# Patient Record
Sex: FEMALE | Race: WHITE | Employment: FULL TIME | URBAN - NONMETROPOLITAN AREA
[De-identification: names, ages, dates, MRNs, and addresses within clinical notes are randomized per-mention and may not be internally consistent; named-entity substitution may affect disease eponyms.]

---

## 2022-05-04 ENCOUNTER — APPOINTMENT (OUTPATIENT)
Dept: GENERAL RADIOLOGY | Age: 52
End: 2022-05-04
Attending: EMERGENCY MEDICINE
Payer: COMMERCIAL

## 2022-05-04 ENCOUNTER — HOSPITAL ENCOUNTER (EMERGENCY)
Age: 52
Discharge: HOME | End: 2022-05-04
Attending: EMERGENCY MEDICINE
Payer: COMMERCIAL

## 2022-05-04 VITALS
HEIGHT: 63 IN | WEIGHT: 155 LBS | TEMPERATURE: 97.7 F | OXYGEN SATURATION: 100 % | DIASTOLIC BLOOD PRESSURE: 63 MMHG | BODY MASS INDEX: 27.46 KG/M2 | SYSTOLIC BLOOD PRESSURE: 140 MMHG | RESPIRATION RATE: 18 BRPM | HEART RATE: 72 BPM

## 2022-05-04 DIAGNOSIS — N18.5 CKD (CHRONIC KIDNEY DISEASE) STAGE 5, GFR LESS THAN 15 ML/MIN (HCC): Primary | ICD-10-CM

## 2022-05-04 DIAGNOSIS — N18.5 ANEMIA DUE TO STAGE 5 CHRONIC KIDNEY DISEASE, NOT ON CHRONIC DIALYSIS (HCC): ICD-10-CM

## 2022-05-04 DIAGNOSIS — D63.1 ANEMIA DUE TO STAGE 5 CHRONIC KIDNEY DISEASE, NOT ON CHRONIC DIALYSIS (HCC): ICD-10-CM

## 2022-05-04 LAB
ALBUMIN SERPL-MCNC: 3.3 G/DL (ref 3.5–4.7)
ALBUMIN/GLOB SERPL: 0.9 {RATIO}
ALP SERPL-CCNC: 134 U/L (ref 38–126)
ALT SERPL-CCNC: 8 U/L (ref 3–52)
ANION GAP SERPL CALC-SCNC: 10 MMOL/L
APPEARANCE UR: CLEAR
AST SERPL W P-5'-P-CCNC: 10 U/L (ref 14–74)
BACTERIA URNS QL MICRO: NEGATIVE /HPF
BASOPHILS # BLD: 0.1 K/UL (ref 0–0.1)
BASOPHILS NFR BLD: 1 % (ref 0–2)
BILIRUB SERPL-MCNC: 0.6 MG/DL (ref 0.2–1)
BILIRUB UR QL: NEGATIVE
BUN SERPL-MCNC: 56 MG/DL (ref 9–21)
BUN/CREAT SERPL: 10
CA-I BLD-MCNC: 8 MG/DL (ref 8.5–10.5)
CHLORIDE SERPL-SCNC: 113 MMOL/L (ref 94–111)
CO2 SERPL-SCNC: 16 MMOL/L (ref 21–33)
COLOR UR: YELLOW
CREAT SERPL-MCNC: 5.4 MG/DL (ref 0.7–1.2)
DIFFERENTIAL METHOD BLD: ABNORMAL
EOSINOPHIL # BLD: 0.2 K/UL (ref 0–0.4)
EOSINOPHIL NFR BLD: 2 % (ref 0–5)
EPITH CASTS URNS QL MICRO: ABNORMAL /LPF (ref 0–20)
ERYTHROCYTE [DISTWIDTH] IN BLOOD BY AUTOMATED COUNT: 15.2 % (ref 11.6–14.5)
GLOBULIN SER CALC-MCNC: 3.6 G/DL
GLUCOSE BLD STRIP.AUTO-MCNC: 175 MG/DL (ref 70–110)
GLUCOSE SERPL-MCNC: 134 MG/DL (ref 70–110)
GLUCOSE UR STRIP.AUTO-MCNC: 250 MG/DL
HCT VFR BLD AUTO: 27 % (ref 35–45)
HGB BLD-MCNC: 8.1 G/DL (ref 12–16)
HGB UR QL STRIP: ABNORMAL
IMM GRANULOCYTES # BLD AUTO: 0.1 K/UL (ref 0–0.04)
IMM GRANULOCYTES NFR BLD AUTO: 1 % (ref 0–0.5)
KETONES UR QL STRIP.AUTO: NEGATIVE MG/DL
LEUKOCYTE ESTERASE UR QL STRIP.AUTO: NEGATIVE
LYMPHOCYTES # BLD: 1.2 K/UL (ref 0.9–3.6)
LYMPHOCYTES NFR BLD: 14 % (ref 21–52)
MAGNESIUM SERPL-MCNC: 1.8 MG/DL (ref 1.7–2.8)
MCH RBC QN AUTO: 27.4 PG (ref 24–34)
MCHC RBC AUTO-ENTMCNC: 30 G/DL (ref 31–37)
MCV RBC AUTO: 91.2 FL (ref 78–100)
MONOCYTES # BLD: 0.6 K/UL (ref 0.05–1.2)
MONOCYTES NFR BLD: 7 % (ref 3–10)
NEUTS SEG # BLD: 6.2 K/UL (ref 1.8–8)
NEUTS SEG NFR BLD: 75 % (ref 40–73)
NITRITE UR QL STRIP.AUTO: NEGATIVE
NRBC # BLD: 0 K/UL (ref 0–0.01)
NRBC BLD-RTO: 0 PER 100 WBC
PERFORMED BY, TECHID: ABNORMAL
PH UR STRIP: 5.5 [PH] (ref 5–8)
PLATELET # BLD AUTO: 143 K/UL (ref 135–420)
PMV BLD AUTO: 11 FL (ref 9.2–11.8)
POTASSIUM SERPL-SCNC: 5.1 MMOL/L (ref 3.2–5.1)
PROT SERPL-MCNC: 6.9 G/DL (ref 6.1–8.4)
PROT UR STRIP-MCNC: 300 MG/DL
RBC # BLD AUTO: 2.96 M/UL (ref 4.2–5.3)
RBC #/AREA URNS HPF: ABNORMAL /HPF (ref 0–2)
SODIUM SERPL-SCNC: 139 MMOL/L (ref 135–145)
SP GR UR REFRACTOMETRY: 1.01 (ref 1–1.03)
UROBILINOGEN UR QL STRIP.AUTO: 0.2 EU/DL (ref 0.2–1)
WBC # BLD AUTO: 8.3 K/UL (ref 4.6–13.2)
WBC URNS QL MICRO: ABNORMAL /HPF (ref 0–4)

## 2022-05-04 PROCEDURE — 71045 X-RAY EXAM CHEST 1 VIEW: CPT

## 2022-05-04 PROCEDURE — 80053 COMPREHEN METABOLIC PANEL: CPT

## 2022-05-04 PROCEDURE — 85025 COMPLETE CBC W/AUTO DIFF WBC: CPT

## 2022-05-04 PROCEDURE — 99284 EMERGENCY DEPT VISIT MOD MDM: CPT

## 2022-05-04 PROCEDURE — 83735 ASSAY OF MAGNESIUM: CPT

## 2022-05-04 PROCEDURE — 81001 URINALYSIS AUTO W/SCOPE: CPT

## 2022-05-04 PROCEDURE — 74011250636 HC RX REV CODE- 250/636: Performed by: EMERGENCY MEDICINE

## 2022-05-04 PROCEDURE — 82962 GLUCOSE BLOOD TEST: CPT

## 2022-05-04 RX ORDER — ACETAMINOPHEN, DIPHENHYDRAMINE HCL, PHENYLEPHRINE HCL 325; 25; 5 MG/1; MG/1; MG/1
1 TABLET ORAL DAILY
COMMUNITY
Start: 2021-06-03

## 2022-05-04 RX ORDER — SODIUM CHLORIDE 9 MG/ML
75 INJECTION, SOLUTION INTRAVENOUS CONTINUOUS
Status: DISCONTINUED | OUTPATIENT
Start: 2022-05-04 | End: 2022-05-04

## 2022-05-04 RX ADMIN — SODIUM CHLORIDE 75 ML/HR: 9 INJECTION, SOLUTION INTRAVENOUS at 09:57

## 2022-05-04 NOTE — ED PROVIDER NOTES
EMERGENCY DEPARTMENT HISTORY AND PHYSICAL EXAM      Date: 5/4/2022  Patient Name: Belle Romero    History of Presenting Illness     Chief Complaint   Patient presents with    Other     multiple complaints       History Provided By: Patient    HPI: Belle Romero, 45 y.o. female with a past medical history significant diabetes presents to the ED with cc of Cramps in extremities, worse in hands and feet. Radiates into whole body. Started 2 weeks ago. She denies any aggravating or relieving factors. Denies NVD. States she has been off her DM medications for months. There are no other complaints, changes, or physical findings at this time. PCP: None    No current facility-administered medications on file prior to encounter. Current Outpatient Medications on File Prior to Encounter   Medication Sig Dispense Refill    cyanocobalamin, vitamin B-12, 5,000 mcg subl 1 Tablet by SubLINGual route daily. Past History     Past Medical History:  History reviewed. No pertinent past medical history. Past Surgical History:  History reviewed. No pertinent surgical history. Family History:  History reviewed. No pertinent family history. Social History:  Social History     Tobacco Use    Smoking status: Current Every Day Smoker     Packs/day: 0.50    Smokeless tobacco: Never Used   Substance Use Topics    Alcohol use: Not on file    Drug use: Not on file       Allergies: Allergies   Allergen Reactions    Codeine Palpitations    Pcn [Penicillins] Hives         Review of Systems     Review of Systems   Constitutional: Negative. HENT: Negative. Respiratory: Negative. Cardiovascular: Negative. Gastrointestinal: Negative. Genitourinary: Negative. Musculoskeletal:        Cramps   Neurological: Negative. All other systems reviewed and are negative. Physical Exam     Physical Exam  Vitals and nursing note reviewed. Constitutional:       Appearance: Normal appearance.    HENT:      Head: Normocephalic. Eyes:      Extraocular Movements: Extraocular movements intact. Pupils: Pupils are equal, round, and reactive to light. Cardiovascular:      Rate and Rhythm: Normal rate and regular rhythm. Pulses: Normal pulses. Heart sounds: Normal heart sounds. Pulmonary:      Effort: Pulmonary effort is normal.      Breath sounds: Normal breath sounds. Abdominal:      Palpations: Abdomen is soft. Tenderness: There is no abdominal tenderness. Musculoskeletal:         General: Normal range of motion. Cervical back: Normal range of motion and neck supple. Comments: Cramps in extremities   Neurological:      General: No focal deficit present. Mental Status: She is alert and oriented to person, place, and time. Cranial Nerves: No cranial nerve deficit. Sensory: No sensory deficit. Motor: No weakness. Psychiatric:         Mood and Affect: Mood normal.         Behavior: Behavior normal.         Lab and Diagnostic Study Results     Labs -     Recent Results (from the past 12 hour(s))   GLUCOSE, POC    Collection Time: 05/04/22  8:12 AM   Result Value Ref Range    Glucose (POC) 175 (H) 70 - 110 mg/dL    Performed by Army Soto    CBC WITH AUTOMATED DIFF    Collection Time: 05/04/22  8:34 AM   Result Value Ref Range    WBC 8.3 4.6 - 13.2 K/uL    RBC 2.96 (L) 4.20 - 5.30 M/uL    HGB 8.1 (L) 12.0 - 16.0 g/dL    HCT 27.0 (L) 35.0 - 45.0 %    MCV 91.2 78.0 - 100.0 FL    MCH 27.4 24.0 - 34.0 PG    MCHC 30.0 (L) 31.0 - 37.0 g/dL    RDW 15.2 (H) 11.6 - 14.5 %    PLATELET 378 350 - 924 K/uL    MPV 11.0 9.2 - 11.8 FL    NRBC 0.0 0.0  WBC    ABSOLUTE NRBC 0.00 0.00 - 0.01 K/uL    NEUTROPHILS 75 (H) 40 - 73 %    LYMPHOCYTES 14 (L) 21 - 52 %    MONOCYTES 7 3 - 10 %    EOSINOPHILS 2 0 - 5 %    BASOPHILS 1 0 - 2 %    IMMATURE GRANULOCYTES 1 (H) 0 - 0.5 %    ABS. NEUTROPHILS 6.2 1.8 - 8.0 K/UL    ABS. LYMPHOCYTES 1.2 0.9 - 3.6 K/UL    ABS.  MONOCYTES 0.6 0.05 - 1.2 K/UL    ABS. EOSINOPHILS 0.2 0.0 - 0.4 K/UL    ABS. BASOPHILS 0.1 0.0 - 0.1 K/UL    ABS. IMM. GRANS. 0.1 (H) 0.00 - 0.04 K/UL    DF AUTOMATED     METABOLIC PANEL, COMPREHENSIVE    Collection Time: 05/04/22  8:34 AM   Result Value Ref Range    Sodium 139 135 - 145 mmol/L    Potassium 5.1 3.2 - 5.1 mmol/L    Chloride 113 (H) 94 - 111 mmol/L    CO2 16 (L) 21 - 33 mmol/L    Anion gap 10 mmol/L    Glucose 134 (H) 70 - 110 mg/dL    BUN 56 (H) 9 - 21 mg/dL    Creatinine 5.40 (H) 0.70 - 1.20 mg/dL    BUN/Creatinine ratio 10      GFR est AA 10 ml/min/1.73m2    GFR est non-AA 8 ml/min/1.73m2    Calcium 8.0 (L) 8.5 - 10.5 mg/dL    Bilirubin, total 0.6 0.2 - 1.0 mg/dL    AST (SGOT) 10 (L) 14 - 74 U/L    ALT (SGPT) 8 3 - 52 U/L    Alk. phosphatase 134 (H) 38 - 126 U/L    Protein, total 6.9 6.1 - 8.4 g/dL    Albumin 3.3 (L) 3.5 - 4.7 g/dL    Globulin 3.6 g/dL    A-G Ratio 0.9     MAGNESIUM    Collection Time: 05/04/22  8:34 AM   Result Value Ref Range    Magnesium 1.8 1.7 - 2.8 mg/dL   URINALYSIS W/ RFLX MICROSCOPIC    Collection Time: 05/04/22  8:50 AM   Result Value Ref Range    Color Yellow      Appearance Clear      Specific gravity 1.015 1.005 - 1.030      pH (UA) 5.5 5.0 - 8.0      Protein 300 (A) Negative mg/dL    Glucose 250 (A) Negative mg/dL    Ketone Negative Negative mg/dL    Bilirubin Negative Negative      Blood Moderate (A) Negative      Urobilinogen 0.2 0.2 - 1.0 EU/dL    Nitrites Negative Negative      Leukocyte Esterase Negative Negative     URINE MICROSCOPIC    Collection Time: 05/04/22  8:50 AM   Result Value Ref Range    WBC 0-4 0 - 4 /hpf    RBC 0-5 0 - 2 /hpf    Epithelial cells Few 0 - 20 /lpf    Bacteria Negative (A) None /hpf       Radiologic Studies -   @lastxrresult@  CT Results  (Last 48 hours)    None        CXR Results  (Last 48 hours)    None            Medical Decision Making   - I am the first provider for this patient.     - I reviewed the vital signs, available nursing notes, past medical history, past surgical history, family history and social history. - Initial assessment performed. The patients presenting problems have been discussed, and they are in agreement with the care plan formulated and outlined with them. I have encouraged them to ask questions as they arise throughout their visit. Vital Signs-Reviewed the patient's vital signs. Patient Vitals for the past 12 hrs:   Temp Pulse Resp BP SpO2   05/04/22 1020 -- 72 18 (!) 140/63 100 %   05/04/22 0912 -- 72 18 (!) 139/56 100 %   05/04/22 0808 97.7 °F (36.5 °C) 93 18 (!) 156/79 100 %       Records Reviewed: Nursing Notes and Old Medical Records    The patient presents with       ED Course:          Provider Notes (Medical Decision Making):     MDM  Number of Diagnoses or Management Options  Risk of Complications, Morbidity, and/or Mortality  General comments: Patient refused rectal exam. Patient denies any PMHx apart from DM. However, it appears she had CRI, Anemia and DM. Prior Bun/Cr was 41/4.3 in 3/2021. Discussed with Dr Arnold Farias Nephrology. She recommends see in office today or tomorrow. [phone] She will follow. 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 She states she has not seen a Nephrologist in 2.5 years and was referred to one but doesn't remember the name. Procedures   Medical Decision Makingedical Decision Making  Performed by: Loa Osgood, MD  PROCEDURES:  Procedures       Disposition   Disposition: DC- Adult Discharges: All of the diagnostic tests were reviewed and questions answered. Diagnosis, care plan and treatment options were discussed. The patient understands the instructions and will follow up as directed. The patients results have been reviewed with them. They have been counseled regarding their diagnosis.   The patient verbally convey understanding and agreement of the signs, symptoms, diagnosis, treatment and prognosis and additionally agrees to follow up as recommended with their PCP in 24 - 48 hours. They also agree with the care-plan and convey that all of their questions have been answered. I have also put together some discharge instructions for them that include: 1) educational information regarding their diagnosis, 2) how to care for their diagnosis at home, as well a 3) list of reasons why they would want to return to the ED prior to their follow-up appointment, should their condition change. Discharged    DISCHARGE PLAN:  1. Current Discharge Medication List      CONTINUE these medications which have NOT CHANGED    Details   cyanocobalamin, vitamin B-12, 5,000 mcg subl 1 Tablet by SubLINGual route daily. 2.   Follow-up Information     Follow up With Specialties Details Why Derrick Duarte MD Nephrology, [facility]  [address] [address] [phone]          3. Return to ED if worse   4. Current Discharge Medication List            Diagnosis     Clinical Impression:   1. CKD (chronic kidney disease) stage 5, GFR less than 15 ml/min ([facility])    2. Anemia due to stage 5 chronic kidney disease, not on chronic dialysis [facility])        Attestations:    Iglesia Gallegos MD    Please note that this dictation was completed with [employer], the computer voice recognition software. Quite often unanticipated grammatical, syntax, homophones, and other interpretive errors are inadvertently transcribed by the computer software. Please disregard these errors. Please excuse any errors that have escaped final proofreading. Thank you.

## 2022-05-04 NOTE — ED TRIAGE NOTES
Pt states she has been cramping real bad x 2 weeks. Pt states the cramps are in her hands and abdomen and they were worse last night. Pt states she used to be a diabetic, but they stopped a lot of her sugar meds, pt also reports itchy skin and she thinks she is dehydrated.

## 2022-05-05 ENCOUNTER — OFFICE VISIT (OUTPATIENT)
Dept: NEPHROLOGY | Age: 52
End: 2022-05-05
Payer: COMMERCIAL

## 2022-05-05 VITALS
BODY MASS INDEX: 27.89 KG/M2 | DIASTOLIC BLOOD PRESSURE: 69 MMHG | HEART RATE: 85 BPM | WEIGHT: 157.4 LBS | SYSTOLIC BLOOD PRESSURE: 108 MMHG | HEIGHT: 63 IN

## 2022-05-05 DIAGNOSIS — E11.22 CONTROLLED TYPE 2 DIABETES MELLITUS WITH STAGE 5 CHRONIC KIDNEY DISEASE NOT ON CHRONIC DIALYSIS, WITHOUT LONG-TERM CURRENT USE OF INSULIN (HCC): ICD-10-CM

## 2022-05-05 DIAGNOSIS — N18.5 CKD (CHRONIC KIDNEY DISEASE) STAGE 5, GFR LESS THAN 15 ML/MIN (HCC): Primary | ICD-10-CM

## 2022-05-05 DIAGNOSIS — N18.5 CONTROLLED TYPE 2 DIABETES MELLITUS WITH STAGE 5 CHRONIC KIDNEY DISEASE NOT ON CHRONIC DIALYSIS, WITHOUT LONG-TERM CURRENT USE OF INSULIN (HCC): ICD-10-CM

## 2022-05-05 PROCEDURE — 99204 OFFICE O/P NEW MOD 45 MIN: CPT | Performed by: INTERNAL MEDICINE

## 2022-05-05 RX ORDER — SODIUM BICARBONATE 650 MG/1
650 TABLET ORAL 2 TIMES DAILY
Qty: 60 TABLET | Refills: 3 | Status: SHIPPED | OUTPATIENT
Start: 2022-05-05

## 2022-05-05 RX ORDER — EPOETIN ALFA 10000 [IU]/ML
10000 SOLUTION INTRAVENOUS; SUBCUTANEOUS EVERY 2 WEEKS
Qty: 1 ML | Refills: 5
Start: 2022-05-05 | End: 2022-07-07 | Stop reason: SDUPTHER

## 2022-05-05 RX ORDER — LANOLIN ALCOHOL/MO/W.PET/CERES
325 CREAM (GRAM) TOPICAL
Qty: 90 TABLET | Refills: 3 | Status: SHIPPED | OUTPATIENT
Start: 2022-05-05

## 2022-05-05 NOTE — PROGRESS NOTES
Renal Consultation Note    NAME:  Naomi Officer   :   1970   MRN:   [id number]     ATTENDING: No admitting provider for patient encounter. PCP:  None    Date/Time:  2022 1:30 PM      Subjective:   REQUESTING PHYSICIAN:  REASON FOR CONSULT:        Patient is a 78-year-old  female with history of diabetes diet-controlled, chronic kidney disease stage V, history of unilateral kidney s/p nephrectomy, who presented to the ER yesterday because of bilateral leg cramping. She had labs done in ER which showed a creatinine of 5.4 with a GFR of 8. Her potassium was 5.1 and CO2 16 on those labs. Patient was discharged from the ER with plan to follow-up in my office today. Patient seen in the room. Her only complaint today is fatigue and tiredness. According to patient she was told about 3 years ago about kidney issues and she saw a nephrologist in the community, Dr. Shannen Iqbal about 2 years ago \" but nothing was done other than stop metformin\". She has been taking Aleve/Advil on a regular basis almost twice a day for the past year or more  Review of records show her creatinine was 4.2 in  and 3.8 in 2019  Patient claims she had unilateral nephrectomy done when she was 22years of age for a congenital problem, she cannot elaborate more  Does not take any blood pressure meds  Blood sugars in the 130s range. Not on any antidiabetics now which were discontinued because of hypoglycemic episodes  She is still smoking      History reviewed. No pertinent past medical history.    Past Surgical History:   Procedure Laterality Date    HX  SECTION      PARTIAL REMOVAL OF KIDNEY       Social History     Tobacco Use    Smoking status: Current Every Day Smoker     Packs/day: 0.50     Years: 35.00     Pack years: 17.50    Smokeless tobacco: Never Used   Substance Use Topics    Alcohol use: Not Currently      Family History   Problem Relation Age of Onset    Diabetes Maternal Grandmother Allergies   Allergen Reactions    Dulaglutide Other (comments)     Nausea with vomiting and dizziness    Sitagliptin Other (comments)     Headache, runny nose, back pain and low blood sugars    Codeine Palpitations    Pcn [Penicillins] Hives      Prior to Admission medications    Medication Sig Start Date End Date Taking? Authorizing Provider   sodium bicarbonate 650 mg tablet Take 1 Tablet by mouth two (2) times a day. 5/5/22  Yes Sue Knowles MD   ferrous sulfate 325 mg (65 mg iron) tablet Take 1 Tablet by mouth three (3) times daily (with meals). 5/5/22  Yes Sue Knowles MD   cyanocobalamin, vitamin B-12, 5,000 mcg subl 1 Tablet by SubLINGual route daily. 6/3/21  Yes Other, MD Sera       REVIEW OF SYSTEMS:       Constitutional: Fatigue +  HENT: Negative. Eyes: Negative. Respiratory: Negative. Cardiovascular: Negative. Gastrointestinal: Negative for abdominal pain and nausea. Skin: Negative. Neurological: Negative. Objective:   VITALS:    Visit Vitals  /69 (BP 1 Location: Right arm, BP Patient Position: Sitting, BP Cuff Size: Adult)   Pulse 85   Ht 5' 3\" (1.6 m)   Wt 71.4 kg (157 lb 6.4 oz)   BMI 27.88 kg/m²         PHYSICAL EXAM:     General: NAD, alert, cooperative  Eyes: sclera anicteric  Oral Cavity: No thrush or ulcers  Neck: no JVD  Chest: Fair bilateral air entry. No Wheezing or Rhonchi. No rales. Heart: normal sounds  Abdomen: soft and non tender   : no woods  Lower Extremities: no edema  Skin: no rash  Neuro: intact, no focals  Psychiatric: non-depressed          LAB DATA REVIEWED:        Labs from 5/4/2022 sodium 139 potassium 5.1 chloride 113 CO2 16 BUN 56 creatinine 5.4 GFR 8 calcium 8.0 ALT AST normal bilirubin normal hemoglobin 8.1 urine shows 300+ protein    Recommendations/Plan:       #1 acute kidney injury on chronic kidney disease stage V  -Creatinine was 5.4 on recent labs with a GFR of only 8  -Creatinine was 4.2 in January 2021.   3.8 in 2019  -This might be her baseline versus might have acute kidney injury from prerenal component from NSAIDs. She has been taking Aleve almost on a daily basis for the past year  -I am discontinuing NSAIDs today  -She claims she had a nephrectomy when she was 22years of age for some congenital problem.  -We will check renal ultrasound to evaluate her remaining kidney  -We will check urine analysis quantify proteinuria. UA shows 300+ protein  -She is a diabetic but has no retinopathy. However no eye exam for over 8 years  -Significant proteinuria could be because of analgesic nephropathy/NSAIDs versus diabetic nephropathy. With her having unilateral kidney would not do kidney biopsy because of risks over weighing benefit  -I am referring her for kidney smart class today  -She needs to quit smoking. We will refer her for kidney transplant evaluation after  -We will repeat her labs in 3 weeks. No emergent indication to start hemodialysis today. Based on her labs in 3 weeks we will decide if she needs to be referred for PD catheter or AV fistula evaluation  -Follow-up in office in 3 weeks with labs    #2 mild hyperkalemia  -Potassium is ranged 5.1-5.3 in the past.  5.1 on recent labs  -Likely because of advanced renal disease/NSAIDs/potassium supplementation. She has been taking over-the-counter potassium chloride for cramps which I am discontinuing today  -She was also given a flyer for low potassium diet. Discontinuing NSAIDs will also help  -Advised to increase her fluid intake    #3 metabolic acidosis  -CO2 was only 17 on recent labs. Likely because of advanced renal disease and RTA 4 from NSAIDs  -Discontinue NSAIDs today  -Starting sodium bicarbonate 650 twice daily    #4 anemia  -Hb 8.1. Likely anemia of chronic kidney disease. Will check iron studies.   She has been low in iron in the past.  -Advised her to start taking iron sulfate 325 3 times daily  -Also starting Procrit 10,000 units every 2 weeks  -Needs GI evaluation including colonoscopy    #5 renal osteodystrophy  -Calcium is okay. Check phosphorus. Vitamin D level has been low in the past we will recheck and start vitamin D3 if indicated  -iPTh has been elevated in 2019. We will repeat and start vitamin D analogs if needed    #6 diabetes  -Diet controlled.   Last A1c was 5.6    #7 arthritic pain  -I advised her to see a rheumatologist.  Will discontinue NSAIDs today    Negative for the referral.  She will come back to see me in 3 weeks with pre-clinic labs          ________________________________________________________________________  Signed: Juan Pablo Camilo MD

## 2022-05-05 NOTE — LETTER
NOTIFICATION RETURN TO WORK / SCHOOL    5/5/2022 1:14 PM    Ms. Juan Chong  [address] [address]      To Whom It May Concern:    Juan Chong is currently under the care of [facility]. Patients return to work is contingent upon her follow up that is scheduled for 5/26/2022. If there are questions or concerns please have the patient contact our office.         Sincerely,      Joy Vaca MD

## 2022-05-05 NOTE — PROGRESS NOTES
Marlo December presents today for   Chief Complaint   Patient presents with   [facility] ED Follow-up       Is someone accompanying this pt? Patient sister Sharee Alert     Is the patient using any DME equipment during [address]? no    Depression Screening:  3 most recent PHQ Screens 5/5/2022   Little interest or pleasure in doing things Not at all   Feeling down, depressed, irritable, or hopeless Not at all   Total Score PHQ 2 0   Trouble falling or staying asleep, or sleeping too much Not at all   Feeling tired or having little energy Not at all   Poor appetite, weight loss, or overeating Not at all   Feeling bad about yourself - or that you are a failure or have let yourself or your family down Not at all   Trouble concentrating on things such as school, work, reading, or watching TV Not at all   Moving or speaking so slowly that other people could have noticed; or the opposite being so fidgety that others notice Not at all   Thoughts of being better off dead, or hurting yourself in some way Not at all   PHQ 9 Score 0   How difficult have these problems made it for you to do your work, take care of your home and get along with others Not difficult at all       Learning Assessment:  No flowsheet data found. Health Maintenance reviewed and discussed and ordered per Provider. Health Maintenance Due   Topic Date Due    Hepatitis C Screening  Never done    Depression Screen  Never done    COVID-19 Vaccine (1) Never done    Pneumococcal 0-64 years (1 - PCV) Never done    DTaP/Tdap/Td series (1 - Tdap) Never done    Cervical cancer screen  Never done    Lipid Screen  Never done    Colorectal Cancer Screening Combo  Never done    Shingrix Vaccine Age 50> (1 of 2) Never done    Breast Cancer Screen Mammogram  Never done   . Coordination of Care:  1. Have you been to the ER, urgent care clinic since your last visit? Hospitalized since your last visit? Yes, generalized pain, cramping    2.  Have you seen or consulted any other health care providers outside of the [address] [place] [address] since your last visit? Include any pap smears or colon screening.  no

## 2022-05-09 ENCOUNTER — HOSPITAL ENCOUNTER (OUTPATIENT)
Dept: ULTRASOUND IMAGING | Age: 52
Discharge: HOME | End: 2022-05-09
Attending: INTERNAL MEDICINE
Payer: COMMERCIAL

## 2022-05-09 DIAGNOSIS — N18.5 CKD (CHRONIC KIDNEY DISEASE) STAGE 5, GFR LESS THAN 15 ML/MIN (HCC): ICD-10-CM

## 2022-05-09 PROCEDURE — 76770 US EXAM ABDO BACK WALL COMP: CPT

## 2022-05-24 ENCOUNTER — HOSPITAL ENCOUNTER (OUTPATIENT)
Dept: LAB | Age: 52
Discharge: HOME | End: 2022-05-24
Payer: COMMERCIAL

## 2022-05-24 LAB
25(OH)D3 SERPL-MCNC: 18.1 NG/ML (ref 30–100)
ALBUMIN SERPL-MCNC: 3.1 G/DL (ref 3.4–5)
ANION GAP SERPL CALC-SCNC: 8 MMOL/L (ref 3–18)
BUN SERPL-MCNC: 51 MG/DL (ref 7–18)
BUN/CREAT SERPL: 9 (ref 12–20)
CA-I BLD-MCNC: 8.4 MG/DL (ref 8.5–10.1)
CHLORIDE SERPL-SCNC: 110 MMOL/L (ref 100–111)
CO2 SERPL-SCNC: 20 MMOL/L (ref 21–32)
CREAT SERPL-MCNC: 5.58 MG/DL (ref 0.6–1.3)
FERRITIN SERPL-MCNC: 237 NG/ML (ref 8–388)
GLUCOSE SERPL-MCNC: 154 MG/DL (ref 74–99)
IRON SATN MFR SERPL: 19 % (ref 20–50)
IRON SERPL-MCNC: 49 UG/DL (ref 50–175)
PHOSPHATE SERPL-MCNC: 5.1 MG/DL (ref 2.5–4.9)
POTASSIUM SERPL-SCNC: 4.6 MMOL/L (ref 3.5–5.5)
SODIUM SERPL-SCNC: 138 MMOL/L (ref 136–145)
TIBC SERPL-MCNC: 256 UG/DL (ref 250–450)

## 2022-05-24 PROCEDURE — 83540 ASSAY OF IRON: CPT

## 2022-05-24 PROCEDURE — 80069 RENAL FUNCTION PANEL: CPT

## 2022-05-24 PROCEDURE — 36415 COLL VENOUS BLD VENIPUNCTURE: CPT

## 2022-05-24 PROCEDURE — 84156 ASSAY OF PROTEIN URINE: CPT

## 2022-05-24 PROCEDURE — 82306 VITAMIN D 25 HYDROXY: CPT

## 2022-05-24 PROCEDURE — 82728 ASSAY OF FERRITIN: CPT

## 2022-05-26 ENCOUNTER — OFFICE VISIT (OUTPATIENT)
Dept: NEPHROLOGY | Age: 52
End: 2022-05-26
Payer: COMMERCIAL

## 2022-05-26 VITALS
HEART RATE: 87 BPM | WEIGHT: 153.4 LBS | BODY MASS INDEX: 27.18 KG/M2 | DIASTOLIC BLOOD PRESSURE: 58 MMHG | HEIGHT: 63 IN | SYSTOLIC BLOOD PRESSURE: 121 MMHG

## 2022-05-26 DIAGNOSIS — N28.89 RENAL MASS, RIGHT: Primary | ICD-10-CM

## 2022-05-26 DIAGNOSIS — E11.22 CONTROLLED TYPE 2 DIABETES MELLITUS WITH STAGE 5 CHRONIC KIDNEY DISEASE NOT ON CHRONIC DIALYSIS, WITHOUT LONG-TERM CURRENT USE OF INSULIN (HCC): ICD-10-CM

## 2022-05-26 DIAGNOSIS — N18.5 CKD (CHRONIC KIDNEY DISEASE) STAGE 5, GFR LESS THAN 15 ML/MIN (HCC): ICD-10-CM

## 2022-05-26 DIAGNOSIS — N18.5 CHRONIC KIDNEY DISEASE (CKD), STAGE V (HCC): ICD-10-CM

## 2022-05-26 DIAGNOSIS — N18.5 CONTROLLED TYPE 2 DIABETES MELLITUS WITH STAGE 5 CHRONIC KIDNEY DISEASE NOT ON CHRONIC DIALYSIS, WITHOUT LONG-TERM CURRENT USE OF INSULIN (HCC): ICD-10-CM

## 2022-05-26 LAB
ALBUMIN MFR UR ELPH: 60.2 %
ALPHA1 GLOB MFR UR ELPH: 7.8 %
ALPHA2 GLOB 24H MFR UR ELPH: 9.2 %
B-GLOBULIN 24H MFR UR ELPH: 14.4 %
GAMMA GLOB 24H MFR UR ELPH: 8.4 %
M PROTEIN MFR UR ELPH: NORMAL %
PLEASE NOTE:, 133800: NORMAL
PROT UR-MCNC: 144.7 MG/DL

## 2022-05-26 PROCEDURE — 99214 OFFICE O/P EST MOD 30 MIN: CPT | Performed by: INTERNAL MEDICINE

## 2022-05-26 RX ORDER — MELATONIN
1000 DAILY
Qty: 30 TABLET | Refills: 3 | Status: SHIPPED | OUTPATIENT
Start: 2022-05-26 | End: 2022-07-07 | Stop reason: SDUPTHER

## 2022-05-26 NOTE — PROGRESS NOTES
Renal Note    NAME:  Angie Ferrer   :   1970   MRN:   [id number]     ATTENDING: No admitting provider for patient encounter. PCP:  None    Date/Time:  2022 1:30 PM      Subjective:       Patient is here for follow-up. She denies any complaints today. Creatinine is unchanged with a GFR of 8. Potassium is improved denies any nausea/vomiting. No loss of appetite. Complains of feeling tired. Still waiting for FARSHAD approval.  Has started taking iron tablets but they constipate her. Already taking stool softener. Has stopped taking Aleve/Advil    Blood sugars in the 130s range. Not on any antidiabetics now which were discontinued because of hypoglycemic episodes  She is still smoking      No past medical history on file. Past Surgical History:   Procedure Laterality Date    HX  SECTION      PARTIAL REMOVAL OF KIDNEY       Social History     Tobacco Use    Smoking status: Current Every Day Smoker     Packs/day: 0.50     Years: 35.00     Pack years: 17.50    Smokeless tobacco: Never Used   Substance Use Topics    Alcohol use: Not Currently      Family History   Problem Relation Age of Onset    Diabetes Maternal Grandmother        Allergies   Allergen Reactions    Dulaglutide Other (comments)     Nausea with vomiting and dizziness    Sitagliptin Other (comments)     Headache, runny nose, back pain and low blood sugars    Codeine Palpitations    Pcn [Penicillins] Hives      Prior to Admission medications    Medication Sig Start Date End Date Taking? Authorizing Provider   cholecalciferol (VITAMIN D3) (1000 Units /25 mcg) tablet Take 1 Tablet by mouth daily. 22  Yes Tang Simental MD   sodium bicarbonate 650 mg tablet Take 1 Tablet by mouth two (2) times a day. 22  Yes Tang Simental MD   ferrous sulfate 325 mg (65 mg iron) tablet Take 1 Tablet by mouth three (3) times daily (with meals).  22  Yes Tang Simental MD   cyanocobalamin, vitamin B-12, 5,000 mcg subl 1 Tablet by SubLINGual route daily. 6/3/21  Yes Other, MD Sera   epoetin carmelo (Epogen) 10,000 unit/mL injection 1 mL by SubCUTAneous route Once every 2 weeks. Patient not taking: Reported on 5/26/2022 5/5/22   Guy Roberts MD       REVIEW OF SYSTEMS:       Constitutional: Fatigue +  HENT: Negative. Eyes: Negative. Respiratory: Negative. Cardiovascular: Negative. Gastrointestinal: Negative for abdominal pain and nausea. Skin: Negative. Neurological: Negative. Objective:   VITALS:    Visit Vitals  BP (!) 121/58 (BP 1 Location: Left arm, BP Patient Position: Sitting, BP Cuff Size: Adult)   Pulse 87   Ht 5' 3\" (1.6 m)   Wt 69.6 kg (153 lb 6.4 oz)   BMI 27.17 kg/m²         PHYSICAL EXAM:     General: NAD, alert, cooperative  Eyes: sclera anicteric  Oral Cavity: No thrush or ulcers  Neck: no JVD  Chest: Fair bilateral air entry. No Wheezing or Rhonchi. No rales.   Heart: normal sounds  Abdomen: soft and non tender   : no woods  Lower Extremities: no edema  Skin: no rash  Neuro: intact, no focals  Psychiatric: non-depressed          LAB DATA REVIEWED:      1901  FERRITIN   Collected: 05/24/22 1215  Final result  Specimen: Serum    Ferritin 237 ng/mL            05/24/22 1901  IRON PROFILE   Collected: 05/24/22 1215  Final result  Specimen: Serum or Plasma    Iron 49 Low  ug/dL  Iron % saturation 19 Low  %   TIBC 256 ug/dL            05/24/22 1856  VITAMIN D, 25 HYDROXY   Collected: 05/24/22 1215  Final result  Specimen: Serum    Vitamin D 25-Hydroxy 18.1 Low  ng/mL             05/24/22 1445  RENAL FUNCTION PANEL   Collected: 05/24/22 1215  Final result  Specimen: Serum or Plasma    Sodium 138 mmol/L Creatinine 5.58 High  mg/dL   Potassium 4.6 mmol/L BUN/Creatinine ratio 9 Low      Chloride 110 mmol/L GFR est AA 10 Low  ml/min/1.73m2   CO2 20 Low  mmol/L GFR est non-AA 8 Low  ml/min/1.73m2    Anion gap 8 mmol/L Calcium 8.4 Low  mg/dL   Glucose 154 High  mg/dL Phosphorus 5.1 High  mg/dL   BUN 51 High  mg/dL Albumin 3.1 Low  g/dL          Labs from 5/4/2022 sodium 139 potassium 5.1 chloride 113 CO2 16 BUN 56 creatinine 5.4 GFR 8 calcium 8.0 ALT AST normal bilirubin normal hemoglobin 8.1 urine shows 300+ protein    Recommendations/Plan:       #1 acute kidney injury on chronic kidney disease stage V  -Creatinine was 5.5 on recent labs with a GFR of only 8  -Creatinine was 4.2 in January 2021. 3.8 in 2019  -This might be her baseline . No improvement in renal function despite discontinuing NSAIDs at the last visit.  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  -Ultrasound shows an exophytic lower pole solid mass 7.5 cm. . No visible calculi. No hydronephrosis.   Prior left nephrectomy  -Ending results of urine analysis quantify proteinuria. UA shows 300+ protein  -She is a diabetic but has no retinopathy. However no eye exam for over 8 years  -Significant proteinuria could be because of analgesic nephropathy/NSAIDs versus diabetic nephropathy. With her having unilateral kidney would not do kidney biopsy because of risks over weighing benefit  -I am referring her for kidney smart class today  -She needs to quit smoking. We will refer her for kidney transplant evaluation after  -We will repeat her labs in 3 weeks. No emergent indication to start hemodialysis today. She wishes to do in center hemodialysis. I will refer her for AV fistula evaluation  -Follow-up in office in 6 weeks with labs    #2 mild hyperkalemia  -Potassium is ranged 5.1-5.3 in the past.  Improved to 4.6 on recent labs. Has stopped taking over-the-counter potassium supplements now  -Likely because of advanced renal disease/NSAIDs/potassium supplementation.   -She was also given a flyer for low potassium diet. NSAIDs now-Advised to increase her fluid intake    #3 metabolic acidosis  -CO2 was only 17-->20 on recent labs.   Likely because of advanced renal disease and RTA 4 from NSAIDs  -Discontinued NSAIDs   -Continue sodium bicarbonate 650 twice daily    #4 anemia  -Hb 8.1. Likely anemia of chronic kidney disease. Will check iron studies. She has been low in iron in the past.  -Continue iron sulfate 325 tid  -Also starting Procrit 10,000 units every 2 weeks  -Needs GI evaluation including colonoscopy    #5 renal osteodystrophy  -Calcium was first are okay  -Vitamin D level is only 18. Start vitamin D3 1000 units daily  -iPTh has been elevated in 2019. We will repeat and start vitamin D analogs if needed    #6 diabetes  -Diet controlled. Last A1c was 5.6    #7  Right renal mass  -Renal ultrasound shows possible right renal mass. I will order MRI without contrast for further evaluation.   Make referral to urology if needed   -advised her to stop smoking    #8 arthritic pain  -I advised her to see a rheumatologist.  off NSAIDs    Negative for the referral.  She will come back to see me in 6 weeks with pre-clinic labs          ________________________________________________________________________  Signed: Juan Pablo Camilo MD

## 2022-05-26 NOTE — PROGRESS NOTES
F/u for CKD. Patient states that she is experiencing constipation. Jhoana Garrett presents today for   Chief Complaint   Patient presents with    Follow-up     CKD stage 5, GFR less than 15 ml/min       Is someone accompanying this pt? No  Is the patient using any DME equipment during OV? No    Depression Screening:  3 most recent PHQ Screens 5/26/2022   Little interest or pleasure in doing things Not at all   Feeling down, depressed, irritable, or hopeless Not at all   Total Score PHQ 2 0   Trouble falling or staying asleep, or sleeping too much -   Feeling tired or having little energy -   Poor appetite, weight loss, or overeating -   Feeling bad about yourself - or that you are a failure or have let yourself or your family down -   Trouble concentrating on things such as school, work, reading, or watching TV -   Moving or speaking so slowly that other people could have noticed; or the opposite being so fidgety that others notice -   Thoughts of being better off dead, or hurting yourself in some way -   PHQ 9 Score -   How difficult have these problems made it for you to do your work, take care of your home and get along with others -       Learning Assessment:  No flowsheet data found. Health Maintenance reviewed and discussed and ordered per Provider. Health Maintenance Due   Topic Date Due    Hepatitis C Screening  Never done    COVID-19 Vaccine (1) Never done    Pneumococcal 0-64 years (1 - PCV) Never done    DTaP/Tdap/Td series (1 - Tdap) Never done    Cervical cancer screen  Never done    Lipid Screen  Never done    Colorectal Cancer Screening Combo  Never done    Shingrix Vaccine Age 50> (1 of 2) Never done    Breast Cancer Screen Mammogram  Never done   . Coordination of Care:  1. \"Have you been to the ER, urgent care clinic since your last visit? Hospitalized since your last visit? \" No    2.  \"Have you seen or consulted any other health care providers outside of the [facility] since your last visit? \" No     3. For patients aged 39-70: Has the patient had a colonoscopy? No     If the patient is female:    4. For patients aged 41-77: Has the patient had a mammogram within the past 2 years? No    5. For patients aged 21-65: Has the patient had a pap smear?  No

## 2022-06-07 LAB — CREATININE, EXTERNAL: 5.3

## 2022-07-01 ENCOUNTER — HOSPITAL ENCOUNTER (OUTPATIENT)
Dept: LAB | Age: 52
Discharge: HOME | End: 2022-07-01
Payer: COMMERCIAL

## 2022-07-01 DIAGNOSIS — N18.5 CKD (CHRONIC KIDNEY DISEASE) STAGE 5, GFR LESS THAN 15 ML/MIN (HCC): ICD-10-CM

## 2022-07-01 DIAGNOSIS — E11.22 CONTROLLED TYPE 2 DIABETES MELLITUS WITH STAGE 5 CHRONIC KIDNEY DISEASE NOT ON CHRONIC DIALYSIS, WITHOUT LONG-TERM CURRENT USE OF INSULIN (HCC): ICD-10-CM

## 2022-07-01 DIAGNOSIS — N28.89 RENAL MASS, RIGHT: ICD-10-CM

## 2022-07-01 DIAGNOSIS — N18.5 CHRONIC KIDNEY DISEASE (CKD), STAGE V (HCC): ICD-10-CM

## 2022-07-01 DIAGNOSIS — N18.5 CONTROLLED TYPE 2 DIABETES MELLITUS WITH STAGE 5 CHRONIC KIDNEY DISEASE NOT ON CHRONIC DIALYSIS, WITHOUT LONG-TERM CURRENT USE OF INSULIN (HCC): ICD-10-CM

## 2022-07-01 LAB
ALBUMIN SERPL-MCNC: 3.1 G/DL (ref 3.4–5)
ANION GAP SERPL CALC-SCNC: 7 MMOL/L (ref 3–18)
BUN SERPL-MCNC: 62 MG/DL (ref 7–18)
BUN/CREAT SERPL: 12 (ref 12–20)
CA-I BLD-MCNC: 8.5 MG/DL (ref 8.5–10.1)
CHLORIDE SERPL-SCNC: 110 MMOL/L (ref 100–111)
CO2 SERPL-SCNC: 20 MMOL/L (ref 21–32)
CREAT SERPL-MCNC: 5.25 MG/DL (ref 0.6–1.3)
CREAT UR-MCNC: 99 MG/DL (ref 30–125)
GLUCOSE SERPL-MCNC: 116 MG/DL (ref 74–99)
PHOSPHATE SERPL-MCNC: 6 MG/DL (ref 2.5–4.9)
POTASSIUM SERPL-SCNC: 5.4 MMOL/L (ref 3.5–5.5)
PROT UR-MCNC: 417 MG/DL
PROT/CREAT UR-RTO: 4.2
SODIUM SERPL-SCNC: 137 MMOL/L (ref 136–145)

## 2022-07-01 PROCEDURE — 83970 ASSAY OF PARATHORMONE: CPT

## 2022-07-01 PROCEDURE — 84156 ASSAY OF PROTEIN URINE: CPT

## 2022-07-01 PROCEDURE — 80069 RENAL FUNCTION PANEL: CPT

## 2022-07-02 LAB
CA-I BLD-MCNC: 8.2 MG/DL (ref 8.5–10.1)
PTH-INTACT SERPL-MCNC: 287.8 PG/ML (ref 18.4–88)

## 2022-07-07 ENCOUNTER — OFFICE VISIT (OUTPATIENT)
Dept: NEPHROLOGY | Age: 52
End: 2022-07-07
Payer: COMMERCIAL

## 2022-07-07 VITALS
DIASTOLIC BLOOD PRESSURE: 69 MMHG | SYSTOLIC BLOOD PRESSURE: 123 MMHG | HEART RATE: 68 BPM | WEIGHT: 150.2 LBS | HEIGHT: 63 IN | BODY MASS INDEX: 26.61 KG/M2

## 2022-07-07 DIAGNOSIS — N18.5 CKD (CHRONIC KIDNEY DISEASE) STAGE 5, GFR LESS THAN 15 ML/MIN (HCC): ICD-10-CM

## 2022-07-07 DIAGNOSIS — N28.89 RENAL MASS: ICD-10-CM

## 2022-07-07 DIAGNOSIS — N18.5 CHRONIC KIDNEY DISEASE (CKD), STAGE V (HCC): Primary | ICD-10-CM

## 2022-07-07 DIAGNOSIS — N28.89 RENAL MASS, RIGHT: ICD-10-CM

## 2022-07-07 DIAGNOSIS — N28.89 RENAL MASS: Primary | ICD-10-CM

## 2022-07-07 DIAGNOSIS — N18.5 CHRONIC KIDNEY DISEASE (CKD), STAGE V (HCC): ICD-10-CM

## 2022-07-07 PROCEDURE — 99214 OFFICE O/P EST MOD 30 MIN: CPT | Performed by: INTERNAL MEDICINE

## 2022-07-07 RX ORDER — SEVELAMER HYDROCHLORIDE 800 MG/1
800 TABLET, FILM COATED ORAL
Qty: 90 TABLET | Refills: 1 | Status: SHIPPED | OUTPATIENT
Start: 2022-07-07 | End: 2022-08-18

## 2022-07-07 RX ORDER — CALCITRIOL 0.25 UG/1
0.25 CAPSULE ORAL DAILY
Qty: 30 CAPSULE | Refills: 1 | Status: SHIPPED | OUTPATIENT
Start: 2022-07-07 | End: 2022-08-17

## 2022-07-07 RX ORDER — EPOETIN ALFA 10000 [IU]/ML
10000 SOLUTION INTRAVENOUS; SUBCUTANEOUS EVERY 2 WEEKS
Qty: 1 ML | Refills: 5 | Status: SHIPPED | OUTPATIENT
Start: 2022-07-07

## 2022-07-07 RX ORDER — MELATONIN
1000 DAILY
Qty: 30 TABLET | Refills: 3 | Status: SHIPPED | OUTPATIENT
Start: 2022-07-07

## 2022-07-07 NOTE — PROGRESS NOTES
Sonya Hinojosa presents today for 6 week follow up right renal mass. Patient states no concerns for today. Is someone accompanying this pt? No  Is the patient using any DME equipment during OV? No    Depression Screening:  3 most recent PHQ Screens 7/7/2022   Little interest or pleasure in doing things Not at all   Feeling down, depressed, irritable, or hopeless Not at all   Total Score PHQ 2 0   Trouble falling or staying asleep, or sleeping too much -   Feeling tired or having little energy -   Poor appetite, weight loss, or overeating -   Feeling bad about yourself - or that you are a failure or have let yourself or your family down -   Trouble concentrating on things such as school, work, reading, or watching TV -   Moving or speaking so slowly that other people could have noticed; or the opposite being so fidgety that others notice -   Thoughts of being better off dead, or hurting yourself in some way -   PHQ 9 Score -   How difficult have these problems made it for you to do your work, take care of your home and get along with others -       Learning Assessment:  No flowsheet data found. Health Maintenance reviewed and discussed and ordered per Provider. Health Maintenance Due   Topic Date Due    Hepatitis C Screening  Never done    COVID-19 Vaccine (1) Never done    Pneumococcal 0-64 years (1 - PCV) Never done    Foot Exam Q1  Never done    A1C test (Diabetic or Prediabetic)  Never done    MICROALBUMIN Q1  Never done    Eye Exam Retinal or Dilated  Never done    Lipid Screen  Never done    DTaP/Tdap/Td series (1 - Tdap) Never done    Cervical cancer screen  Never done    Colorectal Cancer Screening Combo  Never done    Shingrix Vaccine Age 50> (1 of 2) Never done    Breast Cancer Screen Mammogram  Never done   . Coordination of Care:  1. \"Have you been to the ER, urgent care clinic since your last visit? Hospitalized since your last visit? \" No    2.  \"Have you seen or consulted any other health care providers outside of the [address] [place] [address] since your last visit? \" Yes- Dr. Rachel Orozco    3. For patients aged 39-70: Has the patient had a colonoscopy? No     If the patient is female:    4. For patients aged 41-77: Has the patient had a mammogram within the past 2 years? No    5. For patients aged 21-65: Has the patient had a pap smear?  No

## 2022-07-07 NOTE — PROGRESS NOTES
Renal Note    NAME:  Kristi Steen   :   1970   MRN:   [id number]     ATTENDING: No admitting provider for patient encounter. PCP:  None    Date/Time:  2022 1:30 PM      Subjective:       Patient is here for follow-up. She denies any complaints today. Creatinine is unchanged with a GFR of 8. Potassium is improved denies any nausea/vomiting. No loss of appetite. Complains of feeling tired. Still waiting for FARSHAD approval.  Has started taking iron tablets but they constipate her. Already taking stool softener. Has stopped taking Aleve/Advil    Blood sugars in the 130s range. Not on any antidiabetics now which were discontinued because of hypoglycemic episodes  She is still smoking  GFR on recent labs was only 8  Not yet set up for kidney smart class  scheduled for vascular appointment tomorrow      No past medical history on file. Past Surgical History:   Procedure Laterality Date    HX  SECTION      PARTIAL REMOVAL OF KIDNEY       Social History     Tobacco Use    Smoking status: Current Every Day Smoker     Packs/day: 0.50     Years: 35.00     Pack years: 17.50    Smokeless tobacco: Never Used   Substance Use Topics    Alcohol use: Not Currently      Family History   Problem Relation Age of Onset    Diabetes Maternal Grandmother        Allergies   Allergen Reactions    Dulaglutide Other (comments)     Nausea with vomiting and dizziness    Sitagliptin Other (comments)     Headache, runny nose, back pain and low blood sugars    Codeine Palpitations    Pcn [Penicillins] Hives      Prior to Admission medications    Medication Sig Start Date End Date Taking? Authorizing Provider   calcitRIOL (ROCALTROL) 0.25 mcg capsule Take 1 Capsule by mouth daily. 22  Yes Maria C Dennison MD   sevelamer (RENAGEL) 800 mg tablet Take 1 Tablet by mouth three (3) times daily (with meals).  22  Yes Maria C Dennison MD   cholecalciferol (VITAMIN D3) (1000 Units /25 mcg) tablet Take 1 Tablet by mouth daily. 7/7/22  Yes Mason Quintero MD   sodium bicarbonate 650 mg tablet Take 1 Tablet by mouth two (2) times a day. 5/5/22  Yes Mason Quintero MD   ferrous sulfate 325 mg (65 mg iron) tablet Take 1 Tablet by mouth three (3) times daily (with meals). 5/5/22  Yes Mason Quintero MD   cyanocobalamin, vitamin B-12, 5,000 mcg subl 1 Tablet by SubLINGual route daily. 6/3/21  Yes Manish, MD Sera   epoetin carmelo (Epogen) 10,000 unit/mL injection 1 mL by SubCUTAneous route Once every 2 weeks. 7/7/22   Mason Quintero MD       REVIEW OF SYSTEMS:       Constitutional: Fatigue +  HENT: Negative. Eyes: Negative. Respiratory: Negative. Cardiovascular: Negative. Gastrointestinal: Negative for abdominal pain and nausea. Skin: Negative. Neurological: Negative. Objective:   VITALS:    Visit Vitals  /69 (BP 1 Location: Right arm, BP Patient Position: Sitting, BP Cuff Size: Adult)   Pulse 68   Ht 5' 3\" (1.6 m)   Wt 68.1 kg (150 lb 3.2 oz)   BMI 26.61 kg/m²         PHYSICAL EXAM:     General: NAD, alert, cooperative  Eyes: sclera anicteric  Oral Cavity: No thrush or ulcers  Neck: no JVD  Chest: Fair bilateral air entry. No Wheezing or Rhonchi. No rales.   Heart: normal sounds  Abdomen: soft and non tender   : no woods  Lower Extremities: no edema  Skin: no rash  Neuro: intact, no focals  Psychiatric: non-depressed          LAB DATA REVIEWED:      1901  FERRITIN   Collected: 05/24/22 1215  Final result  Specimen: Serum    Ferritin 237 ng/mL            05/24/22 1901  IRON PROFILE   Collected: 05/24/22 1215  Final result  Specimen: Serum or Plasma    Iron 49 Low  ug/dL  Iron % saturation 19 Low  %   TIBC 256 ug/dL            05/24/22 1856  VITAMIN D, 25 HYDROXY   Collected: 05/24/22 1215  Final result  Specimen: Serum    Vitamin D 25-Hydroxy 18.1 Low  ng/mL             05/24/22 1445  RENAL FUNCTION PANEL   Collected: 05/24/22 1215  Final result  Specimen: Serum or Plasma    Sodium 138 mmol/L Creatinine 5.58 High  mg/dL   Potassium 4.6 mmol/L BUN/Creatinine ratio 9 Low      Chloride 110 mmol/L GFR est AA 10 Low  ml/min/1.73m2   CO2 20 Low  mmol/L GFR est non-AA 8 Low  ml/min/1.73m2    Anion gap 8 mmol/L Calcium 8.4 Low  mg/dL   Glucose 154 High  mg/dL Phosphorus 5.1 High  mg/dL   BUN 51 High  mg/dL Albumin 3.1 Low  g/dL          Labs from 5/4/2022 sodium 139 potassium 5.1 chloride 113 CO2 16 BUN 56 creatinine 5.4 GFR 8 calcium 8.0 ALT AST normal bilirubin normal hemoglobin 8.1 urine shows 300+ protein    Recommendations/Plan:       #1 acute kidney injury on chronic kidney disease stage V  -Creatinine was 5.3 on recent labs with a GFR of only 9  -Creatinine was 4.2 in January 2021. 3.8 in 2019  -This might be her baseline . 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  -Ultrasound shows an exophytic lower pole solid mass 7.5 cm. . No visible calculi. No hydronephrosis.   Prior left nephrectomy  -Ending results of urine analysis quantify proteinuria. UA shows 300+ protein  -She is a diabetic but has no retinopathy. However no eye exam for over 8 years  -Significant proteinuria could be because of analgesic nephropathy/NSAIDs versus diabetic nephropathy. With her having unilateral kidney would not do kidney biopsy because of risks over weighing benefit  -I am referring her for kidney smart class today  -She needs to quit smoking. We will refer her for kidney transplant evaluation after  -We will repeat her labs in 3 weeks. No emergent indication to start hemodialysis today. She wishes to do in center hemodialysis. Supposed to see vascular for AV fistula evaluation in am  -Follow-up in office in 6 weeks with labs    #2 mild hyperkalemia  -Potassium is ranged 5.1-5.3 in the past.  5.4 on recent labs.   Has stopped taking over-the-counter potassium supplements now  -Giving her samples of Scott Davis today  -Likely because of advanced renal disease/NSAIDs/potassium supplementation.   -She was also given a flyer for low potassium diet. NSAIDs now-Advised to increase her fluid intake    #3 metabolic acidosis  -CO2 was only 17-->20 on recent labs. Likely because of advanced renal disease and RTA 4 from NSAIDs  -Discontinued NSAIDs   -Continue sodium bicarbonate 650 twice daily    #4 anemia  -Hb 8.1. Likely anemia of chronic kidney disease. Will check iron studies. She has been low in iron in the past.  -Continue iron sulfate 325 tid  -Also starting Procrit 10,000 units every 2 weeks  -Needs GI evaluation including colonoscopy    #5 renal osteodystrophy  -Calcium was first are okay  -Vitamin D level is only 18. Start vitamin D3 1000 units daily  -iPTh has been elevated in 2019. We will repeat and start vitamin D analogs if needed    #6 diabetes  -Diet controlled. Last A1c was 5.6    #7  Right renal mass  -Renal ultrasound shows possible right renal mass. I will order MRI without contrast for further(the last visit also but not done. I am reordering today) evaluation.   Make referral to urology if needed   -advised her to stop smoking    #8 arthritic pain  -I advised her to see a rheumatologist.  off NSAIDs    Negative for the referral.  She will come back to see me in 6 weeks with pre-clinic labs          ________________________________________________________________________  Signed: Saira Gonzalez MD

## 2022-07-08 ENCOUNTER — TELEPHONE (OUTPATIENT)
Dept: SURGERY | Age: 52
End: 2022-07-08

## 2022-07-08 NOTE — TELEPHONE ENCOUNTER
Please call [facility] pharmacy to see if they carry this medication. There number is [phone].

## 2022-07-08 NOTE — TELEPHONE ENCOUNTER
Patient called and stated that [facility] does not carry :  epoetin carmelo (Epogen) 10,000 unit/mL injection       Patient would like this medication called into a different pharmacy. When asked which pharmacy she would like us to call this into, patient did not specify. Patient would like for us to call her once this has been addressed to let her know where she can pick it up at.

## 2022-07-13 ENCOUNTER — HOSPITAL ENCOUNTER (OUTPATIENT)
Dept: MRI IMAGING | Age: 52
Discharge: HOME | End: 2022-07-13
Attending: INTERNAL MEDICINE
Payer: COMMERCIAL

## 2022-07-13 DIAGNOSIS — N28.89 RENAL MASS: ICD-10-CM

## 2022-07-13 PROCEDURE — 74181 MRI ABDOMEN W/O CONTRAST: CPT

## 2022-08-17 DIAGNOSIS — N18.5 CHRONIC KIDNEY DISEASE (CKD), STAGE V (HCC): ICD-10-CM

## 2022-08-17 DIAGNOSIS — N28.89 RENAL MASS: Primary | ICD-10-CM

## 2022-08-17 RX ORDER — CALCITRIOL 0.25 UG/1
CAPSULE ORAL
Qty: 30 CAPSULE | Refills: 0 | Status: SHIPPED | OUTPATIENT
Start: 2022-08-17

## 2022-08-18 ENCOUNTER — TELEPHONE (OUTPATIENT)
Dept: GASTROENTEROLOGY | Age: 52
End: 2022-08-18

## 2022-08-18 RX ORDER — SEVELAMER CARBONATE 800 MG/1
TABLET, FILM COATED ORAL
Qty: 45 TABLET | Refills: 0 | Status: SHIPPED | OUTPATIENT
Start: 2022-08-18

## 2022-08-22 ENCOUNTER — TRANSCRIBE ORDER (OUTPATIENT)
Dept: REGISTRATION | Age: 52
End: 2022-08-22

## 2022-08-22 ENCOUNTER — HOSPITAL ENCOUNTER (OUTPATIENT)
Dept: LAB | Age: 52
Discharge: HOME | End: 2022-08-22
Payer: COMMERCIAL

## 2022-08-22 DIAGNOSIS — D64.9 ANEMIA, UNSPECIFIED: Primary | ICD-10-CM

## 2022-08-22 DIAGNOSIS — N18.4 CHRONIC KIDNEY DISEASE, STAGE IV (SEVERE) (HCC): ICD-10-CM

## 2022-08-22 DIAGNOSIS — D64.9 ANEMIA, UNSPECIFIED: ICD-10-CM

## 2022-08-22 DIAGNOSIS — N18.4 CHRONIC KIDNEY DISEASE, STAGE IV (SEVERE) (HCC): Primary | ICD-10-CM

## 2022-08-22 LAB
ALBUMIN SERPL-MCNC: 3.2 G/DL (ref 3.4–5)
ANION GAP SERPL CALC-SCNC: 13 MMOL/L (ref 3–18)
BASOPHILS # BLD: 0.1 K/UL (ref 0–0.1)
BASOPHILS NFR BLD: 1 % (ref 0–2)
BUN SERPL-MCNC: 86 MG/DL (ref 7–18)
BUN/CREAT SERPL: 13 (ref 12–20)
CA-I BLD-MCNC: 8.6 MG/DL (ref 8.5–10.1)
CHLORIDE SERPL-SCNC: 100 MMOL/L (ref 100–111)
CO2 SERPL-SCNC: 21 MMOL/L (ref 21–32)
CREAT SERPL-MCNC: 6.86 MG/DL (ref 0.6–1.3)
CREAT UR-MCNC: 60 MG/DL (ref 30–125)
DIFFERENTIAL METHOD BLD: ABNORMAL
EOSINOPHIL # BLD: 0.2 K/UL (ref 0–0.4)
EOSINOPHIL NFR BLD: 2 % (ref 0–5)
ERYTHROCYTE [DISTWIDTH] IN BLOOD BY AUTOMATED COUNT: 14.6 % (ref 11.6–14.5)
GLUCOSE SERPL-MCNC: 274 MG/DL (ref 74–99)
HCT VFR BLD AUTO: 26.9 % (ref 35–45)
HGB BLD-MCNC: 8.6 G/DL (ref 12–16)
IMM GRANULOCYTES # BLD AUTO: 0.1 K/UL (ref 0–0.04)
IMM GRANULOCYTES NFR BLD AUTO: 1 % (ref 0–0.5)
LYMPHOCYTES # BLD: 1.2 K/UL (ref 0.9–3.6)
LYMPHOCYTES NFR BLD: 13 % (ref 21–52)
MCH RBC QN AUTO: 29 PG (ref 24–34)
MCHC RBC AUTO-ENTMCNC: 32 G/DL (ref 31–37)
MCV RBC AUTO: 90.6 FL (ref 78–100)
MONOCYTES # BLD: 0.5 K/UL (ref 0.05–1.2)
MONOCYTES NFR BLD: 6 % (ref 3–10)
NEUTS SEG # BLD: 7 K/UL (ref 1.8–8)
NEUTS SEG NFR BLD: 77 % (ref 40–73)
NRBC # BLD: 0 K/UL (ref 0–0.01)
NRBC BLD-RTO: 0 PER 100 WBC
PHOSPHATE SERPL-MCNC: 5.5 MG/DL (ref 2.5–4.9)
PLATELET # BLD AUTO: 175 K/UL (ref 135–420)
PMV BLD AUTO: 10.8 FL (ref 9.2–11.8)
POTASSIUM SERPL-SCNC: 4.7 MMOL/L (ref 3.5–5.5)
PROT UR-MCNC: 206 MG/DL
RBC # BLD AUTO: 2.97 M/UL (ref 4.2–5.3)
SODIUM SERPL-SCNC: 134 MMOL/L (ref 136–145)
WBC # BLD AUTO: 9 K/UL (ref 4.6–13.2)

## 2022-08-22 PROCEDURE — 83540 ASSAY OF IRON: CPT

## 2022-08-22 PROCEDURE — 83970 ASSAY OF PARATHORMONE: CPT

## 2022-08-22 PROCEDURE — 82570 ASSAY OF URINE CREATININE: CPT

## 2022-08-22 PROCEDURE — 85025 COMPLETE CBC W/AUTO DIFF WBC: CPT

## 2022-08-22 PROCEDURE — 36415 COLL VENOUS BLD VENIPUNCTURE: CPT

## 2022-08-22 PROCEDURE — 83550 IRON BINDING TEST: CPT

## 2022-08-22 PROCEDURE — 80069 RENAL FUNCTION PANEL: CPT

## 2022-08-22 PROCEDURE — 82728 ASSAY OF FERRITIN: CPT

## 2022-08-22 PROCEDURE — 84156 ASSAY OF PROTEIN URINE: CPT

## 2022-08-23 LAB
CA-I BLD-MCNC: 8.1 MG/DL (ref 8.5–10.1)
FERRITIN SERPL-MCNC: 299 NG/ML (ref 8–388)
IRON SERPL-MCNC: 45 UG/DL (ref 50–175)
PTH-INTACT SERPL-MCNC: 199.9 PG/ML (ref 18.4–88)
TIBC SERPL-MCNC: 242 UG/DL (ref 250–450)

## 2023-01-05 ENCOUNTER — APPOINTMENT (OUTPATIENT)
Dept: GENERAL RADIOLOGY | Age: 53
DRG: 133 | End: 2023-01-05
Attending: EMERGENCY MEDICINE
Payer: MEDICAID

## 2023-01-05 ENCOUNTER — HOSPITAL ENCOUNTER (INPATIENT)
Age: 53
LOS: 3 days | Discharge: HOME | DRG: 133 | End: 2023-01-09
Attending: EMERGENCY MEDICINE | Admitting: INTERNAL MEDICINE
Payer: MEDICAID

## 2023-01-05 DIAGNOSIS — J44.1 COPD EXACERBATION (HCC): Primary | ICD-10-CM

## 2023-01-05 DIAGNOSIS — J81.0 ACUTE PULMONARY EDEMA (HCC): ICD-10-CM

## 2023-01-05 DIAGNOSIS — N18.9 CHRONIC RENAL FAILURE, UNSPECIFIED CKD STAGE: ICD-10-CM

## 2023-01-05 DIAGNOSIS — R09.02 HYPOXEMIA: ICD-10-CM

## 2023-01-05 DIAGNOSIS — I95.9 HYPOTENSION, UNSPECIFIED HYPOTENSION TYPE: ICD-10-CM

## 2023-01-05 LAB
ARTERIAL PATENCY WRIST A: ABNORMAL
BASE EXCESS BLDA CALC-SCNC: 0.3 MMOL/L (ref 0–3)
BDY SITE: ABNORMAL
BREATHS.SPONTANEOUS ON VENT: 30
COHGB MFR BLD: 3.6 % (ref 1–2)
FIO2 ON VENT: 35 %
GAS FLOW.O2 SETTING OXYMISER: 12
HCO3 BLDA-SCNC: 26 MMOL/L (ref 22–26)
METHGB MFR BLD: 0 % (ref 0–1.4)
OXYHGB MFR BLD: 86.4 % (ref 95–99)
PCO2 BLDA: 46 MMHG (ref 35–45)
PEEP RESPIRATORY: 8
PERFORMED BY, TECHID: ABNORMAL
PH BLDA: 7.36 (ref 7.35–7.45)
PO2 BLDA: 58 MMHG (ref 80–100)
SAO2 % BLD: 90 % (ref 95–99)
SAO2% DEVICE SAO2% SENSOR NAME: ABNORMAL
SPECIMEN SITE: ABNORMAL
VENTILATION MODE VENT: ABNORMAL

## 2023-01-05 PROCEDURE — 5A1935Z RESPIRATORY VENTILATION, LESS THAN 24 CONSECUTIVE HOURS: ICD-10-PCS | Performed by: INTERNAL MEDICINE

## 2023-01-05 PROCEDURE — 99285 EMERGENCY DEPT VISIT HI MDM: CPT

## 2023-01-05 PROCEDURE — 94640 AIRWAY INHALATION TREATMENT: CPT

## 2023-01-05 PROCEDURE — 81001 URINALYSIS AUTO W/SCOPE: CPT

## 2023-01-05 PROCEDURE — 96365 THER/PROPH/DIAG IV INF INIT: CPT

## 2023-01-05 PROCEDURE — 87635 SARS-COV-2 COVID-19 AMP PRB: CPT

## 2023-01-05 PROCEDURE — 87804 INFLUENZA ASSAY W/OPTIC: CPT

## 2023-01-05 PROCEDURE — 74011000250 HC RX REV CODE- 250: Performed by: EMERGENCY MEDICINE

## 2023-01-05 PROCEDURE — 85025 COMPLETE CBC W/AUTO DIFF WBC: CPT

## 2023-01-05 PROCEDURE — 84484 ASSAY OF TROPONIN QUANT: CPT

## 2023-01-05 PROCEDURE — 77010033678 HC OXYGEN DAILY

## 2023-01-05 PROCEDURE — 83880 ASSAY OF NATRIURETIC PEPTIDE: CPT

## 2023-01-05 PROCEDURE — 5A09357 ASSISTANCE WITH RESPIRATORY VENTILATION, LESS THAN 24 CONSECUTIVE HOURS, CONTINUOUS POSITIVE AIRWAY PRESSURE: ICD-10-PCS | Performed by: INTERNAL MEDICINE

## 2023-01-05 PROCEDURE — 71045 X-RAY EXAM CHEST 1 VIEW: CPT

## 2023-01-05 PROCEDURE — 74011250636 HC RX REV CODE- 250/636: Performed by: EMERGENCY MEDICINE

## 2023-01-05 PROCEDURE — 93005 ELECTROCARDIOGRAM TRACING: CPT

## 2023-01-05 PROCEDURE — 94761 N-INVAS EAR/PLS OXIMETRY MLT: CPT

## 2023-01-05 PROCEDURE — 36600 WITHDRAWAL OF ARTERIAL BLOOD: CPT

## 2023-01-05 PROCEDURE — 80048 BASIC METABOLIC PNL TOTAL CA: CPT

## 2023-01-05 PROCEDURE — 51702 INSERT TEMP BLADDER CATH: CPT

## 2023-01-05 PROCEDURE — 36415 COLL VENOUS BLD VENIPUNCTURE: CPT

## 2023-01-05 PROCEDURE — 94002 VENT MGMT INPAT INIT DAY: CPT

## 2023-01-05 PROCEDURE — 82803 BLOOD GASES ANY COMBINATION: CPT

## 2023-01-05 PROCEDURE — 83605 ASSAY OF LACTIC ACID: CPT

## 2023-01-05 RX ORDER — NOREPINEPHRINE BITARTRATE/D5W 4MG/250ML
.5-3 PLASTIC BAG, INJECTION (ML) INTRAVENOUS
Status: DISCONTINUED | OUTPATIENT
Start: 2023-01-05 | End: 2023-01-07

## 2023-01-05 RX ORDER — FUROSEMIDE 10 MG/ML
40 INJECTION INTRAMUSCULAR; INTRAVENOUS ONCE
Status: COMPLETED | OUTPATIENT
Start: 2023-01-05 | End: 2023-01-06

## 2023-01-05 RX ORDER — SODIUM CHLORIDE 9 MG/ML
250 INJECTION, SOLUTION INTRAVENOUS ONCE
Status: COMPLETED | OUTPATIENT
Start: 2023-01-05 | End: 2023-01-05

## 2023-01-05 RX ORDER — IPRATROPIUM BROMIDE AND ALBUTEROL SULFATE 2.5; .5 MG/3ML; MG/3ML
3 SOLUTION RESPIRATORY (INHALATION)
Status: COMPLETED | OUTPATIENT
Start: 2023-01-05 | End: 2023-01-05

## 2023-01-05 RX ORDER — ALBUTEROL SULFATE 0.83 MG/ML
5 SOLUTION RESPIRATORY (INHALATION)
Status: COMPLETED | OUTPATIENT
Start: 2023-01-05 | End: 2023-01-05

## 2023-01-05 RX ORDER — NOREPINEPHRINE BITARTRATE 1 MG/ML
INJECTION, SOLUTION INTRAVENOUS
Status: DISPENSED
Start: 2023-01-05 | End: 2023-01-06

## 2023-01-05 RX ORDER — DEXTROSE MONOHYDRATE 50 MG/ML
INJECTION, SOLUTION INTRAVENOUS
Status: DISPENSED
Start: 2023-01-05 | End: 2023-01-06

## 2023-01-05 RX ORDER — NOREPINEPHRINE BITARTRATE/D5W 4MG/250ML
.5-3 PLASTIC BAG, INJECTION (ML) INTRAVENOUS
Status: DISCONTINUED | OUTPATIENT
Start: 2023-01-05 | End: 2023-01-05

## 2023-01-05 RX ADMIN — IPRATROPIUM BROMIDE AND ALBUTEROL SULFATE 3 ML: 2.5; .5 SOLUTION RESPIRATORY (INHALATION) at 23:30

## 2023-01-05 RX ADMIN — ALBUTEROL SULFATE 5 MG: 2.5 SOLUTION RESPIRATORY (INHALATION) at 22:46

## 2023-01-05 RX ADMIN — Medication 4 MCG/MIN: at 23:51

## 2023-01-05 RX ADMIN — SODIUM CHLORIDE 250 ML: 9 INJECTION, SOLUTION INTRAVENOUS at 23:21

## 2023-01-05 RX ADMIN — ALBUTEROL SULFATE 5 MG: 2.5 SOLUTION RESPIRATORY (INHALATION) at 23:30

## 2023-01-06 ENCOUNTER — APPOINTMENT (OUTPATIENT)
Dept: NON INVASIVE DIAGNOSTICS | Age: 53
DRG: 133 | End: 2023-01-06
Attending: NURSE PRACTITIONER
Payer: MEDICAID

## 2023-01-06 PROBLEM — N18.6 END STAGE RENAL FAILURE ON DIALYSIS (HCC): Status: ACTIVE | Noted: 2023-01-06

## 2023-01-06 PROBLEM — E87.70 FLUID OVERLOAD: Status: ACTIVE | Noted: 2023-01-06

## 2023-01-06 PROBLEM — I95.9 HYPOTENSION: Status: ACTIVE | Noted: 2023-01-06

## 2023-01-06 PROBLEM — J81.1 PULMONARY EDEMA: Status: ACTIVE | Noted: 2023-01-06

## 2023-01-06 PROBLEM — Z99.2 END STAGE RENAL FAILURE ON DIALYSIS (HCC): Status: ACTIVE | Noted: 2023-01-06

## 2023-01-06 PROBLEM — J96.01 ACUTE RESPIRATORY FAILURE WITH HYPOXIA (HCC): Status: ACTIVE | Noted: 2023-01-06

## 2023-01-06 PROBLEM — J44.1 COPD EXACERBATION (HCC): Status: ACTIVE | Noted: 2023-01-06

## 2023-01-06 LAB
ANION GAP SERPL CALC-SCNC: 13 MMOL/L (ref 3–18)
ANION GAP SERPL CALC-SCNC: 13 MMOL/L (ref 3–18)
APPEARANCE UR: CLEAR
ARTERIAL PATENCY WRIST A: ABNORMAL
ATRIAL RATE: 90 BPM
BACTERIA URNS QL MICRO: ABNORMAL /HPF
BASE DEFICIT BLDA-SCNC: 0.3 MMOL/L
BASOPHILS # BLD: 0 K/UL (ref 0–0.1)
BASOPHILS # BLD: 0.1 K/UL (ref 0–0.1)
BASOPHILS NFR BLD: 0 % (ref 0–2)
BASOPHILS NFR BLD: 1 % (ref 0–2)
BDY SITE: ABNORMAL
BILIRUB UR QL: NEGATIVE
BNP SERPL-MCNC: ABNORMAL PG/ML (ref 0–900)
BREATHS.SPONTANEOUS ON VENT: 20
BUN SERPL-MCNC: 27 MG/DL (ref 7–18)
BUN SERPL-MCNC: 33 MG/DL (ref 7–18)
BUN/CREAT SERPL: 6 (ref 12–20)
BUN/CREAT SERPL: 7 (ref 12–20)
CA-I BLD-MCNC: 8.2 MG/DL (ref 8.5–10.1)
CA-I BLD-MCNC: 8.8 MG/DL (ref 8.5–10.1)
CALCULATED P AXIS, ECG09: 62 DEGREES
CALCULATED R AXIS, ECG10: 53 DEGREES
CALCULATED T AXIS, ECG11: -147 DEGREES
CHLORIDE SERPL-SCNC: 97 MMOL/L (ref 100–111)
CHLORIDE SERPL-SCNC: 97 MMOL/L (ref 100–111)
CO2 SERPL-SCNC: 26 MMOL/L (ref 21–32)
CO2 SERPL-SCNC: 27 MMOL/L (ref 21–32)
COHGB MFR BLD: 0.9 % (ref 1–2)
COLOR UR: YELLOW
COVID-19 RAPID TEST, COVR: NOT DETECTED
CREAT SERPL-MCNC: 4.16 MG/DL (ref 0.6–1.3)
CREAT SERPL-MCNC: 4.67 MG/DL (ref 0.6–1.3)
DIAGNOSIS, 93000: NORMAL
DIFFERENTIAL METHOD BLD: ABNORMAL
DIFFERENTIAL METHOD BLD: ABNORMAL
EOSINOPHIL # BLD: 0 K/UL (ref 0–0.4)
EOSINOPHIL # BLD: 0.2 K/UL (ref 0–0.4)
EOSINOPHIL NFR BLD: 0 % (ref 0–5)
EOSINOPHIL NFR BLD: 2 % (ref 0–5)
EPITH CASTS URNS QL MICRO: ABNORMAL /LPF (ref 0–20)
ERYTHROCYTE [DISTWIDTH] IN BLOOD BY AUTOMATED COUNT: 16.8 % (ref 11.6–14.5)
ERYTHROCYTE [DISTWIDTH] IN BLOOD BY AUTOMATED COUNT: 17 % (ref 11.6–14.5)
FIO2 ON VENT: 35 %
FLUAV AG NPH QL IA: NEGATIVE
FLUBV AG NOSE QL IA: NEGATIVE
GAS FLOW.O2 SETTING OXYMISER: 12
GLUCOSE SERPL-MCNC: 264 MG/DL (ref 74–99)
GLUCOSE SERPL-MCNC: 284 MG/DL (ref 74–99)
GLUCOSE UR STRIP.AUTO-MCNC: 500 MG/DL
HBV SURFACE AG SER QL: 0.5 INDEX
HBV SURFACE AG SER QL: NEGATIVE
HCO3 BLDA-SCNC: 24 MMOL/L (ref 22–26)
HCT VFR BLD AUTO: 23.8 % (ref 35–45)
HCT VFR BLD AUTO: 24.2 % (ref 35–45)
HGB BLD-MCNC: 7.2 G/DL (ref 12–16)
HGB BLD-MCNC: 7.6 G/DL (ref 12–16)
HGB UR QL STRIP: ABNORMAL
HYALINE CASTS URNS QL MICRO: ABNORMAL /LPF
IMM GRANULOCYTES # BLD AUTO: 0.1 K/UL (ref 0–0.04)
IMM GRANULOCYTES # BLD AUTO: 0.2 K/UL (ref 0–0.04)
IMM GRANULOCYTES NFR BLD AUTO: 1 % (ref 0–0.5)
IMM GRANULOCYTES NFR BLD AUTO: 1 % (ref 0–0.5)
KETONES UR QL STRIP.AUTO: NEGATIVE MG/DL
LACTATE SERPL-SCNC: 2.4 MMOL/L (ref 0.4–2)
LEUKOCYTE ESTERASE UR QL STRIP.AUTO: ABNORMAL
LYMPHOCYTES # BLD: 0.7 K/UL (ref 0.9–3.6)
LYMPHOCYTES # BLD: 3.2 K/UL (ref 0.9–3.6)
LYMPHOCYTES NFR BLD: 28 % (ref 21–52)
LYMPHOCYTES NFR BLD: 5 % (ref 21–52)
MCH RBC QN AUTO: 29.6 PG (ref 24–34)
MCH RBC QN AUTO: 30.5 PG (ref 24–34)
MCHC RBC AUTO-ENTMCNC: 30.3 G/DL (ref 31–37)
MCHC RBC AUTO-ENTMCNC: 31.4 G/DL (ref 31–37)
MCV RBC AUTO: 97.2 FL (ref 78–100)
MCV RBC AUTO: 97.9 FL (ref 78–100)
METHGB MFR BLD: 0.2 % (ref 0–1.4)
MONOCYTES # BLD: 0.2 K/UL (ref 0.05–1.2)
MONOCYTES # BLD: 0.6 K/UL (ref 0.05–1.2)
MONOCYTES NFR BLD: 1 % (ref 3–10)
MONOCYTES NFR BLD: 5 % (ref 3–10)
MUCOUS THREADS URNS QL MICRO: ABNORMAL /LPF
NEUTS SEG # BLD: 13.6 K/UL (ref 1.8–8)
NEUTS SEG # BLD: 7.2 K/UL (ref 1.8–8)
NEUTS SEG NFR BLD: 63 % (ref 40–73)
NEUTS SEG NFR BLD: 93 % (ref 40–73)
NITRITE UR QL STRIP.AUTO: NEGATIVE
NRBC # BLD: 0 K/UL (ref 0–0.01)
NRBC # BLD: 0 K/UL (ref 0–0.01)
NRBC BLD-RTO: 0 PER 100 WBC
NRBC BLD-RTO: 0 PER 100 WBC
OXYHGB MFR BLD: 94.5 % (ref 95–99)
P-R INTERVAL, ECG05: 159 MS
PCO2 BLDA: 36 MMHG (ref 35–45)
PEEP RESPIRATORY: 8
PERFORMED BY, TECHID: ABNORMAL
PH BLDA: 7.43 (ref 7.35–7.45)
PH UR STRIP: 7.5 (ref 5–8)
PLATELET # BLD AUTO: 197 K/UL (ref 135–420)
PLATELET # BLD AUTO: 271 K/UL (ref 135–420)
PMV BLD AUTO: 10.4 FL (ref 9.2–11.8)
PMV BLD AUTO: 10.6 FL (ref 9.2–11.8)
PO2 BLDA: 77 MMHG (ref 80–100)
POTASSIUM SERPL-SCNC: 3.2 MMOL/L (ref 3.5–5.5)
POTASSIUM SERPL-SCNC: 4.1 MMOL/L (ref 3.5–5.5)
PROT UR STRIP-MCNC: 300 MG/DL
Q-T INTERVAL, ECG07: 348 MS
QRS DURATION, ECG06: 82 MS
QTC CALCULATION (BEZET), ECG08: 429 MS
RBC # BLD AUTO: 2.43 M/UL (ref 4.2–5.3)
RBC # BLD AUTO: 2.49 M/UL (ref 4.2–5.3)
RBC #/AREA URNS HPF: ABNORMAL /HPF (ref 0–2)
SAO2 % BLD: 96 % (ref 95–99)
SAO2% DEVICE SAO2% SENSOR NAME: ABNORMAL
SODIUM SERPL-SCNC: 136 MMOL/L (ref 136–145)
SODIUM SERPL-SCNC: 137 MMOL/L (ref 136–145)
SP GR UR REFRACTOMETRY: 1.02 (ref 1–1.03)
SPECIMEN SITE: ABNORMAL
TROPONIN-HIGH SENSITIVITY: 101 NG/L (ref 0–54)
TROPONIN-HIGH SENSITIVITY: 75 NG/L (ref 0–54)
UROBILINOGEN UR QL STRIP.AUTO: 0.2 EU/DL (ref 0.2–1)
VENTRICULAR RATE, ECG03: 91 BPM
WBC # BLD AUTO: 11.4 K/UL (ref 4.6–13.2)
WBC # BLD AUTO: 14.6 K/UL (ref 4.6–13.2)
WBC URNS QL MICRO: ABNORMAL /HPF (ref 0–4)

## 2023-01-06 PROCEDURE — 74011250637 HC RX REV CODE- 250/637: Performed by: EMERGENCY MEDICINE

## 2023-01-06 PROCEDURE — 94640 AIRWAY INHALATION TREATMENT: CPT

## 2023-01-06 PROCEDURE — 84484 ASSAY OF TROPONIN QUANT: CPT

## 2023-01-06 PROCEDURE — 86706 HEP B SURFACE ANTIBODY: CPT

## 2023-01-06 PROCEDURE — 65610000006 HC RM INTENSIVE CARE

## 2023-01-06 PROCEDURE — 96366 THER/PROPH/DIAG IV INF ADDON: CPT

## 2023-01-06 PROCEDURE — 96374 THER/PROPH/DIAG INJ IV PUSH: CPT

## 2023-01-06 PROCEDURE — 85025 COMPLETE CBC W/AUTO DIFF WBC: CPT

## 2023-01-06 PROCEDURE — 74011250636 HC RX REV CODE- 250/636: Performed by: EMERGENCY MEDICINE

## 2023-01-06 PROCEDURE — 94664 DEMO&/EVAL PT USE INHALER: CPT

## 2023-01-06 PROCEDURE — 74011000250 HC RX REV CODE- 250

## 2023-01-06 PROCEDURE — 74011000250 HC RX REV CODE- 250: Performed by: EMERGENCY MEDICINE

## 2023-01-06 PROCEDURE — 74011000250 HC RX REV CODE- 250: Performed by: NURSE PRACTITIONER

## 2023-01-06 PROCEDURE — 87340 HEPATITIS B SURFACE AG IA: CPT

## 2023-01-06 PROCEDURE — 36600 WITHDRAWAL OF ARTERIAL BLOOD: CPT

## 2023-01-06 PROCEDURE — 94761 N-INVAS EAR/PLS OXIMETRY MLT: CPT

## 2023-01-06 PROCEDURE — 90935 HEMODIALYSIS ONE EVALUATION: CPT

## 2023-01-06 PROCEDURE — 5A1D70Z PERFORMANCE OF URINARY FILTRATION, INTERMITTENT, LESS THAN 6 HOURS PER DAY: ICD-10-PCS | Performed by: INTERNAL MEDICINE

## 2023-01-06 PROCEDURE — 80048 BASIC METABOLIC PNL TOTAL CA: CPT

## 2023-01-06 PROCEDURE — 94003 VENT MGMT INPAT SUBQ DAY: CPT

## 2023-01-06 PROCEDURE — 74011250637 HC RX REV CODE- 250/637: Performed by: NURSE PRACTITIONER

## 2023-01-06 PROCEDURE — 74011250636 HC RX REV CODE- 250/636: Performed by: NURSE PRACTITIONER

## 2023-01-06 PROCEDURE — 87040 BLOOD CULTURE FOR BACTERIA: CPT

## 2023-01-06 PROCEDURE — 82803 BLOOD GASES ANY COMBINATION: CPT

## 2023-01-06 PROCEDURE — 77010033678 HC OXYGEN DAILY

## 2023-01-06 RX ORDER — ONDANSETRON 4 MG/1
4 TABLET, ORALLY DISINTEGRATING ORAL
Status: DISCONTINUED | OUTPATIENT
Start: 2023-01-06 | End: 2023-01-09 | Stop reason: HOSPADM

## 2023-01-06 RX ORDER — FUROSEMIDE 10 MG/ML
40 INJECTION INTRAMUSCULAR; INTRAVENOUS 2 TIMES DAILY
Status: DISCONTINUED | OUTPATIENT
Start: 2023-01-06 | End: 2023-01-08

## 2023-01-06 RX ORDER — NOREPINEPHRINE BITARTRATE 1 MG/ML
INJECTION, SOLUTION INTRAVENOUS
Status: COMPLETED
Start: 2023-01-06 | End: 2023-01-06

## 2023-01-06 RX ORDER — ALBUTEROL SULFATE 0.83 MG/ML
2.5 SOLUTION RESPIRATORY (INHALATION)
Status: DISCONTINUED | OUTPATIENT
Start: 2023-01-06 | End: 2023-01-09 | Stop reason: HOSPADM

## 2023-01-06 RX ORDER — LANOLIN ALCOHOL/MO/W.PET/CERES
325 CREAM (GRAM) TOPICAL
Status: DISCONTINUED | OUTPATIENT
Start: 2023-01-06 | End: 2023-01-09 | Stop reason: HOSPADM

## 2023-01-06 RX ORDER — CALCITRIOL 0.25 UG/1
0.25 CAPSULE ORAL DAILY
Status: DISCONTINUED | OUTPATIENT
Start: 2023-01-06 | End: 2023-01-09 | Stop reason: HOSPADM

## 2023-01-06 RX ORDER — ONDANSETRON 2 MG/ML
4 INJECTION INTRAMUSCULAR; INTRAVENOUS
Status: DISCONTINUED | OUTPATIENT
Start: 2023-01-06 | End: 2023-01-09 | Stop reason: HOSPADM

## 2023-01-06 RX ORDER — SEVELAMER CARBONATE 800 MG/1
800 TABLET, FILM COATED ORAL
Status: DISCONTINUED | OUTPATIENT
Start: 2023-01-06 | End: 2023-01-09 | Stop reason: HOSPADM

## 2023-01-06 RX ORDER — NOREPINEPHRINE BITARTRATE 1 MG/ML
INJECTION, SOLUTION INTRAVENOUS
Status: DISPENSED
Start: 2023-01-06 | End: 2023-01-07

## 2023-01-06 RX ORDER — SODIUM BICARBONATE 650 MG/1
650 TABLET ORAL 2 TIMES DAILY
Status: DISCONTINUED | OUTPATIENT
Start: 2023-01-06 | End: 2023-01-09 | Stop reason: HOSPADM

## 2023-01-06 RX ORDER — LANOLIN ALCOHOL/MO/W.PET/CERES
5000 CREAM (GRAM) TOPICAL DAILY
Status: DISCONTINUED | OUTPATIENT
Start: 2023-01-06 | End: 2023-01-09 | Stop reason: HOSPADM

## 2023-01-06 RX ORDER — HEPARIN SODIUM 5000 [USP'U]/ML
5000 INJECTION, SOLUTION INTRAVENOUS; SUBCUTANEOUS EVERY 8 HOURS
Status: DISCONTINUED | OUTPATIENT
Start: 2023-01-06 | End: 2023-01-09 | Stop reason: HOSPADM

## 2023-01-06 RX ORDER — MELATONIN
1000 DAILY
Status: DISCONTINUED | OUTPATIENT
Start: 2023-01-06 | End: 2023-01-09 | Stop reason: HOSPADM

## 2023-01-06 RX ORDER — ACETAMINOPHEN 650 MG/1
650 SUPPOSITORY RECTAL
Status: DISCONTINUED | OUTPATIENT
Start: 2023-01-06 | End: 2023-01-09 | Stop reason: HOSPADM

## 2023-01-06 RX ORDER — SODIUM CHLORIDE 0.9 % (FLUSH) 0.9 %
5-40 SYRINGE (ML) INJECTION AS NEEDED
Status: DISCONTINUED | OUTPATIENT
Start: 2023-01-06 | End: 2023-01-09 | Stop reason: HOSPADM

## 2023-01-06 RX ORDER — POLYETHYLENE GLYCOL 3350 17 G/17G
17 POWDER, FOR SOLUTION ORAL DAILY PRN
Status: DISCONTINUED | OUTPATIENT
Start: 2023-01-06 | End: 2023-01-09 | Stop reason: HOSPADM

## 2023-01-06 RX ORDER — ACETAMINOPHEN 325 MG/1
650 TABLET ORAL
Status: DISCONTINUED | OUTPATIENT
Start: 2023-01-06 | End: 2023-01-09 | Stop reason: HOSPADM

## 2023-01-06 RX ORDER — SODIUM CHLORIDE 0.9 % (FLUSH) 0.9 %
5-40 SYRINGE (ML) INJECTION EVERY 8 HOURS
Status: DISCONTINUED | OUTPATIENT
Start: 2023-01-06 | End: 2023-01-09 | Stop reason: HOSPADM

## 2023-01-06 RX ORDER — FUROSEMIDE 10 MG/ML
40 INJECTION INTRAMUSCULAR; INTRAVENOUS 2 TIMES DAILY
Status: DISCONTINUED | OUTPATIENT
Start: 2023-01-06 | End: 2023-01-06

## 2023-01-06 RX ADMIN — SEVELAMER CARBONATE 800 MG: 800 TABLET, FILM COATED ORAL at 16:56

## 2023-01-06 RX ADMIN — SODIUM BICARBONATE 650 MG: 650 TABLET ORAL at 13:48

## 2023-01-06 RX ADMIN — METHYLPREDNISOLONE SODIUM SUCCINATE 60 MG: 125 INJECTION, POWDER, FOR SOLUTION INTRAMUSCULAR; INTRAVENOUS at 06:50

## 2023-01-06 RX ADMIN — METHYLPREDNISOLONE SODIUM SUCCINATE 60 MG: 125 INJECTION, POWDER, FOR SOLUTION INTRAMUSCULAR; INTRAVENOUS at 17:24

## 2023-01-06 RX ADMIN — Medication 4 MCG/MIN: at 17:40

## 2023-01-06 RX ADMIN — CYANOCOBALAMIN TAB 500 MCG 5000 MCG: 500 TAB at 13:47

## 2023-01-06 RX ADMIN — FERROUS SULFATE TAB 325 MG (65 MG ELEMENTAL FE) 325 MG: 325 (65 FE) TAB at 13:48

## 2023-01-06 RX ADMIN — NITROGLYCERIN 0.5 INCH: 20 OINTMENT TOPICAL at 01:15

## 2023-01-06 RX ADMIN — FUROSEMIDE 40 MG: 10 INJECTION, SOLUTION INTRAMUSCULAR; INTRAVENOUS at 00:21

## 2023-01-06 RX ADMIN — HEPARIN SODIUM 5000 UNITS: 5000 INJECTION INTRAVENOUS; SUBCUTANEOUS at 22:28

## 2023-01-06 RX ADMIN — HEPARIN SODIUM 5000 UNITS: 5000 INJECTION INTRAVENOUS; SUBCUTANEOUS at 06:47

## 2023-01-06 RX ADMIN — FUROSEMIDE 40 MG: 10 INJECTION, SOLUTION INTRAMUSCULAR; INTRAVENOUS at 13:49

## 2023-01-06 RX ADMIN — Medication 1000 UNITS: at 13:48

## 2023-01-06 RX ADMIN — METHYLPREDNISOLONE SODIUM SUCCINATE 60 MG: 125 INJECTION, POWDER, FOR SOLUTION INTRAMUSCULAR; INTRAVENOUS at 13:53

## 2023-01-06 RX ADMIN — METHYLPREDNISOLONE SODIUM SUCCINATE 60 MG: 125 INJECTION, POWDER, FOR SOLUTION INTRAMUSCULAR; INTRAVENOUS at 23:00

## 2023-01-06 RX ADMIN — ALBUTEROL SULFATE 2.5 MG: 2.5 SOLUTION RESPIRATORY (INHALATION) at 07:40

## 2023-01-06 RX ADMIN — CALCITRIOL CAPSULES 0.25 MCG 0.25 MCG: 0.25 CAPSULE ORAL at 13:48

## 2023-01-06 RX ADMIN — SEVELAMER CARBONATE 800 MG: 800 TABLET, FILM COATED ORAL at 13:48

## 2023-01-06 RX ADMIN — SODIUM BICARBONATE 650 MG: 650 TABLET ORAL at 17:41

## 2023-01-06 RX ADMIN — HEPARIN SODIUM 5000 UNITS: 5000 INJECTION INTRAVENOUS; SUBCUTANEOUS at 17:41

## 2023-01-06 RX ADMIN — ALBUTEROL SULFATE 2.5 MG: 2.5 SOLUTION RESPIRATORY (INHALATION) at 19:54

## 2023-01-06 RX ADMIN — SODIUM CHLORIDE, PRESERVATIVE FREE 10 ML: 5 INJECTION INTRAVENOUS at 23:11

## 2023-01-06 RX ADMIN — FERROUS SULFATE TAB 325 MG (65 MG ELEMENTAL FE) 325 MG: 325 (65 FE) TAB at 16:57

## 2023-01-06 RX ADMIN — SODIUM CHLORIDE, PRESERVATIVE FREE 10 ML: 5 INJECTION INTRAVENOUS at 14:05

## 2023-01-06 RX ADMIN — FUROSEMIDE 40 MG: 10 INJECTION, SOLUTION INTRAMUSCULAR; INTRAVENOUS at 17:28

## 2023-01-06 RX ADMIN — NOREPINEPHRINE BITARTRATE 4 MG: 1 SOLUTION INTRAVENOUS at 18:17

## 2023-01-06 NOTE — RT PROTOCOL NOTE
RT Nebulizer Bronchodilator Protocol Note    There is a bronchodilator order in the chart from a provider indicating to follow the RT Bronchodilator Protocol and there is an Initiate RT Bronchodilator Protocol order as well (see protocol at bottom of note). CXR Findings:  Recent Results (from the past 2 days)    XR CHEST PORT 01/05/2023    Impression  1. Findings of pulmonary edema from fluid overload versus CHF. Small right  effusion is present. 2. Unchanged focal left apical rounded opacity which may represent summation  artifact from left anterior rib. This finding could be further characterized  after complete resolution of pulmonary symptomatology with a nonemergent CT scan  of the chest.      The findings from the last RT Protocol Assessment were as follows:  History of Pulmonary Disease: Chronic pulmonary disease  Respiratory Pattern: Regular pattern and RR 12-20 bpm  Breath Sounds: Slighly diminished and/or crackles  Cough: Strong, spontaneous, non-productive  Indication for Bronchodilator Therapy: Decreased or absent breath sounds  Bronchodilator Assessment Score: 4     Aerosolized bronchodilator medication orders have been revised according to the RT Nebulizer Bronchodilator Protocol below. Respiratory Therapist to perform RT Therapy Protocol Assessment initially then follow the protocol. Repeat RT Therapy Protocol Assessment PRN for score 0-3 or on second treatment, BID, and PRN for scores above 3. No Indications - adjust the frequency to every 6 hours PRN wheezing or bronchospasm, if no treatments needed after 48 hours then discontinue using Per Protocol order mode. If indication present, adjust the RT bronchodilator orders based on the Bronchodilator Assessment Score as indicated below. If a patient is on this medication at home then do not decrease Frequency below that used at home.     0-3 - enter or revise RT bronchodilator order(s) to equivalent RT Bronchodilator order with Frequency of every 4 hours PRN for wheezing or increased work of breathing using Per Protocol order mode. 4-6 - enter or revise RT Bronchodilator order(s) to two equivalent RT bronchodilator orders with one order with BID Frequency and one order with Frequency of every 4 hours PRN wheezing or increased work of breathing using Per Protocol order mode. 7-10 - enter or revise RT Bronchodilator order(s) to two equivalent RT bronchodilator orders with one order with TID Frequency and one order with Frequency of every 4 hours PRN wheezing or increased work of breathing using Per Protocol order mode. 11-13 - enter or revise RT Bronchodilator order(s) to one equivalent RT bronchodilator order with QID Frequency and an Albuterol order with Frequency of every 4 hours PRN wheezing or increased work of breathing using Per Protocol order mode. Greater than 13 - enter or revise RT Bronchodilator order(s) to one equivalent RT bronchodilator order with every 4 hours Frequency and an Albuterol order with Frequency of every 2 hours PRN wheezing or increased work of breathing using Per Protocol order mode. RT to enter RT Home Evaluation for COPD & MDI Assessment order using Per Protocol order mode.     Electronically signed by Diamante Torre on 1/6/2023 at 4:37 AM

## 2023-01-06 NOTE — PROGRESS NOTES
-[id number] TRANSFER - IN REPORT:    Verbal report received from [facility] [address], RN(name) on Authur Aase  being received from ED(unit) for routine progression of care      Report consisted of patients Situation, Background, Assessment and   Recommendations(SBAR). Information from the following report(s) Kardex, ED Summary, MAR, and Recent Results was reviewed with the receiving nurse. Opportunity for questions and clarification was provided. Assessment completed upon patients arrival to unit and care assumed.     -0410 Received care of pt from ED via stretcher. Pt transfer to ICU bed. A&OX4. Skin cold. ( All extremities cold to touch). Pt requesting warm blankets. Perm cath to right upper chest wall. TLC to right femoral intact. . Levophed drip @ 4 mcg/min. #16 fr woods patent draining yellow colored urine. Resp shallow. Lung sounds diminished in all lung fields. BIPAP settings rt-12, I- 10, E-5,FIO2 -30%. Warm blanket applied to hands in order to get SATS. Pt denies any complaints at present. Pt has own home meds in personal belongings bag. Per pt will give home meds to , (This nurse will inform am nurse about pt home meds). Pt denies any complaints at present. Pt oriented to call bell system and bed controls. @1786 Verbal shift change report given to KELLE Aragon LPN (oncoming nurse) by KELLE Tolentino RN (offgoing nurse). Report included the following information ED Summary, Intake/Output, MAR, and Recent Results.

## 2023-01-06 NOTE — ED PROVIDER NOTES
Pt biba, in severe resp distress, found by medics, w dec rr, sig resp distress, placed on cpap, improved some. Hpi limited by resp distress. Nodding yes no to some questions. C/o mid chest pain. On hd. Says had hd as scheduled yesterday. Given duoneb/magnesium/solu medrol en route  H/o copd. Past Medical History:   Diagnosis Date    ESRD on hemodialysis [facility])        Past Surgical History:   Procedure Laterality Date    HX  SECTION      PARTIAL REMOVAL OF KIDNEY           Family History:   Problem Relation Age of Onset    Diabetes Maternal Grandmother        Social History     Socioeconomic History    Marital status:      Spouse name: Not on file    Number of children: Not on file    Years of education: Not on file    Highest education level: Not on file   Occupational History    Not on file   Tobacco Use    Smoking status: Every Day     Packs/day: 0.50     Years: 35.00     Pack years: 17.50     Types: Cigarettes    Smokeless tobacco: Never   Vaping Use    Vaping Use: Never used   Substance and Sexual Activity    Alcohol use: Not Currently    Drug use: Not on file    Sexual activity: Not on file   Other Topics Concern    Not on file   Social History Narrative    Not on file     Social Determinants of Health     Financial Resource Strain: Not on file   Food Insecurity: Not on file   Transportation Needs: Not on file   Physical Activity: Not on file   Stress: Not on file   Social Connections: Not on file   Intimate Partner Violence: Not on file   Housing Stability: Not on file         ALLERGIES: Dulaglutide, Sitagliptin, Codeine, and Pcn [penicillins]    Review of Systems   Unable to perform ROS: Acuity of condition     Vitals:    23 2330 23 0015 23 0021 23 0115   BP:   120/63 127/64   Pulse:  (!) 105 89 81   Resp:  20  20   Temp:       SpO2: 100% 95%  97%            Physical Exam  Vitals and nursing note reviewed.    Constitutional:       General: She is in acute distress. Appearance: She is well-developed. She is ill-appearing. HENT:      Head: Normocephalic and atraumatic. Eyes:      Pupils: Pupils are equal, round, and reactive to light. Cardiovascular:      Rate and Rhythm: Regular rhythm. Tachycardia present. Heart sounds: No murmur heard. Pulmonary:      Effort: Respiratory distress present. Breath sounds: Wheezing present. Abdominal:      Palpations: Abdomen is soft. Tenderness: There is no abdominal tenderness. Musculoskeletal:         General: Normal range of motion. Cervical back: Normal range of motion. Right lower leg: No edema. Left lower leg: No edema. Comments: + thrill in lue fistula   Skin:     General: Skin is dry. Capillary Refill: Capillary refill takes less than 2 seconds. Findings: No rash. Neurological:      Mental Status: She is alert. Comments: Alert, eyes open, nodding to questions, following commands   Psychiatric:      Comments: Unable to assess        Medical Decision Making  Amount and/or Complexity of Data Reviewed  Labs: ordered. Radiology: ordered. ECG/medicine tests: ordered. Risk  Prescription drug management. Decision regarding hospitalization.            Procedures      Vitals:  Patient Vitals for the past 12 hrs:   Temp Pulse Resp BP SpO2   01/06/23 0115 -- 81 20 127/64 97 %   01/06/23 0021 -- 89 -- 120/63 --   01/06/23 0015 -- (!) 105 20 -- 95 %   01/05/23 2330 -- -- -- -- 100 %   01/05/23 2247 -- -- -- -- 100 %   01/05/23 2235 -- 100 (!) 36 -- 100 %   01/05/23 2231 -- 95 27 135/73 --   01/05/23 2216 97.9 °F (36.6 °C) (!) 113 30 135/73 100 %         Medications ordered:   Medications   NOREPINephrine (LEVOPHED) 1 mg/mL injection (  Canceled Entry 1/5/23 2346)   dextrose 5% infusion (  Canceled Entry 1/5/23 2346)   NOREPINephrine (LEVOPHED) 4 mg in 5% dextrose 250 mL infusion (4 mcg/min IntraVENous New Bag 1/5/23 5401)   albuterol (PROVENTIL VENTOLIN) nebulizer solution 5 mg (5 mg Nebulization Given 1/5/23 2246)   furosemide (LASIX) injection 40 mg (40 mg IntraVENous Given 1/6/23 0021)   nitroglycerin (NITROBID) 2 % ointment 0.5 Inch (0.5 Inches Topical Given 1/6/23 0115)   0.9% sodium chloride infusion 250 mL (250 mL IntraVENous New Bag 1/5/23 2321)   albuterol-ipratropium (DUO-NEB) 2.5 MG-0.5 MG/3 ML (3 mL Nebulization Given 1/5/23 2330)   albuterol (PROVENTIL VENTOLIN) nebulizer solution 5 mg (5 mg Nebulization Given 1/5/23 2330)         Lab findings:  Recent Results (from the past 12 hour(s))   TROPONIN-HIGH SENSITIVITY    Collection Time: 01/05/23 10:34 PM   Result Value Ref Range    Troponin-High Sensitivity 75 (H) 0 - 54 ng/L   LACTIC ACID    Collection Time: 01/05/23 10:34 PM   Result Value Ref Range    Lactic acid 2.4 (HH) 0.4 - 2.0 mmol/L   CBC WITH AUTOMATED DIFF    Collection Time: 01/05/23 10:34 PM   Result Value Ref Range    WBC 11.4 4.6 - 13.2 K/uL    RBC 2.43 (L) 4.20 - 5.30 M/uL    HGB 7.2 (L) 12.0 - 16.0 g/dL    HCT 23.8 (L) 35.0 - 45.0 %    MCV 97.9 78.0 - 100.0 FL    MCH 29.6 24.0 - 34.0 PG    MCHC 30.3 (L) 31.0 - 37.0 g/dL    RDW 17.0 (H) 11.6 - 14.5 %    PLATELET 050 637 - 235 K/uL    MPV 10.6 9.2 - 11.8 FL    NRBC 0.0 0.0  WBC    ABSOLUTE NRBC 0.00 0.00 - 0.01 K/uL    NEUTROPHILS 63 40 - 73 %    LYMPHOCYTES 28 21 - 52 %    MONOCYTES 5 3 - 10 %    EOSINOPHILS 2 0 - 5 %    BASOPHILS 1 0 - 2 %    IMMATURE GRANULOCYTES 1 (H) 0 - 0.5 %    ABS. NEUTROPHILS 7.2 1.8 - 8.0 K/UL    ABS. LYMPHOCYTES 3.2 0.9 - 3.6 K/UL    ABS. MONOCYTES 0.6 0.05 - 1.2 K/UL    ABS. EOSINOPHILS 0.2 0.0 - 0.4 K/UL    ABS. BASOPHILS 0.1 0.0 - 0.1 K/UL    ABS. IMM.  GRANS. 0.1 (H) 0.00 - 0.04 K/UL    DF AUTOMATED     METABOLIC PANEL, BASIC    Collection Time: 01/05/23 10:34 PM   Result Value Ref Range    Sodium 136 136 - 145 mmol/L    Potassium 3.2 (L) 3.5 - 5.5 mmol/L    Chloride 97 (L) 100 - 111 mmol/L    CO2 26 21 - 32 mmol/L    Anion gap 13 3.0 - 18.0 mmol/L    Glucose 284 (H) 74 - 99 mg/dL    BUN 27 (H) 7 - 18 mg/dL    Creatinine 4.16 (H) 0.60 - 1.30 mg/dL    BUN/Creatinine ratio 6 (L) 12 - 20      eGFR 12 (L) >60 ml/min/1.73m2    Calcium 8.2 (L) 8.5 - 10.1 mg/dL   NT-PRO BNP    Collection Time: 01/05/23 10:34 PM   Result Value Ref Range    NT pro-BNP 47,190 (H) 0 - 900 pg/mL   INFLUENZA A & B AG (RAPID TEST)    Collection Time: 01/05/23 10:34 PM   Result Value Ref Range    Influenza A Antigen Negative Negative      Influenza B Antigen Negative Negative     BLOOD GAS, ARTERIAL    Collection Time: 01/05/23 10:40 PM   Result Value Ref Range    pH 7.36 7.35 - 7.45      PCO2 46 (H) 35 - 45 mmHg    PO2 58 (L) 80 - 100 mmHg    O2 SATURATION 90 (L) 95 - 99 %    BICARBONATE 26 22 - 26 mmol/L    BASE EXCESS 0.3 0 - 3 mmol/L    O2 METHOD Non Invasive      FIO2 35 %    MODE Pressure Control      SET RATE 12      SPONTANEOUS RATE 30      PEEP/CPAP 8.0      Sample source Arterial      SITE Right Brachial      CHRISTIANO'S TEST NOT APPLICABLE      Carboxy-Hgb 3.6 (H) 1 - 2 %    Methemoglobin 0.0 0 - 1.4 %    Oxyhemoglobin 86.4 (L) 95 - 99 %    Performed by Enedelia Rolf    COVID-19 RAPID TEST    Collection Time: 01/05/23 10:45 PM   Result Value Ref Range    COVID-19 rapid test Not Detected Not Detected     URINALYSIS W/ RFLX MICROSCOPIC    Collection Time: 01/05/23 11:45 PM   Result Value Ref Range    Color Yellow      Appearance Clear      Specific gravity 1.018 1.005 - 1.030      pH (UA) 7.5 5.0 - 8.0      Protein 300 (A) Negative mg/dL    Glucose 500 (A) Negative mg/dL    Ketone Negative Negative mg/dL    Bilirubin Negative Negative      Blood Moderate (A) Negative      Urobilinogen 0.2 0.2 - 1.0 EU/dL    Nitrites Negative Negative      Leukocyte Esterase Small (A) Negative     URINE MICROSCOPIC    Collection Time: 01/05/23 11:45 PM   Result Value Ref Range    WBC 10-20 0 - 4 /hpf    RBC 20-50 0 - 2 /hpf    Epithelial cells Few 0 - 20 /lpf    Bacteria 3+ (A) None /hpf    Mucus 1+ /lpf    Hyaline cast 0-2 /lpf   TROPONIN-HIGH SENSITIVITY    Collection Time: 01/06/23 12:40 AM   Result Value Ref Range    Troponin-High Sensitivity 101 (H) 0 - 54 ng/L           X-Ray, CT or other radiology findings or impressions:  XR CHEST PORT   Final Result      1. Findings of pulmonary edema from fluid overload versus CHF. Small right   effusion is present. 2. Unchanged focal left apical rounded opacity which may represent summation   artifact from left anterior rib. This finding could be further characterized   after complete resolution of pulmonary symptomatology with a nonemergent CT scan   of the chest.         10:55 PM cephalization on cxr, c/w pulm edema. Progress notes, Consult notes or additional Procedure notes:   10:55 PM feeling better, no pain  11:42 PM cont hypotension, urgend central fem line placed without diff for pressor use  2:09 AM cont to be markedly improved on bipap, no pain, no curr sob. On levophed via cvl, pressure improved. 2:10 AM d/w NP Jorge Luis Certain, to admit to icu  CRITICAL CARE NOTE:  2:10 AM  I have spent 33 minutes of critical care time involved in lab review, consultations with specialist, family decision-making, and documentation. During this entire length of time I was immediately available to the patient. This time excludes time spent on separately billable procedures. Critical Care: The reason for providing this level of medical care for this critically ill patient was due a critical illness that impaired one or more vital organ systems such that there was a high probability of imminent or life threatening deterioration in the patients condition. This care involved high complexity decision making to assess, manipulate, and support vital system functions, to treat this degree vital organ system failure and to prevent further life threatening deterioration of the patients condition. Diagnosis:   1. COPD exacerbation ([facility] [address]    2. Acute pulmonary edema (HCC)    3. Hypotension, unspecified hypotension type    4. Hypoxemia    5. Chronic renal failure, unspecified CKD stage        Disposition: admit    Follow-up Information    None          Patient's Medications   Start Taking    No medications on file   Continue Taking    CALCITRIOL (ROCALTROL) 0.25 MCG CAPSULE    Take 1 capsule by mouth once daily    CHOLECALCIFEROL (VITAMIN D3) (1000 UNITS /25 MCG) TABLET    Take 1 Tablet by mouth daily. CYANOCOBALAMIN, VITAMIN B-12, 5,000 MCG SUBL    1 Tablet by SubLINGual route daily. EPOETIN LILLIANA (EPOGEN) 10,000 UNIT/ML INJECTION    1 mL by SubCUTAneous route Once every 2 weeks. FERROUS SULFATE 325 MG (65 MG IRON) TABLET    Take 1 Tablet by mouth three (3) times daily (with meals). SEVELAMER CARBONATE (RENVELA) 800 MG TAB TAB    TAKE 1 TABLET BY MOUTH THREE TIMES DAILY WITH MEALS    SODIUM BICARBONATE 650 MG TABLET    Take 1 Tablet by mouth two (2) times a day.    These Medications have changed    No medications on file   Stop Taking    No medications on file

## 2023-01-06 NOTE — ROUTINE PROCESS
TRANSFER - OUT REPORT:    Verbal report given to Vamsi Eliseo (name) on Tucker Dempsey  being transferred to ICU (unit) for routine progression of care       Report consisted of patients Situation, Background, Assessment and   Recommendations(SBAR). Information from the following report(s) SBAR, ED Summary, and MAR was reviewed with the receiving nurse. Lines:   Triple Lumen 01/05/23 Proximal;Right Femoral (Active)       Peripheral IV 01/05/23 Right Hand (Active)   Site Assessment Clean, dry, & intact 01/05/23 2242   Phlebitis Assessment 0 01/05/23 2242   Infiltration Assessment 0 01/05/23 2242   Dressing Status Clean, dry, & intact 01/05/23 2242   Dressing Type Elastic bandage;Tape;Transparent 01/05/23 2242   Hub Color/Line Status Blue;Capped;Flushed 01/05/23 2242   Action Taken Blood drawn 01/05/23 2242       Peripheral IV 01/05/23 Right Antecubital (Active)   Site Assessment Clean, dry, & intact 01/05/23 2244   Phlebitis Assessment 0 01/05/23 2244   Infiltration Assessment 0 01/05/23 2244   Dressing Status Clean, dry, & intact 01/05/23 2244   Dressing Type Transparent 01/05/23 2244   Hub Color/Line Status Pink;Flushed; Infusing 01/05/23 2244        Opportunity for questions and clarification was provided.       Patient transported with:   Monitor  O2 @ BiPap liters  Tech

## 2023-01-06 NOTE — H&P
History and Physical    Subjective:     Alecia Sarah is a 45 y.o. female  has a past medical history of ESRD on hemodialysis ([facility] [address] Patient seen at bedside in the emergency department. Patient came in in respiratory distress short of breath and was placed on BiPAP. Patient was given breathing treatments and magnesium in the field ER physician continue treatment with continued BiPAP steroids nebulizer treatments. On x-ray patient was found to be in pulmonary edema versus CHF. Patient does not have a history of CHF either in her history or stated. Patient does have a significant history of end-stage renal dialysis with dialysis  and COPD. She is not on oxygen at home. Patient continues to smoke about half pack of cigarettes a day. Patient went to dialysis on Wednesday and had a full dialysis per herself and she goes again on Friday. Patient will be admitted to the ICU she will be continued on BiPAP she is also on the Levophed drip for the ER doctor placed a femoral central line on the right we will continue the Levophed drip while patient stabilizes. Nebulizer treatments steroids Lasix cardiology and nephrology consult. Patient is awake alert oriented and comfortable at this time on BiPAP. Past Medical History:   Diagnosis Date    ESRD on hemodialysis [facility])       Past Surgical History:   Procedure Laterality Date    HX  SECTION      PARTIAL REMOVAL OF KIDNEY       Family History   Problem Relation Age of Onset    Diabetes Maternal Grandmother       Social History     Tobacco Use    Smoking status: Every Day     Packs/day: 0.50     Years: 35.00     Pack years: 17.50     Types: Cigarettes    Smokeless tobacco: Never   Substance Use Topics    Alcohol use: Not Currently       Prior to Admission medications    Medication Sig Start Date End Date Taking?  Authorizing Provider   calcitRIOL (ROCALTROL) 0.25 mcg capsule Take 1 capsule by mouth once daily 22  Yes Marcus, Aaron Vallejo MD   cholecalciferol (VITAMIN D3) (1000 Units /25 mcg) tablet Take 1 Tablet by mouth daily. 7/7/22  Yes Katerina Donahue MD   sodium bicarbonate 650 mg tablet Take 1 Tablet by mouth two (2) times a day. 5/5/22  Yes Katerina Donahue MD   ferrous sulfate 325 mg (65 mg iron) tablet Take 1 Tablet by mouth three (3) times daily (with meals). 5/5/22  Yes Katerina Donahue MD   cyanocobalamin, vitamin B-12, 5,000 mcg subl 1 Tablet by SubLINGual route daily. 6/3/21  Yes Sera Hammond MD   sevelamer carbonate (RENVELA) 800 mg tab tab TAKE 1 TABLET BY MOUTH THREE TIMES DAILY WITH MEALS 8/18/22   Katerina Donahue MD   epoetin carmelo (Epogen) 10,000 unit/mL injection 1 mL by SubCUTAneous route Once every 2 weeks. 7/7/22   Katerina Donahue MD     Allergies   Allergen Reactions    Dulaglutide Other (comments)     Nausea with vomiting and dizziness    Sitagliptin Other (comments)     Headache, runny nose, back pain and low blood sugars    Codeine Palpitations    Pcn [Penicillins] Hives         Review of Systems   Constitutional:  Negative for fever. Respiratory:  Positive for shortness of breath. Negative for wheezing. Cardiovascular:  Positive for chest pain and leg swelling. Neurological:  Negative for dizziness. All other systems reviewed and are negative. Objective:   VITALS:    Visit Vitals  /64 (BP 1 Location: Right upper arm, BP Patient Position: At rest)   Pulse 89   Temp 97.9 °F (36.6 °C)   Resp 28   Wt 68 kg (150 lb)   SpO2 100%   Breastfeeding No   BMI 26.57 kg/m²       Physical Exam  Vitals and nursing note reviewed. Constitutional:       General: She is not in acute distress. Appearance: She is well-developed. She is ill-appearing. She is not toxic-appearing. HENT:      Head: Normocephalic and atraumatic. Eyes:      Conjunctiva/sclera: Conjunctivae normal.      Pupils: Pupils are equal, round, and reactive to light. Cardiovascular:      Rate and Rhythm: Normal rate and regular rhythm. Pulses: Normal pulses. Comments: Distant heart sounds  Pulmonary:      Effort: Pulmonary effort is normal.      Breath sounds: No stridor. Rhonchi and rales present. No wheezing. Comments: Respirations assisted with BiPAP, scattered rhonchi and rales to lower bases  Abdominal:      General: Bowel sounds are normal. There is no distension. Palpations: Abdomen is soft. Tenderness: There is no abdominal tenderness. Musculoskeletal:         General: Normal range of motion. Cervical back: Normal range of motion and neck supple. Skin:     General: Skin is warm and dry. Coloration: Skin is pale. Skin is not jaundiced. Comments: Dialysis catheter to right chest, femoral line to right groin   Neurological:      General: No focal deficit present. Mental Status: She is alert and oriented to person, place, and time. Psychiatric:         Mood and Affect: Mood normal.         Behavior: Behavior normal.         Thought Content:  Thought content normal.         Judgment: Judgment normal.       _______________________________________________________________________  Care Plan discussed with:    Comments   Patient X    Family      RN X    Care Manager                    Consultant:      _______________________________________________________________________  Expected  Disposition:   Home with Family X   HH/PT/OT/RN    SNF/LTC    ATILIO    ________________________________________________________________________  TOTAL TIME:  48 Minutes    Critical Care Provided     Minutes non procedure based      Comments    X Reviewed previous records   >50% of visit spent in counseling and coordination of care X Discussion with patient and/or family and questions answered       ________________________________________________________________________    Labs:  Recent Results (from the past 24 hour(s))   TROPONIN-HIGH SENSITIVITY    Collection Time: 01/05/23 10:34 PM   Result Value Ref Range    Troponin-High Sensitivity 75 (H) 0 - 54 ng/L   LACTIC ACID    Collection Time: 01/05/23 10:34 PM   Result Value Ref Range    Lactic acid 2.4 (HH) 0.4 - 2.0 mmol/L   CBC WITH AUTOMATED DIFF    Collection Time: 01/05/23 10:34 PM   Result Value Ref Range    WBC 11.4 4.6 - 13.2 K/uL    RBC 2.43 (L) 4.20 - 5.30 M/uL    HGB 7.2 (L) 12.0 - 16.0 g/dL    HCT 23.8 (L) 35.0 - 45.0 %    MCV 97.9 78.0 - 100.0 FL    MCH 29.6 24.0 - 34.0 PG    MCHC 30.3 (L) 31.0 - 37.0 g/dL    RDW 17.0 (H) 11.6 - 14.5 %    PLATELET 488 844 - 423 K/uL    MPV 10.6 9.2 - 11.8 FL    NRBC 0.0 0.0  WBC    ABSOLUTE NRBC 0.00 0.00 - 0.01 K/uL    NEUTROPHILS 63 40 - 73 %    LYMPHOCYTES 28 21 - 52 %    MONOCYTES 5 3 - 10 %    EOSINOPHILS 2 0 - 5 %    BASOPHILS 1 0 - 2 %    IMMATURE GRANULOCYTES 1 (H) 0 - 0.5 %    ABS. NEUTROPHILS 7.2 1.8 - 8.0 K/UL    ABS. LYMPHOCYTES 3.2 0.9 - 3.6 K/UL    ABS. MONOCYTES 0.6 0.05 - 1.2 K/UL    ABS. EOSINOPHILS 0.2 0.0 - 0.4 K/UL    ABS. BASOPHILS 0.1 0.0 - 0.1 K/UL    ABS. IMM.  GRANS. 0.1 (H) 0.00 - 0.04 K/UL    DF AUTOMATED     METABOLIC PANEL, BASIC    Collection Time: 01/05/23 10:34 PM   Result Value Ref Range    Sodium 136 136 - 145 mmol/L    Potassium 3.2 (L) 3.5 - 5.5 mmol/L    Chloride 97 (L) 100 - 111 mmol/L    CO2 26 21 - 32 mmol/L    Anion gap 13 3.0 - 18.0 mmol/L    Glucose 284 (H) 74 - 99 mg/dL    BUN 27 (H) 7 - 18 mg/dL    Creatinine 4.16 (H) 0.60 - 1.30 mg/dL    BUN/Creatinine ratio 6 (L) 12 - 20      eGFR 12 (L) >60 ml/min/1.73m2    Calcium 8.2 (L) 8.5 - 10.1 mg/dL   NT-PRO BNP    Collection Time: 01/05/23 10:34 PM   Result Value Ref Range    NT pro-BNP 47,190 (H) 0 - 900 pg/mL   INFLUENZA A & B AG (RAPID TEST)    Collection Time: 01/05/23 10:34 PM   Result Value Ref Range    Influenza A Antigen Negative Negative      Influenza B Antigen Negative Negative     BLOOD GAS, ARTERIAL    Collection Time: 01/05/23 10:40 PM   Result Value Ref Range    pH 7.36 7.35 - 7.45      PCO2 46 (H) 35 - 45 mmHg    PO2 58 (L) 80 - 100 mmHg    O2 SATURATION 90 (L) 95 - 99 %    BICARBONATE 26 22 - 26 mmol/L    BASE EXCESS 0.3 0 - 3 mmol/L    O2 METHOD Non Invasive      FIO2 35 %    MODE Pressure Control      SET RATE 12      SPONTANEOUS RATE 30      PEEP/CPAP 8.0      Sample source Arterial      SITE Right Brachial      CHRISTIANO'S TEST NOT APPLICABLE      Carboxy-Hgb 3.6 (H) 1 - 2 %    Methemoglobin 0.0 0 - 1.4 %    Oxyhemoglobin 86.4 (L) 95 - 99 %    Performed by [employer]    COVID-19 RAPID TEST    Collection Time: 01/05/23 10:45 PM   Result Value Ref Range    COVID-19 rapid test Not Detected Not Detected     URINALYSIS W/ RFLX MICROSCOPIC    Collection Time: 01/05/23 11:45 PM   Result Value Ref Range    Color Yellow      Appearance Clear      Specific gravity 1.018 1.005 - 1.030      pH (UA) 7.5 5.0 - 8.0      Protein 300 (A) Negative mg/dL    Glucose 500 (A) Negative mg/dL    Ketone Negative Negative mg/dL    Bilirubin Negative Negative      Blood Moderate (A) Negative      Urobilinogen 0.2 0.2 - 1.0 EU/dL    Nitrites Negative Negative      Leukocyte Esterase Small (A) Negative     URINE MICROSCOPIC    Collection Time: 01/05/23 11:45 PM   Result Value Ref Range    WBC 10-20 0 - 4 /hpf    RBC 20-50 0 - 2 /hpf    Epithelial cells Few 0 - 20 /lpf    Bacteria 3+ (A) None /hpf    Mucus 1+ /lpf    Hyaline cast 0-2 /lpf   TROPONIN-HIGH SENSITIVITY    Collection Time: 01/06/23 12:40 AM   Result Value Ref Range    Troponin-High Sensitivity 101 (H) 0 - 54 ng/L   BLOOD GAS, ARTERIAL    Collection Time: 01/06/23  3:48 AM   Result Value Ref Range    pH 7.43 7.35 - 7.45      PCO2 36 35 - 45 mmHg    PO2 77 (L) 80 - 100 mmHg    O2 SATURATION 96 95 - 99 %    BICARBONATE 24 22 - 26 mmol/L    BASE DEFICIT 0.3 mmol/L    O2 METHOD Non Invasive      FIO2 35 %    SET RATE 12      SPONTANEOUS RATE 20      PEEP/CPAP 8.0      Sample source Arterial      SITE Right Brachial      CHRISTIANO'S TEST NOT APPLICABLE      Carboxy-Hgb 0.9 (L) 1 - 2 %    Methemoglobin 0.2 0 - 1.4 % Oxyhemoglobin 94.5 (L) 95 - 99 %    Performed by Oliver Carr        Imaging:  XR CHEST PORT    Result Date: 1/5/2023  EXAM: XR CHEST PORT CLINICAL INDICATION/HISTORY: sob -Additional: None COMPARISON: 5/4/2022 TECHNIQUE: Frontal view of the chest _______________ FINDINGS: HEART AND MEDIASTINUM: Right-sided tunneled Mediport projects over the distal SVC. Leftward rotation degraded examination with findings of hilar mass or congestion and cephalization. LUNGS AND PLEURAL SPACES: Bilateral diffuse hazy indistinct interstitial opacities with lower lung confluence, with small right and tiny left effusion. Redemonstration of the left apical focal 2 cm opacity. No pneumothorax. BONY THORAX AND SOFT TISSUES: No acute or destructive osseous abnormality. _______________     1. Findings of pulmonary edema from fluid overload versus CHF. Small right effusion is present. 2. Unchanged focal left apical rounded opacity which may represent summation artifact from left anterior rib.  This finding could be further characterized after complete resolution of pulmonary symptomatology with a nonemergent CT scan of the chest.       Assessment & Plan:       Acute respiratory failure with hypoxia ([address]  Patient on Bipap  Lasix BID  Cardiology consultation  Nebulizers as needed  Solu-Medrol every 8    Pulmonary edema  Acute respiratory failure with hypoxia  Chest x-ray shows findings of pulmonary edema fluid overload versus CHF  Will start to give Lasix twice daily 40 mg  Cardiology consult  Fluid restriction  Echocardiogram  Is not inpatient records that she has had fluid overload or CHF in the past    COPD exacerbation ([address]  This is acute exacerbation of a chronic problem  Continue patient on BiPAP  Nebulizers as needed  Solu-Medrol every 8    End stage renal failure on dialysis [facility])  This is a chronic problem   Patient has dialysis Monday Wednesday Friday  Patient went Wednesday completed all of dialysis  Renal consult for evaluation and dialysis  Fluid restriction  Continue Rocaltrol, vitamin D, ferrous sulfate, Renvela      Fluid overload  Nephrology consult for evaluation and dialysis  Lasix BID  Fluid restriction    Hypotension  Patient on Levophed drip  Has a right femoral central line  Monitor closely in the ICU  Hold hypertension meds        Code Status: Full      Prophylaxis: Heparin SQ      Electronically Signed : Emma Zamora ENP-C, FNP-C, [address] voice recognition was used to generate this report, which may have resulted in some phonetic based errors in grammar and contents.  Even though attempts were made to correct all the mistakes, some may have been missed, and remained in the body of the document

## 2023-01-06 NOTE — ED TRIAGE NOTES
Patient arrived via EMS in respiratory distress on CPAP. Respiratory rate was 4 upon arrival and after CPAP initial O2 sat was 80%. Patient up to 90% en route. Patient is a dialysis patient (MWF) but says she has not missed any and had a full treatment yesterday. Patient placed on BiPap upon arrival to the ED. Patient given 125 mg Solumedrol, 1 Duo-neb, 2 albuterol, and 2g Mg given en route.

## 2023-01-06 NOTE — PROGRESS NOTES
Care Management Interventions  PCP Verified by CM: Yes  Palliative Care Criteria Met (RRAT>21 & CHF Dx)?: No  Transition of Care Consult (CM Consult): Discharge Planning  Support Systems: Child(jacob)  Confirm Follow Up Transport: Family  The Plan for Transition of Care is Related to the Following Treatment Goals : Patient centered discharge planning to ensure smooth transition to community and PLOF. Discharge Location  Patient Expects to be Discharged to[de-identified] Home    Reason for Admission:   Pt presented to ED with respiratory distress and SOB. She was placed on Bipap and given nebs. CXR shows pulmonary edema vs CHF. She is in ESRD with MWF days. Cardiology and Nephrology consulted. Hospitalist consulted for admission. RUR Score:      14%               Plan for utilizing home health:          PCP: First and Last name:  Mayela Goodman MD     Name of Practice:    Are you a current patient: Yes/No:    Approximate date of last visit:    Can you participate in a virtual visit with your PCP:                     Current Advanced Directive/Advance Care Plan: Full Code      Healthcare Decision Maker:   Click here to complete [address] including selection of the Healthcare Decision Maker Relationship (ie \"Primary\")                             Transition of Care Plan:   Plan of care includes medication reconciliation to be complete, education will be given with teach back method, and will identify need for post-acute care follow up. Patient centered discharge planning to ensure smooth transition to community and PLOF. CM following for DC needs. DC POC is pt will return home and resume dialysis as PTA.

## 2023-01-06 NOTE — PROGRESS NOTES
01/06/23 1209   Oxygen Therapy   O2 Sat (%) 96 %   Pulse via Oximetry 88 beats per minute   O2 Device (S)  Nasal cannula  (2L)   O2 Flow Rate (L/min) 2 l/min     Patient removed from NIPPV for lunch and placed on NC at 2LPM. NAD

## 2023-01-06 NOTE — ASSESSMENT & PLAN NOTE
This is acute exacerbation of a chronic problem  Continue patient on BiPAP  Nebulizers as needed  Solu-Medrol every 8

## 2023-01-06 NOTE — ASSESSMENT & PLAN NOTE
Acute respiratory failure with hypoxia  Chest x-ray shows findings of pulmonary edema fluid overload versus CHF  Will start to give Lasix twice daily 40 mg  Cardiology consult  Fluid restriction  Echocardiogram  Is not inpatient records that she has had fluid overload or CHF in the past

## 2023-01-06 NOTE — ASSESSMENT & PLAN NOTE
Patient on Levophed drip  Has a right femoral central line  Monitor closely in the ICU  Hold hypertension meds

## 2023-01-06 NOTE — PROCEDURES
[facility] / [phone]    Vitals Pre Post Assessment Pre Post   BP BP: 125/72 (01/06/23 1245) 139/62 LOC AXOX4 NO CHANGE   HR Pulse (Heart Rate): (!) 106 (01/06/23 1245)   74 Lungs WHEEZING, CONGESTED LESS WHEEZING,    Resp Resp Rate: 15 (01/06/23 1245) 16 Cardiac NSR NO CHANGE   Temp Temp: 97.8 °F (36.6 °C) (01/06/23 0410) 97.9 Skin WDI NO CHANGE   Weight    Edema GENERALIZED NO CHANGE   Tele status   Pain Pain Intensity 1: 0 (01/06/23 0503) PT DENIES     Orders   Duration: Start: 3634 End: 1615 Total: 3.0   Dialyzer: Dialyzer/Set Up Inspection: Revaclear (01/06/23 1245)   K Bath: Dialysate K (mEq/L): 3 (01/06/23 1245)   Ca Bath: Dialysate CA (mEq/L): 2.5 (01/06/23 1245)   Na: Dialysate NA (mEq/L): 138 (01/06/23 1245)   Bicarb: Dialysate HCO3 (mEq/L): 35 (01/06/23 1245)   Target Fluid Removal: Goal/Amount of Fluid to Remove (mL): 3000 mL (01/06/23 1245)     Access   Type & Location:  [place] / PC : Pre- Assessment: Dressing CDI. No s/s of infection. Both lumens aspirate & flush well. Running well at . Post assessment: Dressing CDI. No changes. Comments:                                        Labs         HBsAg (Antigen) / date:                               12/16/22 NEG                HBsAb (Antibody) / date: 12/16/22 LUAN   Source:  Sent from pt home clinic, reviewed by self.    Obtained/Reviewed  Critical Results Called HGB   Date Value Ref Range Status   01/06/2023 7.6 (L) 12.0 - 16.0 g/dL Final     Potassium   Date Value Ref Range Status   01/06/2023 4.1 3.5 - 5.5 mmol/L Final     Calcium   Date Value Ref Range Status   01/06/2023 8.8 8.5 - 10.1 mg/dL Final     BUN   Date Value Ref Range Status   01/06/2023 33 (H) 7 - 18 mg/dL Final     Creatinine   Date Value Ref Range Status   01/06/2023 4.67 (H) 0.60 - 1.30 mg/dL Final        Meds Given   Name Dose Route                    Adequacy / Fluid    Total Liters Process: 53.7   Net Fluid Removed: 3000 ML      Comments   Time Out Done:   (Time) 1300 YES   Admitting Diagnosis: ESPERANZA, SOB   Consent obtained/signed: Informed Consent Verified: Yes (01/06/23 1245)   Machine / RO # Machine Number: [id number] (01/06/23 1245)   Primary Nurse Rpt Pre: MayBishop Ramírez RN   Primary Nurse Rpt Post: May, Bishop Ramírez RN   Pt Education: Jaycob Michelle Plan: FOLLOW MD POC   Pts outpatient clinic: [facility]      Tx Summary   4632 SBAR received from Primary RN. 1245 Pt suite A&Ox[address]. Consent signed & on file OR Chronic consent applies. 1300Each catheter limb disinfected per p&p, caps removed, hubs disinfected per p&p. Each lumen aspirated for blood return and flushed with Normal Saline per policy. 1315 VSS. Dialysis Tx initiated. Pt states she is having trouble breathing, RT called, and UF goal increased, MD aware. 1615: Tx ended. VSS. Each dialysis catheter limb disinfected per p&p, all possible blood returned per p&p, and each dialysis hub disinfected per p&p. Each lumen flushed, post dialysis catheter saline dwell instilled per order, and caps applied. Bed locked and in the lowest position, call bell and belongings in reach. SBAR given to Primary, RN. Patient is stable at time of their/ my departure. All Dialysis related medications have been reviewed.        Comments:

## 2023-01-06 NOTE — PROGRESS NOTES
Patient currently having dialysis at the bedside. Patient became tachapneic with decreased expiratory wheezes bilaterally with a increase in WOB. Placed on NIPPV. RN aware of changes.

## 2023-01-06 NOTE — ASSESSMENT & PLAN NOTE
This is a chronic problem   Patient has dialysis Monday Wednesday Friday  Patient went Wednesday completed all of dialysis  Renal consult for evaluation and dialysis  Fluid restriction  Continue Rocaltrol, vitamin D, ferrous sulfate, Renvela

## 2023-01-07 LAB
ANION GAP SERPL CALC-SCNC: 13 MMOL/L (ref 3–18)
BASOPHILS # BLD: 0 K/UL (ref 0–0.1)
BASOPHILS NFR BLD: 0 % (ref 0–2)
BUN SERPL-MCNC: 37 MG/DL (ref 7–18)
BUN/CREAT SERPL: 10 (ref 12–20)
CA-I BLD-MCNC: 9.1 MG/DL (ref 8.5–10.1)
CHLORIDE SERPL-SCNC: 92 MMOL/L (ref 100–111)
CO2 SERPL-SCNC: 29 MMOL/L (ref 21–32)
CREAT SERPL-MCNC: 3.6 MG/DL (ref 0.6–1.3)
DIFFERENTIAL METHOD BLD: ABNORMAL
EOSINOPHIL # BLD: 0 K/UL (ref 0–0.4)
EOSINOPHIL NFR BLD: 0 % (ref 0–5)
ERYTHROCYTE [DISTWIDTH] IN BLOOD BY AUTOMATED COUNT: 17.2 % (ref 11.6–14.5)
GLUCOSE BLD STRIP.AUTO-MCNC: 294 MG/DL (ref 70–110)
GLUCOSE BLD STRIP.AUTO-MCNC: 335 MG/DL (ref 70–110)
GLUCOSE SERPL-MCNC: 259 MG/DL (ref 74–99)
HBV SURFACE AB SER QL: NONREACTIVE
HBV SURFACE AB SER-ACNC: <3.1 MIU/ML
HCT VFR BLD AUTO: 23.7 % (ref 35–45)
HGB BLD-MCNC: 7.2 G/DL (ref 12–16)
IMM GRANULOCYTES # BLD AUTO: 0 K/UL
IMM GRANULOCYTES NFR BLD AUTO: 0 %
LYMPHOCYTES # BLD: 1 K/UL (ref 0.9–3.6)
LYMPHOCYTES NFR BLD: 6 % (ref 21–52)
MCH RBC QN AUTO: 29.8 PG (ref 24–34)
MCHC RBC AUTO-ENTMCNC: 30.4 G/DL (ref 31–37)
MCV RBC AUTO: 97.9 FL (ref 78–100)
MONOCYTES # BLD: 0.7 K/UL (ref 0.05–1.2)
MONOCYTES NFR BLD: 4 % (ref 3–10)
NEUTS BAND NFR BLD MANUAL: 2 % (ref 0–5)
NEUTS SEG # BLD: 15.3 K/UL (ref 1.8–8)
NEUTS SEG NFR BLD: 88 % (ref 40–73)
NRBC # BLD: 0 K/UL (ref 0–0.01)
NRBC BLD-RTO: 0 PER 100 WBC
PERFORMED BY, TECHID: ABNORMAL
PERFORMED BY, TECHID: ABNORMAL
PLATELET # BLD AUTO: 197 K/UL (ref 135–420)
PMV BLD AUTO: 11 FL (ref 9.2–11.8)
POTASSIUM SERPL-SCNC: 3.9 MMOL/L (ref 3.5–5.5)
RBC # BLD AUTO: 2.42 M/UL (ref 4.2–5.3)
RBC MORPH BLD: ABNORMAL
SODIUM SERPL-SCNC: 134 MMOL/L (ref 136–145)
WBC # BLD AUTO: 17 K/UL (ref 4.6–13.2)

## 2023-01-07 PROCEDURE — 77010033678 HC OXYGEN DAILY

## 2023-01-07 PROCEDURE — 94640 AIRWAY INHALATION TREATMENT: CPT

## 2023-01-07 PROCEDURE — 82962 GLUCOSE BLOOD TEST: CPT

## 2023-01-07 PROCEDURE — 74011250636 HC RX REV CODE- 250/636: Performed by: NURSE PRACTITIONER

## 2023-01-07 PROCEDURE — 85025 COMPLETE CBC W/AUTO DIFF WBC: CPT

## 2023-01-07 PROCEDURE — 80048 BASIC METABOLIC PNL TOTAL CA: CPT

## 2023-01-07 PROCEDURE — 36415 COLL VENOUS BLD VENIPUNCTURE: CPT

## 2023-01-07 PROCEDURE — 94618 PULMONARY STRESS TESTING: CPT

## 2023-01-07 PROCEDURE — 74011250637 HC RX REV CODE- 250/637: Performed by: NURSE PRACTITIONER

## 2023-01-07 PROCEDURE — 90935 HEMODIALYSIS ONE EVALUATION: CPT

## 2023-01-07 PROCEDURE — 94664 DEMO&/EVAL PT USE INHALER: CPT

## 2023-01-07 PROCEDURE — 83036 HEMOGLOBIN GLYCOSYLATED A1C: CPT

## 2023-01-07 PROCEDURE — 74011636637 HC RX REV CODE- 636/637: Performed by: INTERNAL MEDICINE

## 2023-01-07 PROCEDURE — 74011000250 HC RX REV CODE- 250: Performed by: NURSE PRACTITIONER

## 2023-01-07 PROCEDURE — 94761 N-INVAS EAR/PLS OXIMETRY MLT: CPT

## 2023-01-07 PROCEDURE — 65270000029 HC RM PRIVATE

## 2023-01-07 RX ORDER — GUAIFENESIN 100 MG/5ML
100 SOLUTION ORAL
Status: DISCONTINUED | OUTPATIENT
Start: 2023-01-07 | End: 2023-01-09 | Stop reason: HOSPADM

## 2023-01-07 RX ORDER — IBUPROFEN 200 MG
4 TABLET ORAL AS NEEDED
Status: DISCONTINUED | OUTPATIENT
Start: 2023-01-07 | End: 2023-01-09 | Stop reason: HOSPADM

## 2023-01-07 RX ORDER — INSULIN LISPRO 100 [IU]/ML
INJECTION, SOLUTION INTRAVENOUS; SUBCUTANEOUS
Status: DISCONTINUED | OUTPATIENT
Start: 2023-01-07 | End: 2023-01-09 | Stop reason: HOSPADM

## 2023-01-07 RX ORDER — LORAZEPAM 2 MG/ML
2 INJECTION INTRAMUSCULAR
Status: DISCONTINUED | OUTPATIENT
Start: 2023-01-07 | End: 2023-01-09 | Stop reason: HOSPADM

## 2023-01-07 RX ORDER — DEXTROSE 50 % IN WATER (D50W) INTRAVENOUS SYRINGE
25-50 AS NEEDED
Status: DISCONTINUED | OUTPATIENT
Start: 2023-01-07 | End: 2023-01-09

## 2023-01-07 RX ADMIN — SODIUM BICARBONATE 650 MG: 650 TABLET ORAL at 17:16

## 2023-01-07 RX ADMIN — SODIUM CHLORIDE, PRESERVATIVE FREE 10 ML: 5 INJECTION INTRAVENOUS at 14:11

## 2023-01-07 RX ADMIN — SODIUM CHLORIDE, PRESERVATIVE FREE 10 ML: 5 INJECTION INTRAVENOUS at 09:59

## 2023-01-07 RX ADMIN — SODIUM BICARBONATE 650 MG: 650 TABLET ORAL at 09:54

## 2023-01-07 RX ADMIN — LORAZEPAM 2 MG: 2 INJECTION INTRAMUSCULAR; INTRAVENOUS at 21:30

## 2023-01-07 RX ADMIN — GUAIFENESIN 100 MG: 100 SOLUTION ORAL at 01:32

## 2023-01-07 RX ADMIN — FUROSEMIDE 40 MG: 10 INJECTION, SOLUTION INTRAMUSCULAR; INTRAVENOUS at 09:54

## 2023-01-07 RX ADMIN — SEVELAMER CARBONATE 800 MG: 800 TABLET, FILM COATED ORAL at 16:45

## 2023-01-07 RX ADMIN — SODIUM CHLORIDE, PRESERVATIVE FREE 10 ML: 5 INJECTION INTRAVENOUS at 05:21

## 2023-01-07 RX ADMIN — SEVELAMER CARBONATE 800 MG: 800 TABLET, FILM COATED ORAL at 14:09

## 2023-01-07 RX ADMIN — LORAZEPAM 2 MG: 2 INJECTION INTRAMUSCULAR; INTRAVENOUS at 09:59

## 2023-01-07 RX ADMIN — CYANOCOBALAMIN TAB 500 MCG 5000 MCG: 500 TAB at 09:54

## 2023-01-07 RX ADMIN — INSULIN LISPRO 6 UNITS: 100 INJECTION, SOLUTION INTRAVENOUS; SUBCUTANEOUS at 21:43

## 2023-01-07 RX ADMIN — CALCITRIOL CAPSULES 0.25 MCG 0.25 MCG: 0.25 CAPSULE ORAL at 09:54

## 2023-01-07 RX ADMIN — FERROUS SULFATE TAB 325 MG (65 MG ELEMENTAL FE) 325 MG: 325 (65 FE) TAB at 09:54

## 2023-01-07 RX ADMIN — SODIUM CHLORIDE, PRESERVATIVE FREE 10 ML: 5 INJECTION INTRAVENOUS at 22:36

## 2023-01-07 RX ADMIN — Medication 1000 UNITS: at 09:54

## 2023-01-07 RX ADMIN — FUROSEMIDE 40 MG: 10 INJECTION, SOLUTION INTRAMUSCULAR; INTRAVENOUS at 17:15

## 2023-01-07 RX ADMIN — LORAZEPAM 2 MG: 2 INJECTION INTRAMUSCULAR; INTRAVENOUS at 03:51

## 2023-01-07 RX ADMIN — ALBUTEROL SULFATE 2.5 MG: 2.5 SOLUTION RESPIRATORY (INHALATION) at 20:45

## 2023-01-07 RX ADMIN — ALBUTEROL SULFATE 2.5 MG: 2.5 SOLUTION RESPIRATORY (INHALATION) at 07:56

## 2023-01-07 RX ADMIN — HEPARIN SODIUM 5000 UNITS: 5000 INJECTION INTRAVENOUS; SUBCUTANEOUS at 21:30

## 2023-01-07 RX ADMIN — GUAIFENESIN 100 MG: 100 SOLUTION ORAL at 09:54

## 2023-01-07 RX ADMIN — HEPARIN SODIUM 5000 UNITS: 5000 INJECTION INTRAVENOUS; SUBCUTANEOUS at 05:43

## 2023-01-07 RX ADMIN — INSULIN LISPRO 8 UNITS: 100 INJECTION, SOLUTION INTRAVENOUS; SUBCUTANEOUS at 16:48

## 2023-01-07 NOTE — PROGRESS NOTES
Hospitalist Progress Note             Date of Service:  2023  NAME:  Devin Coughlin  :  1970  MRN:  [id number]    Assessment & Plan:         Acute respiratory failure with hypoxia (HCC)  Sp bipap, now on room air, desats on 6 min walk  Cont hd  Cardiology consultation  Nebulizers as needed  Solu-Medrol every 8     Pulmonary edema  - treat with hd  - cont lasix     COPD exacerbation ([facility] [address]  This is acute exacerbation of a chronic problem  Sp bipap, now room air  Nebulizers as needed  Dc steriods     End stage renal failure on dialysis [facility])  This is a chronic problem   Patient has dialysis   Patient went Wednesday completed all of dialysis  Renal consult for evaluation and dialysis  Fluid restriction  Continue Rocaltrol, vitamin D, ferrous sulfate, Renvela        Fluid overload  Nephrology consult for evaluation and dialysis  Lasix BID  Fluid restriction     Hypotension  - sp levo drip, dc femoral line in am if remains stable, has no other access      Hospital Problems  Date Reviewed: 2022            Codes Class Noted POA    * (Principal) Acute respiratory failure with hypoxia ([facility] ICD-10-CM: J96.01  ICD-9-CM: 518.81  2023 Unknown        Fluid overload ICD-10-CM: E87.70  ICD-9-CM: 276.69  2023 Unknown        End stage renal failure on dialysis [facility]) ICD-10-CM: N18.6, Z99.2  ICD-9-CM: 585.6, V45.11  2023 Unknown        Pulmonary edema ICD-10-CM: J81.1  ICD-9-CM: 327  2023 Yes        Hypotension ICD-10-CM: I95.9  ICD-9-CM: 458.9  2023 Unknown        COPD exacerbation ([facility] ICD-10-CM: J44.1  ICD-9-CM: 491.21  2023 Unknown             Review of Systems:   A comprehensive review of systems was negative except for that written in the HPI. Still feels swollen, feels like she needs more hd      Vital Signs:    Last 24hrs VS reviewed since prior progress note.  Most recent are:  Visit Vitals  /62 (BP 1 Location: Right upper arm, BP Patient Position: At rest;Sitting)   Pulse 95   Temp 98.4 °F (36.9 °C)   Resp 16   Ht 5' 4\" (1.626 m)   Wt 68 kg (150 lb)   SpO2 96%   Breastfeeding No   BMI 25.75 kg/m²         Intake/Output Summary (Last 24 hours) at 1/7/2023 1119  Last data filed at 1/7/2023 0725  Gross per 24 hour   Intake 285.63 ml   Output --   Net 285.63 ml        Physical Examination:             General:          Alert, cooperative, no distress, appears stated age. appears older than stated age, poor muscle mass  HEENT:           Atraumatic, anicteric sclerae, pink conjunctivae                          No oral ulcers, mucosa moist, throat clear, dentition fair  Neck:               Supple, symmetrical  Lungs:             decrased at bases, + end exp wheeze  Chest wall:      No tenderness  No Accessory muscle use. Heart:              Regular  rhythm,  No  murmur   No edema  Abdomen:        Soft, non-tender. Not distended. Bowel sounds normal  Extremities:     No cyanosis. No clubbing,                            Skin turgor normal, Capillary refill normal  Skin:                Not pale. Not Jaundiced  No rashes   Psych:             Not anxious or agitated.   Neurologic:      Alert, moves all extremities, answers questions appropriately and responds to commands        Data Review:    Review and/or order of clinical lab test  Review and/or order of tests in the radiology section of CPT  Review and/or order of tests in the medicine section of CPT      Labs:     Recent Labs     01/07/23  0400 01/06/23  0905   WBC 17.0* 14.6*   HGB 7.2* 7.6*   HCT 23.7* 24.2*    197     Recent Labs     01/07/23  0400 01/06/23  0905 01/05/23  2234   * 137 136   K 3.9 4.1 3.2*   CL 92* 97* 97*   CO2 29 27 26   BUN 37* 33* 27*   CREA 3.60* 4.67* 4.16*   * 264* 284*   CA 9.1 8.8 8.2*     No results for input(s): ALT, AP, TBIL, TBILI, TP, ALB, GLOB, GGT, AML, LPSE in the last 72 hours.    No lab exists for component: SGOT, GPT, AMYP, HLPSE  No results for input(s): INR, PTP, APTT, INREXT in the last 72 hours. No results for input(s): FE, TIBC, PSAT, FERR in the last 72 hours. No results found for: FOL, RBCF   Recent Labs     01/06/23  0348 01/05/23  2240   PH 7.43 7.36   PCO2 36 46*   PO2 77* 58*     No results for input(s): CPK, CKNDX, TROIQ in the last 72 hours.     No lab exists for component: CPKMB  No results found for: CHOL, CHOLX, CHLST, CHOLV, HDL, HDLP, LDL, LDLC, DLDLP, TGLX, TRIGL, TRIGP, CHHD, CHHDX  Lab Results   Component Value Date/Time    Glucose (POC) 175 (H) 05/04/2022 08:12 AM     Lab Results   Component Value Date/Time    Color Yellow 01/05/2023 11:45 PM    Appearance Clear 01/05/2023 11:45 PM    Specific gravity 1.018 01/05/2023 11:45 PM    pH (UA) 7.5 01/05/2023 11:45 PM    Protein 300 (A) 01/05/2023 11:45 PM    Glucose 500 (A) 01/05/2023 11:45 PM    Ketone Negative 01/05/2023 11:45 PM    Bilirubin Negative 01/05/2023 11:45 PM    Urobilinogen 0.2 01/05/2023 11:45 PM    Nitrites Negative 01/05/2023 11:45 PM    Leukocyte Esterase Small (A) 01/05/2023 11:45 PM    Epithelial cells Few 01/05/2023 11:45 PM    Bacteria 3+ (A) 01/05/2023 11:45 PM    WBC 10-20 01/05/2023 11:45 PM    RBC 20-50 01/05/2023 11:45 PM         Medications Reviewed:     Current Facility-Administered Medications   Medication Dose Route Frequency    guaiFENesin (ROBITUSSIN) 100 mg/5 mL oral liquid 100 mg  100 mg Oral Q4H PRN    LORazepam (ATIVAN) injection 2 mg  2 mg IntraVENous Q6H PRN    calcitRIOL (ROCALTROL) capsule 0.25 mcg  0.25 mcg Oral DAILY    cholecalciferol (VITAMIN D3) (1000 Units /25 mcg) tablet 1,000 Units  1,000 Units Oral DAILY    cyanocobalamin (VITAMIN B12) tablet 5,000 mcg  5,000 mcg Oral DAILY    ferrous sulfate tablet 325 mg  325 mg Oral TID WITH MEALS    sevelamer carbonate (RENVELA) tab 800 mg  800 mg Oral TID WITH MEALS    sodium bicarbonate tablet 650 mg  650 mg Oral BID sodium chloride (NS) flush 5-40 mL  5-40 mL IntraVENous Q8H    sodium chloride (NS) flush 5-40 mL  5-40 mL IntraVENous PRN    acetaminophen (TYLENOL) tablet 650 mg  650 mg Oral Q6H PRN    Or    acetaminophen (TYLENOL) suppository 650 mg  650 mg Rectal Q6H PRN    polyethylene glycol (MIRALAX) packet 17 g  17 g Oral DAILY PRN    ondansetron (ZOFRAN ODT) tablet 4 mg  4 mg Oral Q8H PRN    Or    ondansetron (ZOFRAN) injection 4 mg  4 mg IntraVENous Q6H PRN    albuterol (PROVENTIL VENTOLIN) nebulizer solution 2.5 mg  2.5 mg Nebulization BID RT    albuterol (PROVENTIL VENTOLIN) nebulizer solution 2.5 mg  2.5 mg Nebulization Q2H PRN    furosemide (LASIX) injection 40 mg  40 mg IntraVENous BID    heparin (porcine) injection 5,000 Units  5,000 Units SubCUTAneous Q8H     ______________________________________________________________________  EXPECTED LENGTH OF STAY: 3d 5h  ACTUAL LENGTH OF STAY:          1                 Yu Lloyd MD

## 2023-01-07 NOTE — PROGRESS NOTES
Femoral line removed. Patient placed in appropriate position. Line removed without difficulty, intact. Pressure applied for several minutes. No signs of bleeding.

## 2023-01-07 NOTE — PROGRESS NOTES
Six Minute Walk Test     Data Measured Before the Walk  Heart Rate: 101  O2 Saturation: 89  O2 Device: Room air  Jen Dyspnea Rate: Very, very slight (just noticeable)  Jen Fatigue Scale: Nothing at all    Data Measured During the Walk  Heart Rate: 111  O2 Flow Rate: 0 l/min  O2 Saturation: 85  O2 Flow Rate: 2 l/min  O2 Saturation: 91    Symptoms: Leg pain, Shortness of Breath, Fatigue  Any Problems While Exercising:    Jen Dyspnea Rate: Slight (light)  Jen Fatigue Scale: Slight (light)    Data Measured Immediately After Walk  Did Patient Stop or Pause Before 6 Minutes: Yes  Why Patient Stopped or Paused: Six walk test stopped, pt is unstead while walking      Electronically signed by Fabian Robledo on 1/7/2023 at 9:17 AM

## 2023-01-07 NOTE — RT PROTOCOL NOTE
RT Nebulizer Bronchodilator Protocol Note    There is a bronchodilator order in the chart from a provider indicating to follow the RT Bronchodilator Protocol and there is an Initiate RT Bronchodilator Protocol order as well (see protocol at bottom of note). CXR Findings:  Recent Results (from the past 2 days)    XR CHEST PORT 01/05/2023    Impression  1. Findings of pulmonary edema from fluid overload versus CHF. Small right  effusion is present. 2. Unchanged focal left apical rounded opacity which may represent summation  artifact from left anterior rib. This finding could be further characterized  after complete resolution of pulmonary symptomatology with a nonemergent CT scan  of the chest.      The findings from the last RT Protocol Assessment were as follows:  History of Pulmonary Disease: Chronic pulmonary disease  Respiratory Pattern: Dyspnea on exertion or RR 21-25 bpm  Breath Sounds: Slighly diminished and/or crackles  Cough: Strong, spontaneous, non-productive  Indication for Bronchodilator Therapy: On home bronchodilators  Bronchodilator Assessment Score: 6     Aerosolized bronchodilator medication orders have been revised according to the RT Nebulizer Bronchodilator Protocol below. Respiratory Therapist to perform RT Therapy Protocol Assessment initially then follow the protocol. Repeat RT Therapy Protocol Assessment PRN for score 0-3 or on second treatment, BID, and PRN for scores above 3. No Indications - adjust the frequency to every 6 hours PRN wheezing or bronchospasm, if no treatments needed after 48 hours then discontinue using Per Protocol order mode. If indication present, adjust the RT bronchodilator orders based on the Bronchodilator Assessment Score as indicated below. If a patient is on this medication at home then do not decrease Frequency below that used at home.     0-3 - enter or revise RT bronchodilator order(s) to equivalent RT Bronchodilator order with Frequency of every 4 hours PRN for wheezing or increased work of breathing using Per Protocol order mode. 4-6 - enter or revise RT Bronchodilator order(s) to two equivalent RT bronchodilator orders with one order with BID Frequency and one order with Frequency of every 4 hours PRN wheezing or increased work of breathing using Per Protocol order mode. 7-10 - enter or revise RT Bronchodilator order(s) to two equivalent RT bronchodilator orders with one order with TID Frequency and one order with Frequency of every 4 hours PRN wheezing or increased work of breathing using Per Protocol order mode. 11-13 - enter or revise RT Bronchodilator order(s) to one equivalent RT bronchodilator order with QID Frequency and an Albuterol order with Frequency of every 4 hours PRN wheezing or increased work of breathing using Per Protocol order mode. Greater than 13 - enter or revise RT Bronchodilator order(s) to one equivalent RT bronchodilator order with every 4 hours Frequency and an Albuterol order with Frequency of every 2 hours PRN wheezing or increased work of breathing using Per Protocol order mode. RT to enter RT Home Evaluation for COPD & MDI Assessment order using Per Protocol order mode.     Electronically signed by Letitia Willson on 1/7/2023 at 8:04 AM

## 2023-01-07 NOTE — DIALYSIS
Hemodialysis / [phone]    Vitals Pre Post Assessment Pre Post   BP BP: (!) 109/49 (01/07/23 1215)   94/45 LOC A&O x 3 A&O x 3   HR Pulse (Heart Rate): 95 (01/07/23 1215) 83 Lungs congestion congestion   Resp Resp Rate: 20 (01/07/23 1215) 22 Cardiac regular regular   Temp Temp: 98.4 °F (36.9 °C) (01/07/23 1125) 97.7 Skin warm warm   Weight Pre-Dialysis Weight: 70.6 kg (155 lb 10.3 oz) (01/07/23 1125)  Edema No edema No edema   Tele status bedside bedside Pain Pain Intensity 1: 0 (01/07/23 0743) No pain     Orders   Duration: Start: 1130 End: 1330 Total: 2 hr UF   Dialyzer: Dialyzer/Set Up Inspection: Elian Kenton ([id number]) (01/07/23 1125)   K Bath: Dialysate K (mEq/L): 3 (01/06/23 1245)   Ca Bath: Dialysate CA (mEq/L): 2.5 (01/06/23 1245)   Na: Dialysate NA (mEq/L): 138 (01/06/23 1245)   Bicarb: Dialysate HCO3 (mEq/L): 35 (01/06/23 1245)   Target Fluid Removal: Goal/Amount of Fluid to Remove (mL): 2000 mL (01/07/23 1125)     Access   Type & Location: [facility] cath   Comments:                in place, patent, dressing dry and intact, no S/S of infection                        Labs   HBsAg (Antigen) / date:    12/16/22 negative                                           HBsAb (Antibody) / date[place]16[place] [facility] [place] [facility]   Obtained/Reviewed  Critical Results Called HGB   Date Value Ref Range Status   01/07/2023 7.2 (L) 12.0 - 16.0 g/dL Final     Potassium   Date Value Ref Range Status   01/07/2023 3.9 3.5 - 5.5 mmol/L Final     Calcium   Date Value Ref Range Status   01/07/2023 9.1 8.5 - 10.1 mg/dL Final     BUN   Date Value Ref Range Status   01/07/2023 37 (H) 7 - 18 mg/dL Final     Creatinine   Date Value Ref Range Status   01/07/2023 3.60 (H) 0.60 - 1.30 mg/dL Final        Meds Given   Name Dose Route                    Adequacy / Fluid    Total Liters Process: zero   Net Fluid Removed: 2000 ml      Comments   Time Out Done:   (Time) Yes, 1125   Admitting Diagnosis: Renal failure   Consent obtained/signed: Informed Consent Verified: Yes (01/07/23 1125)   Machine / RO # Machine Number: [id number] (01/07/23 1125)   Primary Nurse Rpt Pre: Margo Kaplan RN   Primary Nurse Rpt Post: Margo Kaplan RN   Pt Education: Yes, r/t dialysis process   Care Plan: Continue HD as ordered   Pts outpatient clinic:      Tx Summary   Comments:             tolerated tx well, at end, all blood in circuit returned with 300 ml NS, [facility] cath in place, patent, dressing dry and intact, no S/S of infection.

## 2023-01-07 NOTE — PROGRESS NOTES
Problem: Breathing Pattern - Ineffective  Goal: *Absence of hypoxia  Outcome: Progressing Towards Goal  Goal: *Use of effective breathing techniques  Outcome: Progressing Towards Goal  Goal: *PALLIATIVE CARE:  Alleviation of Dyspnea  Outcome: Progressing Towards Goal     Problem: Patient Education: Go to Patient Education Activity  Goal: Patient/Family Education  Outcome: Progressing Towards Goal     Problem: Chronic Obstructive Pulmonary Disease (COPD)  Goal: *Oxygen saturation during activity within specified parameters  Outcome: Progressing Towards Goal  Goal: *Able to remain out of bed as prescribed  Outcome: Progressing Towards Goal  Goal: *Absence of hypoxia  Outcome: Progressing Towards Goal  Goal: *Optimize nutritional status  Outcome: Progressing Towards Goal     Problem: Patient Education: Go to Patient Education Activity  Goal: Patient/Family Education  Outcome: Progressing Towards Goal     Problem: Tissue Perfusion - Cardiopulmonary, Altered  Goal: *Optimize tissue perfusion  Outcome: Progressing Towards Goal  Goal: *Absence of hypoxia  Outcome: Progressing Towards Goal     Problem: Patient Education: Go to Patient Education Activity  Goal: Patient/Family Education  Outcome: Progressing Towards Goal     Problem: Chronic Renal Failure  Goal: *Fluid and electrolytes stabilized  Outcome: Progressing Towards Goal     Problem: Patient Education: Go to Patient Education Activity  Goal: Patient/Family Education  Outcome: Progressing Towards Goal     Problem: Patient Education: Go to Patient Education Activity  Goal: Patient/Family Education  Outcome: Progressing Towards Goal     Problem: Pressure Injury - Risk of  Goal: *Prevention of pressure injury  Description: Document Mio Scale and appropriate interventions in the flowsheet.   Outcome: Progressing Towards Goal  Note: Pressure Injury Interventions:       Moisture Interventions: Absorbent underpads, Internal/External urinary devices, Minimize layers    Activity Interventions: Pressure redistribution bed/mattress(bed type)    Mobility Interventions: Float heels, Pressure redistribution bed/mattress (bed type)    Nutrition Interventions: Document food/fluid/supplement intake, Discuss nutritional consult with provider    Friction and Shear Interventions: Minimize layers                Problem: Patient Education: Go to Patient Education Activity  Goal: Patient/Family Education  Outcome: Progressing Towards Goal     Problem: Falls - Risk of  Goal: *Absence of Falls  Description: Document Arjun Fall Risk and appropriate interventions in the flowsheet.   Outcome: Progressing Towards Goal     Problem: Patient Education: Go to Patient Education Activity  Goal: Patient/Family Education  Outcome: Progressing Towards Goal

## 2023-01-07 NOTE — PROGRESS NOTES
Comprehensive Nutrition Assessment    Type and Reason for Visit: Initial, Positive nutrition screen    Nutrition Recommendations/Plan:   Add 4 carbohydrate choices consistent intake to current diet  Change diet to low saturated fat 2 gm Na   Recommend check HgbA1C      Malnutrition Assessment:  Malnutrition Status:  No malnutrition (01/07/23 1253)    Context:  Chronic illness     Findings of the 6 clinical characteristics of malnutrition:   Energy Intake:  No significant decrease in energy intake  Weight Loss:  No significant weight loss     Body Fat Loss:  No significant body fat loss,     Muscle Mass Loss:  Mild muscle mass loss, Calf (gastrocnemius), Hand (interosseous), Thigh (quadriceps)  Fluid Accumulation:  Mild, Generalized   Strength:        Nutrition Assessment:    Pt admitted for Acute Respiratory failure, Fluid overload ESRD on HD, M, W, F, currently receiving additional HD tx for fluid reduction, hypotension, COPD. Usual weight in July, 2022 150 pounds, matches current weight on admission. Pt intake 51-75% of meals, poor dentition, but pt denies difficulty chewing or swallowing. Last BM 1/6/2023. Pt states she takes vit D, B12, B6 and Fe supplements daily. Pt denies consistent carbohydrate intake; gluc 259 on humalog SSI - may be due to recent solumedrol  tx.  K 3.9 liberalize K restriction. Protein intake per diet hx  and nutrition assessment appears adequate,  but needs exceed current protein restricted diet. Nutrition Related Findings:    Pt states she is suppose to follow a low salt diet at home and does not use salt shaker. Wound Type: None    Current Nutrition Intake & Therapies:  Average Meal Intake: 51-75%  Average Supplement Intake: None ordered  ADULT DIET Regular; Low Sodium (2 gm);  Low Potassium (Less than 3000 mg/day); 60 to 80 gm    Anthropometric Measures:  Height: 5' 4\" (162.6 cm)  Ideal Body Weight (IBW): 120 lbs (55 kg)  Admission Body Weight: 149 lb 14.6 oz  Current Body Wt:  68 kg (150 lb), 125 % IBW. Bed scale  Current BMI (kg/m2): 25.7  Usual Body Weight: 68 kg (149 lb 14.6 oz)  % Weight Change (Calculated): 0.1      BMI Category: Overweight (BMI 25.0-29. 9)    Estimated Daily Nutrient Needs:  Energy Requirements Based On: Kcal/kg  Weight Used for Energy Requirements: Current  Energy (kcal/day): 1815-5754 Kcal/d  Weight Used for Protein Requirements: Admission  Protein (g/day): 68-82 gm/d (1-1.2 gm/kg)  Method Used for Fluid Requirements: Standard renal  Fluid (ml/day): 1500 ml/d    Nutrition Diagnosis:   Altered nutrition-related lab values related to endocrine dysfunction, renal dysfunction as evidenced by lab values (elevated glucose)    Nutrition Interventions:   Food and/or Nutrient Delivery: Modify current diet  Nutrition Education/Counseling: Education needed (Pt on dialysis with hypotension, unable to complete education)   Goals:     Goals: other (specify)  Specify Other Goals: Glucose  with HgbA1C 5.7 or less. Nutrition Monitoring and Evaluation:   Behavioral-Environmental Outcomes: Knowledge or skill  Food/Nutrient Intake Outcomes: Food and nutrient intake  Physical Signs/Symptoms Outcomes: Biochemical data, Fluid status or edema    Discharge Planning:     Too soon to determine    [place]  Contact: [phone]

## 2023-01-07 NOTE — PROGRESS NOTES
1845- Bedside shift change report given to Guille Colmenares (oncoming nurse) by Gemma Caruso LPN (offgoing nurse). Report included the following information SBAR, Kardex, ED Summary, Intake/Output, MAR, Accordion, Recent Results, and Cardiac Rhythm SR w/ ST depression . Received patient resting in bed, watching tv. Bed in lowest and locked position. 2015- This RN to bedside for shift assessment. Patient calm and pleasant, denies pain or needs. All lung fields clear, diminished at this time, patient remains on 2L NC.     2200- Patient up to [facility], small/smear BM.     0000- RT at bedside. 0115- Patient coughing in bed. This RN to bedside, patient requesting cough medicine at this time. Provider on unit, orders received at this time. 0330- Patient restless in bed, agitated, states she needs to get out of here and the solumedrol is making her sweat. Afebrile. Discussed patient anxiety/restlessness with provider. Verbal orders received at this time. 0315- Morning labs drawn at this time. Weaned off of oxygen at this time. Saturations 93% on monitor. 6911- Bedside shift change report given to Gemma Caruso LPN (oncoming nurse) by Baldo Glass RN (offgoing nurse).  Report included the following information SBAR, Kardex, ED Summary, Intake/Output, MAR, Accordion, Recent Results, and Cardiac Rhythm SR .

## 2023-01-08 ENCOUNTER — APPOINTMENT (OUTPATIENT)
Dept: GENERAL RADIOLOGY | Age: 53
DRG: 133 | End: 2023-01-08
Attending: INTERNAL MEDICINE
Payer: MEDICAID

## 2023-01-08 LAB
ANION GAP SERPL CALC-SCNC: 15 MMOL/L (ref 3–18)
BASOPHILS # BLD: 0 K/UL (ref 0–0.1)
BASOPHILS NFR BLD: 0 % (ref 0–2)
BUN SERPL-MCNC: 71 MG/DL (ref 7–18)
BUN/CREAT SERPL: 15 (ref 12–20)
CA-I BLD-MCNC: 8.9 MG/DL (ref 8.5–10.1)
CHLORIDE SERPL-SCNC: 90 MMOL/L (ref 100–111)
CO2 SERPL-SCNC: 28 MMOL/L (ref 21–32)
CREAT SERPL-MCNC: 4.74 MG/DL (ref 0.6–1.3)
DIFFERENTIAL METHOD BLD: ABNORMAL
EOSINOPHIL # BLD: 0 K/UL (ref 0–0.4)
EOSINOPHIL NFR BLD: 0 % (ref 0–5)
ERYTHROCYTE [DISTWIDTH] IN BLOOD BY AUTOMATED COUNT: 17.2 % (ref 11.6–14.5)
EST. AVERAGE GLUCOSE BLD GHB EST-MCNC: 97 MG/DL
GLUCOSE BLD STRIP.AUTO-MCNC: 167 MG/DL (ref 70–110)
GLUCOSE BLD STRIP.AUTO-MCNC: 219 MG/DL (ref 70–110)
GLUCOSE BLD STRIP.AUTO-MCNC: 240 MG/DL (ref 70–110)
GLUCOSE BLD STRIP.AUTO-MCNC: 267 MG/DL (ref 70–110)
GLUCOSE SERPL-MCNC: 194 MG/DL (ref 74–99)
HBA1C MFR BLD: 5 % (ref 4.2–5.6)
HCT VFR BLD AUTO: 20 % (ref 35–45)
HGB BLD-MCNC: 6.3 G/DL (ref 12–16)
IMM GRANULOCYTES # BLD AUTO: 0.2 K/UL (ref 0–0.04)
IMM GRANULOCYTES NFR BLD AUTO: 2 % (ref 0–0.5)
LYMPHOCYTES # BLD: 1.8 K/UL (ref 0.9–3.6)
LYMPHOCYTES NFR BLD: 13 % (ref 21–52)
MCH RBC QN AUTO: 30.7 PG (ref 24–34)
MCHC RBC AUTO-ENTMCNC: 31.5 G/DL (ref 31–37)
MCV RBC AUTO: 97.6 FL (ref 78–100)
MONOCYTES # BLD: 0.9 K/UL (ref 0.05–1.2)
MONOCYTES NFR BLD: 6 % (ref 3–10)
NEUTS SEG # BLD: 11.2 K/UL (ref 1.8–8)
NEUTS SEG NFR BLD: 79 % (ref 40–73)
NRBC # BLD: 0 K/UL (ref 0–0.01)
NRBC BLD-RTO: 0 PER 100 WBC
PERFORMED BY, TECHID: ABNORMAL
PLATELET # BLD AUTO: 165 K/UL (ref 135–420)
PMV BLD AUTO: 10.4 FL (ref 9.2–11.8)
POTASSIUM SERPL-SCNC: 3.4 MMOL/L (ref 3.5–5.5)
RBC # BLD AUTO: 2.05 M/UL (ref 4.2–5.3)
SODIUM SERPL-SCNC: 133 MMOL/L (ref 136–145)
WBC # BLD AUTO: 14.1 K/UL (ref 4.6–13.2)

## 2023-01-08 PROCEDURE — 86900 BLOOD TYPING SEROLOGIC ABO: CPT

## 2023-01-08 PROCEDURE — 85025 COMPLETE CBC W/AUTO DIFF WBC: CPT

## 2023-01-08 PROCEDURE — 74011000250 HC RX REV CODE- 250: Performed by: NURSE PRACTITIONER

## 2023-01-08 PROCEDURE — 94761 N-INVAS EAR/PLS OXIMETRY MLT: CPT

## 2023-01-08 PROCEDURE — 36415 COLL VENOUS BLD VENIPUNCTURE: CPT

## 2023-01-08 PROCEDURE — 94640 AIRWAY INHALATION TREATMENT: CPT

## 2023-01-08 PROCEDURE — 82962 GLUCOSE BLOOD TEST: CPT

## 2023-01-08 PROCEDURE — 94618 PULMONARY STRESS TESTING: CPT

## 2023-01-08 PROCEDURE — 36430 TRANSFUSION BLD/BLD COMPNT: CPT

## 2023-01-08 PROCEDURE — 74011250636 HC RX REV CODE- 250/636: Performed by: NURSE PRACTITIONER

## 2023-01-08 PROCEDURE — P9016 RBC LEUKOCYTES REDUCED: HCPCS

## 2023-01-08 PROCEDURE — 74011250637 HC RX REV CODE- 250/637: Performed by: NURSE PRACTITIONER

## 2023-01-08 PROCEDURE — 65270000029 HC RM PRIVATE

## 2023-01-08 PROCEDURE — 71045 X-RAY EXAM CHEST 1 VIEW: CPT

## 2023-01-08 PROCEDURE — 74011636637 HC RX REV CODE- 636/637: Performed by: INTERNAL MEDICINE

## 2023-01-08 PROCEDURE — 80048 BASIC METABOLIC PNL TOTAL CA: CPT

## 2023-01-08 PROCEDURE — 30233N1 TRANSFUSION OF NONAUTOLOGOUS RED BLOOD CELLS INTO PERIPHERAL VEIN, PERCUTANEOUS APPROACH: ICD-10-PCS | Performed by: INTERNAL MEDICINE

## 2023-01-08 RX ORDER — SODIUM CHLORIDE 9 MG/ML
250 INJECTION, SOLUTION INTRAVENOUS AS NEEDED
Status: DISCONTINUED | OUTPATIENT
Start: 2023-01-08 | End: 2023-01-09 | Stop reason: HOSPADM

## 2023-01-08 RX ADMIN — INSULIN LISPRO 4 UNITS: 100 INJECTION, SOLUTION INTRAVENOUS; SUBCUTANEOUS at 16:24

## 2023-01-08 RX ADMIN — SODIUM BICARBONATE 650 MG: 650 TABLET ORAL at 17:38

## 2023-01-08 RX ADMIN — SEVELAMER CARBONATE 800 MG: 800 TABLET, FILM COATED ORAL at 11:28

## 2023-01-08 RX ADMIN — SEVELAMER CARBONATE 800 MG: 800 TABLET, FILM COATED ORAL at 08:40

## 2023-01-08 RX ADMIN — LORAZEPAM 2 MG: 2 INJECTION INTRAMUSCULAR; INTRAVENOUS at 13:45

## 2023-01-08 RX ADMIN — INSULIN LISPRO 2 UNITS: 100 INJECTION, SOLUTION INTRAVENOUS; SUBCUTANEOUS at 07:37

## 2023-01-08 RX ADMIN — HEPARIN SODIUM 5000 UNITS: 5000 INJECTION INTRAVENOUS; SUBCUTANEOUS at 06:29

## 2023-01-08 RX ADMIN — HEPARIN SODIUM 5000 UNITS: 5000 INJECTION INTRAVENOUS; SUBCUTANEOUS at 13:45

## 2023-01-08 RX ADMIN — CYANOCOBALAMIN TAB 500 MCG 5000 MCG: 500 TAB at 08:41

## 2023-01-08 RX ADMIN — Medication 1000 UNITS: at 08:40

## 2023-01-08 RX ADMIN — HEPARIN SODIUM 5000 UNITS: 5000 INJECTION INTRAVENOUS; SUBCUTANEOUS at 21:52

## 2023-01-08 RX ADMIN — ACETAMINOPHEN 650 MG: 325 TABLET ORAL at 13:33

## 2023-01-08 RX ADMIN — SEVELAMER CARBONATE 800 MG: 800 TABLET, FILM COATED ORAL at 16:23

## 2023-01-08 RX ADMIN — ACETAMINOPHEN 650 MG: 325 TABLET ORAL at 06:11

## 2023-01-08 RX ADMIN — SODIUM BICARBONATE 650 MG: 650 TABLET ORAL at 08:40

## 2023-01-08 RX ADMIN — CALCITRIOL CAPSULES 0.25 MCG 0.25 MCG: 0.25 CAPSULE ORAL at 08:40

## 2023-01-08 RX ADMIN — SODIUM CHLORIDE, PRESERVATIVE FREE 10 ML: 5 INJECTION INTRAVENOUS at 06:55

## 2023-01-08 RX ADMIN — SODIUM CHLORIDE, PRESERVATIVE FREE 10 ML: 5 INJECTION INTRAVENOUS at 21:52

## 2023-01-08 RX ADMIN — SODIUM CHLORIDE, PRESERVATIVE FREE 10 ML: 5 INJECTION INTRAVENOUS at 13:47

## 2023-01-08 RX ADMIN — INSULIN LISPRO 4 UNITS: 100 INJECTION, SOLUTION INTRAVENOUS; SUBCUTANEOUS at 11:28

## 2023-01-08 RX ADMIN — INSULIN LISPRO 6 UNITS: 100 INJECTION, SOLUTION INTRAVENOUS; SUBCUTANEOUS at 21:52

## 2023-01-08 RX ADMIN — ALBUTEROL SULFATE 2.5 MG: 2.5 SOLUTION RESPIRATORY (INHALATION) at 20:04

## 2023-01-08 RX ADMIN — ALBUTEROL SULFATE 2.5 MG: 2.5 SOLUTION RESPIRATORY (INHALATION) at 07:19

## 2023-01-08 NOTE — PROGRESS NOTES
Pt does not qualify ffor 02 at this time, sats drop to 94% and slight wobble unsteady when walking. No resp distress only c/o leg pain both legs.      01/08/23 0907   Resting (Room Air)   SpO2 98   HR 90   During Walk (Room Air)   SpO2 94      Walk/Assistance Device   (none)   Rate of Dyspnea 0   Symptoms Leg pain   Comments   (unsteady on feet slight wobble)   After Walk   SpO2 94   HR 90   O2 Device Other (comment)  (room air)   O2 Flow Rate (l/min) 0 l/min   FIO2 (%) 21   Rate of Dyspnea 0   Symptoms Leg pain   Does the Patient Qualify for Home O2 No   Does the Patient Need Portable Oxygen Tanks No

## 2023-01-08 NOTE — PROGRESS NOTES
Problem: Breathing Pattern - Ineffective  Goal: *Absence of hypoxia  Outcome: Progressing Towards Goal  Goal: *Use of effective breathing techniques  Outcome: Progressing Towards Goal  Goal: *PALLIATIVE CARE:  Alleviation of Dyspnea  Outcome: Progressing Towards Goal     Problem: Patient Education: Go to Patient Education Activity  Goal: Patient/Family Education  Outcome: Progressing Towards Goal     Problem: Chronic Obstructive Pulmonary Disease (COPD)  Goal: *Oxygen saturation during activity within specified parameters  Outcome: Progressing Towards Goal  Goal: *Able to remain out of bed as prescribed  Outcome: Progressing Towards Goal  Goal: *Absence of hypoxia  Outcome: Progressing Towards Goal  Goal: *Optimize nutritional status  Outcome: Progressing Towards Goal     Problem: Patient Education: Go to Patient Education Activity  Goal: Patient/Family Education  Outcome: Progressing Towards Goal     Problem: Tissue Perfusion - Cardiopulmonary, Altered  Goal: *Optimize tissue perfusion  Outcome: Progressing Towards Goal  Goal: *Absence of hypoxia  Outcome: Progressing Towards Goal     Problem: Patient Education: Go to Patient Education Activity  Goal: Patient/Family Education  Outcome: Progressing Towards Goal     Problem: Chronic Renal Failure  Goal: *Fluid and electrolytes stabilized  Outcome: Progressing Towards Goal     Problem: Patient Education: Go to Patient Education Activity  Goal: Patient/Family Education  Outcome: Progressing Towards Goal     Problem: Pressure Injury - Risk of  Goal: *Prevention of pressure injury  Description: Document Mio Scale and appropriate interventions in the flowsheet.   Outcome: Progressing Towards Goal  Note: Pressure Injury Interventions:       Moisture Interventions: Absorbent underpads, Internal/External urinary devices, Check for incontinence Q2 hours and as needed    Activity Interventions: Increase time out of bed, Pressure redistribution bed/mattress(bed type)    Mobility Interventions: Float heels, Pressure redistribution bed/mattress (bed type)    Nutrition Interventions: Document food/fluid/supplement intake    Friction and Shear Interventions: HOB 30 degrees or less, Minimize layers                Problem: Patient Education: Go to Patient Education Activity  Goal: Patient/Family Education  Outcome: Progressing Towards Goal     Problem: Falls - Risk of  Goal: *Absence of Falls  Description: Document Arjun Fall Risk and appropriate interventions in the flowsheet.   Outcome: Progressing Towards Goal     Problem: Patient Education: Go to Patient Education Activity  Goal: Patient/Family Education  Outcome: Progressing Towards Goal

## 2023-01-08 NOTE — PROGRESS NOTES
1845- Bedside shift change report given to Ole Oh RN (oncoming nurse) by Cande Cochran LPN (offgoing nurse). Report included the following information SBAR, Kardex, ED Summary, Intake/Output, MAR, Accordion, Recent Results, and Cardiac Rhythm SR . Received patient resting in bed, watching tv. Bed in lowest and locked position. CBWR.    1930- This RN to bedside for shift assessment. Patient demands food at this time. This RN gives patient options, to which patient gets agitated d/t unavailability of requested sandwich. Patient verbally aggressive at this time. 2045- Rt at bedside for breathing treatment. 2130- This RN to bedside for scheduled & PRN medications. 2330- Patient states PIV is hurting. Assessment completed at this time by this RN. No redness or abnormalities upon assessment, patient request this PIV to be d/c'd.     0005- This RN to bedside. PIV removal, new 22G placed to patient R FA. Patient tolerates well. 0231- Patient resting in bed, eyes closed. Respirations even and unlabored. 0330- Phlebo at bedside, morning labs drawn at this time    0505- Patient c/o headache. PRN medication to be given. 0- RN returns to patient room with tylenol, patient found sleeping. No medication given at this time. 0600- Patient requesting PRN tylenol for HA. Provided. 1672- Bedside shift change report given to Cande Cochran LPN (oncoming nurse) by Ole Oh RN (offgoing nurse).  Report included the following information SBAR, Kardex, ED Summary, Intake/Output, MAR, Accordion, Recent Results, and Cardiac Rhythm SR .

## 2023-01-08 NOTE — PROGRESS NOTES
Problem: Breathing Pattern - Ineffective  Goal: *Absence of hypoxia  Outcome: Progressing Towards Goal  Goal: *Use of effective breathing techniques  Outcome: Progressing Towards Goal  Goal: *PALLIATIVE CARE:  Alleviation of Dyspnea  Outcome: Progressing Towards Goal     Problem: Patient Education: Go to Patient Education Activity  Goal: Patient/Family Education  Outcome: Progressing Towards Goal     Problem: Chronic Obstructive Pulmonary Disease (COPD)  Goal: *Oxygen saturation during activity within specified parameters  Outcome: Progressing Towards Goal  Goal: *Able to remain out of bed as prescribed  Outcome: Progressing Towards Goal  Goal: *Absence of hypoxia  Outcome: Progressing Towards Goal  Goal: *Optimize nutritional status  Outcome: Progressing Towards Goal     Problem: Patient Education: Go to Patient Education Activity  Goal: Patient/Family Education  Outcome: Progressing Towards Goal     Problem: Tissue Perfusion - Cardiopulmonary, Altered  Goal: *Optimize tissue perfusion  Outcome: Progressing Towards Goal  Goal: *Absence of hypoxia  Outcome: Progressing Towards Goal     Problem: Patient Education: Go to Patient Education Activity  Goal: Patient/Family Education  Outcome: Progressing Towards Goal     Problem: Chronic Renal Failure  Goal: *Fluid and electrolytes stabilized  Outcome: Progressing Towards Goal     Problem: Patient Education: Go to Patient Education Activity  Goal: Patient/Family Education  Outcome: Progressing Towards Goal     Problem: Pressure Injury - Risk of  Goal: *Prevention of pressure injury  Description: Document Mio Scale and appropriate interventions in the flowsheet.   Outcome: Progressing Towards Goal  Note: Pressure Injury Interventions:       Moisture Interventions: Absorbent underpads, Internal/External urinary devices, Maintain skin hydration (lotion/cream), Minimize layers    Activity Interventions: Increase time out of bed, PT/OT evaluation    Mobility Interventions: PT/OT evaluation, HOB 30 degrees or less    Nutrition Interventions: Document food/fluid/supplement intake    Friction and Shear Interventions: HOB 30 degrees or less, Minimize layers                Problem: Patient Education: Go to Patient Education Activity  Goal: Patient/Family Education  Outcome: Progressing Towards Goal     Problem: Falls - Risk of  Goal: *Absence of Falls  Description: Document Arjun Fall Risk and appropriate interventions in the flowsheet.   Outcome: Progressing Towards Goal     Problem: Patient Education: Go to Patient Education Activity  Goal: Patient/Family Education  Outcome: Progressing Towards Goal

## 2023-01-08 NOTE — PROGRESS NOTES
Chart reviewed   44 y/o WF who gets HD MWF at [address] [place] [address],[address] was admitted with Acute Hypoxic Resp failure secondary to Pulmonary edema , she is usually compliant with her HD treatments , she had HD / UF on Friday and Saturday with improvement in her Resp Failure , she is off BIPAP on NC , she is planned for HD tomorrow and continue MWF with target UF 2-3 L .      Will follow , please give me a call with any question     Dr. Dalila Bowling   Pager [phone]

## 2023-01-08 NOTE — PROGRESS NOTES
0700:  Bedside shift change report given to E. Michele Simmonds, RN (oncoming nurse) by Hernandez Hassan, MIO (offgoing nurse). Report included the following information SBAR, Kardex, Intake/Output, MAR, Recent Results, and Cardiac Rhythm NSR .     0741:  Initial assessment complete, see flowsheet. Patient is A&OX4, has no c/o pain or signs of distress this morning. Xray at the bedside. Patient set up for breakfast after insulin administration, see MAR.    7991-7120:  Patient is resting quietly with her eyes closed at this time, curtain closed to promote adequate rest.    1333:  Patient c/o HA and inability to rest. See MAR for ativan and Tylenol administration. 1505:  Patient signed blood administration consent after reviewing risks. Opportunities for questions and concerns provided. 1722:  Patient tolerating blood transfusion. Rate changed, see flowsheet. Patient's  is leaving bedside, requested to be called [facility]: [phone]) after transfusion completes if patient is comfortable with being discharged. [facility] in report. 1845:  IV blown during transfusion. Nursing supervisor at bedside, successfully obtained a 20G in the right FA, see Avatar. Transfusion resumed. 1900:  Bedside shift change report given to CHUCHO Najera (oncoming nurse) by Cleo Holloway RN (offgoing nurse). Report included the following information SBAR, Kardex, Intake/Output, MAR, Recent Results, and Cardiac Rhythm NSR .

## 2023-01-08 NOTE — PROGRESS NOTES
Hospitalist Progress Note             Date of Service:  2023  NAME:  Kate Smith  :  1970  MRN:  [id number]    Assessment & Plan:    anemia of chronic renal dz, severe, meets criteria for transfusion  - < 7, falling while walking  - if no access today, will await HD tomorrow to give     Acute respiratory failure with hypoxia ([address]- resolved with HD  Sp bipap, now on room air, desats on 6 min walk  Cont hd  Cardiology consultation  Nebulizers as needed  Solu-Medrol every 8     Pulmonary edema  - treat with hd     COPD exacerbation ([facility]  This is acute exacerbation of a chronic problem  Sp bipap, now room air  Nebulizers as needed  Dc steriods     End stage renal failure on dialysis [facility])  This is a chronic problem   Patient has dialysis   Patient went Wednesday completed all of dialysis  Renal consult for evaluation and dialysis  Fluid restriction  Continue Rocaltrol, vitamin D, ferrous sulfate, Renvela        Fluid overload  Nephrology consult for evaluation and dialysis  Fluid restriction     Hypotension  - sp levo drip,     Pt admitted fo rpulm edema, needed 2 runs HD to become euvolemia, but she appears to have a number of chronic medical illnesses, and I don't think she takes very good care of herself, I think she does little to no activity, she smokes, and doesn't follow dietary recommendations. When I take her for 6 min walks, she does ok from a pulse ox pov, but can barely walk properly, this level of debility is impressive considering she is only 52. Today her chronic anemia drops down to < 7, thus requiring a treansfusion. Overall pt feels improved, but I don't feel safe letting her go yet, transfuse 1 unit pRBC today, work with PT in am, will need home PT I suspect.       Hospital Problems  Date Reviewed: 2022            Codes Class Noted POA    * (Principal) Acute respiratory failure with hypoxia ([facility] ICD-10-CM: J96.01  ICD-9-CM: 518.81  1/6/2023 Unknown        Fluid overload ICD-10-CM: E87.70  ICD-9-CM: 276.69  1/6/2023 Unknown        End stage renal failure on dialysis [facility]) ICD-10-CM: N18.6, Z99.2  ICD-9-CM: 585.6, V45.11  1/6/2023 Unknown        Pulmonary edema ICD-10-CM: J81.1  ICD-9-CM: 888  1/6/2023 Yes        Hypotension ICD-10-CM: I95.9  ICD-9-CM: 458.9  1/6/2023 Unknown        COPD exacerbation ([facility] ICD-10-CM: J44.1  ICD-9-CM: 491.21  1/6/2023 Unknown             Review of Systems:   A comprehensive review of systems was negative except for that written in the HPI. Vital Signs:    Last 24hrs VS reviewed since prior progress note. Most recent are:  Visit Vitals  BP (!) 90/43 (BP 1 Location: Right upper arm, BP Patient Position: At rest)   Pulse 96   Temp 97.6 °F (36.4 °C)   Resp 13   Ht 5' 4\" (1.626 m)   Wt 68 kg (150 lb)   SpO2 94%   Breastfeeding No   BMI 25.75 kg/m²         Intake/Output Summary (Last 24 hours) at 1/8/2023 1025  Last data filed at 1/8/2023 0656  Gross per 24 hour   Intake --   Output 3150 ml   Net -3150 ml        Physical Examination:             General:          Alert, cooperative, no distress, appears stated age.    + severe generalized weakness  HEENT:           Atraumatic, anicteric sclerae, pink conjunctivae                          No oral ulcers, mucosa moist, throat clear, dentition fair  Neck:               Supple, symmetrical  Lungs:             end exp wheeze  Chest wall:      No tenderness  No Accessory muscle use. Heart:              Regular  rhythm,  No  murmur   No edema  Abdomen:        Soft, non-tender. Not distended. Bowel sounds normal  Extremities:     No cyanosis. No clubbing,                            Skin turgor normal, Capillary refill normal  Skin:                Not pale. Not Jaundiced  No rashes   Psych:             Not anxious or agitated.   Neurologic:      Alert, moves all extremities, answers questions appropriately and responds to commands        Data Review:    Review and/or order of clinical lab test  Review and/or order of tests in the radiology section of CPT  Review and/or order of tests in the medicine section of CPT      Labs:     Recent Labs     01/08/23  0400 01/07/23  0400   WBC 14.1* 17.0*   HGB 6.3* 7.2*   HCT 20.0* 23.7*    197     Recent Labs     01/08/23  0400 01/07/23  0400 01/06/23  0905   * 134* 137   K 3.4* 3.9 4.1   CL 90* 92* 97*   CO2 28 29 27   BUN 71* 37* 33*   CREA 4.74* 3.60* 4.67*   * 259* 264*   CA 8.9 9.1 8.8     No results for input(s): ALT, AP, TBIL, TBILI, TP, ALB, GLOB, GGT, AML, LPSE in the last 72 hours. No lab exists for component: SGOT, GPT, AMYP, HLPSE  No results for input(s): INR, PTP, APTT, INREXT in the last 72 hours. No results for input(s): FE, TIBC, PSAT, FERR in the last 72 hours. No results found for: FOL, RBCF   Recent Labs     01/06/23  0348 01/05/23  2240   PH 7.43 7.36   PCO2 36 46*   PO2 77* 58*     No results for input(s): CPK, CKNDX, TROIQ in the last 72 hours.     No lab exists for component: CPKMB  No results found for: CHOL, CHOLX, CHLST, CHOLV, HDL, HDLP, LDL, LDLC, DLDLP, TGLX, TRIGL, TRIGP, CHHD, CHHDX  Lab Results   Component Value Date/Time    Glucose (POC) 167 (H) 01/08/2023 07:31 AM    Glucose (POC) 294 (H) 01/07/2023 09:34 PM    Glucose (POC) 335 (H) 01/07/2023 04:44 PM    Glucose (POC) 175 (H) 05/04/2022 08:12 AM     Lab Results   Component Value Date/Time    Color Yellow 01/05/2023 11:45 PM    Appearance Clear 01/05/2023 11:45 PM    Specific gravity 1.018 01/05/2023 11:45 PM    pH (UA) 7.5 01/05/2023 11:45 PM    Protein 300 (A) 01/05/2023 11:45 PM    Glucose 500 (A) 01/05/2023 11:45 PM    Ketone Negative 01/05/2023 11:45 PM    Bilirubin Negative 01/05/2023 11:45 PM    Urobilinogen 0.2 01/05/2023 11:45 PM    Nitrites Negative 01/05/2023 11:45 PM    Leukocyte Esterase Small (A) 01/05/2023 11:45 PM    Epithelial cells Few 01/05/2023 11:45 PM    Bacteria 3+ (A) 01/05/2023 11:45 PM    WBC 10-20 01/05/2023 11:45 PM    RBC 20-50 01/05/2023 11:45 PM         Medications Reviewed:     Current Facility-Administered Medications   Medication Dose Route Frequency    0.9% sodium chloride infusion 250 mL  250 mL IntraVENous PRN    guaiFENesin (ROBITUSSIN) 100 mg/5 mL oral liquid 100 mg  100 mg Oral Q4H PRN    LORazepam (ATIVAN) injection 2 mg  2 mg IntraVENous Q6H PRN    insulin lispro (HUMALOG) injection   SubCUTAneous AC&HS    glucose chewable tablet 16 g  4 Tablet Oral PRN    glucagon (GLUCAGEN) injection 1 mg  1 mg IntraMUSCular PRN    dextrose (D50W) injection syrg 12.5-25 g  25-50 mL IntraVENous PRN    calcitRIOL (ROCALTROL) capsule 0.25 mcg  0.25 mcg Oral DAILY    cholecalciferol (VITAMIN D3) (1000 Units /25 mcg) tablet 1,000 Units  1,000 Units Oral DAILY    cyanocobalamin (VITAMIN B12) tablet 5,000 mcg  5,000 mcg Oral DAILY    [Held by provider] ferrous sulfate tablet 325 mg  325 mg Oral TID WITH MEALS    sevelamer carbonate (RENVELA) tab 800 mg  800 mg Oral TID WITH MEALS    sodium bicarbonate tablet 650 mg  650 mg Oral BID    sodium chloride (NS) flush 5-40 mL  5-40 mL IntraVENous Q8H    sodium chloride (NS) flush 5-40 mL  5-40 mL IntraVENous PRN    acetaminophen (TYLENOL) tablet 650 mg  650 mg Oral Q6H PRN    Or    acetaminophen (TYLENOL) suppository 650 mg  650 mg Rectal Q6H PRN    polyethylene glycol (MIRALAX) packet 17 g  17 g Oral DAILY PRN    ondansetron (ZOFRAN ODT) tablet 4 mg  4 mg Oral Q8H PRN    Or    ondansetron (ZOFRAN) injection 4 mg  4 mg IntraVENous Q6H PRN    albuterol (PROVENTIL VENTOLIN) nebulizer solution 2.5 mg  2.5 mg Nebulization BID RT    albuterol (PROVENTIL VENTOLIN) nebulizer solution 2.5 mg  2.5 mg Nebulization Q2H PRN    heparin (porcine) injection 5,000 Units  5,000 Units SubCUTAneous Q8H     ______________________________________________________________________  EXPECTED LENGTH OF STAY: 3d 5h  ACTUAL LENGTH OF STAY: 2                 rIis Martin MD

## 2023-01-09 VITALS
BODY MASS INDEX: 25.61 KG/M2 | WEIGHT: 150 LBS | OXYGEN SATURATION: 95 % | DIASTOLIC BLOOD PRESSURE: 66 MMHG | SYSTOLIC BLOOD PRESSURE: 113 MMHG | HEIGHT: 64 IN | HEART RATE: 86 BPM | TEMPERATURE: 98.1 F | RESPIRATION RATE: 18 BRPM

## 2023-01-09 LAB
ABO + RH BLD: NORMAL
ANION GAP SERPL CALC-SCNC: 17 MMOL/L (ref 3–18)
BASOPHILS # BLD: 0 K/UL (ref 0–0.1)
BASOPHILS NFR BLD: 0 % (ref 0–2)
BLD PROD TYP BPU: NORMAL
BLOOD GROUP ANTIBODIES SERPL: NEGATIVE
BPU ID: NORMAL
BUN SERPL-MCNC: 81 MG/DL (ref 7–18)
BUN/CREAT SERPL: 15 (ref 12–20)
CA-I BLD-MCNC: 8.6 MG/DL (ref 8.5–10.1)
CHLORIDE SERPL-SCNC: 89 MMOL/L (ref 100–111)
CO2 SERPL-SCNC: 25 MMOL/L (ref 21–32)
CREAT SERPL-MCNC: 5.41 MG/DL (ref 0.6–1.3)
CROSSMATCH RESULT,%XM: NORMAL
DIFFERENTIAL METHOD BLD: ABNORMAL
EOSINOPHIL # BLD: 0 K/UL (ref 0–0.4)
EOSINOPHIL NFR BLD: 0 % (ref 0–5)
ERYTHROCYTE [DISTWIDTH] IN BLOOD BY AUTOMATED COUNT: 16.5 % (ref 11.6–14.5)
GLUCOSE BLD STRIP.AUTO-MCNC: 380 MG/DL (ref 70–110)
GLUCOSE BLD STRIP.AUTO-MCNC: 384 MG/DL (ref 70–110)
GLUCOSE SERPL-MCNC: 286 MG/DL (ref 74–99)
HCT VFR BLD AUTO: 26.5 % (ref 35–45)
HGB BLD-MCNC: 8.8 G/DL (ref 12–16)
IMM GRANULOCYTES # BLD AUTO: 0.2 K/UL (ref 0–0.04)
IMM GRANULOCYTES NFR BLD AUTO: 2 % (ref 0–0.5)
LYMPHOCYTES # BLD: 1 K/UL (ref 0.9–3.6)
LYMPHOCYTES NFR BLD: 10 % (ref 21–52)
MAGNESIUM SERPL-MCNC: 2.1 MG/DL (ref 1.6–2.6)
MCH RBC QN AUTO: 31.1 PG (ref 24–34)
MCHC RBC AUTO-ENTMCNC: 33.2 G/DL (ref 31–37)
MCV RBC AUTO: 93.6 FL (ref 78–100)
MONOCYTES # BLD: 0.6 K/UL (ref 0.05–1.2)
MONOCYTES NFR BLD: 6 % (ref 3–10)
NEUTS SEG # BLD: 8.5 K/UL (ref 1.8–8)
NEUTS SEG NFR BLD: 82 % (ref 40–73)
NRBC # BLD: 0 K/UL (ref 0–0.01)
NRBC BLD-RTO: 0 PER 100 WBC
PERFORMED BY, TECHID: ABNORMAL
PERFORMED BY, TECHID: ABNORMAL
PHOSPHATE SERPL-MCNC: 3.5 MG/DL (ref 2.5–4.9)
PLATELET # BLD AUTO: 153 K/UL (ref 135–420)
PMV BLD AUTO: 10.1 FL (ref 9.2–11.8)
POTASSIUM SERPL-SCNC: 3.8 MMOL/L (ref 3.5–5.5)
RBC # BLD AUTO: 2.83 M/UL (ref 4.2–5.3)
SODIUM SERPL-SCNC: 131 MMOL/L (ref 136–145)
SPECIMEN EXP DATE BLD: NORMAL
STATUS OF UNIT,%ST: NORMAL
TRANSFUSION STATUS PATIENT QL: NORMAL
UNIT DIVISION, %UDIV: 0
WBC # BLD AUTO: 10.3 K/UL (ref 4.6–13.2)

## 2023-01-09 PROCEDURE — 82962 GLUCOSE BLOOD TEST: CPT

## 2023-01-09 PROCEDURE — 74011250636 HC RX REV CODE- 250/636: Performed by: NURSE PRACTITIONER

## 2023-01-09 PROCEDURE — 97161 PT EVAL LOW COMPLEX 20 MIN: CPT

## 2023-01-09 PROCEDURE — 90935 HEMODIALYSIS ONE EVALUATION: CPT

## 2023-01-09 PROCEDURE — 94640 AIRWAY INHALATION TREATMENT: CPT

## 2023-01-09 PROCEDURE — 74011636637 HC RX REV CODE- 636/637: Performed by: INTERNAL MEDICINE

## 2023-01-09 PROCEDURE — 94761 N-INVAS EAR/PLS OXIMETRY MLT: CPT

## 2023-01-09 PROCEDURE — 74011250637 HC RX REV CODE- 250/637: Performed by: NURSE PRACTITIONER

## 2023-01-09 PROCEDURE — 80048 BASIC METABOLIC PNL TOTAL CA: CPT

## 2023-01-09 PROCEDURE — 85025 COMPLETE CBC W/AUTO DIFF WBC: CPT

## 2023-01-09 PROCEDURE — 83735 ASSAY OF MAGNESIUM: CPT

## 2023-01-09 PROCEDURE — 74011000250 HC RX REV CODE- 250: Performed by: NURSE PRACTITIONER

## 2023-01-09 PROCEDURE — 36415 COLL VENOUS BLD VENIPUNCTURE: CPT

## 2023-01-09 PROCEDURE — 84100 ASSAY OF PHOSPHORUS: CPT

## 2023-01-09 RX ORDER — DEXTROSE MONOHYDRATE 100 MG/ML
125-250 INJECTION, SOLUTION INTRAVENOUS AS NEEDED
Status: DISCONTINUED | OUTPATIENT
Start: 2023-01-09 | End: 2023-01-09 | Stop reason: HOSPADM

## 2023-01-09 RX ADMIN — HEPARIN SODIUM 5000 UNITS: 5000 INJECTION INTRAVENOUS; SUBCUTANEOUS at 06:00

## 2023-01-09 RX ADMIN — SEVELAMER CARBONATE 800 MG: 800 TABLET, FILM COATED ORAL at 09:08

## 2023-01-09 RX ADMIN — INSULIN LISPRO 10 UNITS: 100 INJECTION, SOLUTION INTRAVENOUS; SUBCUTANEOUS at 09:08

## 2023-01-09 RX ADMIN — SODIUM CHLORIDE, PRESERVATIVE FREE 10 ML: 5 INJECTION INTRAVENOUS at 15:01

## 2023-01-09 RX ADMIN — Medication 1000 UNITS: at 09:07

## 2023-01-09 RX ADMIN — CALCITRIOL CAPSULES 0.25 MCG 0.25 MCG: 0.25 CAPSULE ORAL at 09:07

## 2023-01-09 RX ADMIN — SODIUM BICARBONATE 650 MG: 650 TABLET ORAL at 09:07

## 2023-01-09 RX ADMIN — ALBUTEROL SULFATE 2.5 MG: 2.5 SOLUTION RESPIRATORY (INHALATION) at 07:05

## 2023-01-09 RX ADMIN — SODIUM CHLORIDE, PRESERVATIVE FREE 10 ML: 5 INJECTION INTRAVENOUS at 05:36

## 2023-01-09 RX ADMIN — CYANOCOBALAMIN TAB 500 MCG 5000 MCG: 500 TAB at 09:07

## 2023-01-09 RX ADMIN — SEVELAMER CARBONATE 800 MG: 800 TABLET, FILM COATED ORAL at 12:04

## 2023-01-09 RX ADMIN — INSULIN LISPRO 10 UNITS: 100 INJECTION, SOLUTION INTRAVENOUS; SUBCUTANEOUS at 12:04

## 2023-01-09 NOTE — PROCEDURES
Hemodialysis / [phone]    Vitals Pre Post Assessment Pre Post   BP BP: (!) 106/54 (01/09/23 1704)   96/55 LOC Awake, alert, oriented x 4. Awake, alert, oriented x 4. HR Pulse (Heart Rate): 95 (01/09/23 1704) 88 Lungs Exp. Wheeze, R upper. Exp. Wheeze, R upper. Resp Resp Rate: 20 (01/09/23 1546) 20 Cardiac HR 95 HR 88   Temp Temp: 98.3 °F (36.8 °C) (01/09/23 1546) 97.8 Skin CDI CDI   Weight Pre-Dialysis Weight: 70.6 kg (155 lb 10.3 oz) (01/07/23 1125) Not obtained Edema No edema noted No edema noted   Tele status No tele No tele Pain Pain Intensity 1: 0 (01/09/23 1210) No c/o pain     Orders   Duration: Start: 1603 End: 1910 Total: 3   Dialyzer: Dialyzer/Set Up Inspection: Revaclear (01/09/23 1603)   K Bath: Dialysate K (mEq/L): 3 (01/09/23 1603)   Ca Bath: Dialysate CA (mEq/L): 2.5 (01/09/23 1603)   Na: Dialysate NA (mEq/L): 138 (01/09/23 1603)   Bicarb: Dialysate HCO3 (mEq/L): 35 (01/09/23 1603)   Target Fluid Removal: Goal/Amount of Fluid to Remove (mL): 3000 mL (01/09/23 1603)     Access   Type & Location: R CVC   Comments:            Dressing CDI. Skin surrounding CVC site noted with small pink area at insertion point, surrounded by skin appropriate for patient's ethnicity. No swelling or drainage noted. Limbs cleaned per P&P. Caps removed, hubs disinfected per P&P. 5 cc blood aspirated swiftly from each limb, followed by 10cc NS flush, no resistance noted.                              Labs   HBsAg (Antigen) / date:           Negative   1/6/23                                HBsAb (Antibody) / date:           Susceptible 1/6/23   Source:           Epic   Obtained/Reviewed  Critical Results Called HGB   Date Value Ref Range Status   01/09/2023 8.8 (L) 12.0 - 16.0 g/dL Final     Comment:     CHANGE NOTED     Potassium   Date Value Ref Range Status   01/09/2023 3.8 3.5 - 5.5 mmol/L Final     Calcium   Date Value Ref Range Status   01/09/2023 8.6 8.5 - 10.1 mg/dL Final     BUN   Date Value Ref Range Status 01/09/2023 81 (H) 7 - 18 mg/dL Final     Creatinine   Date Value Ref Range Status   01/09/2023 5.41 (H) 0.60 - 1.30 mg/dL Final        Meds Given   Name Dose Route                    Adequacy / Fluid    Total Liters Process: 66.4   Net Fluid Removed: 2000      Comments   Time Out Done:   (Time) 1545   Admitting Diagnosis: Acute Hypoxic Respiratory Failure   Consent obtained/signed: Informed Consent Verified:  (OP HD) (01/09/23 1603)   Machine / RO # Machine Number: [id number] (01/07/23 1125)   Primary Nurse Rpt Pre: SHANIKA Smith RN   Primary Nurse Rpt Post: SHANIKA Smith RN   Pt Education: Infection prevention   Care Plan: Continue HD plan of care   Pts outpatient clinic: [address] [facility] Summary   Comments:      8038 - HD initiated without incident. Blood pump running well at 400.        1635 - Arterial pressure alarms sounding, causing blood pump to stop. Lines clamped and reversed. Blood pump resumed at 400. All circuit pressures remain within appropriate limits. 1649 - BPP 80/51. UF paused. Repeat BP 82/53. 100cc NS bolus given. Patient c/o light headedness, states that her heart is racing. She remains awake and alert at this time. 1704 - /54. UF goal reduced to 2500. UF resumed. 1910 - All possible blood returned to patient. CVC limbs cleaned, flushed with 10cc NS, clamped, and secured with new clean caps. VSS, patient awake and alert at end of HD treatment. Report to SHANIKA Smith RN.

## 2023-01-09 NOTE — ROUTINE PROCESS
0700 Bedside shift change report given to SHANIKA Francis RN (oncoming nurse) by CHUCHO Amin RN (offgoing nurse). Report included the following information SBAR, Kardex, Intake/Output, MAR, Accordion, Recent Results, and Med Rec Status. 1145 Pt sitting in bed eating lunch. Pt shows no signs of distress and has no c/o pain at this time. CBWR, 2 side rails up and bed locked in lowest position. Waiting on dialysis nurse, scheduled to arrive between 2 and 2:30 PM.    1600 Pt receiving dialysis. Pt tolerating well. Pt resting in bed with no signs of distress and no c/o pain at this time. CBWR, 2 side rails up and bed locked in lowest position. 1900 Bedside shift change report given to SYEDA Jonas RN (oncoming nurse) by SHANIKA Francis RN (offgoing nurse). Report included the following information SBAR, Kardex, Intake/Output, MAR, Accordion, Recent Results, and Med Rec Status.

## 2023-01-09 NOTE — PROGRESS NOTES
Problem: Mobility Impaired (Adult and Pediatric)  Goal: *Acute Goals and Plan of Care (Insert Text)  Description: Pt ambulates with MOD (I) and performs transfers with MOD (I) . PLOF: Community ambulator, no AD, (I) ADLs    Outcome: Resolved/Met     Problem: Patient Education: Go to Patient Education Activity  Goal: Patient/Family Education  Outcome: Resolved/Met   PHYSICAL THERAPY EVALUATION AND DISCHARGE    Patient: Martin Samano [de-identified]45 y.o. female)  Date: 2023   Start Time: 101   Stop Time: 6716  $$ Initial PT Evaluation: Low Complex 21 Min    Primary Diagnosis: COPD exacerbation ([facility] [address] [J44.1]  Pulmonary edema [J81.1]  Hypotension [I95.9]       Precautions: N/A       ASSESSMENT :  Based on the objective data described below, the patient presents with acute respiratory failure with hypoxia, that resolved with dialysis. She performs mobility without assistance and states she is at her baseline level. She does have slight deviations in gait at times but does not require assistance to maintain balance. Patient does not require further skilled intervention at this level of care. PLAN :  Recommendations and Planned Interventions:   No formal PT needs identified at this time. Discharge Recommendations: Home as prior  AM-PAC:   Further Equipment Recommendations for Discharge: N/A     SUBJECTIVE:   Patient stated am I having dialysis today?     OBJECTIVE DATA SUMMARY:     Past Medical History:   Diagnosis Date    ESRD on hemodialysis ([facility] [address]      Past Surgical History:   Procedure Laterality Date    HX  SECTION      PARTIAL REMOVAL OF KIDNEY       Barriers to Learning/Limitations: None  Compensate with: N/A  Home Situation:   Home Situation  Home Environment: Private residence  # Steps to Enter: 4  Rails to Enter: Yes  Hand Rails : Bilateral  One/Two Story Residence: One story  Living Alone: No  Support Systems: Spouse/Significant Other  Patient Expects to be Discharged to[de-identified] Home with outpatient services  Current DME Used/Available at Home: None  Critical Behavior:  Neurologic State: Alert  Orientation Level: Oriented X4        Psychosocial  Patient Behaviors: Calm; Cooperative  Strength:    Strength: Within functional limits  Tone & Sensation:   Tone: Normal  Sensation: Intact  Coordination:  Coordination: Within functional limits  Range Of Motion:   AROM: Within functional limits  PROM: Within functional limits  Posture:  Posture (WDL): Within defined limits     Functional Mobility:  Bed Mobility:  Rolling: Independent  Supine to Sit: Independent  Sit to Supine: Independent  Scooting: Independent  Transfers:  Sit to Stand: Modified independent  Stand to Sit: Modified independent  Balance:   Sitting: Intact  Standing: Intact  Ambulation/Gait Training:  Distance (ft): 100 Feet (ft)  Assistive Device: Other (comment) (no AD)  Ambulation - Level of Assistance: Modified independent     Gait Description (WDL): Exceptions to WDL  Gait Abnormalities: Path deviations (slight deviation to the L initially)   AM-PAC:  24/24; Current research shows that an AM-PAC score of 17 or less is typically not associated with a discharge to the patient's home setting, whereas a score of 18 or greater is typically associated with a discharge to the patient's home setting. Pain:  Pain level pre-treatment: 0/10   Pain level post-treatment: 0/10  Pain Location: N/A  Activity Tolerance:   Good  Please refer to the flowsheet for vital signs taken during this treatment. After treatment:   []         Patient left in no apparent distress sitting up in chair  [x]         Patient left in no apparent distress in bed  [x]         Call bell left within reach  []         Nursing notified  []         Caregiver present  []         Bed alarm activated  []         SCDs applied    COMMUNICATION/EDUCATION:   [x]         Role of Physical Therapy in the acute care setting.   [x]         Fall prevention education was provided and the patient/caregiver indicated understanding. [x]         Patient/family have participated as able in goal setting and plan of care. [x]         Patient/family agree to work toward stated goals and plan of care. []         Patient understands intent and goals of therapy, but is neutral about his/her participation. []         Patient is unable to participate in goal setting/plan of care: ongoing with therapy staff.  []         Other:     Thank you for this referral.  Aldair Vasquez, PT, DPT   Time Calculation: 10 mins

## 2023-01-09 NOTE — PROGRESS NOTES
Discharge planning/Transition of Care RUR 14%  Discharge order noted for today after Dialysis. No transitional care needs identified.

## 2023-01-09 NOTE — DISCHARGE SUMMARY
HOSPITALIST DISCHARGE NOTE  Star Patel MD, [address] [facility]       PATIENT ID: Hillery Opitz  MRN: [id number]   YOB: 1970    DATE OF ADMISSION: 1/5/2023 10:13 PM    DATE OF DISCHARGE: 01/09/23    PRIMARY CARE PROVIDER: Rabia Jessica MD     ATTENDING PHYSICIAN: Star Patel MD  DISCHARGING PROVIDER: Star Patel MD        CONSULTATIONS: IP CONSULT TO NEPHROLOGY    PROCEDURES/SURGERIES: * No surgery found *    ADMITTING [address] [place] [address] COURSE:     Hillery Opitz is a 45 y.o. female  has a past medical history of ESRD on hemodialysis ([facility] [address] Patient seen at bedside in the emergency department. Patient came in in respiratory distress short of breath and was placed on BiPAP. Patient was given breathing treatments and magnesium in the field ER physician continue treatment with continued BiPAP steroids nebulizer treatments. On x-ray patient was found to be in pulmonary edema versus CHF. Patient does not have a history of CHF either in her history or stated. Patient does have a significant history of end-stage renal dialysis with dialysis Monday Wednesday Friday and COPD. She is not on oxygen at home. Patient continues to smoke about half pack of cigarettes a day. Patient went to dialysis on Wednesday and had a full dialysis per herself and she goes again on Friday. Patient will be admitted to the ICU she will be continued on BiPAP she is also on the Levophed drip for the ER doctor placed a femoral central line on the right we will continue the Levophed drip while patient stabilizes. Nebulizer treatments steroids Lasix cardiology and nephrology consult. Patient is awake alert oriented and comfortable at this time on BiPAP.     DISCHARGE DIAGNOSES / PLAN:      Acute respiratory failure with hypoxia ([facility] [address]- resolved with HD  Sp bipap, now on room air, desats on 6 min walk  Cont hd  Cardiology consultation  Nebulizers as needed  Solu-Medrol every 8 hrs. Patient can be discharged home after dialysis later today. Acute Anemia of chronic renal dz  Severe, meets criteria for transfusion  Hgb < 7, falling while walking  Status post 1 unit PRBC. Pulmonary edema  Significant proved after renal dialysis x2 sessions. Will require an additional session prior to discharge home today. COPD exacerbation (HCC)  Sp bipap, now room air  Nebulizers as needed  Dc steriods. End stage renal failure on dialysis [facility])  Patient has dialysis Monday Wednesday Friday. Patient went Wednesday completed all of dialysis. Renal consult for evaluation and dialysis. Fluid restriction. Continue Rocaltrol, vitamin D, ferrous sulfate, Renvela. Hypotension  S/p levo drip,      1/8/2023  - pt admitted fo rpulm edema, needed 2 runs HD to become euvolemia, but she appears to have a number of chronic medical illnesses, and I don't think she takes very good care of herself, I think she does little to no activity, she smokes, and doesn't follow dietary recommendations. When I take her for 6 min walks, she does ok from a pulse ox pov, but can barely walk properly, this level of debility is impressive considering she is only 52. Today her chronic anemia drops down to < 7, thus requiring a treansfusion. Overall pt feels improved, but I don't feel safe letting her go yet, transfuse 1 unit pRBC today, work with PT in am, will need home PT I suspect. Patient stable enough to be discharged home today after dialysis.       PENDING TEST RESULTS:   At the time of discharge the following test results are still pending: None    FOLLOW UP APPOINTMENTS:    Follow-up Information       Follow up With Specialties Details Why Adiel Stinson MD Nephrology, Nephrology   [address] [address] [phone]               DIET: Renal Diet    ACTIVITY: Activity as tolerated      DISCHARGE MEDICATIONS:  Current Discharge Medication List        CONTINUE these medications which have NOT CHANGED    Details   calcitRIOL (ROCALTROL) 0.25 mcg capsule Take 1 capsule by mouth once daily  Qty: 30 Capsule, Refills: 0      cholecalciferol (VITAMIN D3) (1000 Units /25 mcg) tablet Take 1 Tablet by mouth daily. Qty: 30 Tablet, Refills: 3      sodium bicarbonate 650 mg tablet Take 1 Tablet by mouth two (2) times a day. Qty: 60 Tablet, Refills: 3      ferrous sulfate 325 mg (65 mg iron) tablet Take 1 Tablet by mouth three (3) times daily (with meals). Qty: 90 Tablet, Refills: 3      cyanocobalamin, vitamin B-12, 5,000 mcg subl 1 Tablet by SubLINGual route daily. sevelamer carbonate (RENVELA) 800 mg tab tab TAKE 1 TABLET BY MOUTH THREE TIMES DAILY WITH MEALS  Qty: 45 Tablet, Refills: 0      epoetin carmelo (Epogen) 10,000 unit/mL injection 1 mL by SubCUTAneous route Once every 2 weeks. Qty: 1 mL, Refills: 5    Comments: Please dispense as a 30 day supply  Associated Diagnoses: Chronic kidney disease (CKD), stage V (HCC)               Recent Days:  Recent Labs     01/09/23  0325 01/08/23  0400 01/07/23  0400   WBC 10.3 14.1* 17.0*   HGB 8.8* 6.3* 7.2*   HCT 26.5* 20.0* 23.7*    165 197     Recent Labs     01/09/23  0325 01/08/23  0400 01/07/23  0400   * 133* 134*   K 3.8 3.4* 3.9   CL 89* 90* 92*   CO2 25 28 29   * 194* 259*   BUN 81* 71* 37*   CREA 5.41* 4.74* 3.60*   CA 8.6 8.9 9.1   MG 2.1  --   --    PHOS 3.5  --   --      No results for input(s): PH, PCO2, PO2, HCO3, FIO2 in the last 72 hours.       Recent Results (from the past 336 hour(s))   TROPONIN-HIGH SENSITIVITY    Collection Time: 01/05/23 10:34 PM   Result Value Ref Range    Troponin-High Sensitivity 75 (H) 0 - 54 ng/L   LACTIC ACID    Collection Time: 01/05/23 10:34 PM   Result Value Ref Range    Lactic acid 2.4 (HH) 0.4 - 2.0 mmol/L   CBC WITH AUTOMATED DIFF    Collection Time: 01/05/23 10:34 PM   Result Value Ref Range    WBC 11.4 4.6 - 13.2 K/uL    RBC 2.43 (L) 4.20 - 5.30 M/uL    HGB 7.2 (L) 12.0 - 16.0 g/dL    HCT 23.8 (L) 35.0 - 45.0 %    MCV 97.9 78.0 - 100.0 FL    MCH 29.6 24.0 - 34.0 PG    MCHC 30.3 (L) 31.0 - 37.0 g/dL    RDW 17.0 (H) 11.6 - 14.5 %    PLATELET 608 090 - 878 K/uL    MPV 10.6 9.2 - 11.8 FL    NRBC 0.0 0.0  WBC    ABSOLUTE NRBC 0.00 0.00 - 0.01 K/uL    NEUTROPHILS 63 40 - 73 %    LYMPHOCYTES 28 21 - 52 %    MONOCYTES 5 3 - 10 %    EOSINOPHILS 2 0 - 5 %    BASOPHILS 1 0 - 2 %    IMMATURE GRANULOCYTES 1 (H) 0 - 0.5 %    ABS. NEUTROPHILS 7.2 1.8 - 8.0 K/UL    ABS. LYMPHOCYTES 3.2 0.9 - 3.6 K/UL    ABS. MONOCYTES 0.6 0.05 - 1.2 K/UL    ABS. EOSINOPHILS 0.2 0.0 - 0.4 K/UL    ABS. BASOPHILS 0.1 0.0 - 0.1 K/UL    ABS. IMM.  GRANS. 0.1 (H) 0.00 - 0.04 K/UL    DF AUTOMATED     METABOLIC PANEL, BASIC    Collection Time: 01/05/23 10:34 PM   Result Value Ref Range    Sodium 136 136 - 145 mmol/L    Potassium 3.2 (L) 3.5 - 5.5 mmol/L    Chloride 97 (L) 100 - 111 mmol/L    CO2 26 21 - 32 mmol/L    Anion gap 13 3.0 - 18.0 mmol/L    Glucose 284 (H) 74 - 99 mg/dL    BUN 27 (H) 7 - 18 mg/dL    Creatinine 4.16 (H) 0.60 - 1.30 mg/dL    BUN/Creatinine ratio 6 (L) 12 - 20      eGFR 12 (L) >60 ml/min/1.73m2    Calcium 8.2 (L) 8.5 - 10.1 mg/dL   NT-PRO BNP    Collection Time: 01/05/23 10:34 PM   Result Value Ref Range    NT pro-BNP 47,190 (H) 0 - 900 pg/mL   INFLUENZA A & B AG (RAPID TEST)    Collection Time: 01/05/23 10:34 PM   Result Value Ref Range    Influenza A Antigen Negative Negative      Influenza B Antigen Negative Negative     EKG, 12 LEAD, INITIAL    Collection Time: 01/05/23 10:39 PM   Result Value Ref Range    Ventricular Rate 91 BPM    Atrial Rate 90 BPM    P-R Interval 159 ms    QRS Duration 82 ms    Q-T Interval 348 ms    QTC Calculation (Bezet) 429 ms    Calculated P Axis 62 degrees    Calculated R Axis 53 degrees    Calculated T Axis -147 degrees    Diagnosis       Sinus rhythm  Repol abnrm, severe global ischemia (LM/MVD)    Confirmed by [facility] ([id number]) on 1/6/2023 9:10:39 AM     BLOOD GAS, ARTERIAL    Collection Time: 01/05/23 10:40 PM   Result Value Ref Range    pH 7.36 7.35 - 7.45      PCO2 46 (H) 35 - 45 mmHg    PO2 58 (L) 80 - 100 mmHg    O2 SATURATION 90 (L) 95 - 99 %    BICARBONATE 26 22 - 26 mmol/L    BASE EXCESS 0.3 0 - 3 mmol/L    O2 METHOD Non Invasive      FIO2 35 %    MODE Pressure Control      SET RATE 12      SPONTANEOUS RATE 30      PEEP/CPAP 8.0      Sample source Arterial      SITE Right Brachial      CHRISTIANO'S TEST NOT APPLICABLE      Carboxy-Hgb 3.6 (H) 1 - 2 %    Methemoglobin 0.0 0 - 1.4 %    Oxyhemoglobin 86.4 (L) 95 - 99 %    Performed by Renea Colvin    COVID-19 RAPID TEST    Collection Time: 01/05/23 10:45 PM   Result Value Ref Range    COVID-19 rapid test Not Detected Not Detected     URINALYSIS W/ RFLX MICROSCOPIC    Collection Time: 01/05/23 11:45 PM   Result Value Ref Range    Color Yellow      Appearance Clear      Specific gravity 1.018 1.005 - 1.030      pH (UA) 7.5 5.0 - 8.0      Protein 300 (A) Negative mg/dL    Glucose 500 (A) Negative mg/dL    Ketone Negative Negative mg/dL    Bilirubin Negative Negative      Blood Moderate (A) Negative      Urobilinogen 0.2 0.2 - 1.0 EU/dL    Nitrites Negative Negative      Leukocyte Esterase Small (A) Negative     URINE MICROSCOPIC    Collection Time: 01/05/23 11:45 PM   Result Value Ref Range    WBC 10-20 0 - 4 /hpf    RBC 20-50 0 - 2 /hpf    Epithelial cells Few 0 - 20 /lpf    Bacteria 3+ (A) None /hpf    Mucus 1+ /lpf    Hyaline cast 0-2 /lpf   CULTURE, BLOOD    Collection Time: 01/06/23 12:40 AM    Specimen: Blood   Result Value Ref Range    Special Requests: No Special Requests      Culture result: No growth 2 days     TROPONIN-HIGH SENSITIVITY    Collection Time: 01/06/23 12:40 AM   Result Value Ref Range    Troponin-High Sensitivity 101 (H) 0 - 54 ng/L   CULTURE, BLOOD    Collection Time: 01/06/23 12:45 AM    Specimen: Blood   Result Value Ref Range    Special Requests: No Special Requests      Culture result: No growth 2 days     BLOOD GAS, ARTERIAL    Collection Time: 01/06/23  3:48 AM   Result Value Ref Range    pH 7.43 7.35 - 7.45      PCO2 36 35 - 45 mmHg    PO2 77 (L) 80 - 100 mmHg    O2 SATURATION 96 95 - 99 %    BICARBONATE 24 22 - 26 mmol/L    BASE DEFICIT 0.3 mmol/L    O2 METHOD Non Invasive      FIO2 35 %    SET RATE 12      SPONTANEOUS RATE 20      PEEP/CPAP 8.0      Sample source Arterial      SITE Right Brachial      CHRISTIANO'S TEST NOT APPLICABLE      Carboxy-Hgb 0.9 (L) 1 - 2 %    Methemoglobin 0.2 0 - 1.4 %    Oxyhemoglobin 94.5 (L) 95 - 99 %    Performed by Context app    METABOLIC PANEL, BASIC    Collection Time: 01/06/23  9:05 AM   Result Value Ref Range    Sodium 137 136 - 145 mmol/L    Potassium 4.1 3.5 - 5.5 mmol/L    Chloride 97 (L) 100 - 111 mmol/L    CO2 27 21 - 32 mmol/L    Anion gap 13 3.0 - 18.0 mmol/L    Glucose 264 (H) 74 - 99 mg/dL    BUN 33 (H) 7 - 18 mg/dL    Creatinine 4.67 (H) 0.60 - 1.30 mg/dL    BUN/Creatinine ratio 7 (L) 12 - 20      eGFR 11 (L) >60 ml/min/1.73m2    Calcium 8.8 8.5 - 10.1 mg/dL   CBC WITH AUTOMATED DIFF    Collection Time: 01/06/23  9:05 AM   Result Value Ref Range    WBC 14.6 (H) 4.6 - 13.2 K/uL    RBC 2.49 (L) 4.20 - 5.30 M/uL    HGB 7.6 (L) 12.0 - 16.0 g/dL    HCT 24.2 (L) 35.0 - 45.0 %    MCV 97.2 78.0 - 100.0 FL    MCH 30.5 24.0 - 34.0 PG    MCHC 31.4 31.0 - 37.0 g/dL    RDW 16.8 (H) 11.6 - 14.5 %    PLATELET 258 486 - 528 K/uL    MPV 10.4 9.2 - 11.8 FL    NRBC 0.0 0.0  WBC    ABSOLUTE NRBC 0.00 0.00 - 0.01 K/uL    NEUTROPHILS 93 (H) 40 - 73 %    LYMPHOCYTES 5 (L) 21 - 52 %    MONOCYTES 1 (L) 3 - 10 %    EOSINOPHILS 0 0 - 5 %    BASOPHILS 0 0 - 2 %    IMMATURE GRANULOCYTES 1 (H) 0 - 0.5 %    ABS. NEUTROPHILS 13.6 (H) 1.8 - 8.0 K/UL    ABS. LYMPHOCYTES 0.7 (L) 0.9 - 3.6 K/UL    ABS. MONOCYTES 0.2 0.05 - 1.2 K/UL    ABS. EOSINOPHILS 0.0 0.0 - 0.4 K/UL    ABS. BASOPHILS 0.0 0.0 - 0.1 K/UL    ABS. IMM.  GRANS. 0.2 (H) 0.00 - 0.04 K/UL    DF AUTOMATED     HEP B SURFACE AB    Collection Time: 01/06/23 12:00 PM   Result Value Ref Range    Hepatitis B surface Ab <3.10 mIU/mL    Hep B surface Ab Interp. NONREACTIVE NONREACTIVE   HEP B SURFACE AG    Collection Time: 01/06/23 12:00 PM   Result Value Ref Range    Hepatitis B surface Ag 0.5 <1.00 Index    Hep B surface Ag Interp. Negative Negative   METABOLIC PANEL, BASIC    Collection Time: 01/07/23  4:00 AM   Result Value Ref Range    Sodium 134 (L) 136 - 145 mmol/L    Potassium 3.9 3.5 - 5.5 mmol/L    Chloride 92 (L) 100 - 111 mmol/L    CO2 29 21 - 32 mmol/L    Anion gap 13 3.0 - 18.0 mmol/L    Glucose 259 (H) 74 - 99 mg/dL    BUN 37 (H) 7 - 18 mg/dL    Creatinine 3.60 (H) 0.60 - 1.30 mg/dL    BUN/Creatinine ratio 10 (L) 12 - 20      eGFR 14 (L) >60 ml/min/1.73m2    Calcium 9.1 8.5 - 10.1 mg/dL   CBC WITH AUTOMATED DIFF    Collection Time: 01/07/23  4:00 AM   Result Value Ref Range    WBC 17.0 (H) 4.6 - 13.2 K/uL    RBC 2.42 (L) 4.20 - 5.30 M/uL    HGB 7.2 (L) 12.0 - 16.0 g/dL    HCT 23.7 (L) 35.0 - 45.0 %    MCV 97.9 78.0 - 100.0 FL    MCH 29.8 24.0 - 34.0 PG    MCHC 30.4 (L) 31.0 - 37.0 g/dL    RDW 17.2 (H) 11.6 - 14.5 %    PLATELET 954 606 - 305 K/uL    MPV 11.0 9.2 - 11.8 FL    NRBC 0.0 0.0  WBC    ABSOLUTE NRBC 0.00 0.00 - 0.01 K/uL    NEUTROPHILS 88 (H) 40 - 73 %    BAND NEUTROPHILS 2 0 - 5 %    LYMPHOCYTES 6 (L) 21 - 52 %    MONOCYTES 4 3 - 10 %    EOSINOPHILS 0 0 - 5 %    BASOPHILS 0 0 - 2 %    IMMATURE GRANULOCYTES 0 %    ABS. NEUTROPHILS 15.3 (H) 1.8 - 8.0 K/UL    ABS. LYMPHOCYTES 1.0 0.9 - 3.6 K/UL    ABS. MONOCYTES 0.7 0.05 - 1.2 K/UL    ABS. EOSINOPHILS 0.0 0.0 - 0.4 K/UL    ABS. BASOPHILS 0.0 0.0 - 0.1 K/UL    ABS. IMM.  GRANS. 0.0 K/UL    DF Manual      RBC COMMENTS Normocytic, Normochromic     HEMOGLOBIN A1C WITH EAG    Collection Time: 01/07/23  4:00 AM   Result Value Ref Range    Hemoglobin A1c 5.0 4.2 - 5.6 %    Est. average glucose 97 mg/dL   GLUCOSE, POC Collection Time: 01/07/23  4:44 PM   Result Value Ref Range    Glucose (POC) 335 (H) 70 - 110 mg/dL    Performed by Margo Kaplan    GLUCOSE, POC    Collection Time: 01/07/23  9:34 PM   Result Value Ref Range    Glucose (POC) 294 (H) 70 - 110 mg/dL    Performed by Brigid Vazquez    CBC WITH AUTOMATED DIFF    Collection Time: 01/08/23  4:00 AM   Result Value Ref Range    WBC 14.1 (H) 4.6 - 13.2 K/uL    RBC 2.05 (L) 4.20 - 5.30 M/uL    HGB 6.3 (L) 12.0 - 16.0 g/dL    HCT 20.0 (L) 35.0 - 45.0 %    MCV 97.6 78.0 - 100.0 FL    MCH 30.7 24.0 - 34.0 PG    MCHC 31.5 31.0 - 37.0 g/dL    RDW 17.2 (H) 11.6 - 14.5 %    PLATELET 658 007 - 325 K/uL    MPV 10.4 9.2 - 11.8 FL    NRBC 0.0 0.0  WBC    ABSOLUTE NRBC 0.00 0.00 - 0.01 K/uL    NEUTROPHILS 79 (H) 40 - 73 %    LYMPHOCYTES 13 (L) 21 - 52 %    MONOCYTES 6 3 - 10 %    EOSINOPHILS 0 0 - 5 %    BASOPHILS 0 0 - 2 %    IMMATURE GRANULOCYTES 2 (H) 0 - 0.5 %    ABS. NEUTROPHILS 11.2 (H) 1.8 - 8.0 K/UL    ABS. LYMPHOCYTES 1.8 0.9 - 3.6 K/UL    ABS. MONOCYTES 0.9 0.05 - 1.2 K/UL    ABS. EOSINOPHILS 0.0 0.0 - 0.4 K/UL    ABS. BASOPHILS 0.0 0.0 - 0.1 K/UL    ABS. IMM.  GRANS. 0.2 (H) 0.00 - 0.04 K/UL    DF AUTOMATED     METABOLIC PANEL, BASIC    Collection Time: 01/08/23  4:00 AM   Result Value Ref Range    Sodium 133 (L) 136 - 145 mmol/L    Potassium 3.4 (L) 3.5 - 5.5 mmol/L    Chloride 90 (L) 100 - 111 mmol/L    CO2 28 21 - 32 mmol/L    Anion gap 15 3.0 - 18.0 mmol/L    Glucose 194 (H) 74 - 99 mg/dL    BUN 71 (H) 7 - 18 mg/dL    Creatinine 4.74 (H) 0.60 - 1.30 mg/dL    BUN/Creatinine ratio 15 12 - 20      eGFR 10 (L) >60 ml/min/1.73m2    Calcium 8.9 8.5 - 10.1 mg/dL   GLUCOSE, POC    Collection Time: 01/08/23  7:31 AM   Result Value Ref Range    Glucose (POC) 167 (H) 70 - 110 mg/dL    Performed by Ramya 59    Collection Time: 01/08/23 10:45 AM   Result Value Ref Range    Crossmatch Expiration 01/11/2023,2359     ABO/Rh(D) O Positive     Antibody screen Negative     Unit number [id number]     Blood component type [facility]     Unit division 00     Status of unit [address] to transfuse     Crossmatch result Compatible    GLUCOSE, POC    Collection Time: 01/08/23 11:23 AM   Result Value Ref Range    Glucose (POC) 240 (H) 70 - 110 mg/dL    Performed by [address] [place] [address], POC    Collection Time: 01/08/23  4:19 PM   Result Value Ref Range    Glucose (POC) 219 (H) 70 - 110 mg/dL    Performed by [address] [place] [address], POC    Collection Time: 01/08/23  9:31 PM   Result Value Ref Range    Glucose (POC) 267 (H) 70 - 110 mg/dL    Performed by Stephanie Knowles    CBC WITH AUTOMATED DIFF    Collection Time: 01/09/23  3:25 AM   Result Value Ref Range    WBC 10.3 4.6 - 13.2 K/uL    RBC 2.83 (L) 4.20 - 5.30 M/uL    HGB 8.8 (L) 12.0 - 16.0 g/dL    HCT 26.5 (L) 35.0 - 45.0 %    MCV 93.6 78.0 - 100.0 FL    MCH 31.1 24.0 - 34.0 PG    MCHC 33.2 31.0 - 37.0 g/dL    RDW 16.5 (H) 11.6 - 14.5 %    PLATELET 333 690 - 817 K/uL    MPV 10.1 9.2 - 11.8 FL    NRBC 0.0 0.0  WBC    ABSOLUTE NRBC 0.00 0.00 - 0.01 K/uL    NEUTROPHILS 82 (H) 40 - 73 %    LYMPHOCYTES 10 (L) 21 - 52 %    MONOCYTES 6 3 - 10 %    EOSINOPHILS 0 0 - 5 %    BASOPHILS 0 0 - 2 %    IMMATURE GRANULOCYTES 2 (H) 0 - 0.5 %    ABS. NEUTROPHILS 8.5 (H) 1.8 - 8.0 K/UL    ABS. LYMPHOCYTES 1.0 0.9 - 3.6 K/UL    ABS. MONOCYTES 0.6 0.05 - 1.2 K/UL    ABS. EOSINOPHILS 0.0 0.0 - 0.4 K/UL    ABS. BASOPHILS 0.0 0.0 - 0.1 K/UL    ABS. IMM.  GRANS. 0.2 (H) 0.00 - 0.04 K/UL    DF AUTOMATED     MAGNESIUM    Collection Time: 01/09/23  3:25 AM   Result Value Ref Range    Magnesium 2.1 1.6 - 2.6 mg/dL   METABOLIC PANEL, BASIC    Collection Time: 01/09/23  3:25 AM   Result Value Ref Range    Sodium 131 (L) 136 - 145 mmol/L    Potassium 3.8 3.5 - 5.5 mmol/L    Chloride 89 (L) 100 - 111 mmol/L    CO2 25 21 - 32 mmol/L    Anion gap 17 3.0 - 18.0 mmol/L    Glucose 286 (H) 74 - 99 mg/dL    BUN 81 (H) 7 - 18 mg/dL    Creatinine 5.41 (H) 0.60 - 1.30 mg/dL    BUN/Creatinine ratio 15 12 - 20      eGFR 9 (L) >60 ml/min/1.73m2    Calcium 8.6 8.5 - 10.1 mg/dL   PHOSPHORUS    Collection Time: 01/09/23  3:25 AM   Result Value Ref Range    Phosphorus 3.5 2.5 - 4.9 mg/dL   GLUCOSE, POC    Collection Time: 01/09/23  8:54 AM   Result Value Ref Range    Glucose (POC) 384 (H) 70 - 110 mg/dL    Performed by Rene Pacheco         NOTIFY YOUR PHYSICIAN FOR ANY OF THE FOLLOWING:   Fever over 101 degrees for 24 hours. Chest pain, shortness of breath, fever, chills, nausea, vomiting, diarrhea, change in mentation, falling, weakness, bleeding. Severe pain or pain not relieved by medications. Or, any other signs or symptoms that you may have questions about. DISPOSITION:  x  Home With:   OT  PT  HH  RN       Long term SNF/Inpatient Rehab    Independent/assisted living    Hospice    Other:       PATIENT CONDITION AT DISCHARGE:     Functional status    Poor     Deconditioned    x Independent      Cognition    x Lucid     Forgetful     Dementia      Catheters/lines (plus indication)    Henson     PICC     PEG    x None      Code status    x Full code     DNR      PHYSICAL EXAMINATION AT DISCHARGE:  General:          Alert, cooperative, no distress, appears stated age. HEENT:           Atraumatic, anicteric sclerae, pink conjunctivae                          No oral ulcers, mucosa moist, throat clear, dentition fair  Neck:               Supple, symmetrical  Lungs:             Clear to auscultation bilaterally. No Wheezing or Rhonchi. No rales. Chest wall:      No tenderness  No Accessory muscle use. Heart:              Regular  rhythm,  No  murmur   No edema  Abdomen:        Soft, non-tender. Not distended. Bowel sounds normal  Extremities:     No cyanosis. No clubbing,                            Skin turgor normal, Capillary refill normal  Skin:                Not pale.   Not Jaundiced  No rashes   Psych:             Not anxious or agitated.   Neurologic:      Alert, moves all extremities, answers questions appropriately and responds to commands       CHRONIC MEDICAL DIAGNOSES:  Problem List as of 1/9/2023 Date Reviewed: 7/7/2022            Codes Class Noted - Resolved    * (Principal) Acute respiratory failure with hypoxia ([facility] ICD-10-CM: J96.01  ICD-9-CM: 518.81  1/6/2023 - Present        Fluid overload ICD-10-CM: E87.70  ICD-9-CM: 276.69  1/6/2023 - Present        End stage renal failure on dialysis [facility]) ICD-10-CM: N18.6, Z99.2  ICD-9-CM: 585.6, V45.11  1/6/2023 - Present        Pulmonary edema ICD-10-CM: J81.1  ICD-9-CM: 774  1/6/2023 - Present        Hypotension ICD-10-CM: I95.9  ICD-9-CM: 458.9  1/6/2023 - Present        COPD exacerbation ([facility] ICD-10-CM: J44.1  ICD-9-CM: 491.21  1/6/2023 - Present        Chronic kidney disease (CKD), stage V ([facility] [[id number]] ICD-10-CM: N18.5  ICD-9-CM: 585.5  5/26/2022 - Present           Greater than 35 minutes were spent with the patient on counseling and coordination of care    Signed:   Ziyad Melchor MD  1/9/2023  10:54 AM

## 2023-01-09 NOTE — PROGRESS NOTES
1845- Bedside shift change report given to Sheldon Gordon RN (oncoming nurse) by Edi Chung RN (offgoing nurse). Report included the following information SBAR, Kardex, ED Summary, Intake/Output, MAR, Accordion, Recent Results, and Cardiac Rhythm SR . Received patient in bed, during PRBC transfusion. Bed in lowest and locked position. Patient to be discharged to home post-transfusion. - Transfusion completed at this time. No transfusion reaction suspected at this time. VSS. - Call to provider at this time to discuss d/c orders and to obtain discharge summary. As per physician hospitalist note, patient to see PT in am and possible home PT. Per ALIZE, patient to be seen by PT in morning and possible discharge with home PT.     - Patient informed she will be staying the night. Patient is agitated. - Attempts made to call patient  with update of patient status. No answer. No opportunity for VM. Will attempt again. - Scheduled medications given at this time. - Reassessment completed at this time, no changes noted. Patient sleeping in bed, responsive to verbal commands. VSS    0130- Patient calls this RN to bedside, states she is incontinent of urine. 5- This RN returns to room, patient ambulates independently to [facility]. Patient cleansing self while RN strips bed and apply new linens. Patient abruptly stands up from [facility], stress incontinence of urine and stool upon coughing. Urine and feces covers the floor, walls and cabinets. Patient and room cleaned at this time. New gown, socks telemetry leads and brief applied to patient. Patient returns to bed independently. Denies pain or dizziness. 0315- Phlebo at bedside, morning labs drawn at this time. 0500- Cardiac monitoring orders  at this time. Patient removed from telemetry. 8497- Bedside shift change report given to SHANIKA Leyva (oncoming nurse) by Sheldon Gordon RN (offgoing nurse).  Report included the following information SBAR, Kardex, ED Summary, Intake/Output, MAR, Accordion, and Recent Results.

## 2023-01-09 NOTE — PROGRESS NOTES
Problem: Breathing Pattern - Ineffective  Goal: *Absence of hypoxia  Outcome: Resolved/Met  Goal: *Use of effective breathing techniques  Outcome: Resolved/Met  Goal: *PALLIATIVE CARE:  Alleviation of Dyspnea  Outcome: Resolved/Met     Problem: Patient Education: Go to Patient Education Activity  Goal: Patient/Family Education  Outcome: Resolved/Met     Problem: Chronic Obstructive Pulmonary Disease (COPD)  Goal: *Oxygen saturation during activity within specified parameters  Outcome: Resolved/Met  Goal: *Able to remain out of bed as prescribed  Outcome: Resolved/Met  Goal: *Absence of hypoxia  Outcome: Resolved/Met  Goal: *Optimize nutritional status  Outcome: Resolved/Met     Problem: Patient Education: Go to Patient Education Activity  Goal: Patient/Family Education  Outcome: Resolved/Met     Problem: Tissue Perfusion - Cardiopulmonary, Altered  Goal: *Optimize tissue perfusion  Outcome: Resolved/Met  Goal: *Absence of hypoxia  Outcome: Resolved/Met     Problem: Patient Education: Go to Patient Education Activity  Goal: Patient/Family Education  Outcome: Resolved/Met     Problem: Chronic Renal Failure  Goal: *Fluid and electrolytes stabilized  Outcome: Resolved/Met     Problem: Patient Education: Go to Patient Education Activity  Goal: Patient/Family Education  Outcome: Resolved/Met     Problem: Pressure Injury - Risk of  Goal: *Prevention of pressure injury  Description: Document Mio Scale and appropriate interventions in the flowsheet. Outcome: Resolved/Met     Problem: Patient Education: Go to Patient Education Activity  Goal: Patient/Family Education  Outcome: Resolved/Met     Problem: Falls - Risk of  Goal: *Absence of Falls  Description: Document Arjun Fall Risk and appropriate interventions in the flowsheet.   Outcome: Resolved/Met     Problem: Patient Education: Go to Patient Education Activity  Goal: Patient/Family Education  Outcome: Resolved/Met

## 2023-01-10 ENCOUNTER — DOCUMENTATION ONLY (OUTPATIENT)
Dept: OTHER | Age: 53
End: 2023-01-10

## 2023-01-10 NOTE — PROGRESS NOTES
1920  Verbal shift change report given to SYEDA Thomas RN (oncoming nurse) by SHANIKA Perez RN BSN (offgoing nurse). Report included the following information SBAR, Kardex, Intake/Output, MAR, Recent Results, and Med Rec Status. Dialysis completed. Pt tolerated.

## 2023-01-10 NOTE — PROGRESS NOTES
Pt's  in and pt to get pt. Pt discharge home with belongings and discharge instructions. Pt taken down by supervisor via wheel chair.

## 2023-01-12 LAB
BACTERIA SPEC CULT: NORMAL
BACTERIA SPEC CULT: NORMAL
SPECIAL REQUESTS,SREQ: NORMAL
SPECIAL REQUESTS,SREQ: NORMAL

## 2023-01-19 ENCOUNTER — APPOINTMENT (OUTPATIENT)
Dept: NON INVASIVE DIAGNOSTICS | Age: 53
DRG: 425 | End: 2023-01-19
Attending: NURSE PRACTITIONER
Payer: MEDICAID

## 2023-01-19 ENCOUNTER — HOSPITAL ENCOUNTER (INPATIENT)
Age: 53
LOS: 1 days | Discharge: HOME | DRG: 425 | End: 2023-01-20
Attending: EMERGENCY MEDICINE | Admitting: INTERNAL MEDICINE
Payer: MEDICAID

## 2023-01-19 ENCOUNTER — APPOINTMENT (OUTPATIENT)
Dept: GENERAL RADIOLOGY | Age: 53
DRG: 425 | End: 2023-01-19
Attending: EMERGENCY MEDICINE
Payer: MEDICAID

## 2023-01-19 DIAGNOSIS — J44.1 COPD EXACERBATION (HCC): Primary | ICD-10-CM

## 2023-01-19 DIAGNOSIS — J81.0 ACUTE PULMONARY EDEMA (HCC): ICD-10-CM

## 2023-01-19 LAB
ANION GAP SERPL CALC-SCNC: 7 MMOL/L (ref 3–18)
ATRIAL RATE: 102 BPM
BASOPHILS # BLD: 0 K/UL (ref 0–0.1)
BASOPHILS NFR BLD: 0 % (ref 0–2)
BUN SERPL-MCNC: 23 MG/DL (ref 7–18)
BUN/CREAT SERPL: 8 (ref 12–20)
CA-I BLD-MCNC: 7.8 MG/DL (ref 8.5–10.1)
CALCULATED P AXIS, ECG09: 66 DEGREES
CALCULATED R AXIS, ECG10: 32 DEGREES
CALCULATED T AXIS, ECG11: 102 DEGREES
CHLORIDE SERPL-SCNC: 99 MMOL/L (ref 100–111)
CO2 SERPL-SCNC: 33 MMOL/L (ref 21–32)
CREAT SERPL-MCNC: 2.87 MG/DL (ref 0.6–1.3)
DIAGNOSIS, 93000: NORMAL
DIFFERENTIAL METHOD BLD: ABNORMAL
ECHO AO ASC DIAM: 3.1 CM
ECHO AO ASCENDING AORTA INDEX: 1.79 CM/M2
ECHO AO ROOT DIAM: 3 CM
ECHO AO ROOT INDEX: 1.73 CM/M2
ECHO AR MAX VEL PISA: 2.8 M/S
ECHO AV AREA PEAK VELOCITY: 0.9 CM2
ECHO AV AREA VTI: 0.9 CM2
ECHO AV AREA/BSA PEAK VELOCITY: 0.5 CM2/M2
ECHO AV AREA/BSA VTI: 0.5 CM2/M2
ECHO AV MEAN GRADIENT: 30 MMHG
ECHO AV MEAN VELOCITY: 2.6 M/S
ECHO AV PEAK GRADIENT: 47 MMHG
ECHO AV PEAK VELOCITY: 3.4 M/S
ECHO AV VELOCITY RATIO: 0.29
ECHO AV VTI: 80.8 CM
ECHO EST RA PRESSURE: 3 MMHG
ECHO IVC PROX: 2 CM
ECHO LA AREA 2C: 19.1 CM2
ECHO LA AREA 4C: 19 CM2
ECHO LA DIAMETER INDEX: 2.37 CM/M2
ECHO LA DIAMETER: 4.1 CM
ECHO LA MAJOR AXIS: 5.3 CM
ECHO LA MINOR AXIS: 5.3 CM
ECHO LA TO AORTIC ROOT RATIO: 1.37
ECHO LA VOL BP: 55 ML (ref 22–52)
ECHO LA VOL/BSA BIPLANE: 32 ML/M2 (ref 16–34)
ECHO LV E' LATERAL VELOCITY: 7 CM/S
ECHO LV E' SEPTAL VELOCITY: 4 CM/S
ECHO LV EDV A2C: 95 ML
ECHO LV EDV A4C: 82 ML
ECHO LV EDV INDEX A4C: 47 ML/M2
ECHO LV EDV NDEX A2C: 55 ML/M2
ECHO LV EJECTION FRACTION A2C: 49 %
ECHO LV EJECTION FRACTION A4C: 46 %
ECHO LV EJECTION FRACTION BIPLANE: 48 % (ref 55–100)
ECHO LV ESV A2C: 48 ML
ECHO LV ESV A4C: 44 ML
ECHO LV ESV INDEX A2C: 28 ML/M2
ECHO LV ESV INDEX A4C: 25 ML/M2
ECHO LV FRACTIONAL SHORTENING: 24 % (ref 28–44)
ECHO LV INTERNAL DIMENSION DIASTOLE INDEX: 3.12 CM/M2
ECHO LV INTERNAL DIMENSION DIASTOLIC: 5.4 CM (ref 3.9–5.3)
ECHO LV INTERNAL DIMENSION SYSTOLIC INDEX: 2.37 CM/M2
ECHO LV INTERNAL DIMENSION SYSTOLIC: 4.1 CM
ECHO LV IVSD: 1 CM (ref 0.6–0.9)
ECHO LV MASS 2D: 234.8 G (ref 67–162)
ECHO LV MASS INDEX 2D: 135.7 G/M2 (ref 43–95)
ECHO LV POSTERIOR WALL DIASTOLIC: 1.2 CM (ref 0.6–0.9)
ECHO LV RELATIVE WALL THICKNESS RATIO: 0.44
ECHO LVOT AREA: 3.1 CM2
ECHO LVOT AV VTI INDEX: 0.3
ECHO LVOT DIAM: 2 CM
ECHO LVOT MEAN GRADIENT: 3 MMHG
ECHO LVOT PEAK GRADIENT: 4 MMHG
ECHO LVOT PEAK VELOCITY: 1 M/S
ECHO LVOT STROKE VOLUME INDEX: 43.7 ML/M2
ECHO LVOT SV: 75.7 ML
ECHO LVOT VTI: 24.1 CM
ECHO MV A VELOCITY: 1.15 M/S
ECHO MV AREA VTI: 3.1 CM2
ECHO MV E DECELERATION TIME (DT): 197 MS
ECHO MV E VELOCITY: 1.55 M/S
ECHO MV E/A RATIO: 1.35
ECHO MV E/E' LATERAL: 22.14
ECHO MV E/E' RATIO (AVERAGED): 30.45
ECHO MV E/E' SEPTAL: 38.75
ECHO MV LVOT VTI INDEX: 1.01
ECHO MV MAX VELOCITY: 1.6 M/S
ECHO MV MEAN GRADIENT: 4 MMHG
ECHO MV MEAN VELOCITY: 1 M/S
ECHO MV PEAK GRADIENT: 10 MMHG
ECHO MV VTI: 24.3 CM
ECHO PV MAX VELOCITY: 1.2 M/S
ECHO PV PEAK GRADIENT: 6 MMHG
ECHO RA AREA 4C: 15.8 CM2
ECHO RA END SYSTOLIC VOLUME APICAL 4 CHAMBER INDEX BSA: 25 ML/M2
ECHO RA VOLUME: 43 ML
ECHO RIGHT VENTRICULAR SYSTOLIC PRESSURE (RVSP): 23 MMHG
ECHO RV BASAL DIMENSION: 3 CM
ECHO RV LONGITUDINAL DIMENSION: 5.1 CM
ECHO RV MID DIMENSION: 2.1 CM
ECHO RV TAPSE: 1.8 CM (ref 1.7–?)
ECHO TV REGURGITANT MAX VELOCITY: 2.22 M/S
ECHO TV REGURGITANT PEAK GRADIENT: 20 MMHG
EOSINOPHIL # BLD: 0.1 K/UL (ref 0–0.4)
EOSINOPHIL NFR BLD: 1 % (ref 0–5)
ERYTHROCYTE [DISTWIDTH] IN BLOOD BY AUTOMATED COUNT: 17.1 % (ref 11.6–14.5)
GLUCOSE SERPL-MCNC: 113 MG/DL (ref 74–99)
HCT VFR BLD AUTO: 29.3 % (ref 35–45)
HGB BLD-MCNC: 9.3 G/DL (ref 12–16)
IMM GRANULOCYTES # BLD AUTO: 0.1 K/UL (ref 0–0.04)
IMM GRANULOCYTES NFR BLD AUTO: 1 % (ref 0–0.5)
LACTATE SERPL-SCNC: 1.7 MMOL/L (ref 0.4–2)
LYMPHOCYTES # BLD: 1.3 K/UL (ref 0.9–3.6)
LYMPHOCYTES NFR BLD: 14 % (ref 21–52)
MCH RBC QN AUTO: 31.3 PG (ref 24–34)
MCHC RBC AUTO-ENTMCNC: 31.7 G/DL (ref 31–37)
MCV RBC AUTO: 98.7 FL (ref 78–100)
MONOCYTES # BLD: 0.7 K/UL (ref 0.05–1.2)
MONOCYTES NFR BLD: 8 % (ref 3–10)
NEUTS SEG # BLD: 7 K/UL (ref 1.8–8)
NEUTS SEG NFR BLD: 76 % (ref 40–73)
NRBC # BLD: 0 K/UL (ref 0–0.01)
NRBC BLD-RTO: 0 PER 100 WBC
P-R INTERVAL, ECG05: 143 MS
PLATELET # BLD AUTO: 139 K/UL (ref 135–420)
PMV BLD AUTO: 10.5 FL (ref 9.2–11.8)
POTASSIUM SERPL-SCNC: 3.6 MMOL/L (ref 3.5–5.5)
Q-T INTERVAL, ECG07: 325 MS
QRS DURATION, ECG06: 77 MS
QTC CALCULATION (BEZET), ECG08: 422 MS
RBC # BLD AUTO: 2.97 M/UL (ref 4.2–5.3)
SODIUM SERPL-SCNC: 139 MMOL/L (ref 136–145)
TROPONIN-HIGH SENSITIVITY: 53 NG/L (ref 0–54)
TROPONIN-HIGH SENSITIVITY: 61 NG/L (ref 0–54)
VENTRICULAR RATE, ECG03: 101 BPM
WBC # BLD AUTO: 9.2 K/UL (ref 4.6–13.2)

## 2023-01-19 PROCEDURE — 74011250636 HC RX REV CODE- 250/636: Performed by: NURSE PRACTITIONER

## 2023-01-19 PROCEDURE — 74011250636 HC RX REV CODE- 250/636: Performed by: EMERGENCY MEDICINE

## 2023-01-19 PROCEDURE — 65270000029 HC RM PRIVATE

## 2023-01-19 PROCEDURE — 94640 AIRWAY INHALATION TREATMENT: CPT

## 2023-01-19 PROCEDURE — 80048 BASIC METABOLIC PNL TOTAL CA: CPT

## 2023-01-19 PROCEDURE — G0378 HOSPITAL OBSERVATION PER HR: HCPCS

## 2023-01-19 PROCEDURE — 90935 HEMODIALYSIS ONE EVALUATION: CPT

## 2023-01-19 PROCEDURE — 93005 ELECTROCARDIOGRAM TRACING: CPT

## 2023-01-19 PROCEDURE — 96374 THER/PROPH/DIAG INJ IV PUSH: CPT

## 2023-01-19 PROCEDURE — 99285 EMERGENCY DEPT VISIT HI MDM: CPT

## 2023-01-19 PROCEDURE — 93306 TTE W/DOPPLER COMPLETE: CPT

## 2023-01-19 PROCEDURE — 94761 N-INVAS EAR/PLS OXIMETRY MLT: CPT

## 2023-01-19 PROCEDURE — 74011000250 HC RX REV CODE- 250: Performed by: EMERGENCY MEDICINE

## 2023-01-19 PROCEDURE — 84484 ASSAY OF TROPONIN QUANT: CPT

## 2023-01-19 PROCEDURE — 74011250637 HC RX REV CODE- 250/637: Performed by: NURSE PRACTITIONER

## 2023-01-19 PROCEDURE — 85025 COMPLETE CBC W/AUTO DIFF WBC: CPT

## 2023-01-19 PROCEDURE — 77010033678 HC OXYGEN DAILY

## 2023-01-19 PROCEDURE — 74011000250 HC RX REV CODE- 250: Performed by: NURSE PRACTITIONER

## 2023-01-19 PROCEDURE — 83605 ASSAY OF LACTIC ACID: CPT

## 2023-01-19 PROCEDURE — 5A1D70Z PERFORMANCE OF URINARY FILTRATION, INTERMITTENT, LESS THAN 6 HOURS PER DAY: ICD-10-PCS | Performed by: SPECIALIST

## 2023-01-19 PROCEDURE — 71045 X-RAY EXAM CHEST 1 VIEW: CPT

## 2023-01-19 RX ORDER — MELATONIN
1000 DAILY
Status: DISCONTINUED | OUTPATIENT
Start: 2023-01-19 | End: 2023-01-20 | Stop reason: HOSPADM

## 2023-01-19 RX ORDER — SODIUM BICARBONATE 650 MG/1
650 TABLET ORAL 2 TIMES DAILY
Status: DISCONTINUED | OUTPATIENT
Start: 2023-01-19 | End: 2023-01-20 | Stop reason: HOSPADM

## 2023-01-19 RX ORDER — ONDANSETRON 2 MG/ML
4 INJECTION INTRAMUSCULAR; INTRAVENOUS
Status: DISCONTINUED | OUTPATIENT
Start: 2023-01-19 | End: 2023-01-20 | Stop reason: HOSPADM

## 2023-01-19 RX ORDER — ACETAMINOPHEN 325 MG/1
650 TABLET ORAL
Status: DISCONTINUED | OUTPATIENT
Start: 2023-01-19 | End: 2023-01-20 | Stop reason: HOSPADM

## 2023-01-19 RX ORDER — IPRATROPIUM BROMIDE AND ALBUTEROL SULFATE 2.5; .5 MG/3ML; MG/3ML
3 SOLUTION RESPIRATORY (INHALATION)
Status: DISCONTINUED | OUTPATIENT
Start: 2023-01-19 | End: 2023-01-20 | Stop reason: HOSPADM

## 2023-01-19 RX ORDER — HEPARIN SODIUM 5000 [USP'U]/ML
5000 INJECTION, SOLUTION INTRAVENOUS; SUBCUTANEOUS EVERY 8 HOURS
Status: DISCONTINUED | OUTPATIENT
Start: 2023-01-19 | End: 2023-01-20 | Stop reason: HOSPADM

## 2023-01-19 RX ORDER — LANOLIN ALCOHOL/MO/W.PET/CERES
1000 CREAM (GRAM) TOPICAL DAILY
Status: DISCONTINUED | OUTPATIENT
Start: 2023-01-19 | End: 2023-01-20 | Stop reason: HOSPADM

## 2023-01-19 RX ORDER — HYDROXYZINE PAMOATE 25 MG/1
25 CAPSULE ORAL
Status: DISCONTINUED | OUTPATIENT
Start: 2023-01-19 | End: 2023-01-20 | Stop reason: HOSPADM

## 2023-01-19 RX ORDER — ACETAMINOPHEN 650 MG/1
650 SUPPOSITORY RECTAL
Status: DISCONTINUED | OUTPATIENT
Start: 2023-01-19 | End: 2023-01-20 | Stop reason: HOSPADM

## 2023-01-19 RX ORDER — SODIUM CHLORIDE 0.9 % (FLUSH) 0.9 %
5-40 SYRINGE (ML) INJECTION EVERY 8 HOURS
Status: DISCONTINUED | OUTPATIENT
Start: 2023-01-19 | End: 2023-01-20 | Stop reason: HOSPADM

## 2023-01-19 RX ORDER — SEVELAMER CARBONATE 800 MG/1
800 TABLET, FILM COATED ORAL
Status: DISCONTINUED | OUTPATIENT
Start: 2023-01-19 | End: 2023-01-20 | Stop reason: HOSPADM

## 2023-01-19 RX ORDER — POLYETHYLENE GLYCOL 3350 17 G/17G
17 POWDER, FOR SOLUTION ORAL DAILY PRN
Status: DISCONTINUED | OUTPATIENT
Start: 2023-01-19 | End: 2023-01-20 | Stop reason: HOSPADM

## 2023-01-19 RX ORDER — ONDANSETRON 4 MG/1
4 TABLET, ORALLY DISINTEGRATING ORAL
Status: DISCONTINUED | OUTPATIENT
Start: 2023-01-19 | End: 2023-01-20 | Stop reason: HOSPADM

## 2023-01-19 RX ORDER — ALBUTEROL SULFATE 0.83 MG/ML
5 SOLUTION RESPIRATORY (INHALATION)
Status: COMPLETED | OUTPATIENT
Start: 2023-01-19 | End: 2023-01-19

## 2023-01-19 RX ORDER — IPRATROPIUM BROMIDE AND ALBUTEROL SULFATE 2.5; .5 MG/3ML; MG/3ML
3 SOLUTION RESPIRATORY (INHALATION)
Status: COMPLETED | OUTPATIENT
Start: 2023-01-19 | End: 2023-01-19

## 2023-01-19 RX ORDER — SODIUM CHLORIDE 0.9 % (FLUSH) 0.9 %
5-40 SYRINGE (ML) INJECTION AS NEEDED
Status: DISCONTINUED | OUTPATIENT
Start: 2023-01-19 | End: 2023-01-20 | Stop reason: HOSPADM

## 2023-01-19 RX ORDER — CALCITRIOL 0.25 UG/1
0.25 CAPSULE ORAL DAILY
Status: DISCONTINUED | OUTPATIENT
Start: 2023-01-19 | End: 2023-01-20 | Stop reason: HOSPADM

## 2023-01-19 RX ADMIN — SODIUM BICARBONATE 650 MG: 650 TABLET ORAL at 09:22

## 2023-01-19 RX ADMIN — SEVELAMER CARBONATE 800 MG: 800 TABLET, FILM COATED ORAL at 09:26

## 2023-01-19 RX ADMIN — METHYLPREDNISOLONE SODIUM SUCCINATE 125 MG: 125 INJECTION, POWDER, FOR SOLUTION INTRAMUSCULAR; INTRAVENOUS at 01:15

## 2023-01-19 RX ADMIN — HEPARIN SODIUM 5000 UNITS: 5000 INJECTION INTRAVENOUS; SUBCUTANEOUS at 05:17

## 2023-01-19 RX ADMIN — SEVELAMER CARBONATE 800 MG: 800 TABLET, FILM COATED ORAL at 16:17

## 2023-01-19 RX ADMIN — SEVELAMER CARBONATE 800 MG: 800 TABLET, FILM COATED ORAL at 11:33

## 2023-01-19 RX ADMIN — SODIUM CHLORIDE 250 ML: 9 INJECTION, SOLUTION INTRAVENOUS at 04:09

## 2023-01-19 RX ADMIN — HEPARIN SODIUM 5000 UNITS: 5000 INJECTION INTRAVENOUS; SUBCUTANEOUS at 23:33

## 2023-01-19 RX ADMIN — IPRATROPIUM BROMIDE AND ALBUTEROL SULFATE 3 ML: 2.5; .5 SOLUTION RESPIRATORY (INHALATION) at 01:12

## 2023-01-19 RX ADMIN — IPRATROPIUM BROMIDE AND ALBUTEROL SULFATE 3 ML: .5; 2.5 SOLUTION RESPIRATORY (INHALATION) at 16:44

## 2023-01-19 RX ADMIN — METHYLPREDNISOLONE SODIUM SUCCINATE 60 MG: 125 INJECTION, POWDER, FOR SOLUTION INTRAMUSCULAR; INTRAVENOUS at 09:21

## 2023-01-19 RX ADMIN — CALCITRIOL CAPSULES 0.25 MCG 0.25 MCG: 0.25 CAPSULE ORAL at 09:21

## 2023-01-19 RX ADMIN — SODIUM BICARBONATE 650 MG: 650 TABLET ORAL at 17:32

## 2023-01-19 RX ADMIN — HEPARIN SODIUM 5000 UNITS: 5000 INJECTION INTRAVENOUS; SUBCUTANEOUS at 14:46

## 2023-01-19 RX ADMIN — Medication 10 ML: at 05:17

## 2023-01-19 RX ADMIN — Medication 10 ML: at 14:44

## 2023-01-19 RX ADMIN — Medication 1000 UNITS: at 09:21

## 2023-01-19 RX ADMIN — IPRATROPIUM BROMIDE AND ALBUTEROL SULFATE 3 ML: .5; 2.5 SOLUTION RESPIRATORY (INHALATION) at 13:00

## 2023-01-19 RX ADMIN — ALBUTEROL SULFATE 5 MG: 2.5 SOLUTION RESPIRATORY (INHALATION) at 01:36

## 2023-01-19 RX ADMIN — CYANOCOBALAMIN TAB 500 MCG 1000 MCG: 500 TAB at 09:22

## 2023-01-19 RX ADMIN — Medication 10 ML: at 23:33

## 2023-01-19 NOTE — PROGRESS NOTES
Care Management Interventions  PCP Verified by CM: Yes  Palliative Care Criteria Met (RRAT>21 & CHF Dx)?: No  Transition of Care Consult (CM Consult): Discharge Planning  Physical Therapy Consult: No  Occupational Therapy Consult: No  Speech Therapy Consult: No  Support Systems: Spouse/Significant Other  Confirm Follow Up Transport: Family  The Plan for Transition of Care is Related to the Following Treatment Goals : Patient centered discharge planning to ensure smooth transition to community and PLOF. Discharge Location  Patient Expects to be Discharged to[de-identified] Home    Reason for Admission:   Chart reviewed and patient interviewed. Patient presented to ED with complaints of SOB. Patient found to be in ESRD needing to continue dialysis and COPD exacerbation. Hospitalist consulted for admission. RUR Score:  18%                PCP: First and Last name:   Salvador Nance 32     Name of Practice:    Are you a current patient: Yes/No:    Approximate date of last visit:    Can you participate in a virtual visit if needed:     Do you (patient/family) have any concerns for transition/discharge? None                 Plan for utilizing home health:   Denies need    Current Advanced Directive/Advance Care Plan:  Full Code      Healthcare Decision Maker:   Click here to complete [address] including selection of the Healthcare Decision Maker Relationship (ie \"Primary\")              Transition of Care Plan:   Plan of care includes patient/primary care giver interview to develop discharge plan, consider home health, and follow up appointment to be arranged by nursing staff at discharge. Patient centered discharge planning to ensure smooth transition to community and PLOF. Patient lives at home with  and is independent of ADLs. NO DME or HH use or needs. Patient has transportation at DC and for dialysis. CM following for DC needs.

## 2023-01-19 NOTE — ED NOTES
TRANSFER - OUT REPORT:    Verbal report given to KELLE Jane RN(name) on [address]  being transferred to ICU(unit) for routine progression of care       Report consisted of patients Situation, Background, Assessment and   Recommendations(SBAR). Information from the following report(s) SBAR, ED Summary, MAR, Recent Results, and Cardiac Rhythm ST  was reviewed with the receiving nurse. Lines:   Peripheral IV 01/19/23 Anterior;Right Wrist (Active)        Opportunity for questions and clarification was provided.       Patient transported with:   Monitor  Registered Nurse

## 2023-01-19 NOTE — H&P
History and Physical    Subjective:     Kat Melendez is a 45 y.o. female  has a past medical history of ESRD on hemodialysis ([facility] [address] and Kidney failure. Patient seen at bedside in the emergency department. Patient came to the ER tonight short of breath. Patient has COPD and is still a smoker. Patient states recently her doctor gave her some patches and she is going to attempt to quit smoking. Patient is also end-stage renal failure patient and gets dialysis . Patient states that she did her whole time on Friday on dialysis but is still short of breath. Mild amount of lower leg edema is noted. Patient was treated with Solu-Medrol nebulizers in the emergency room her nephrologist is Dr. Lois Cosby up in [place]. Patient also had some mild chest pain when coming to the ER when I evaluated her she denied any chest pain. We will cycle troponins and get a cardiology consult and an echo. Patient does not remember having an echo and there is none in her record is having 1. Past Medical History:   Diagnosis Date    ESRD on hemodialysis ([facility] [address]     Kidney failure       Past Surgical History:   Procedure Laterality Date    HX  SECTION      PARTIAL REMOVAL OF KIDNEY       Family History   Problem Relation Age of Onset    Diabetes Maternal Grandmother       Social History     Tobacco Use    Smoking status: Every Day     Packs/day: 0.25     Years: 35.00     Pack years: 8.75     Types: Cigarettes    Smokeless tobacco: Never   Substance Use Topics    Alcohol use: Not Currently       Prior to Admission medications    Medication Sig Start Date End Date Taking?  Authorizing Provider   sevelamer carbonate (RENVELA) 800 mg tab tab TAKE 1 TABLET BY MOUTH THREE TIMES DAILY WITH MEALS 22   Cathie Veliz MD   calcitRIOL (ROCALTROL) 0.25 mcg capsule Take 1 capsule by mouth once daily 22   Cathie Veliz MD   cholecalciferol (VITAMIN D3) (1000 Units /25 mcg) tablet Take 1 Tablet by mouth daily. 7/7/22   Sheldon Cameron MD   epoetin carmelo (Epogen) 10,000 unit/mL injection 1 mL by SubCUTAneous route Once every 2 weeks. 7/7/22   Sheldon Cameron MD   sodium bicarbonate 650 mg tablet Take 1 Tablet by mouth two (2) times a day. 5/5/22   Sheldon Cameron MD   cyanocobalamin, vitamin B-12, 5,000 mcg subl 1 Tablet by SubLINGual route daily. 6/3/21   Other, MD Sera     Allergies   Allergen Reactions    Dulaglutide Other (comments)     Nausea with vomiting and dizziness    Sitagliptin Other (comments)     Headache, runny nose, back pain and low blood sugars    Codeine Palpitations    Pcn [Penicillins] Hives         Review of Systems   Constitutional:  Negative for fever. Respiratory:  Positive for shortness of breath. Cardiovascular:  Positive for chest pain and leg swelling. Neurological:  Negative for dizziness. All other systems reviewed and are negative. Objective:   VITALS:    Visit Vitals  /60   Pulse (!) 108   Temp 98.2 °F (36.8 °C)   Resp 22   Ht 5' 4\" (1.626 m)   Wt 68 kg (150 lb)   SpO2 96%   BMI 25.75 kg/m²       Physical Exam  Vitals and nursing note reviewed. Constitutional:       General: She is not in acute distress. Appearance: She is well-developed. She is ill-appearing. HENT:      Head: Normocephalic and atraumatic. Eyes:      Conjunctiva/sclera: Conjunctivae normal.      Pupils: Pupils are equal, round, and reactive to light. Cardiovascular:      Rate and Rhythm: Normal rate and regular rhythm. Pulses: Normal pulses. Heart sounds: Normal heart sounds. Pulmonary:      Effort: Pulmonary effort is normal. No respiratory distress. Breath sounds: No stridor. Rales present. No wheezing or rhonchi. Comments: Mild Rales noted in the lower lobes at the bases  Abdominal:      General: Bowel sounds are normal. There is no distension. Palpations: Abdomen is soft. Tenderness: There is no abdominal tenderness.    Musculoskeletal: General: Normal range of motion. Cervical back: Normal range of motion and neck supple. Right lower leg: Edema present. Left lower leg: Edema present. Comments: Very minimal nonpitting edema to the lower legs   Skin:     General: Skin is warm and dry. Capillary Refill: Capillary refill takes less than 2 seconds. Coloration: Skin is not jaundiced or pale. Neurological:      General: No focal deficit present. Mental Status: She is alert and oriented to person, place, and time. Psychiatric:         Behavior: Behavior normal.         Thought Content:  Thought content normal.         Judgment: Judgment normal.       _______________________________________________________________________  Care Plan discussed with:    Comments   Patient X    Family      RN X    Care Manager                    Consultant:      _______________________________________________________________________  Expected  Disposition:   Home with Family X   HH/PT/OT/RN    SNF/LTC    ATILIO    ________________________________________________________________________  TOTAL TIME:  48 Minutes    Critical Care Provided     Minutes non procedure based      Comments    X Reviewed previous records   >50% of visit spent in counseling and coordination of care X Discussion with patient and/or family and questions answered       ________________________________________________________________________    Labs:  Recent Results (from the past 24 hour(s))   CBC WITH AUTOMATED DIFF    Collection Time: 01/19/23 12:34 AM   Result Value Ref Range    WBC 9.2 4.6 - 13.2 K/uL    RBC 2.97 (L) 4.20 - 5.30 M/uL    HGB 9.3 (L) 12.0 - 16.0 g/dL    HCT 29.3 (L) 35.0 - 45.0 %    MCV 98.7 78.0 - 100.0 FL    MCH 31.3 24.0 - 34.0 PG    MCHC 31.7 31.0 - 37.0 g/dL    RDW 17.1 (H) 11.6 - 14.5 %    PLATELET 478 443 - 559 K/uL    MPV 10.5 9.2 - 11.8 FL    NRBC 0.0 0.0  WBC    ABSOLUTE NRBC 0.00 0.00 - 0.01 K/uL    NEUTROPHILS 76 (H) 40 - 73 % LYMPHOCYTES 14 (L) 21 - 52 %    MONOCYTES 8 3 - 10 %    EOSINOPHILS 1 0 - 5 %    BASOPHILS 0 0 - 2 %    IMMATURE GRANULOCYTES 1 (H) 0 - 0.5 %    ABS. NEUTROPHILS 7.0 1.8 - 8.0 K/UL    ABS. LYMPHOCYTES 1.3 0.9 - 3.6 K/UL    ABS. MONOCYTES 0.7 0.05 - 1.2 K/UL    ABS. EOSINOPHILS 0.1 0.0 - 0.4 K/UL    ABS. BASOPHILS 0.0 0.0 - 0.1 K/UL    ABS. IMM. GRANS. 0.1 (H) 0.00 - 0.04 K/UL    DF AUTOMATED     METABOLIC PANEL, BASIC    Collection Time: 01/19/23 12:34 AM   Result Value Ref Range    Sodium 139 136 - 145 mmol/L    Potassium 3.6 3.5 - 5.5 mmol/L    Chloride 99 (L) 100 - 111 mmol/L    CO2 33 (H) 21 - 32 mmol/L    Anion gap 7 3.0 - 18.0 mmol/L    Glucose 113 (H) 74 - 99 mg/dL    BUN 23 (H) 7 - 18 mg/dL    Creatinine 2.87 (H) 0.60 - 1.30 mg/dL    BUN/Creatinine ratio 8 (L) 12 - 20      eGFR 19 (L) >60 ml/min/1.73m2    Calcium 7.8 (L) 8.5 - 10.1 mg/dL   TROPONIN-HIGH SENSITIVITY    Collection Time: 01/19/23 12:34 AM   Result Value Ref Range    Troponin-High Sensitivity 53 0 - 54 ng/L   LACTIC ACID    Collection Time: 01/19/23 12:34 AM   Result Value Ref Range    Lactic acid 1.7 0.4 - 2.0 mmol/L       Imaging:  XR CHEST PORT    Result Date: 1/19/2023  EXAM:  XR CHEST PORT INDICATION: Shortness of breath COMPARISON: 1/8/2023. TECHNIQUE: Portable AP semiupright chest view at 0100 hours FINDINGS: The right IJ catheter is stable. The cardiomediastinal contours are stable. There are mild diffuse interstitial opacities and trace pleural effusions, decreased compared to 1/8/2023. There is no pneumothorax. The bones and upper abdomen are stable. Mild pulmonary edema and trace pleural effusions, decreased compared to 1/8/2023.        Assessment & Plan:       End stage renal failure on dialysis [facility])  Patient has dialysis Monday Wednesday Friday went for session on Friday  Came into the ER with shortness of breath  Patient was found and mild fluid overload on x-ray  Patient also is having a COPD exacerbation  patient continues to smoke  Nephrology consult  Patient will need dialysis      COPD exacerbation ([facility] [address]  COPD exacerbation tonight  Patient given Solu-Medrol nebulizers in ED will continue  Patient continues to smoke  No infiltrate or infection on chest x-ray    Hypotension  Patient became hypotensive in ED when got to the ICU she was in the 80s  250 cc bolus of fluid  Only give a small amount of fluid if it does not work we will consider Levophed      Pulmonary edema  Patient with some fluid overload on chest x-ray  Mild pulmonary edema and trace pleural effusions noted  Fluid restriction 1500 mL/day        Code Status: Full      Prophylaxis: Heparin subcu      Electronically Signed : Imelda Sorenson ENP-C, FNP-C, [address] voice recognition was used to generate this report, which may have resulted in some phonetic based errors in grammar and contents.  Even though attempts were made to correct all the mistakes, some may have been missed, and remained in the body of the document

## 2023-01-19 NOTE — PROGRESS NOTES
Chart reviewed     Patient is ESRD under our care at [facility] , usually gets HD MWF , she had a recent admission to [facility] hospital because of Volume overload / CHF that improved with dialysis , she was doing fairly well after discharge and when I saw her earlier this week on HD . She comes to ED with SOB , CXR with Mild Pulmonary edema . Patient will get dialysis today and tomorrow with 2-3 L UF , dialysis nurse notified and orders given .  She will be placed on FR and low salt diet , use Levophed if needed to support BP , echo is pending     Will follow , Please call us with any question   Office [phone]

## 2023-01-19 NOTE — ACP (ADVANCE CARE PLANNING)
Advance Care Planning     General Advance Care Planning (ACP) Conversation      Date of Conversation: 1/19/2023  Conducted with: Patient with Decision Making Capacity    Healthcare Decision Maker:   No healthcare decision makers have been documented.    Click here to complete [address] including selection of the Healthcare Decision Maker Relationship (ie \"Primary\")      Content/Action Overview:   Has NO ACP documents/care preferences - information provided, considering goals and options  Reviewed DNR/DNI and patient elects Full Code (Attempt Resuscitation)         Length of Voluntary ACP Conversation in minutes:  <16 minutes (Non-Billable)    Arminda Gross

## 2023-01-19 NOTE — Clinical Note
Status[de-identified] INPATIENT [101]   Type of Bed: Telemetry [19]   Cardiac Monitoring Required?: Yes   Inpatient Hospitalization Certified Necessary for the Following Reasons: 3.  Patient receiving treatment that can only be provided in an inpatient setting (further clarification in H&P documentation)   Admitting Diagnosis: COPD exacerbation [facility]) [[id number]]   Admitting Diagnosis: Pulmonary edema [384988]   Admitting Physician: Chayo Hwang [[id number]]   Attending Physician: Chayo Hwang [[id number]]   Estimated Length of Stay: 2 Midnights   Discharge Plan[de-identified] Home with Office Follow-up

## 2023-01-19 NOTE — PROGRESS NOTES
-0205 TRANSFER - IN REPORT:    Verbal report received from LOYD Arteaga RN(name) on [address]  being received from ED(unit) for routine progression of care      Report consisted of patients Situation, Background, Assessment and   Recommendations(SBAR). Information from the following report(s) ED Summary, MAR, and Recent Results was reviewed with the receiving nurse. Opportunity for questions and clarification was provided. Assessment completed upon patients arrival to unit and care assumed.     -0300 received care of pt from ED. Pt ambulated short distance to bed. HOB on exertion. A&OX4. Lung sounds rhonchi in upper lobes, expiratory wheezes in bases. O2 @ 2 lpm via n/c. Skin warm and dry. Skin pallor dusky. TDC to right upper chest wall.  +2 pitting edema to BLE. Pt on 1500 ml fluid restrictions. Pt denies any complaints of pain at present. Pt orient to call bell system and  bed controls.    -0320 SEYMOUR. Guru Vaz NP into see pt.      -0409 250 ml IV fluid bolus given per NP orders.     -0720 Bedside shift change report given to LOYD Yung RN (oncoming nurse) by KELLE Lomax RN (offgoing nurse). Report included the following information Intake/Output, MAR, and Recent Results.

## 2023-01-19 NOTE — ASSESSMENT & PLAN NOTE
Patient has dialysis Monday Wednesday Friday went for session on Friday  Came into the ER with shortness of breath  Patient was found and mild fluid overload on x-ray  Patient also is having a COPD exacerbation  patient continues to smoke  Nephrology consult  Patient will need dialysis

## 2023-01-19 NOTE — PROGRESS NOTES
Problem: General Medical Care Plan  Goal: *Labs within defined limits  Outcome: Progressing Towards Goal  Goal: *Absence of infection signs and symptoms  Outcome: Progressing Towards Goal  Goal: *Optimal pain control at patient's stated goal  Outcome: Progressing Towards Goal  Goal: *Skin integrity maintained  Outcome: Progressing Towards Goal  Goal: *Optimize nutritional status  Outcome: Progressing Towards Goal  Goal: *Progressive mobility and function (eg: ADL's)  Outcome: Progressing Towards Goal     Problem: Chronic Renal Failure  Goal: *Fluid and electrolytes stabilized  Outcome: Not Progressing Towards Goal     Problem: General Medical Care Plan  Goal: *Vital signs within specified parameters  Outcome: Not Progressing Towards Goal  Goal: *Anxiety reduced or absent  Outcome: Not Progressing Towards Goal

## 2023-01-19 NOTE — ASSESSMENT & PLAN NOTE
Patient became hypotensive in ED when got to the ICU she was in the 80s  250 cc bolus of fluid  Only give a small amount of fluid if it does not work we will consider Levophed

## 2023-01-19 NOTE — PROGRESS NOTES
0710: Received care of patient. Resting in bed. Alert and oriented x4. Respirations even and unlabored. Reports SOB with excertion and laying flat. 02 @ 2 l/m NC with Sats 96%. Abdomen obese and distended. Right SC TDC, dressing intact. Left arm fistula. Denies further needs. CBWR.     0730: Breakfast tray provided. 3073: Echo being performed at bedside. 1032: Dr. Kt Kraft with nephrology office notified of patient needing HD. Per office nurse will notified MD.     [phone]: Per Dr. Nirmala Kevin office, he is retires and Dr. Nabil Larson assuming care of his patients. Per office he is placing HD orders. 1153: Patient request new lunch tray. Burger and fries provided. 1500: CHUCHO Diggs NP with [facility] into see patient. 1630: Dinner tray provided. Denies needs. 1700: requested breathing treatment. RT into see patient. 1740: Patient OOB to [facility], small BM. 1830: HD nurse arrived to facility. Reports needs orders for HD. Dr. Nabil Larson paged. 1900: Report provided to oncoming nurse.

## 2023-01-19 NOTE — ASSESSMENT & PLAN NOTE
Patient with some fluid overload on chest x-ray  Mild pulmonary edema and trace pleural effusions noted  Fluid restriction 1500 mL/day

## 2023-01-19 NOTE — ED TRIAGE NOTES
Pt. States she had her normal dialysis treatment today. Pt. States she had 2.6 L removed. . Pt. States she had lots of crab and [place] this weekend and started having a lot of swelling. Pt. States when she went to lay down to sleep tonight around 2230 she became very short of breath. Pt. States this happened two weeks ago and she was admitted to the hospital for 3 days.

## 2023-01-19 NOTE — CONSULTS
CARDIOLOGY CONSULTATION      REASON FOR CONSULT:  Cardiomyopathy    REQUESTING PROVIDER: Dr. Zuly Nicholas:  Shortness of breath    HISTORY OF PRESENT ILLNESS:  Ofilia Carrel is a 45y.o. year-old female with past medical history significant for ESRD on HD M/W/F, COPD with active tobacco use who presented to the ED due to worsening dyspnea despite compliance with HD. She admits to dietary indiscretion and increased fluid intake. No chest pain. On arrival a single troponin was negative. EKG with NSR and possible lateral ST changes. Chest x-ray showing mild pulmonary edema. She is followed locally by nephrology and has plans for HD today and tomorrow. She was seen at [facility] in December and underwent a negative nuclear stress test.  Echo at that time with EF 40-45% and moderate AS. She says when her PCP called [facility] cardiology's office to follow-up she was told she didn't need to be seen. Records from hospital admission course thus far reviewed. Telemetry reviewed. No events overnight. Sinus tachycardia overnight into today in the 110s-130s. Rate in the 90s when seen. INPATIENT MEDICATIONS:  Home medications reviewed.     Current Facility-Administered Medications:     sodium chloride (NS) flush 5-40 mL, 5-40 mL, IntraVENous, Q8H, Sarah Rdz NP, 10 mL at 01/19/23 1444    sodium chloride (NS) flush 5-40 mL, 5-40 mL, IntraVENous, PRN, Blanco MON NP    acetaminophen (TYLENOL) tablet 650 mg, 650 mg, Oral, Q6H PRN **OR** acetaminophen (TYLENOL) suppository 650 mg, 650 mg, Rectal, Q6H PRN, Blanco MON NP    polyethylene glycol (MIRALAX) packet 17 g, 17 g, Oral, DAILY PRN, Blanco MON NP    ondansetron (ZOFRAN ODT) tablet 4 mg, 4 mg, Oral, Q8H PRN **OR** ondansetron (ZOFRAN) injection 4 mg, 4 mg, IntraVENous, Q6H PRN, Blanco MON NP    heparin (porcine) injection 5,000 Units, 5,000 Units, SubCUTAneous, Q8H, Sarah Rdz NP, 5,000 Units at 01/19/23 1446    albuterol-ipratropium (DUO-NEB) 2.5 MG-0.5 MG/3 ML, 3 mL, Nebulization, Q4H PRN, Valentino Baptist A, NP, 3 mL at 01/19/23 1300    hydrOXYzine pamoate (VISTARIL) capsule 25 mg, 25 mg, Oral, TID PRN, Valentino Baptist A, NP    calcitRIOL (ROCALTROL) capsule 0.25 mcg, 0.25 mcg, Oral, DAILY, Valentino Baptist A, NP, 0.25 mcg at 01/19/23 4284    cholecalciferol (VITAMIN D3) (1000 Units /25 mcg) tablet 1,000 Units, 1,000 Units, Oral, DAILY, Valentino Baptist A, NP, 1,000 Units at 01/19/23 5761    cyanocobalamin (VITAMIN B12) tablet 1,000 mcg, 1,000 mcg, Oral, DAILY, Valentino Baptist A, NP, 1,000 mcg at 01/19/23 7824    sevelamer carbonate (RENVELA) tab 800 mg, 800 mg, Oral, TID WITH MEALS, Valentino Baptist A, NP, 800 mg at 01/19/23 1133    sodium bicarbonate tablet 650 mg, 650 mg, Oral, BID, Valentino Baptist A, NP, 650 mg at 01/19/23 2005     ALLERGIES:  Allergies reviewed with the patient,  Allergies   Allergen Reactions    Dulaglutide Other (comments)     Nausea with vomiting and dizziness    Sitagliptin Other (comments)     Headache, runny nose, back pain and low blood sugars    Codeine Palpitations    Pcn [Penicillins] Hives    . FAMILY HISTORY:  Family history reviewed. SOCIAL HISTORY:  Notable for active tobacco use, no heavy alcohol or illicit drug use. REVIEW OF SYSTEMS:  Complete review of systems performed, pertinents noted above, all other systems are negative. PHYSICAL EXAMINATION:  Vital sign assessment reveal a blood pressure of  104/68  and pulse rate of 116. Cardiovascular exam has a heart with a regular rate and rhythm, normal S1 and S2. Harsh murmur present. There are no rubs or gallops. Good peripheral pulses. No jugular venous distension. Respiratory exam reveals clear but diminished lung fields, no rales or rhonchi. Gastrointestinal exam has soft, nontender abdomen with normal bowel sounds.   Lymphatic exam reveals mild  edema and no varicosities. No notable skin changes. Neurologic exam is nonfocal.      Visit Vitals  /68 (BP 1 Location: Right upper arm, BP Patient Position: Supine)   Pulse (!) 116   Temp 98.1 °F (36.7 °C)   Resp 21   Ht 5' 4\" (1.626 m)   Wt 68 kg (150 lb)   SpO2 99%   BMI 25.75 kg/m²         Recent labs results and imaging reviewed. Repeat troponin pending. Discussed case with Dr. Marla Amaral and our impression and recommendations are as follows:  Shortness of breath:  Likely due to volume, which is managed by renal/HD. Recent ischemic evaluation negative. Lateral ST depression is less than 1mm per EKG. Would consider OP CCTA vs. Cath given risk factors. Will repeat troponin to rule out ACS as she does remain dyspneic. Recent admission for dyspnea with trops of 75-->101 (likely demand related due to volume). Cardiomyopathy:  Mild with EF 40-45% with repeat echo and comparable to [facility] in December. GDMT limited by ESRD and hypotension. Aortic stenosis:  Moderate per serial echoes. We discussed this with the patient in detail and the need for serial echoes as OP. She is agreeable to be followed at [facility] moving forward. Advised smoking cessation and maintain compliance with HD. Sinus tachycardia: This is improving. Likely due to volume overload. Will add TSH for the morning. Thank you for involving us in the care of this patient. Please do not hesitate to call me or Dr. Marla Amaral if additional questions arise.     Carlos Toscano, JAROD  1/19/2023

## 2023-01-19 NOTE — ASSESSMENT & PLAN NOTE
COPD exacerbation tonight  Patient given Solu-Medrol nebulizers in ED will continue  Patient continues to smoke  No infiltrate or infection on chest x-ray

## 2023-01-19 NOTE — ED PROVIDER NOTES
Pt c/o sob since hd today, gets hd mwf, has been compliant per pt. No chest pain. C/o leg swelling. No fever. H/o copd, c/o sig wheezing. Not no home o2. Sob worse when lying flat on back. No rash. No n/v. Past Medical History:   Diagnosis Date    ESRD on hemodialysis ([facility] [address]     Kidney failure        Past Surgical History:   Procedure Laterality Date    HX  SECTION      PARTIAL REMOVAL OF KIDNEY           Family History:   Problem Relation Age of Onset    Diabetes Maternal Grandmother        Social History     Socioeconomic History    Marital status:      Spouse name: Not on file    Number of children: Not on file    Years of education: Not on file    Highest education level: Not on file   Occupational History    Not on file   Tobacco Use    Smoking status: Every Day     Packs/day: 0.25     Years: 35.00     Pack years: 8.75     Types: Cigarettes    Smokeless tobacco: Never   Vaping Use    Vaping Use: Never used   Substance and Sexual Activity    Alcohol use: Not Currently    Drug use: Not Currently    Sexual activity: Not on file   Other Topics Concern    Not on file   Social History Narrative    Not on file     Social Determinants of Health     Financial Resource Strain: Not on file   Food Insecurity: Not on file   Transportation Needs: Not on file   Physical Activity: Not on file   Stress: Not on file   Social Connections: Not on file   Intimate Partner Violence: Not on file   Housing Stability: Not on file         ALLERGIES: Dulaglutide, Sitagliptin, Codeine, and Pcn [penicillins]    Review of Systems   Constitutional:  Negative for fever. HENT:  Negative for congestion. Respiratory:  Positive for shortness of breath and wheezing. Negative for cough. Cardiovascular:  Negative for chest pain. Gastrointestinal:  Negative for abdominal pain and vomiting. Musculoskeletal:  Negative for back pain. Skin:  Negative for rash. Neurological:  Negative for light-headedness. All other systems reviewed and are negative. Vitals:    01/19/23 0025 01/19/23 0112 01/19/23 0136   BP: 101/65     Pulse: (!) 114     Resp: 22     Temp: 98.2 °F (36.8 °C)     SpO2: 97% 96% 96%   Weight: 68 kg (150 lb)     Height: 5' 4\" (1.626 m)              Physical Exam  Vitals and nursing note reviewed. Constitutional:       Appearance: She is well-developed. She is not diaphoretic. HENT:      Head: Normocephalic and atraumatic. Eyes:      Pupils: Pupils are equal, round, and reactive to light. Cardiovascular:      Rate and Rhythm: Regular rhythm. Tachycardia present. Heart sounds: No murmur heard. Pulmonary:      Breath sounds: Wheezing present. Comments: + tachpnic  Abdominal:      Palpations: Abdomen is soft. Tenderness: There is no abdominal tenderness. Musculoskeletal:         General: No tenderness. Cervical back: Normal range of motion. Skin:     General: Skin is dry. Capillary Refill: Capillary refill takes less than 2 seconds. Findings: No rash. Neurological:      Mental Status: She is alert and oriented to person, place, and time. Psychiatric:         Mood and Affect: Mood normal.        Medical Decision Making  Amount and/or Complexity of Data Reviewed  Labs: ordered. Radiology: ordered. ECG/medicine tests: ordered. Risk  Prescription drug management. Decision regarding hospitalization.            Procedures  Vitals:  Patient Vitals for the past 12 hrs:   Temp Pulse Resp BP SpO2   01/19/23 0136 -- -- -- -- 96 %   01/19/23 0112 -- -- -- -- 96 %   01/19/23 0025 98.2 °F (36.8 °C) (!) 114 22 101/65 97 %         Medications ordered:   Medications   albuterol-ipratropium (DUO-NEB) 2.5 MG-0.5 MG/3 ML (3 mL Nebulization Given 1/19/23 0112)   methylPREDNISolone (PF) (Solu-MEDROL) injection 125 mg (125 mg IntraVENous Given 1/19/23 0115)   albuterol (PROVENTIL VENTOLIN) nebulizer solution 5 mg (5 mg Nebulization Given 1/19/23 0136)         Lab findings:  Recent Results (from the past 12 hour(s))   CBC WITH AUTOMATED DIFF    Collection Time: 01/19/23 12:34 AM   Result Value Ref Range    WBC 9.2 4.6 - 13.2 K/uL    RBC 2.97 (L) 4.20 - 5.30 M/uL    HGB 9.3 (L) 12.0 - 16.0 g/dL    HCT 29.3 (L) 35.0 - 45.0 %    MCV 98.7 78.0 - 100.0 FL    MCH 31.3 24.0 - 34.0 PG    MCHC 31.7 31.0 - 37.0 g/dL    RDW 17.1 (H) 11.6 - 14.5 %    PLATELET 842 611 - 543 K/uL    MPV 10.5 9.2 - 11.8 FL    NRBC 0.0 0.0  WBC    ABSOLUTE NRBC 0.00 0.00 - 0.01 K/uL    NEUTROPHILS 76 (H) 40 - 73 %    LYMPHOCYTES 14 (L) 21 - 52 %    MONOCYTES 8 3 - 10 %    EOSINOPHILS 1 0 - 5 %    BASOPHILS 0 0 - 2 %    IMMATURE GRANULOCYTES 1 (H) 0 - 0.5 %    ABS. NEUTROPHILS 7.0 1.8 - 8.0 K/UL    ABS. LYMPHOCYTES 1.3 0.9 - 3.6 K/UL    ABS. MONOCYTES 0.7 0.05 - 1.2 K/UL    ABS. EOSINOPHILS 0.1 0.0 - 0.4 K/UL    ABS. BASOPHILS 0.0 0.0 - 0.1 K/UL    ABS. IMM. GRANS. 0.1 (H) 0.00 - 0.04 K/UL    DF AUTOMATED     METABOLIC PANEL, BASIC    Collection Time: 01/19/23 12:34 AM   Result Value Ref Range    Sodium 139 136 - 145 mmol/L    Potassium 3.6 3.5 - 5.5 mmol/L    Chloride 99 (L) 100 - 111 mmol/L    CO2 33 (H) 21 - 32 mmol/L    Anion gap 7 3.0 - 18.0 mmol/L    Glucose 113 (H) 74 - 99 mg/dL    BUN 23 (H) 7 - 18 mg/dL    Creatinine 2.87 (H) 0.60 - 1.30 mg/dL    BUN/Creatinine ratio 8 (L) 12 - 20      eGFR 19 (L) >60 ml/min/1.73m2    Calcium 7.8 (L) 8.5 - 10.1 mg/dL   TROPONIN-HIGH SENSITIVITY    Collection Time: 01/19/23 12:34 AM   Result Value Ref Range    Troponin-High Sensitivity 53 0 - 54 ng/L           X-Ray, CT or other radiology findings or impressions:  XR CHEST PORT   Final Result   Mild pulmonary edema and trace pleural effusions, decreased compared   to 1/8/2023. Progress notes, Consult notes or additional Procedure notes:   1:36 AM pt improved, dec wheezing. Deepak Kidd, to admit      Diagnosis:   1. COPD exacerbation ([facility] [address]    2.  Acute pulmonary edema (HCC) Disposition: admit    Follow-up Information    None          Patient's Medications   Start Taking    No medications on file   Continue Taking    CALCITRIOL (ROCALTROL) 0.25 MCG CAPSULE    Take 1 capsule by mouth once daily    CHOLECALCIFEROL (VITAMIN D3) (1000 UNITS /25 MCG) TABLET    Take 1 Tablet by mouth daily. CYANOCOBALAMIN, VITAMIN B-12, 5,000 MCG SUBL    1 Tablet by SubLINGual route daily. EPOETIN LILLIANA (EPOGEN) 10,000 UNIT/ML INJECTION    1 mL by SubCUTAneous route Once every 2 weeks. SEVELAMER CARBONATE (RENVELA) 800 MG TAB TAB    TAKE 1 TABLET BY MOUTH THREE TIMES DAILY WITH MEALS    SODIUM BICARBONATE 650 MG TABLET    Take 1 Tablet by mouth two (2) times a day. These Medications have changed    No medications on file   Stop Taking    FERROUS SULFATE 325 MG (65 MG IRON) TABLET    Take 1 Tablet by mouth three (3) times daily (with meals).

## 2023-01-20 VITALS
HEIGHT: 64 IN | WEIGHT: 150.2 LBS | RESPIRATION RATE: 14 BRPM | OXYGEN SATURATION: 100 % | TEMPERATURE: 97.6 F | DIASTOLIC BLOOD PRESSURE: 52 MMHG | SYSTOLIC BLOOD PRESSURE: 111 MMHG | BODY MASS INDEX: 25.64 KG/M2 | HEART RATE: 85 BPM

## 2023-01-20 LAB
ALBUMIN SERPL-MCNC: 2.6 G/DL (ref 3.4–5)
ALBUMIN/GLOB SERPL: 0.6 (ref 0.8–1.7)
ALP SERPL-CCNC: 103 U/L (ref 45–117)
ALT SERPL-CCNC: 19 U/L (ref 13–56)
ANION GAP SERPL CALC-SCNC: 12 MMOL/L (ref 3–18)
AST SERPL W P-5'-P-CCNC: 8 U/L (ref 10–38)
ATRIAL RATE: 95 BPM
BASOPHILS # BLD: 0 K/UL (ref 0–0.1)
BASOPHILS NFR BLD: 0 % (ref 0–2)
BILIRUB SERPL-MCNC: 0.5 MG/DL (ref 0.2–1)
BUN SERPL-MCNC: 33 MG/DL (ref 7–18)
BUN/CREAT SERPL: 11 (ref 12–20)
CA-I BLD-MCNC: 9.2 MG/DL (ref 8.5–10.1)
CALCULATED P AXIS, ECG09: 78 DEGREES
CALCULATED R AXIS, ECG10: 54 DEGREES
CALCULATED T AXIS, ECG11: -125 DEGREES
CHLORIDE SERPL-SCNC: 94 MMOL/L (ref 100–111)
CO2 SERPL-SCNC: 30 MMOL/L (ref 21–32)
CREAT SERPL-MCNC: 2.91 MG/DL (ref 0.6–1.3)
DIAGNOSIS, 93000: NORMAL
DIFFERENTIAL METHOD BLD: ABNORMAL
EOSINOPHIL # BLD: 0 K/UL (ref 0–0.4)
EOSINOPHIL NFR BLD: 0 % (ref 0–5)
ERYTHROCYTE [DISTWIDTH] IN BLOOD BY AUTOMATED COUNT: 16.8 % (ref 11.6–14.5)
GLOBULIN SER CALC-MCNC: 4.3 G/DL (ref 2–4)
GLUCOSE SERPL-MCNC: 337 MG/DL (ref 74–99)
HCT VFR BLD AUTO: 27.4 % (ref 35–45)
HGB BLD-MCNC: 8.5 G/DL (ref 12–16)
IMM GRANULOCYTES # BLD AUTO: 0.1 K/UL (ref 0–0.04)
IMM GRANULOCYTES NFR BLD AUTO: 1 % (ref 0–0.5)
LYMPHOCYTES # BLD: 0.7 K/UL (ref 0.9–3.6)
LYMPHOCYTES NFR BLD: 5 % (ref 21–52)
MAGNESIUM SERPL-MCNC: 2.2 MG/DL (ref 1.6–2.6)
MCH RBC QN AUTO: 30.9 PG (ref 24–34)
MCHC RBC AUTO-ENTMCNC: 31 G/DL (ref 31–37)
MCV RBC AUTO: 99.6 FL (ref 78–100)
MONOCYTES # BLD: 0.7 K/UL (ref 0.05–1.2)
MONOCYTES NFR BLD: 5 % (ref 3–10)
NEUTS SEG # BLD: 11.5 K/UL (ref 1.8–8)
NEUTS SEG NFR BLD: 89 % (ref 40–73)
NRBC # BLD: 0 K/UL (ref 0–0.01)
NRBC BLD-RTO: 0 PER 100 WBC
P-R INTERVAL, ECG05: 149 MS
PLATELET # BLD AUTO: 149 K/UL (ref 135–420)
PMV BLD AUTO: 11.4 FL (ref 9.2–11.8)
POTASSIUM SERPL-SCNC: 4.1 MMOL/L (ref 3.5–5.5)
PROT SERPL-MCNC: 6.9 G/DL (ref 6.4–8.2)
Q-T INTERVAL, ECG07: 312 MS
QRS DURATION, ECG06: 81 MS
QTC CALCULATION (BEZET), ECG08: 392 MS
RBC # BLD AUTO: 2.75 M/UL (ref 4.2–5.3)
SODIUM SERPL-SCNC: 136 MMOL/L (ref 136–145)
T4 FREE SERPL-MCNC: 1.4 NG/DL (ref 0.7–1.5)
TROPONIN-HIGH SENSITIVITY: 63 NG/L (ref 0–54)
TSH SERPL DL<=0.05 MIU/L-ACNC: 0.27 UIU/ML (ref 0.36–3.74)
VENTRICULAR RATE, ECG03: 95 BPM
WBC # BLD AUTO: 13 K/UL (ref 4.6–13.2)

## 2023-01-20 PROCEDURE — 80053 COMPREHEN METABOLIC PANEL: CPT

## 2023-01-20 PROCEDURE — 84484 ASSAY OF TROPONIN QUANT: CPT

## 2023-01-20 PROCEDURE — 74011250637 HC RX REV CODE- 250/637: Performed by: SPECIALIST

## 2023-01-20 PROCEDURE — 84439 ASSAY OF FREE THYROXINE: CPT

## 2023-01-20 PROCEDURE — 36415 COLL VENOUS BLD VENIPUNCTURE: CPT

## 2023-01-20 PROCEDURE — 94761 N-INVAS EAR/PLS OXIMETRY MLT: CPT

## 2023-01-20 PROCEDURE — 77010033678 HC OXYGEN DAILY

## 2023-01-20 PROCEDURE — 74011250637 HC RX REV CODE- 250/637: Performed by: NURSE PRACTITIONER

## 2023-01-20 PROCEDURE — 90935 HEMODIALYSIS ONE EVALUATION: CPT

## 2023-01-20 PROCEDURE — 93005 ELECTROCARDIOGRAM TRACING: CPT

## 2023-01-20 PROCEDURE — 74011000250 HC RX REV CODE- 250: Performed by: NURSE PRACTITIONER

## 2023-01-20 PROCEDURE — 74011250636 HC RX REV CODE- 250/636: Performed by: NURSE PRACTITIONER

## 2023-01-20 PROCEDURE — 84443 ASSAY THYROID STIM HORMONE: CPT

## 2023-01-20 PROCEDURE — 94640 AIRWAY INHALATION TREATMENT: CPT

## 2023-01-20 PROCEDURE — G0378 HOSPITAL OBSERVATION PER HR: HCPCS

## 2023-01-20 PROCEDURE — 85025 COMPLETE CBC W/AUTO DIFF WBC: CPT

## 2023-01-20 PROCEDURE — 83735 ASSAY OF MAGNESIUM: CPT

## 2023-01-20 RX ORDER — ASPIRIN 81 MG/1
81 TABLET ORAL DAILY
Qty: 30 TABLET | Refills: 5 | Status: ON HOLD | OUTPATIENT
Start: 2023-01-21

## 2023-01-20 RX ORDER — MIDODRINE HYDROCHLORIDE 5 MG/1
10 TABLET ORAL ONCE
Status: COMPLETED | OUTPATIENT
Start: 2023-01-20 | End: 2023-01-20

## 2023-01-20 RX ORDER — IPRATROPIUM BROMIDE AND ALBUTEROL SULFATE 2.5; .5 MG/3ML; MG/3ML
3 SOLUTION RESPIRATORY (INHALATION)
Qty: 30 EACH | Refills: 1 | Status: ON HOLD | OUTPATIENT
Start: 2023-01-20

## 2023-01-20 RX ORDER — ASPIRIN 81 MG/1
81 TABLET ORAL DAILY
Status: DISCONTINUED | OUTPATIENT
Start: 2023-01-20 | End: 2023-01-20 | Stop reason: HOSPADM

## 2023-01-20 RX ADMIN — MIDODRINE HYDROCHLORIDE 10 MG: 5 TABLET ORAL at 08:16

## 2023-01-20 RX ADMIN — CALCITRIOL CAPSULES 0.25 MCG 0.25 MCG: 0.25 CAPSULE ORAL at 11:58

## 2023-01-20 RX ADMIN — Medication 1000 UNITS: at 11:58

## 2023-01-20 RX ADMIN — ASPIRIN 81 MG: 81 TABLET, COATED ORAL at 11:59

## 2023-01-20 RX ADMIN — Medication 10 ML: at 07:25

## 2023-01-20 RX ADMIN — HEPARIN SODIUM 5000 UNITS: 5000 INJECTION INTRAVENOUS; SUBCUTANEOUS at 06:10

## 2023-01-20 RX ADMIN — CYANOCOBALAMIN TAB 500 MCG 1000 MCG: 500 TAB at 11:58

## 2023-01-20 RX ADMIN — SEVELAMER CARBONATE 800 MG: 800 TABLET, FILM COATED ORAL at 07:26

## 2023-01-20 RX ADMIN — SEVELAMER CARBONATE 800 MG: 800 TABLET, FILM COATED ORAL at 12:00

## 2023-01-20 RX ADMIN — SODIUM BICARBONATE 650 MG: 650 TABLET ORAL at 11:59

## 2023-01-20 RX ADMIN — IPRATROPIUM BROMIDE AND ALBUTEROL SULFATE 3 ML: .5; 2.5 SOLUTION RESPIRATORY (INHALATION) at 10:40

## 2023-01-20 NOTE — PROGRESS NOTES
-1900 Received care of pt from off going nurse. -1945 Dialysis nurse in with pt.    -2314 Pt dialysis completed. -3458 Pt given scheduled dose of heparin sub-q. Pt also given bedtime snack. CBWR, bed in lowest position.    -0405  into draw am labs. -4376 Bedside shift report given to oncoming nurse.

## 2023-01-20 NOTE — PROCEDURES
Hemodialysis / [phone]    Vitals Pre Post Assessment Pre Post   BP BP: (!) 103/55 (01/19/23 1940)   104/51 LOC See Assessment See Assessment   HR Pulse (Heart Rate): 99 (01/19/23 1940) 87 Lungs See Assessment See Assessment   Resp Resp Rate: (!) 32 (01/19/23 1940)   26 Cardiac See Assessment See Assessment   Temp Temp: 98.2 °F (36.8 °C) (01/19/23 1940) 97.6 Skin See Assessment See Assessment   Weight    Edema See Assessment See Assessment   Tele status Bedside Bedside Pain Pain Intensity 1: 0 (01/19/23 1152) 0     Orders   Duration: Start: 1940 End: 2242 Total: 3 hrs   Dialyzer: Dialyzer/Set Up Inspection: Jaya Zhuomelodie (01/19/23 1940)   K Bath: Dialysate K (mEq/L): 3 (01/19/23 1940)   Ca Bath: Dialysate CA (mEq/L): 2.5 (01/19/23 1940)   Na: Dialysate NA (mEq/L): 140 (01/19/23 1940)   Bicarb: Dialysate HCO3 (mEq/L): 38 (01/19/23 1940)   Target Fluid Removal: Goal/Amount of Fluid to Remove (mL): 3000 mL (01/19/23 1940)     Access   Type & Location: Rt Chest PC : Pre- Assessment: Dressing CDI. No s/s of infection. Both lumens aspirate & flush well. Running well at . Post assessment: Dressing CDI. No changes.     Comments:                                        Labs   HBsAg (Antigen) / date: Negative 1/6/2023                                              HBsAb (Antibody) / date: Susceptible 1/6/2023   Source: Epic   Obtained/Reviewed  Critical Results Called HGB   Date Value Ref Range Status   01/19/2023 9.3 (L) 12.0 - 16.0 g/dL Final     Potassium   Date Value Ref Range Status   01/19/2023 3.6 3.5 - 5.5 mmol/L Final     Calcium   Date Value Ref Range Status   01/19/2023 7.8 (L) 8.5 - 10.1 mg/dL Final     BUN   Date Value Ref Range Status   01/19/2023 23 (H) 7 - 18 mg/dL Final     Creatinine   Date Value Ref Range Status   01/19/2023 2.87 (H) 0.60 - 1.30 mg/dL Final        Meds Given   Name Dose Route   None ordered                 Adequacy / Fluid    Total Liters Process: 65 L   Net Fluid Removed: 3000 ml Comments   Time Out Done:   (Time) 1930   Admitting Diagnosis: Fluid Overload   Consent obtained/signed: Informed Consent Verified: Yes (Center consent applies) (01/19/23 1940)   Mary Score / RO # Machine Number: [id number] (01/19/23 1940)   Primary Nurse Rpt Pre: Brooks Hurst RN   Primary Nurse Rpt Post: Kalyani Otoole   Pt Education: Procedural   Care Plan: Ongoing   Pts outpatient clinic: [address]      Tx Summary   SBAR received from Primary RN. 1815: Orders not placed. Spoke with primary RN about who was on call for nephrology. [phone]: Nephrology paged for orders. 1845: Received call back from Dr. Gaby Smiley to obtain orders. Pt arrived to HD [address]. Consent signed & on file OR Chronic consent applies. 1940: Each catheter limb disinfected per p&p, caps removed, hubs disinfected per p&p. Each lumen aspirated for blood return and flushed with Normal Saline per policy. VSS. Dialysis Tx initiated. *Pt tolerated treatment well. 2242: Tx ended. VSS. Each dialysis catheter limb disinfected per p&p, all possible blood returned per p&p, and each dialysis hub disinfected per p&p. Each lumen flushed, and caps applied. Bed locked and in the lowest position, call bell and belongings in reach. SBAR given to Primary, RN. Patient is stable at time of my departure. All Dialysis related medications have been reviewed.

## 2023-01-20 NOTE — ROUTINE PROCESS
Assumed care of pt. Pt receiving dialysis at this time. Pt tolerating well. Pt shows no signs of distress and has no c/o pain at this time. CBWR, 2 side rails up, and bed locked in lowest position.

## 2023-01-20 NOTE — PROCEDURES
Hemodialysis / [phone]    Vitals Pre Post Assessment Pre Post   /49 103/52 LOC Alert & orient Alert & orient   HR 94 98 Lungs No sob; sp02 99% No sob   Resp 16 16 Cardiac regular regular   Temp 97.6  Skin warm warm   Weight  N/A N/A Edema Trace BLE None noted   Tele status monitor monitor Pain 0/10 0/10     Orders   Duration: Start: 0826 End: 7447 Total: 3 hours   Dialyzer: Dialyzer/Set Up Inspection: Revaclear (01/20/23 0826)   Talita Grist Bath: Dialysate K (mEq/L): 3 (01/20/23 0826)   Ca Bath: Dialysate CA (mEq/L): 2.5 (01/20/23 0826)   Na: Dialysate NA (mEq/L): 140 (01/20/23 0826)   Bicarb: Dialysate HCO3 (mEq/L): 38 (01/20/23 0826)   Target Fluid Removal: Goal/Amount of Fluid to Remove (mL): 2000 mL (2000 ml to 3000 ml) (01/20/23 0826)     Access   Type & Location: Right cvc   Comments:   patent; dressing dry and intact; dated 1/19/23; no s/s of infection noted ; post treatment both ports clamped and new caps applied securely                                   Labs   HBsAg (Antigen) / date:    Negative on 1/6/23                                           HBsAb (Antibody) / date: Susceptible on 1/6/23   Source: [facility]   Obtained/Reviewed  Critical Results Called HGB   Date Value Ref Range Status   01/20/2023 8.5 (L) 12.0 - 16.0 g/dL Final     Potassium   Date Value Ref Range Status   01/20/2023 4.1 3.5 - 5.5 mmol/L Final     Calcium   Date Value Ref Range Status   01/20/2023 9.2 8.5 - 10.1 mg/dL Final     BUN   Date Value Ref Range Status   01/20/2023 33 (H) 7 - 18 mg/dL Final     Creatinine   Date Value Ref Range Status   01/20/2023 2.91 (H) 0.60 - 1.30 mg/dL Final        Meds Given   Name Dose Route   None ordered                 Adequacy / Fluid    Total Liters Process:    Net Fluid Removed: 2000 ml      Comments   Time Out Done:   (Time) DKE,2386   Admitting Diagnosis: COPD Exacerbation; Pulmonary edema   Consent obtained/signed: Informed Consent Verified: Yes (chronic dialysis patient) (01/20/23 6760)   Machine / Carolina Najera # Machine Number: [id number] (01/20/23 3646)   Primary Nurse Rpt Pre: Mikey Hunter RN   Primary Nurse Rpt Post: Gilford Baldy, RN   Pt Education: Hemodialysis procedural; s/s of hypotension   Care Plan: Continue dialysis per nephrologist   [facility]     Tx Summary   Comments:    spoke with Dr Pattie Mansfield about patient's blood pressures to include SBP in the 90's; per Dr Pattie Mansfield primary nurse gave midodrine 10 mgs to support blood pressure; during treatment patient started continuous coughing and requested a breathing treatment; UF goal decreased r/t SBP in the 90's at times during treatment; patient remained asymptomatic

## 2023-01-20 NOTE — PROGRESS NOTES
0710: Received care of patient resting in bed. Alert and oriented. VSS. Request something to eat. Provided a sandwich. 0830: Midodrine administered per order. HD started.

## 2023-01-20 NOTE — PROGRESS NOTES
Problem: Chronic Renal Failure  Goal: *Fluid and electrolytes stabilized  Outcome: Progressing Towards Goal     Problem: General Medical Care Plan  Goal: *Vital signs within specified parameters  Outcome: Progressing Towards Goal  Goal: *Labs within defined limits  Outcome: Progressing Towards Goal  Goal: *Absence of infection signs and symptoms  Outcome: Progressing Towards Goal  Goal: *Optimal pain control at patient's stated goal  Outcome: Progressing Towards Goal  Goal: *Skin integrity maintained  Outcome: Progressing Towards Goal  Goal: *Fluid volume balance  Outcome: Progressing Towards Goal  Goal: *Optimize nutritional status  Outcome: Progressing Towards Goal  Goal: *Anxiety reduced or absent  Outcome: Progressing Towards Goal  Goal: *Progressive mobility and function (eg: ADL's)  Outcome: Progressing Towards Goal

## 2023-01-20 NOTE — PROGRESS NOTES
Problem: Chronic Renal Failure  Goal: *Fluid and electrolytes stabilized  Outcome: Resolved/Met     Problem: Patient Education: Go to Patient Education Activity  Goal: Patient/Family Education  Outcome: Resolved/Met     Problem: General Medical Care Plan  Goal: *Vital signs within specified parameters  Outcome: Resolved/Met  Goal: *Labs within defined limits  Outcome: Resolved/Met  Goal: *Absence of infection signs and symptoms  Outcome: Resolved/Met  Goal: *Optimal pain control at patient's stated goal  Outcome: Resolved/Met  Goal: *Skin integrity maintained  Outcome: Resolved/Met  Goal: *Fluid volume balance  Outcome: Resolved/Met  Goal: *Optimize nutritional status  Outcome: Resolved/Met  Goal: *Anxiety reduced or absent  Outcome: Resolved/Met  Goal: *Progressive mobility and function (eg: ADL's)  Outcome: Resolved/Met     Problem: Patient Education: Go to Patient Education Activity  Goal: Patient/Family Education  Outcome: Resolved/Met

## 2023-01-20 NOTE — PROGRESS NOTES
CARDIOLOGY PROGRESS NOTE - NP    Patient seen and examined. This is a patient who is followed for shortness of breath. No chest pain. She reports improved breathing with HD yesterday and while seen during HD today. No orthopnea. Reports improved lower extremity edema. No other complaints reported. Telemetry reviewed, there were no events noted in the past 24 hours. Remains in NSR with occasional PACs. ST depression noted on telemetry. Pertinent review of systems items noted above, all other systems are negative. Current medications reviewed. Physical Examination  Vital signs are stable. Blood pressure 128/68, Pulse 93  No apparent distress. Heart is regular, rate and rhythm. Normal S1, S2, harsh murmur is appreciated. Lungs are clear bilaterally. Abdomen is soft, nontender, normal bowel sounds. Extremities have trace edema. Labs reviewed: TSH low free T4 pending. Case discussed with Dr. Caitlin Harrington and our impression and recommendations are as follows:    Shortness of breath:  Symptoms improving with HD. Recent ischemic evaluation negative. Repeating EKG given ST depression noted on tele. Would consider OP CCTA vs. Cath given risk factors. Troponin remained flat. Will add aspirin 81mg daily. Cardiomyopathy:  Mild with EF 40-45% with repeat echo and comparable to [facility] in December. GDMT limited by ESRD and hypotension. She has required midodrine during HD today. Aortic stenosis:  Moderate per serial echoes with moderate regurgitation. We discussed this with the patient in detail and the need for serial echoes as OP. She is agreeable to be followed at [facility] moving forward. Advised smoking cessation and maintain compliance with HD. Sinus tachycardia: This is improving. Likely due to volume overload. TSH low. Free T 4 pending. Please do not hesitate to call me or Dr. Caitlin Harrington if additional questions arise.

## 2023-01-20 NOTE — DISCHARGE SUMMARY
Discharge Summary       PATIENT ID: Cameron Martinez  MRN: [id number]   YOB: 1970    DATE OF ADMISSION: 1/19/2023 12:17 AM    DATE OF DISCHARGE: 01/20/23    PRIMARY CARE PROVIDER: ABAD Tripp     ATTENDING PHYSICIAN: Issa Singletary MD  DISCHARGING PROVIDER: Issa Singletary MD        CONSULTATIONS: IP CONSULT TO NEPHROLOGY  IP CONSULT TO CARDIOLOGY    PROCEDURES/SURGERIES: * No surgery found *    [address] [facility] COURSE:   Cameron Martinez is a 45 y.o. female  has a past medical history of ESRD on hemodialysis ([facility] and Kidney failure. Patient seen at bedside in the emergency department. Patient came to the ER tonight short of breath. Patient has COPD and is still a smoker. Patient states recently her doctor gave her some patches and she is going to attempt to quit smoking. Patient is also end-stage renal failure patient and gets dialysis Monday Wednesday Friday. Patient states that she did her whole time on Friday on dialysis but is still short of breath. Mild amount of lower leg edema is noted. Patient was treated with Solu-Medrol nebulizers in the emergency room her nephrologist is Dr. Bailey dixon in [place]. Patient also had some mild chest pain when coming to the ER when I evaluated her she denied any chest pain. We will cycle troponins and get a cardiology consult and an echo. Patient does not remember having an echo and there is none in her record is having 1.            DISCHARGE DIAGNOSES / PLAN:      Assessment & Plan:         End stage renal failure on dialysis [facility])  Patient has dialysis Monday Wednesday Friday went for session on Friday  Came into the ER with shortness of breath  Patient was found and mild fluid overload on x-ray  Patient also is having a COPD exacerbation  patient continues to smoke  Nephrology consult  Patient will need dialysis        COPD exacerbation ([facility] [address]  COPD exacerbation tonight  Patient given Solu-Medrol nebulizers in ED will continue  Patient continues to smoke  No infiltrate or infection on chest x-ray     Hypotension  Patient became hypotensive in ED when got to the ICU she was in the 80s  250 cc bolus of fluid  Only give a small amount of fluid if it does not work we will consider Levophed        Pulmonary edema  Patient with some fluid overload on chest x-ray  Mild pulmonary edema and trace pleural effusions noted  Fluid restriction 1500 mL/day       Day of dc  Has completed 2 runs of hd  No longer sob  Ok for home  Has nubulizer at home, asking for [employer] script    FOLLOW UP APPOINTMENTS:    Follow-up Information       Follow up With Specialties Details Why [address] [facility], [address], [address] Nurse Practitioner   [address] [facility] [address]  [phone]                 DIET: Renal Diet        DISCHARGE MEDICATIONS:  Current Discharge Medication List        START taking these medications    Details   aspirin delayed-release 81 mg tablet Take 1 Tablet by mouth daily. Qty: 30 Tablet, Refills: 5  Start date: 1/21/2023      albuterol-ipratropium (DUO-NEB) 2.5 mg-0.5 mg/3 ml nebu 3 mL by Nebulization route every four (4) hours as needed for Wheezing. Qty: 30 Each, Refills: 1  Start date: 1/20/2023           CONTINUE these medications which have NOT CHANGED    Details   sevelamer carbonate (RENVELA) 800 mg tab tab TAKE 1 TABLET BY MOUTH THREE TIMES DAILY WITH MEALS  Qty: 45 Tablet, Refills: 0      calcitRIOL (ROCALTROL) 0.25 mcg capsule Take 1 capsule by mouth once daily  Qty: 30 Capsule, Refills: 0      cholecalciferol (VITAMIN D3) (1000 Units /25 mcg) tablet Take 1 Tablet by mouth daily. Qty: 30 Tablet, Refills: 3      epoetin carmelo (Epogen) 10,000 unit/mL injection 1 mL by SubCUTAneous route Once every 2 weeks.   Qty: 1 mL, Refills: 5    Comments: Please dispense as a 30 day supply  Associated Diagnoses: Chronic kidney disease (CKD), stage V (HCC)      sodium bicarbonate 650 mg tablet Take 1 Tablet by mouth two (2) times a day.  Qty: 60 Tablet, Refills: 3      cyanocobalamin, vitamin B-12, 5,000 mcg subl 1 Tablet by SubLINGual route daily. NOTIFY YOUR PHYSICIAN FOR ANY OF THE FOLLOWING:   Fever over 101 degrees for 24 hours. Chest pain, shortness of breath, fever, chills, nausea, vomiting, diarrhea, change in mentation, falling, weakness, bleeding. Severe pain or pain not relieved by medications. Or, any other signs or symptoms that you may have questions about. DISPOSITION:home    Home With:   OT  PT  HH  RN       Long term SNF/Inpatient Rehab    Independent/assisted living    Hospice    Other:       PATIENT CONDITION AT DISCHARGE:     Functional status    Poor     Deconditioned    x Independent      Cognition    x Lucid     Forgetful     Dementia      Catheters/lines (plus indication)    Henson     PICC     PEG    x None      Code status    x Full code     DNR      PHYSICAL EXAMINATION AT DISCHARGE:  General:          Alert, cooperative, no distress, appears stated age. HEENT:           Atraumatic, anicteric sclerae, pink conjunctivae                          No oral ulcers, mucosa moist, throat clear, dentition fair  Neck:               Supple, symmetrical  Lungs:             Clear to auscultation bilaterally. No Wheezing or Rhonchi. No rales. Chest wall:      No tenderness  No Accessory muscle use. Heart:              Regular  rhythm,  No  murmur   No edema  Abdomen:        Soft, non-tender. Not distended. Bowel sounds normal  Extremities:     No cyanosis. No clubbing,                            Skin turgor normal, Capillary refill normal  Skin:                Not pale. Not Jaundiced  No rashes   Psych:             Not anxious or agitated.   Neurologic:      Alert, moves all extremities, answers questions appropriately and responds to commands       CHRONIC MEDICAL DIAGNOSES:  Problem List as of 1/20/2023 Date Reviewed: 7/7/2022            Codes Class Noted - Resolved    Acute respiratory failure with hypoxia ([facility] ICD-10-CM: J96.01  ICD-9-CM: 518.81  1/6/2023 - Present        Fluid overload ICD-10-CM: E87.70  ICD-9-CM: 276.69  1/6/2023 - Present        * (Principal) End stage renal failure on dialysis [facility]) ICD-10-CM: N18.6, Z99.2  ICD-9-CM: 585.6, V45.11  1/6/2023 - Present        Pulmonary edema ICD-10-CM: J81.1  ICD-9-CM: 514  1/6/2023 - Present        Hypotension ICD-10-CM: I95.9  ICD-9-CM: 458.9  1/6/2023 - Present        COPD exacerbation ([facility] ICD-10-CM: J44.1  ICD-9-CM: 491.21  1/6/2023 - Present        Chronic kidney disease (CKD), stage V ([facility] [[id number]] ICD-10-CM: N18.5  ICD-9-CM: 585.5  5/26/2022 - Present           Greater than 35 minutes were spent with the patient on counseling and coordination of care    Signed:   Torin Kim MD  1/20/2023  12:44 PM

## 2023-01-25 NOTE — PROGRESS NOTES
Physician Progress Note      MIGUELJacqlalley Hallman  [facility] #:                  [id number]  :                       1970  ADMIT DATE:       2023 12:17 AM  DISCH DATE:        2023 1:03 PM  RESPONDING  PROVIDER #:        Nimesh Leal MD          QUERY TEXT:    Pt admitted with SOB. Pt noted to have Pulmonary edema. If possible, please document if you are evaluating and/or treating any of the following: The medical record reflects the following:  Risk Factors: 46 y/o, ESRD, CHF, COPD, Cardiomyopathy    Clinical Indicators:  * H&P: Pulmonary edema  - Patient with some fluid overload on chest x-ray  - Mild pulmonary edema and trace pleural effusions noted  - Fluid restriction 1500 mL/day    ER Provider: Acute pulmonary edema    Treatment: Dialysis, cardiology and nephrology consults    Thank you,  Baron ELLIS, RN, CRCR  Please contact me for any questions or concerns regarding this query at [email or web address]  Options provided:  -- Acute pulmonary edema  -- Chronic pulmonary edema  -- Acute on Chronic pulmonary edema  -- Other - I will add my own diagnosis  -- Disagree - Not applicable / Not valid  -- Disagree - Clinically unable to determine / Unknown  -- Refer to Clinical Documentation Reviewer    PROVIDER RESPONSE TEXT:    This patient has acute pulmonary edema.     Query created by: Preston Dyer on 2023 10:52 AM      Electronically signed by:  Nimesh Leal MD 2023 1:56 PM

## 2023-01-29 ENCOUNTER — HOSPITAL ENCOUNTER (INPATIENT)
Age: 53
LOS: 3 days | Discharge: HOME | End: 2023-02-01
Attending: EMERGENCY MEDICINE | Admitting: INTERNAL MEDICINE
Payer: MEDICAID

## 2023-01-29 ENCOUNTER — APPOINTMENT (OUTPATIENT)
Dept: GENERAL RADIOLOGY | Age: 53
End: 2023-01-29
Attending: EMERGENCY MEDICINE
Payer: MEDICAID

## 2023-01-29 DIAGNOSIS — Z99.2 CKD (CHRONIC KIDNEY DISEASE) REQUIRING CHRONIC DIALYSIS (HCC): ICD-10-CM

## 2023-01-29 DIAGNOSIS — N18.6 ESRF (END STAGE RENAL FAILURE) (HCC): ICD-10-CM

## 2023-01-29 DIAGNOSIS — I21.A1 TYPE 2 MI (MYOCARDIAL INFARCTION) (HCC): ICD-10-CM

## 2023-01-29 DIAGNOSIS — N18.6 CKD (CHRONIC KIDNEY DISEASE) REQUIRING CHRONIC DIALYSIS (HCC): ICD-10-CM

## 2023-01-29 DIAGNOSIS — I50.1 PULMONARY EDEMA WITH CONGESTIVE HEART FAILURE (HCC): Primary | ICD-10-CM

## 2023-01-29 LAB
ALBUMIN SERPL-MCNC: 2.9 G/DL (ref 3.4–5)
ALBUMIN/GLOB SERPL: 0.7 (ref 0.8–1.7)
ALP SERPL-CCNC: 108 U/L (ref 45–117)
ALT SERPL-CCNC: 22 U/L (ref 13–56)
ANION GAP SERPL CALC-SCNC: 11 MMOL/L (ref 3–18)
AST SERPL W P-5'-P-CCNC: 16 U/L (ref 10–38)
BASOPHILS # BLD: 0 K/UL (ref 0–0.1)
BASOPHILS NFR BLD: 0 % (ref 0–2)
BILIRUB SERPL-MCNC: 0.6 MG/DL (ref 0.2–1)
BNP SERPL-MCNC: ABNORMAL PG/ML (ref 0–900)
BUN SERPL-MCNC: 47 MG/DL (ref 7–18)
BUN/CREAT SERPL: 8 (ref 12–20)
CA-I BLD-MCNC: 8.9 MG/DL (ref 8.5–10.1)
CHLORIDE SERPL-SCNC: 93 MMOL/L (ref 100–111)
CO2 SERPL-SCNC: 29 MMOL/L (ref 21–32)
CREAT SERPL-MCNC: 5.82 MG/DL (ref 0.6–1.3)
DIFFERENTIAL METHOD BLD: ABNORMAL
EOSINOPHIL # BLD: 0.1 K/UL (ref 0–0.4)
EOSINOPHIL NFR BLD: 1 % (ref 0–5)
ERYTHROCYTE [DISTWIDTH] IN BLOOD BY AUTOMATED COUNT: 17.3 % (ref 11.6–14.5)
GLOBULIN SER CALC-MCNC: 4.3 G/DL (ref 2–4)
GLUCOSE SERPL-MCNC: 167 MG/DL (ref 74–99)
HCT VFR BLD AUTO: 24.3 % (ref 35–45)
HGB BLD-MCNC: 7.6 G/DL (ref 12–16)
IMM GRANULOCYTES # BLD AUTO: 0.1 K/UL (ref 0–0.04)
IMM GRANULOCYTES NFR BLD AUTO: 1 % (ref 0–0.5)
INR PPP: 1.2 (ref 0.8–1.2)
LACTATE SERPL-SCNC: 2 MMOL/L (ref 0.4–2)
LYMPHOCYTES # BLD: 1.3 K/UL (ref 0.9–3.6)
LYMPHOCYTES NFR BLD: 17 % (ref 21–52)
MCH RBC QN AUTO: 31.7 PG (ref 24–34)
MCHC RBC AUTO-ENTMCNC: 31.3 G/DL (ref 31–37)
MCV RBC AUTO: 101.3 FL (ref 78–100)
MONOCYTES # BLD: 0.5 K/UL (ref 0.05–1.2)
MONOCYTES NFR BLD: 7 % (ref 3–10)
NEUTS SEG # BLD: 5.8 K/UL (ref 1.8–8)
NEUTS SEG NFR BLD: 74 % (ref 40–73)
NRBC # BLD: 0 K/UL (ref 0–0.01)
NRBC BLD-RTO: 0 PER 100 WBC
PLATELET # BLD AUTO: 118 K/UL (ref 135–420)
PMV BLD AUTO: 9.9 FL (ref 9.2–11.8)
POTASSIUM SERPL-SCNC: 5.3 MMOL/L (ref 3.5–5.5)
PROT SERPL-MCNC: 7.2 G/DL (ref 6.4–8.2)
PROTHROMBIN TIME: 15.3 SEC (ref 11.5–15.2)
RBC # BLD AUTO: 2.4 M/UL (ref 4.2–5.3)
SODIUM SERPL-SCNC: 133 MMOL/L (ref 136–145)
TROPONIN-HIGH SENSITIVITY: 1418 NG/L (ref 0–54)
TROPONIN-HIGH SENSITIVITY: 2144 NG/L (ref 0–54)
WBC # BLD AUTO: 7.8 K/UL (ref 4.6–13.2)

## 2023-01-29 PROCEDURE — 77010033678 HC OXYGEN DAILY

## 2023-01-29 PROCEDURE — 84484 ASSAY OF TROPONIN QUANT: CPT

## 2023-01-29 PROCEDURE — 0BH17EZ INSERTION OF ENDOTRACHEAL AIRWAY INTO TRACHEA, VIA NATURAL OR ARTIFICIAL OPENING: ICD-10-PCS | Performed by: NURSE PRACTITIONER

## 2023-01-29 PROCEDURE — 5A1935Z RESPIRATORY VENTILATION, LESS THAN 24 CONSECUTIVE HOURS: ICD-10-PCS | Performed by: NURSE PRACTITIONER

## 2023-01-29 PROCEDURE — 71045 X-RAY EXAM CHEST 1 VIEW: CPT

## 2023-01-29 PROCEDURE — 65610000006 HC RM INTENSIVE CARE

## 2023-01-29 PROCEDURE — 74011000250 HC RX REV CODE- 250: Performed by: EMERGENCY MEDICINE

## 2023-01-29 PROCEDURE — 94002 VENT MGMT INPAT INIT DAY: CPT

## 2023-01-29 PROCEDURE — 83605 ASSAY OF LACTIC ACID: CPT

## 2023-01-29 PROCEDURE — 74011250636 HC RX REV CODE- 250/636: Performed by: NURSE PRACTITIONER

## 2023-01-29 PROCEDURE — 85025 COMPLETE CBC W/AUTO DIFF WBC: CPT

## 2023-01-29 PROCEDURE — 93005 ELECTROCARDIOGRAM TRACING: CPT

## 2023-01-29 PROCEDURE — 80053 COMPREHEN METABOLIC PANEL: CPT

## 2023-01-29 PROCEDURE — 75810000275 HC EMERGENCY DEPT VISIT NO LEVEL OF CARE

## 2023-01-29 PROCEDURE — 85610 PROTHROMBIN TIME: CPT

## 2023-01-29 PROCEDURE — 94640 AIRWAY INHALATION TREATMENT: CPT

## 2023-01-29 PROCEDURE — 83880 ASSAY OF NATRIURETIC PEPTIDE: CPT

## 2023-01-29 PROCEDURE — 90935 HEMODIALYSIS ONE EVALUATION: CPT

## 2023-01-29 PROCEDURE — 94761 N-INVAS EAR/PLS OXIMETRY MLT: CPT

## 2023-01-29 PROCEDURE — 36415 COLL VENOUS BLD VENIPUNCTURE: CPT

## 2023-01-29 PROCEDURE — 5A1D70Z PERFORMANCE OF URINARY FILTRATION, INTERMITTENT, LESS THAN 6 HOURS PER DAY: ICD-10-PCS | Performed by: SPECIALIST

## 2023-01-29 PROCEDURE — 74011000250 HC RX REV CODE- 250: Performed by: NURSE PRACTITIONER

## 2023-01-29 PROCEDURE — 74011250637 HC RX REV CODE- 250/637: Performed by: NURSE PRACTITIONER

## 2023-01-29 RX ORDER — SODIUM CHLORIDE 0.9 % (FLUSH) 0.9 %
5-40 SYRINGE (ML) INJECTION EVERY 8 HOURS
Status: DISCONTINUED | OUTPATIENT
Start: 2023-01-29 | End: 2023-02-01 | Stop reason: HOSPADM

## 2023-01-29 RX ORDER — SODIUM CHLORIDE 0.9 % (FLUSH) 0.9 %
5-40 SYRINGE (ML) INJECTION AS NEEDED
Status: DISCONTINUED | OUTPATIENT
Start: 2023-01-29 | End: 2023-02-01 | Stop reason: HOSPADM

## 2023-01-29 RX ORDER — ONDANSETRON 4 MG/1
4 TABLET, ORALLY DISINTEGRATING ORAL
Status: DISCONTINUED | OUTPATIENT
Start: 2023-01-29 | End: 2023-02-01 | Stop reason: HOSPADM

## 2023-01-29 RX ORDER — POLYETHYLENE GLYCOL 3350 17 G/17G
17 POWDER, FOR SOLUTION ORAL DAILY PRN
Status: DISCONTINUED | OUTPATIENT
Start: 2023-01-29 | End: 2023-02-01 | Stop reason: HOSPADM

## 2023-01-29 RX ORDER — ACETAMINOPHEN 325 MG/1
650 TABLET ORAL
Status: DISCONTINUED | OUTPATIENT
Start: 2023-01-29 | End: 2023-02-01 | Stop reason: HOSPADM

## 2023-01-29 RX ORDER — SODIUM BICARBONATE 650 MG/1
650 TABLET ORAL 2 TIMES DAILY
Status: DISCONTINUED | OUTPATIENT
Start: 2023-01-29 | End: 2023-02-01 | Stop reason: HOSPADM

## 2023-01-29 RX ORDER — IPRATROPIUM BROMIDE 0.5 MG/2.5ML
0.5 SOLUTION RESPIRATORY (INHALATION)
Status: COMPLETED | OUTPATIENT
Start: 2023-01-29 | End: 2023-01-29

## 2023-01-29 RX ORDER — FUROSEMIDE 80 MG/1
80 TABLET ORAL DAILY
COMMUNITY
Start: 2023-01-12

## 2023-01-29 RX ORDER — ONDANSETRON 2 MG/ML
4 INJECTION INTRAMUSCULAR; INTRAVENOUS
Status: DISCONTINUED | OUTPATIENT
Start: 2023-01-29 | End: 2023-02-01 | Stop reason: HOSPADM

## 2023-01-29 RX ORDER — ACETAMINOPHEN 650 MG/1
650 SUPPOSITORY RECTAL
Status: DISCONTINUED | OUTPATIENT
Start: 2023-01-29 | End: 2023-02-01 | Stop reason: HOSPADM

## 2023-01-29 RX ORDER — IPRATROPIUM BROMIDE AND ALBUTEROL SULFATE 2.5; .5 MG/3ML; MG/3ML
3 SOLUTION RESPIRATORY (INHALATION)
Status: DISCONTINUED | OUTPATIENT
Start: 2023-01-29 | End: 2023-01-31

## 2023-01-29 RX ORDER — HYDROXYZINE PAMOATE 25 MG/1
25 CAPSULE ORAL
Status: DISCONTINUED | OUTPATIENT
Start: 2023-01-29 | End: 2023-02-01 | Stop reason: HOSPADM

## 2023-01-29 RX ORDER — CALCITRIOL 0.25 UG/1
0.25 CAPSULE ORAL DAILY
Status: DISCONTINUED | OUTPATIENT
Start: 2023-01-30 | End: 2023-02-01 | Stop reason: HOSPADM

## 2023-01-29 RX ORDER — IBUPROFEN 200 MG
1 TABLET ORAL DAILY
Status: DISCONTINUED | OUTPATIENT
Start: 2023-01-30 | End: 2023-02-01 | Stop reason: HOSPADM

## 2023-01-29 RX ORDER — HEPARIN SODIUM 5000 [USP'U]/ML
5000 INJECTION, SOLUTION INTRAVENOUS; SUBCUTANEOUS EVERY 8 HOURS
Status: DISCONTINUED | OUTPATIENT
Start: 2023-01-29 | End: 2023-01-30

## 2023-01-29 RX ORDER — ASPIRIN 81 MG/1
81 TABLET ORAL DAILY
Status: DISCONTINUED | OUTPATIENT
Start: 2023-01-30 | End: 2023-02-01 | Stop reason: HOSPADM

## 2023-01-29 RX ORDER — SEVELAMER CARBONATE 800 MG/1
800 TABLET, FILM COATED ORAL
Status: DISCONTINUED | OUTPATIENT
Start: 2023-01-29 | End: 2023-02-01 | Stop reason: HOSPADM

## 2023-01-29 RX ADMIN — SODIUM CHLORIDE, PRESERVATIVE FREE 10 ML: 5 INJECTION INTRAVENOUS at 23:48

## 2023-01-29 RX ADMIN — HEPARIN SODIUM 5000 UNITS: 5000 INJECTION INTRAVENOUS; SUBCUTANEOUS at 23:46

## 2023-01-29 RX ADMIN — HYDROXYZINE PAMOATE 25 MG: 25 CAPSULE ORAL at 23:47

## 2023-01-29 RX ADMIN — SODIUM BICARBONATE 650 MG: 650 TABLET ORAL at 23:47

## 2023-01-29 RX ADMIN — IPRATROPIUM BROMIDE 0.5 MG: 0.5 SOLUTION RESPIRATORY (INHALATION) at 15:57

## 2023-01-29 RX ADMIN — SEVELAMER CARBONATE 800 MG: 800 TABLET, FILM COATED ORAL at 23:47

## 2023-01-29 RX ADMIN — METHYLPREDNISOLONE SODIUM SUCCINATE 60 MG: 40 INJECTION, POWDER, FOR SOLUTION INTRAMUSCULAR; INTRAVENOUS at 23:47

## 2023-01-29 NOTE — H&P
History and Physical    Subjective:     Kat Cano is a 45 y.o. female with a past medical history for end-stage renal disease on hemodialysis MWF, COPD, caardiomegaly, and aortic stenoisis, and tobacco abuse, patient presents to the ED with a chief complaint of ongoing shortness of breath. Patient reports that the shortness of breath started yesterday evening has worsened over the last 24 hours, she states at that time she took her scheduled dose of oral Lasix, and then took her scheduled dose on oral Lasix this a.m., and she noticed that she had not had any urine output in 24+ hours. Other accompanying symptoms include a nonproductive cough, bilateral lower extremity edema, and abdominal distention, patient denies missing HD, chest pain, palpitations, nausea, vomiting, abdominal pain, fevers and chills. In the ED patient was hypotensive and tachycardic, H/H 7.6/24.3, BUN 47, creatinine 5.82, troponin 1418, BNP shown 75,243, EKG shown sinus tachycardia with ST depressions which was seen on previous EKG, and chest x-ray is pending but appears to show volume overload. In the ED patient was administered a nebulizer treatment, nephrology was consulted and orders was placed for emergent dialysis, placed on BiPAP until dialysis can be completed, and cardiology was consulted due to elevated troponin, which they states no need to start heparin this is likely due to tachycardia versus for overload. Hospital medicine consulted for admission for evaluation and treatment. Patient assessed in the ED, at the bedside, patient is alert and oriented, there is noted acute distress, patient currently on a nonrebreather mask, with awaiting to be placed on BiPAP and she is complaining of severe shortness of breath.  Patient agrees to admission for a diagnosis of fluid overload requiring emergent dialysis, treatment to include emergent dialysis, nephrology consultation, and cardiology consultation    Admit to ICU.    Past Medical History:   Diagnosis Date    ESRD on hemodialysis ([facility]     Kidney failure       Past Surgical History:   Procedure Laterality Date    HX  SECTION      PARTIAL REMOVAL OF KIDNEY       Family History   Problem Relation Age of Onset    Diabetes Maternal Grandmother       Social History     Tobacco Use    Smoking status: Every Day     Packs/day: 0.25     Years: 35.00     Pack years: 8.75     Types: Cigarettes    Smokeless tobacco: Never   Substance Use Topics    Alcohol use: Not Currently       Prior to Admission medications    Medication Sig Start Date End Date Taking? Authorizing Provider   furosemide (LASIX) 80 mg tablet Take 80 mg by mouth daily. 23  Yes Provider, Historical   aspirin delayed-release 81 mg tablet Take 1 Tablet by mouth daily. 23  Yes Osmar George MD   sevelamer carbonate (RENVELA) 800 mg tab tab TAKE 1 TABLET BY MOUTH THREE TIMES DAILY WITH MEALS 22  Yes Feliciano Lockett MD   calcitRIOL (ROCALTROL) 0.25 mcg capsule Take 1 capsule by mouth once daily 22  Yes Feliciano Lockett MD   cholecalciferol (VITAMIN D3) (1000 Units /25 mcg) tablet Take 1 Tablet by mouth daily. 22  Yes Feliciano Lockett MD   epoetin carmelo (Epogen) 10,000 unit/mL injection 1 mL by SubCUTAneous route Once every 2 weeks. 22  Yes Feliciano Lockett MD   sodium bicarbonate 650 mg tablet Take 1 Tablet by mouth two (2) times a day. 22  Yes Feliciano Lockett MD   cyanocobalamin, vitamin B-12, 5,000 mcg subl 1 Tablet by SubLINGual route daily. 6/3/21  Yes Manish, MD Sera   albuterol-ipratropium (DUO-NEB) 2.5 mg-0.5 mg/3 ml nebu 3 mL by Nebulization route every four (4) hours as needed for Wheezing.   Patient not taking: Reported on 2023   Osmar George MD     Allergies   Allergen Reactions    Dulaglutide Other (comments)     Nausea with vomiting and dizziness    Sitagliptin Other (comments)     Headache, runny nose, back pain and low blood sugars    Codeine Palpitations Pcn [Penicillins] Hives          REVIEW OF SYSTEMS:       Total of 12 systems reviewed as follows:    Positive = Red  Constitutional: Negative for malaise/fatigue and weakness, negative for fever and chills   HENT: Negative for ear pain, headaches, negative for loss of sense of taste and smell   Eyes: Negative for blurred vision and double vision   Skin: Negative for itching, negative for open areas   Cardiovascular: Negative for chest pain, palpitations, positive for swelling   Respiratory: Positive for shortness or breath and cough, negative for sputum production   Gastrointestinal: Positive for abdominal distention. Negative for abdominal pain, constipation, nausea, vomiting, and diarrhea   Genitourinary: Decreased urine output. Negative for dysuria, frequency, and hematuria   Musculoskeletal: Negative for joint pain and myalgias   Neurological: Negative for dizziness, seizures, and headaches   Psychiatric: Negative for depression and anxiousness        Objective:   VITALS:    Visit Vitals  BP (!) 165/69   Pulse (!) 122   Temp 98.1 °F (36.7 °C)   Resp (!) 41   Ht 5' 4\" (1.626 m)   Wt 68.5 kg (151 lb)   SpO2 99%   Breastfeeding No   BMI 25.92 kg/m²       PHYSICAL EXAM:  Positive = Red  Constitutional: Alert and oriented x 3 and no noted acute distress appears to be stated age. HENT: Atraumatic, nose midline, oropharynx clear ad moist, trachea midline, no supraclavicular   Eyes: Conjunctiva normal and pupils equal   Skin: Dry, intact, warm, and dry. Discoloration to toes of bilateral feet, scattered bruising    Cardiovascular: Tachycardia, normal heart sounds, no murmurs, pulses palpable, trace edema BLE   Respiratory: Crackles/rales and expiratory wheezes, tachypnea, or rhonchi, effort normal   Gastrointestinal: Appearance normal, bowel sounds are normal, bowl soft and non-tender   Genitourinary: Deferred   Musculoskeletal: Normal ROM   Neurological: Alert and oriented x 3, awake. No facial droop.  No slurred speech. Hand grasps equal. Strength 5/5 in all extremities. Intact sensations   Psychiatric: Anxious, Answers questions appropriately     __________________________________________________  Care Plan discussed with:    Comments   Patient X    Family      RN     Care Manager                    Consultant:      _______________________________________________________________________  Expected  Disposition:   Home with Family X   HH/PT/OT/RN    SNF/LTC    ATILIO    ________________________________________________________________________    Labs:  Recent Results (from the past 24 hour(s))   CBC WITH AUTOMATED DIFF    Collection Time: 01/29/23  3:20 PM   Result Value Ref Range    WBC 7.8 4.6 - 13.2 K/uL    RBC 2.40 (L) 4.20 - 5.30 M/uL    HGB 7.6 (L) 12.0 - 16.0 g/dL    HCT 24.3 (L) 35.0 - 45.0 %    .3 (H) 78.0 - 100.0 FL    MCH 31.7 24.0 - 34.0 PG    MCHC 31.3 31.0 - 37.0 g/dL    RDW 17.3 (H) 11.6 - 14.5 %    PLATELET 612 (L) 391 - 420 K/uL    MPV 9.9 9.2 - 11.8 FL    NRBC 0.0 0.0  WBC    ABSOLUTE NRBC 0.00 0.00 - 0.01 K/uL    NEUTROPHILS 74 (H) 40 - 73 %    LYMPHOCYTES 17 (L) 21 - 52 %    MONOCYTES 7 3 - 10 %    EOSINOPHILS 1 0 - 5 %    BASOPHILS 0 0 - 2 %    IMMATURE GRANULOCYTES 1 (H) 0 - 0.5 %    ABS. NEUTROPHILS 5.8 1.8 - 8.0 K/UL    ABS. LYMPHOCYTES 1.3 0.9 - 3.6 K/UL    ABS. MONOCYTES 0.5 0.05 - 1.2 K/UL    ABS. EOSINOPHILS 0.1 0.0 - 0.4 K/UL    ABS. BASOPHILS 0.0 0.0 - 0.1 K/UL    ABS. IMM.  GRANS. 0.1 (H) 0.00 - 0.04 K/UL    DF AUTOMATED     METABOLIC PANEL, COMPREHENSIVE    Collection Time: 01/29/23  3:20 PM   Result Value Ref Range    Sodium 133 (L) 136 - 145 mmol/L    Potassium 5.3 3.5 - 5.5 mmol/L    Chloride 93 (L) 100 - 111 mmol/L    CO2 29 21 - 32 mmol/L    Anion gap 11 3.0 - 18.0 mmol/L    Glucose 167 (H) 74 - 99 mg/dL    BUN 47 (H) 7 - 18 mg/dL    Creatinine 5.82 (H) 0.60 - 1.30 mg/dL    BUN/Creatinine ratio 8 (L) 12 - 20      eGFR 8 (L) >60 ml/min/1.73m2    Calcium 8.9 8.5 - 10.1 mg/dL Bilirubin, total 0.6 0.2 - 1.0 mg/dL    AST (SGOT) 16 10 - 38 U/L    ALT (SGPT) 22 13 - 56 U/L    Alk. phosphatase 108 45 - 117 U/L    Protein, total 7.2 6.4 - 8.2 g/dL    Albumin 2.9 (L) 3.4 - 5.0 g/dL    Globulin 4.3 (H) 2.0 - 4.0 g/dL    A-G Ratio 0.7 (L) 0.8 - 1.7     TROPONIN-HIGH SENSITIVITY    Collection Time: 01/29/23  3:20 PM   Result Value Ref Range    Troponin-High Sensitivity 1,418 (HH) 0 - 54 ng/L   PROTHROMBIN TIME + INR    Collection Time: 01/29/23  3:20 PM   Result Value Ref Range    Prothrombin time 15.3 (H) 11.5 - 15.2 sec    INR 1.2 0.8 - 1.2     NT-PRO BNP    Collection Time: 01/29/23  3:20 PM   Result Value Ref Range    NT pro-BNP 75,243 (H) 0 - 900 pg/mL   LACTIC ACID    Collection Time: 01/29/23  3:20 PM   Result Value Ref Range    Lactic acid 2.0 0.4 - 2.0 mmol/L   EKG, 12 LEAD, INITIAL    Collection Time: 01/29/23  3:23 PM   Result Value Ref Range    Ventricular Rate 107 BPM    Atrial Rate 106 BPM    P-R Interval 129 ms    QRS Duration 79 ms    Q-T Interval 302 ms    QTC Calculation (Bezet) 403 ms    Calculated P Axis 58 degrees    Calculated R Axis 32 degrees    Calculated T Axis -155 degrees    Diagnosis       Sinus tachycardia  Left atrial enlargement  Repol abnrm, severe global ischemia (LM/MVD)  Baseline wander in lead(s) I,II,aVR,aVF         Imaging:  No results found.      Assessment & Plan:     Acute Respiratory Failure  -symptoms of acute respiratory distress.  -likely secondary to pulmonary edema  -Clinically stable on Bipap for now, will take off once HD is complete  -CXR in process, but appears to be volume overload  -keep O2 saturation greater than 92%  -PRN albuterol     Pulmonary Edema  -patient with decreased urine output despite taking schedule dose of Lasix 80 mg daily  -CXR in process, but appears to be volume overload  -keep O2 saturation greater than 92%  -BNP: 75,243  -patient to receive emergent HD today  -holding Lasix due to Hypotension    ESRD requiring emergent HD  -volume overloaded  -nephrologist consulted for emergent HD, which will take place tonight  -patient currently stable on Bipap  -renal panel in the am  -continue Renvela    NSTEMI Type 2  -troponin 1418, will continue to trend  -likely secondary to volume overload versus tachycardia  -denies chest pain  -cardiologist consulted, no need to start Heparin gtt  -recent ECHO: EF 48%. Left ventricle is mildly dilated. Mildly increased wall thickness. VIsual EF 40-45% Mild global hypokinesis present.  -cardiac monitoring    COPD Exacerbation  -will start solumedrol and nebulizer  -continue PRN oxygen support, currently on Bipap  -patient uses tobacco, cessation education provided    Hypotension  -hypotensive upon arrival in the ED  -BP has improved, will monitor patient BP closely, if becomes hypotensive again will consider starting Levophed     Anemia of Chronic Disease  -HH 7.6/24.3  -patient denies blood loss  -repeat CBC in the am    Cardiomyopathy  -recent ECHO: EF 48%. Left ventricle is mildly dilated. Mildly increased wall thickness. VIsual EF 40-45% Mild global hypokinesis present. -ACEI/BB never initiated due to ongoing hypotension    Aortic stenosis  -moderate stenosis seen on recent ECHO, cessation of tobacco education performed with the patient    Tobacco Abuse  -nicotine patch ordered  -cessation education provided    TOTAL TIME:  45 Minutes    Code Status: Full    Prophylaxis:  Heparin     Electronically Signed : MARY Garcia-BRIGID Chan 25    Please note that this dictation was completed with [employer], the [employer] voice recognition software. Quite often unanticipated grammatical, syntax, homophones, and other interpretive errors are inadvertently transcribed by the computer software. Please disregard these errors. Please excuse any errors that have escaped final proofreading. Thank you.

## 2023-01-29 NOTE — PROGRESS NOTES
Problem: Falls - Risk of  Goal: *Absence of Falls  Description: Document Momence Bridger Fall Risk and appropriate interventions in the flowsheet.   Outcome: Progressing Towards Goal  Note: Fall Risk Interventions:                                Problem: Patient Education: Go to Patient Education Activity  Goal: Patient/Family Education  Outcome: Progressing Towards Goal

## 2023-01-29 NOTE — ED PROVIDER NOTES
[facility] EMERGENCY DEPT  EMERGENCY DEPARTMENT HISTORY AND PHYSICAL EXAM      Date: 2023  Patient Name: Bren Lemons  MRN: [id number]  [place] 1970  Date of evaluation: 2023  Provider: Elver Almodovar MD   Note Started: 3:26 PM 23    HISTORY OF PRESENT ILLNESS     Chief Complaint   Patient presents with    Shortness of Breath       History Provided By: Patient    HPI: Bren Lemons, 45 y.o. female PMHx ESRF on HD presents with \"I'm full of  fluid\" States she is fluid overloaded and feeling SOB. Has been going on for 2 days. She had full dialysis 2 days ago here in [facility]. She has been progressively more SOB and thinks she needs to be dialyzed today. She denies cough, CP, feels her abdomen is tight with fluid and she has not been making  good urine since yesterday. She normally takes Lasix on days she is not dialyzing and took her Lasix today. She denies fever, anosmia, ageusia. She states she has stopped smoking cigarettes since last admission. Er symptoms are acute and moderately severe. Had an echo on 2023 with EF of 40-45%, trivial pericardial effusion without tamponade. She was admitted here and emergently dialyzed for pulmonary edema. Her Nephrologist is Dr Gloria Maria from [place].     PAST MEDICAL HISTORY   Past Medical History:  Past Medical History:   Diagnosis Date    ESRD on hemodialysis ([address]     Kidney failure        Past Surgical History:  Past Surgical History:   Procedure Laterality Date    HX  SECTION      PARTIAL REMOVAL OF KIDNEY         Family History:  Family History   Problem Relation Age of Onset    Diabetes Maternal Grandmother        Social History:  Social History     Tobacco Use    Smoking status: Every Day     Packs/day: 0.25     Years: 35.00     Pack years: 8.75     Types: Cigarettes    Smokeless tobacco: Never   Vaping Use    Vaping Use: Never used   Substance Use Topics    Alcohol use: Not Currently    Drug use: Not Currently Allergies: Allergies   Allergen Reactions    Dulaglutide Other (comments)     Nausea with vomiting and dizziness    Sitagliptin Other (comments)     Headache, runny nose, back pain and low blood sugars    Codeine Palpitations    Pcn [Penicillins] Hives       PCP: ABAD Coppola    Current Meds:   Previous Medications    ALBUTEROL-IPRATROPIUM (DUO-NEB) 2.5 MG-0.5 MG/3 ML NEBU    3 mL by Nebulization route every four (4) hours as needed for Wheezing. ASPIRIN DELAYED-RELEASE 81 MG TABLET    Take 1 Tablet by mouth daily. CALCITRIOL (ROCALTROL) 0.25 MCG CAPSULE    Take 1 capsule by mouth once daily    CHOLECALCIFEROL (VITAMIN D3) (1000 UNITS /25 MCG) TABLET    Take 1 Tablet by mouth daily. CYANOCOBALAMIN, VITAMIN B-12, 5,000 MCG SUBL    1 Tablet by SubLINGual route daily. EPOETIN LILLIANA (EPOGEN) 10,000 UNIT/ML INJECTION    1 mL by SubCUTAneous route Once every 2 weeks. SEVELAMER CARBONATE (RENVELA) 800 MG TAB TAB    TAKE 1 TABLET BY MOUTH THREE TIMES DAILY WITH MEALS    SODIUM BICARBONATE 650 MG TABLET    Take 1 Tablet by mouth two (2) times a day. REVIEW OF SYSTEMS   Review of Systems   Constitutional:  Positive for fatigue. HENT: Negative. Respiratory:  Positive for shortness of breath. Cardiovascular: Negative. Gastrointestinal:  Positive for abdominal distention. Genitourinary:  Positive for decreased urine volume. Neurological: Negative. Positives and Pertinent negatives as per HPI. PHYSICAL EXAM     ED Triage Vitals [01/29/23 1502]   ED Encounter Vitals Group      BP (!) 88/51      Pulse (Heart Rate) (!) 142      Resp Rate 30      Temp 98.1 °F (36.7 °C)      Temp src       O2 Sat (%) 98 %      Weight 151 lb      Height 5' 4\"      Physical Exam  Constitutional:       Appearance: She is well-developed. HENT:      Head: Normocephalic. Cardiovascular:      Rate and Rhythm: Tachycardia present.    Pulmonary:      Effort: Pulmonary effort is normal.      Breath sounds: Examination of the right-lower field reveals rales. Examination of the left-lower field reveals rales. Rales present. Abdominal:      General: Bowel sounds are normal.      Palpations: Abdomen is soft. Musculoskeletal:         General: Normal range of motion. Right lower leg: No tenderness. No edema. Left lower leg: No tenderness. No edema. Neurological:      General: No focal deficit present. Mental Status: She is alert and oriented to person, place, and time. SCREENINGS               No data recorded        LAB, EKG AND DIAGNOSTIC RESULTS   Labs:  No results found for this or any previous visit (from the past 12 hour(s)). EKG: Initial EKG interpreted by me. Shows Sinus tach 107/minute diffuse ST depressions. Radiologic Studies:  Non-plain film images such as CT, Ultrasound and MRI are read by the radiologist. Plain radiographic images are visualized and preliminarily interpreted by the ED Provider with the below findings:    *    Interpretation per the Radiologist below, if available at the time of this note:  No results found. PROCEDURES   Unless otherwise noted below, none  Performed by: Rock Escudero MD   Procedures      CRITICAL CARE TIME   CRITICAL CARE NOTE :  IMPENDING DETERIORATION -Respiratory and Cardiovascular  ASSOCIATED RISK FACTORS - Hypotension, Shock, Dysrhythmia, and Metabolic changes  MANAGEMENT- Bedside Assessment  INTERPRETATION -  Xrays, ECG, Blood Pressure, and Cardiac Output Measures   INTERVENTIONS - hemodynamic mngmt and Metobolic interventions  CASE REVIEW - Hospitalist/Intensivist, Medical Sub-Specialist, and Nursing  TREATMENT RESPONSE -Improved  PERFORMED BY - Self    NOTES   :  I have spent 60 minutes of critical care time involved in lab review, consultations with specialist, family decision- making, bedside attention and documentation. This time excludes time spent in any separate billed procedures.   During this entire length of time I was immediately available to the patient . Abraham Hilario MD     EMERGENCY DEPARTMENT COURSE and DIFFERENTIAL DIAGNOSIS/MDM   Vitals:    Vitals:    01/29/23 1502   BP: (!) 88/51   Pulse: (!) 142   Resp: 30   Temp: 98.1 °F (36.7 °C)   SpO2: 98%   Weight: 68.5 kg (151 lb)   Height: 5' 4\" (1.626 m)        Patient was given the following medications:  Medications - No data to display    CONSULTS: (Who and What was discussed)  None     Chronic Conditions: ESRF on HD  Social Determinants affecting Dx or Tx: None  Counseling:      Records Reviewed (source and summary of external notes): None, Prior medical records, Previous Radiology studies, Previous Laboratory studies, Previous EKGs, and Nursing notes    CC/HPI Summary, DDx, ED Course, and Reassessment: Patient with ESRF on HD who presents with SOB, Ddx include CHF, PNA, COPD exacerbation, OTX. Lab work and CXR done. Patient placed on low flow O2LNC and heart rate dropped from 142 to 107. BP improved to 93/49. She often needs Midodrine for hypotension during dialysis. Patient has some SIRS criteria but fluid overload status precludes liberal IV fluid use. She usually gets Midodrine for hypotension during dialysis. Placed on Oxygen to reduce work of breathing and vitals have improved. Even though her vital signs improved, she continued to complain of SOB and requesting emergent dialysis. I consulted Nephrology and Cardiology. Troponin 1418 with pulmonary edema on CXR. She needs emergent dialysis. Troponin probably demand ischemia and will benefit from dialysis. Paged Dr Faye Kimball (Nephrologist) and Dr Julián Zacarias (Cardiologist). Discussed with Dr Greer Smith, he will have dialysis nurse dialyse emergently today. Discussed with Dr Julián Zacarias, Type 2 MI. Needs emergent dialysis. Discussed with Drea. She will confirm Dialysis nurse is coming then get back to me.     5:37pm Awaiting word from dialysis nurse. Patient feels worse. Placed on 100% NRBM.  If that does not suffice, will go to BiPAP. Supervisor informed. Disposition Considerations (Tests not done, Shared Decision Making, Pt Expectation of Test or Treatment.): Patient in Pulmonary edema with ESRF on HD. Requiring emergent dialysis. Needs to be admitted to the ICU and dialysis being arranged. Patient in agreement with plan. Dialysis nurse will be here at 8 pm. Meanwhile, she will be on BiPAP/NIPPV  ED FINAL IMPRESSION   No diagnosis found. DISPOSITION/PLAN       Admit Note: Pt is being admitted by Eli Gipson MD. The results of their tests and reason(s) for their admission have been discussed with pt and/or available family. They convey agreement and understanding for the need to be admitted and for the admission diagnosis. PATIENT REFERRED TO:  Follow-up Information    None           DISCHARGE MEDICATIONS:  Current Discharge Medication List            DISCONTINUED MEDICATIONS:  Current Discharge Medication List          I am the Primary Clinician of Record. Rafa Shen MD (electronically signed)    (Please note that parts of this dictation were completed with voice recognition software. Quite often unanticipated grammatical, syntax, homophones, and other interpretive errors are inadvertently transcribed by the computer software. Please disregards these errors.  Please excuse any errors that have escaped final proofreading.)

## 2023-01-29 NOTE — PROGRESS NOTES
1700: Dr. Bryan Brady with [facility] requested [facility] to call for HD orders. [facility] paged, Waiting for callback. 1705: [facility] callback. Updated on HD needs and Dr. Bryan Brady contact information. 1745: Spoke with [facility] on call. HD nurse ETA [id number].

## 2023-01-29 NOTE — PROGRESS NOTES
Placed a NRB on patient . Patient complaiining shes hot and sob. Temperature turned down in the room.

## 2023-01-29 NOTE — ROUTINE PROCESS
TRANSFER - OUT REPORT:    Verbal report given to Fabi RN(name) on [address]  being transferred to ICU bed #4(unit) for routine progression of care       Report consisted of patients Situation, Background, Assessment and   Recommendations(SBAR). Information from the following report(s) ED Summary was reviewed with the receiving nurse. Lines:   Peripheral IV 01/29/23 Right Antecubital (Active)        Opportunity for questions and clarification was provided.       Patient transported with:   Monitor  O2 @ 10 liters

## 2023-01-30 ENCOUNTER — APPOINTMENT (OUTPATIENT)
Dept: NON INVASIVE DIAGNOSTICS | Age: 53
End: 2023-01-30
Attending: NURSE PRACTITIONER
Payer: MEDICAID

## 2023-01-30 LAB
ANION GAP SERPL CALC-SCNC: 11 MMOL/L (ref 3–18)
APTT PPP: 36.7 SEC (ref 23–36.4)
APTT PPP: 42.5 SEC (ref 23–36.4)
APTT PPP: 49.4 SEC (ref 23–36.4)
ATRIAL RATE: 106 BPM
BUN SERPL-MCNC: 35 MG/DL (ref 7–18)
BUN/CREAT SERPL: 8 (ref 12–20)
CA-I BLD-MCNC: 9 MG/DL (ref 8.5–10.1)
CALCULATED P AXIS, ECG09: 58 DEGREES
CALCULATED R AXIS, ECG10: 32 DEGREES
CALCULATED T AXIS, ECG11: -155 DEGREES
CHLORIDE SERPL-SCNC: 96 MMOL/L (ref 100–111)
CO2 SERPL-SCNC: 29 MMOL/L (ref 21–32)
COVID-19 RAPID TEST, COVR: NOT DETECTED
CREAT SERPL-MCNC: 4.42 MG/DL (ref 0.6–1.3)
DIAGNOSIS, 93000: NORMAL
ECHO AV MEAN GRADIENT: 26 MMHG
ECHO AV MEAN VELOCITY: 2.4 M/S
ECHO AV PEAK GRADIENT: 41 MMHG
ECHO AV PEAK VELOCITY: 3.2 M/S
ECHO AV VTI: 62.2 CM
ECHO LA DIAMETER INDEX: 2.76 CM/M2
ECHO LA DIAMETER: 4.8 CM
ECHO LA VOL 2C: 88 ML (ref 22–52)
ECHO LA VOL 4C: 71 ML (ref 22–52)
ECHO LA VOL BP: 79 ML (ref 22–52)
ECHO LA VOL/BSA BIPLANE: 45 ML/M2 (ref 16–34)
ECHO LA VOLUME AREA LENGTH: 85 ML
ECHO LA VOLUME INDEX A2C: 51 ML/M2 (ref 16–34)
ECHO LA VOLUME INDEX A4C: 41 ML/M2 (ref 16–34)
ECHO LA VOLUME INDEX AREA LENGTH: 49 ML/M2 (ref 16–34)
ECHO LV EJECTION FRACTION A2C: 36 %
ECHO LV EJECTION FRACTION A4C: 29 %
ECHO LV EJECTION FRACTION BIPLANE: 34 % (ref 55–100)
ECHO LV FRACTIONAL SHORTENING: 11 % (ref 28–44)
ECHO LV GLOBAL LONGITUDINAL STRAIN (GLS): -2.2 %
ECHO LV GLOBAL LONGITUDINAL STRAIN (GLS): -4.5 %
ECHO LV GLOBAL LONGITUDINAL STRAIN (GLS): -4.6 %
ECHO LV GLOBAL LONGITUDINAL STRAIN (GLS): -7.1 %
ECHO LV INTERNAL DIMENSION DIASTOLE INDEX: 3.51 CM/M2
ECHO LV INTERNAL DIMENSION DIASTOLIC: 6.1 CM (ref 3.9–5.3)
ECHO LV INTERNAL DIMENSION SYSTOLIC INDEX: 3.1 CM/M2
ECHO LV INTERNAL DIMENSION SYSTOLIC: 5.4 CM
ECHO LV IVSD: 0.9 CM (ref 0.6–0.9)
ECHO LV MASS 2D: 206.6 G (ref 67–162)
ECHO LV MASS INDEX 2D: 118.7 G/M2 (ref 43–95)
ECHO LV POSTERIOR WALL DIASTOLIC: 0.8 CM (ref 0.6–0.9)
ECHO LV RELATIVE WALL THICKNESS RATIO: 0.26
ECHO LVOT CARDIAC OUTPUT: 3.7 LITER/MINUTE
ECHO LVOT CARDIAC OUTPUT: 3.7 LITER/MINUTE
ECHO LVOT CARDIAC OUTPUT: 3.9 LITER/MINUTE
ECHO LVOT CARDIAC OUTPUT: 4.9 LITER/MINUTE
ECHO LVOT CARDIAC OUTPUT: 4.9 LITER/MINUTE
ECHO LVOT CARDIAC OUTPUT: 6.1 LITER/MINUTE
ECHO LVOT CARDIAC OUTPUT: 6.1 LITER/MINUTE
ECHO MV REGURGITANT ALIASING (NYQUIST) VELOCITY: 30 CM/S
ECHO MV REGURGITANT VELOCITY PISA: 4.9 M/S
ECHO MV REGURGITANT VTIA: 121.8 CM
ECHO RV TAPSE: 1.6 CM (ref 1.7–?)
ERYTHROCYTE [DISTWIDTH] IN BLOOD BY AUTOMATED COUNT: 17.3 % (ref 11.6–14.5)
FLUAV AG NPH QL IA: NEGATIVE
FLUBV AG NOSE QL IA: NEGATIVE
GLUCOSE SERPL-MCNC: 239 MG/DL (ref 74–99)
HCT VFR BLD AUTO: 23.3 % (ref 35–45)
HGB BLD-MCNC: 7.2 G/DL (ref 12–16)
MAGNESIUM SERPL-MCNC: 2.3 MG/DL (ref 1.6–2.6)
MCH RBC QN AUTO: 31.6 PG (ref 24–34)
MCHC RBC AUTO-ENTMCNC: 30.9 G/DL (ref 31–37)
MCV RBC AUTO: 102.2 FL (ref 78–100)
NRBC # BLD: 0 K/UL (ref 0–0.01)
NRBC BLD-RTO: 0 PER 100 WBC
P-R INTERVAL, ECG05: 129 MS
PHOSPHATE SERPL-MCNC: 4.1 MG/DL (ref 2.5–4.9)
PLATELET # BLD AUTO: 122 K/UL (ref 135–420)
PMV BLD AUTO: 11 FL (ref 9.2–11.8)
POTASSIUM SERPL-SCNC: 5.1 MMOL/L (ref 3.5–5.5)
Q-T INTERVAL, ECG07: 302 MS
QRS DURATION, ECG06: 79 MS
QTC CALCULATION (BEZET), ECG08: 403 MS
RBC # BLD AUTO: 2.28 M/UL (ref 4.2–5.3)
SODIUM SERPL-SCNC: 136 MMOL/L (ref 136–145)
THERAPEUTIC RANGE,PTTT: ABNORMAL SEC (ref 82–109)
TROPONIN-HIGH SENSITIVITY: 1623 NG/L (ref 0–54)
TROPONIN-HIGH SENSITIVITY: 2605 NG/L (ref 0–54)
VENTRICULAR RATE, ECG03: 107 BPM
WBC # BLD AUTO: 5.5 K/UL (ref 4.6–13.2)

## 2023-01-30 PROCEDURE — 85730 THROMBOPLASTIN TIME PARTIAL: CPT

## 2023-01-30 PROCEDURE — 84484 ASSAY OF TROPONIN QUANT: CPT

## 2023-01-30 PROCEDURE — 94003 VENT MGMT INPAT SUBQ DAY: CPT

## 2023-01-30 PROCEDURE — 83735 ASSAY OF MAGNESIUM: CPT

## 2023-01-30 PROCEDURE — 74011250637 HC RX REV CODE- 250/637: Performed by: NURSE PRACTITIONER

## 2023-01-30 PROCEDURE — 94640 AIRWAY INHALATION TREATMENT: CPT

## 2023-01-30 PROCEDURE — 74011250636 HC RX REV CODE- 250/636: Performed by: NURSE PRACTITIONER

## 2023-01-30 PROCEDURE — 5A1D70Z PERFORMANCE OF URINARY FILTRATION, INTERMITTENT, LESS THAN 6 HOURS PER DAY: ICD-10-PCS | Performed by: SPECIALIST

## 2023-01-30 PROCEDURE — 74011000250 HC RX REV CODE- 250: Performed by: NURSE PRACTITIONER

## 2023-01-30 PROCEDURE — 85027 COMPLETE CBC AUTOMATED: CPT

## 2023-01-30 PROCEDURE — 36415 COLL VENOUS BLD VENIPUNCTURE: CPT

## 2023-01-30 PROCEDURE — 90935 HEMODIALYSIS ONE EVALUATION: CPT

## 2023-01-30 PROCEDURE — 94664 DEMO&/EVAL PT USE INHALER: CPT

## 2023-01-30 PROCEDURE — 87635 SARS-COV-2 COVID-19 AMP PRB: CPT

## 2023-01-30 PROCEDURE — 65610000006 HC RM INTENSIVE CARE

## 2023-01-30 PROCEDURE — 87804 INFLUENZA ASSAY W/OPTIC: CPT

## 2023-01-30 PROCEDURE — 80048 BASIC METABOLIC PNL TOTAL CA: CPT

## 2023-01-30 PROCEDURE — 77010033678 HC OXYGEN DAILY

## 2023-01-30 PROCEDURE — 84100 ASSAY OF PHOSPHORUS: CPT

## 2023-01-30 PROCEDURE — P9047 ALBUMIN (HUMAN), 25%, 50ML: HCPCS | Performed by: SPECIALIST

## 2023-01-30 PROCEDURE — 94761 N-INVAS EAR/PLS OXIMETRY MLT: CPT

## 2023-01-30 PROCEDURE — 74011250636 HC RX REV CODE- 250/636: Performed by: SPECIALIST

## 2023-01-30 PROCEDURE — 93308 TTE F-UP OR LMTD: CPT

## 2023-01-30 RX ORDER — MIDODRINE HYDROCHLORIDE 5 MG/1
10 TABLET ORAL
Status: COMPLETED | OUTPATIENT
Start: 2023-01-30 | End: 2023-01-30

## 2023-01-30 RX ORDER — ALBUMIN HUMAN 250 G/1000ML
25 SOLUTION INTRAVENOUS
Status: DISCONTINUED | OUTPATIENT
Start: 2023-01-30 | End: 2023-02-01 | Stop reason: HOSPADM

## 2023-01-30 RX ORDER — HEPARIN SODIUM 1000 [USP'U]/ML
3000 INJECTION, SOLUTION INTRAVENOUS; SUBCUTANEOUS ONCE
Status: COMPLETED | OUTPATIENT
Start: 2023-01-30 | End: 2023-01-30

## 2023-01-30 RX ORDER — SODIUM CHLORIDE 9 MG/ML
2000 INJECTION, SOLUTION INTRAVENOUS
Status: DISCONTINUED | OUTPATIENT
Start: 2023-01-30 | End: 2023-02-01 | Stop reason: HOSPADM

## 2023-01-30 RX ORDER — HEPARIN SODIUM 10000 [USP'U]/100ML
12-25 INJECTION, SOLUTION INTRAVENOUS
Status: DISCONTINUED | OUTPATIENT
Start: 2023-01-30 | End: 2023-01-31

## 2023-01-30 RX ADMIN — SODIUM CHLORIDE, PRESERVATIVE FREE 10 ML: 5 INJECTION INTRAVENOUS at 13:34

## 2023-01-30 RX ADMIN — EPOETIN ALFA-EPBX 10000 UNITS: 10000 INJECTION, SOLUTION INTRAVENOUS; SUBCUTANEOUS at 18:12

## 2023-01-30 RX ADMIN — IPRATROPIUM BROMIDE AND ALBUTEROL SULFATE 3 ML: .5; 2.5 SOLUTION RESPIRATORY (INHALATION) at 10:34

## 2023-01-30 RX ADMIN — SODIUM CHLORIDE 2000 ML: 9 INJECTION, SOLUTION INTRAVENOUS at 20:50

## 2023-01-30 RX ADMIN — SEVELAMER CARBONATE 800 MG: 800 TABLET, FILM COATED ORAL at 09:15

## 2023-01-30 RX ADMIN — ALBUMIN (HUMAN) 25 G: 0.25 INJECTION, SOLUTION INTRAVENOUS at 19:06

## 2023-01-30 RX ADMIN — METHYLPREDNISOLONE SODIUM SUCCINATE 60 MG: 40 INJECTION, POWDER, FOR SOLUTION INTRAMUSCULAR; INTRAVENOUS at 22:23

## 2023-01-30 RX ADMIN — HYDROXYZINE PAMOATE 25 MG: 25 CAPSULE ORAL at 22:24

## 2023-01-30 RX ADMIN — MIDODRINE HYDROCHLORIDE 10 MG: 5 TABLET ORAL at 17:23

## 2023-01-30 RX ADMIN — SODIUM CHLORIDE, PRESERVATIVE FREE 10 ML: 5 INJECTION INTRAVENOUS at 06:53

## 2023-01-30 RX ADMIN — HYDROXYZINE PAMOATE 25 MG: 25 CAPSULE ORAL at 15:37

## 2023-01-30 RX ADMIN — IPRATROPIUM BROMIDE AND ALBUTEROL SULFATE 3 ML: .5; 2.5 SOLUTION RESPIRATORY (INHALATION) at 14:37

## 2023-01-30 RX ADMIN — METHYLPREDNISOLONE SODIUM SUCCINATE 60 MG: 40 INJECTION, POWDER, FOR SOLUTION INTRAMUSCULAR; INTRAVENOUS at 05:44

## 2023-01-30 RX ADMIN — SODIUM BICARBONATE 650 MG: 650 TABLET ORAL at 18:16

## 2023-01-30 RX ADMIN — SODIUM BICARBONATE 650 MG: 650 TABLET ORAL at 08:58

## 2023-01-30 RX ADMIN — CALCITRIOL CAPSULES 0.25 MCG 0.25 MCG: 0.25 CAPSULE ORAL at 08:58

## 2023-01-30 RX ADMIN — HEPARIN SODIUM 12 UNITS/KG/HR: 10000 INJECTION, SOLUTION INTRAVENOUS at 05:44

## 2023-01-30 RX ADMIN — SODIUM CHLORIDE, PRESERVATIVE FREE 10 ML: 5 INJECTION INTRAVENOUS at 22:34

## 2023-01-30 RX ADMIN — SEVELAMER CARBONATE 800 MG: 800 TABLET, FILM COATED ORAL at 12:29

## 2023-01-30 RX ADMIN — SEVELAMER CARBONATE 800 MG: 800 TABLET, FILM COATED ORAL at 17:23

## 2023-01-30 RX ADMIN — METHYLPREDNISOLONE SODIUM SUCCINATE 60 MG: 40 INJECTION, POWDER, FOR SOLUTION INTRAMUSCULAR; INTRAVENOUS at 13:26

## 2023-01-30 RX ADMIN — ASPIRIN 81 MG: 81 TABLET, COATED ORAL at 08:58

## 2023-01-30 RX ADMIN — HEPARIN SODIUM 3000 UNITS: 1000 INJECTION INTRAVENOUS; SUBCUTANEOUS at 13:24

## 2023-01-30 NOTE — DIALYSIS
Hemodialysis / [phone]    Vitals Pre Post Assessment Pre Post   BP BP: 109/64 (01/30/23 1617) 110/42 LOC A & O x 4 A & O x 4   HR Pulse (Heart Rate): (!) 116 (01/30/23 1534)   94 Lungs No SOB with 02 intact, 1 liter No SOB, oxygen continues via nasal cannula   Resp Resp Rate: 25 (01/30/23 1534) 28 Cardiac Tachycardia 114 which is baseline for patient / regular rhythm Tachycardia continues    Temp Temp: 98.2 °F (36.8 °C) (01/30/23 1534) 98 Skin No uncovered wounds noted No uncovered wounds noted   Weight  Obtain by hospital staff  Edema Trace lower extremities decreased   Tele status Monitor by hospital staff  Pain Pain Intensity 1: 0 (01/30/23 1534) none     Orders   Duration: Start: 1920 End: 2150 Total: 2.5   Dialyzer: Dialyzer/Set Up Inspection: Elian Kenton (01/29/23 2005)   K Bath: Dialysate K (mEq/L): 2 (01/29/23 2005)   Ca Bath: Dialysate CA (mEq/L): 2.5 (01/29/23 2005)   Na: Dialysate NA (mEq/L): 140 (01/29/23 2005)   Bicarb: Dialysate HCO3 (mEq/L): 35 (01/29/23 2005)   Target Fluid Removal: Goal/Amount of Fluid to Remove (mL): 3000 mL (01/29/23 2005)     Access   Type & Location: R CVC, no s/s of infection noted / dressing changed 1/29/23, disinfect per P and P   Comments:                                        Labs   HBsAg (Antigen) / date:         1/4/23                                      HBsAb (Antibody) / date: 12/16/22   Source: Clinic labs   Obtained/Reviewed  Critical Results Called HGB   Date Value Ref Range Status   01/30/2023 7.2 (L) 12.0 - 16.0 g/dL Final     Potassium   Date Value Ref Range Status   01/30/2023 5.1 3.5 - 5.5 mmol/L Final     Calcium   Date Value Ref Range Status   01/30/2023 9.0 8.5 - 10.1 mg/dL Final     BUN   Date Value Ref Range Status   01/30/2023 35 (H) 7 - 18 mg/dL Final     Creatinine   Date Value Ref Range Status   01/30/2023 4.42 (H) 0.60 - 1.30 mg/dL Final     Comment:     CHANGE NOTED        Meds Given   Name Dose Route   retacrit 10,000 units IV Adequacy / Fluid    Total Liters Process:    Net Fluid Removed:       Comments   Time Out Done:   (Time) 1905   Admitting Diagnosis: SOB   Consent obtained/signed:  Reviewed risks and benefits of hemodialysis / verbalized understanding   Machine / Forreste Senters # Machine Number: [id number] (01/29/23 2005)   Primary Nurse Rpt Pre: Wenceslao Cedeño RN   Primary Nurse Rpt Post: Stefany Naidu RN   Pt Education: Access care   Care Plan: Continue with hemodialysis as ordered   Pts outpatient clinic: [facility]     Tx Summary   Comments:          MD updated with all treatment details / no pain, no distress, no complaints / rested much of treatment / post treatment, all possible blood returned / normal saline instilled to CVC ports and capped tightly and secure

## 2023-01-30 NOTE — PROGRESS NOTES
1900 Assumed care of pt from off going nurse SHANIKA Cardona, RN. Pt A&Ox4 with no c/o at this time. Pt is currently in bed wearing NIV mask. Admission assessment performed. 1945 Dialysis nurse in to begin treatment. 2144 Critical [id number]. [facility]. No new orders. 2345 Dialysis complete, 2.5 L removed. Pt washed up and changed out of clothes into gown and purewick. 0000 Pt bp 96/64 with cuff on right lower leg. BP has been labile during and after dialysis. Bedside and Verbal shift change report given to Charity Wang LPN (oncoming nurse) by Ness Iglesias RN (offgoing nurse). Report included the following information SBAR, Kardex, Intake/Output, MAR, and Recent Results.

## 2023-01-30 NOTE — PROGRESS NOTES
RN reports elevated troponin 2605--from 2144, 1418  history of ESRD/HD presenting with pulmonary edema fluid overload, status post urgent hemodialysis  -Patient with ongoing shortness of breath, tachycardia, hypotensive episodes I will start heparin drip, while we await  a repeat echo and cardiology consultation.  -last echo1/19/23--EF 40 to 45% mild global hypokinesis, moderate aortic regurg,

## 2023-01-30 NOTE — CONSULTS
CARDIOLOGY CONSULTATION      REASON FOR CONSULT: Elevated HS troponin    REQUESTING PROVIDER: Nancy Gonzales MD    CHIEF COMPLAINT:  shortness of breath    HISTORY OF PRESENT ILLNESS:  Willem Salazar is a 45y.o. year-old female with past medical history significant for end-stage renal disease on hemodialysis MWF, COPD, caardiomegaly, and aortic stenoisis, and tobacco abuse, patient presents to the ED with a chief complaint of ongoing shortness of breath. Patient reports that the shortness of breath started yesterday evening has worsened over the last 24 hours, she states at that time she took her scheduled dose of oral Lasix, and then took her scheduled dose on oral Lasix this a.m., and she noticed that she had not had any urine output in 24+ hours. Other accompanying symptoms include a nonproductive cough, bilateral lower extremity edema, and abdominal distention, patient denies missing HD, chest pain, palpitations, nausea, vomiting, abdominal pain, fevers and chills. In the ED patient was hypotensive and tachycardic, H/H 7.6/24.3, BUN 47, creatinine 5.82, troponin 1418, BNP shown 75,243, EKG shown sinus tachycardia with ST depressions which was seen on previous EKG, and chest x-ray is pending but appears to show volume overload. In the ED patient was administered a nebulizer treatment, nephrology was consulted and orders was placed for emergent dialysis, placed on BiPAP until dialysis can be completed, and cardiology was consulted due to elevated troponin, which they states no need to start heparin this is likely due to tachycardia versus for overload. Hospital medicine consulted for admission for evaluation and treatment. Past Medical History:   Diagnosis Date    ESRD on hemodialysis ([address]     Kidney failure    Seen and examined. Resting in bed in NAD. Respiratory treatment in progress. Denies any chest pain, or palpitations.  Reports feeling her heart racing at times, but feels like she has some significant anxiety. Records from hospital admission course thus far reviewed. Telemetry reviewed. Sinus tachycardia. INPATIENT MEDICATIONS:  Home medications reviewed.     Current Facility-Administered Medications:     heparin 25,000 units in D5W 250 ml infusion, 12-25 Units/kg/hr, IntraVENous, TITRATE, Anthony Arevalo S, NP, Last Rate: 9.6 mL/hr at 01/30/23 1328, 14 Units/kg/hr at 01/30/23 1328    sodium chloride (NS) flush 5-40 mL, 5-40 mL, IntraVENous, Q8H, Leilani, Drea S, ACNP, 10 mL at 01/30/23 1334    sodium chloride (NS) flush 5-40 mL, 5-40 mL, IntraVENous, PRN, Leilani, Drea S, ACNP    acetaminophen (TYLENOL) tablet 650 mg, 650 mg, Oral, Q6H PRN **OR** acetaminophen (TYLENOL) suppository 650 mg, 650 mg, Rectal, Q6H PRN, Tong Dukeyl S, ACNP    polyethylene glycol (MIRALAX) packet 17 g, 17 g, Oral, DAILY PRN, Leilani Drea S, ACNP    ondansetron (ZOFRAN ODT) tablet 4 mg, 4 mg, Oral, Q8H PRN **OR** ondansetron (ZOFRAN) injection 4 mg, 4 mg, IntraVENous, Q6H PRN, [place], Drea S, ACNP    hydrOXYzine pamoate (VISTARIL) capsule 25 mg, 25 mg, Oral, TID PRN, Leilani Drea S, ACNP, 25 mg at 01/29/23 2347    aspirin delayed-release tablet 81 mg, 81 mg, Oral, DAILY, [place], Drea S, ACNP, 81 mg at 01/30/23 0858    sevelamer carbonate (RENVELA) tab 800 mg, 800 mg, Oral, TID WITH MEALS, Leilani Drea S, ACNP, 800 mg at 01/30/23 1229    sodium bicarbonate tablet 650 mg, 650 mg, Oral, BID, Leilani, Drea S, ACNP, 650 mg at 01/30/23 0414    calcitRIOL (ROCALTROL) capsule 0.25 mcg, 0.25 mcg, Oral, DAILY, [place], Drea S, ACNP, 0.25 mcg at 01/30/23 0858    methylPREDNISolone (PF) (SOLU-MEDROL) injection 60 mg, 60 mg, IntraVENous, Q8H, Drea Duke ACNP, 60 mg at 01/30/23 1326    nicotine (NICODERM CQ) 14 mg/24 hr patch 1 Patch, 1 Patch, TransDERmal, DAILY, Drea Duke ACNP, 1 Patch at 01/30/23 0858    albuterol-ipratropium (DUO-NEB) 2.5 MG-0.5 MG/3 ML, 3 mL, Nebulization, Q4H PRN, Drea Duke S, ACNP, 3 mL at 01/30/23 7757     ALLERGIES:  Allergies reviewed with the patient,  Allergies   Allergen Reactions    Dulaglutide Other (comments)     Nausea with vomiting and dizziness    Sitagliptin Other (comments)     Headache, runny nose, back pain and low blood sugars    Codeine Palpitations    Pcn [Penicillins] Hives    . FAMILY HISTORY:  Family history reviewed. Family History   Problem Relation Age of Onset    Diabetes Maternal Grandmother          SOCIAL HISTORY:  Notable for   Social History     Socioeconomic History    Marital status:    Tobacco Use    Smoking status: Every Day     Packs/day: 0.25     Years: 35.00     Pack years: 8.75     Types: Cigarettes    Smokeless tobacco: Never   Vaping Use    Vaping Use: Never used   Substance and Sexual Activity    Alcohol use: Not Currently    Drug use: Not Currently     tobacco use, no heavy alcohol or illicit drug use. REVIEW OF SYSTEMS:  Complete review of systems performed, pertinents noted above, all other systems are negative. PHYSICAL EXAMINATION:  Vital sign assessment reveal a blood pressure and pulse of  Visit Vitals  BP (!) 109/52   Pulse (!) 108   Temp 98.4 °F (36.9 °C)   Resp 22   Ht 5' 4\" (1.626 m)   Wt 68.5 kg (151 lb)   SpO2 (P) 100%   Breastfeeding No   BMI 25.92 kg/m²   . Cardiovascular exam has a heart with sinus tachycardia,  normal S1 and S2.  no murmur present. There are no rubs or gallops. Good peripheral pulses. No jugular venous distension. no carotid bruits are present. Respiratory exam reveals clear upper fields with diminished bilateral bases. Gastrointestinal exam has soft, nontender abdomen with normal bowel sounds. Lymphatic exam reveals no edema and no varicosities. No notable skin changes. Neurologic exam is nonfocal.      Recent labs results and imaging reviewed.       Discussed case with Dr. Jodi Garcia and our impression and recommendations are as follows:    Elevated HS troponin  - noted sinus tachy on EKG 1/29/23  - remains chest pain free  - on Heparin gtt   - trop trending down  - echo ordered; pending results  - continue tele    ESRD on HD - nephrology following          Thank you for involving us in the care of this patient. Please do not hesitate to call me or Dr. Mckeon Else if additional questions arise.     MAGI Navarrete  1/30/2023

## 2023-01-30 NOTE — PROGRESS NOTES
Hospitalist Progress Note    Daily Progress Note: 2023 2:43 PM      Kat Grimm                                            MRN: [id number]                                  :1970      Subjective:     Pt examined and seen at bedside. Patient is alert and oriented x 4, there is no noted distress today, patient reports that the shortness of breathe has improved greatly, patient current stable on 2 LPM via NC. Patient remains on heparin gtt for elevated troponin which are trending down and she denies any chest pain. Plan is for patient to continue with her scheduled runs of HD, midodrine ordered to be given to the patient 1 hour prior to HD. No acute events reported overnight. Objective:     Visit Vitals  BP (!) 109/52   Pulse (!) 108   Temp 98.4 °F (36.9 °C)   Resp 22   Ht 5' 4\" (1.626 m)   Wt 68.5 kg (151 lb)   SpO2 99%   Breastfeeding No   BMI 25.92 kg/m²    O2 Flow Rate (L/min): 2 l/min O2 Device: Nasal cannula (2L  vent NIV S/B)    Temp (24hrs), Av.1 °F (36.7 °C), Min:98 °F (36.7 °C), Max:98.4 °F (36.9 °C)      No intake/output data recorded.  1901 -  0700  In: -   Out: 2200     PHYSICAL EXAM:  Physical Exam  Vitals and nursing note reviewed. Constitutional:       Appearance: Normal appearance. She is normal weight. HENT:      Head: Normocephalic and atraumatic. Mouth/Throat:      Mouth: Mucous membranes are moist.   Eyes:      Extraocular Movements: Extraocular movements intact. Pupils: Pupils are equal, round, and reactive to light. Cardiovascular:      Rate and Rhythm: Tachycardia. Pulses: Normal pulses. Heart sounds: Normal heart sounds. Pulmonary:      Effort: Pulmonary effort is normal.      Breath sounds: Normal breath sounds. Abdominal:      General: Abdomen is flat. Bowel sounds are normal.      Palpations: Abdomen is soft. Musculoskeletal:      Cervical back: Normal range of motion and neck supple.    Skin:     General: Skin is warm and dry. Capillary Refill: Capillary refill takes less than 2 seconds. Neurological:      General: No focal deficit present. Mental Status: She is alert and oriented to person, place, and time.    Psychiatric:         Mood and Affect: Mood normal.     Current Facility-Administered Medications   Medication Dose Route Frequency    heparin 25,000 units in D5W 250 ml infusion  12-25 Units/kg/hr IntraVENous TITRATE    sodium chloride (NS) flush 5-40 mL  5-40 mL IntraVENous Q8H    sodium chloride (NS) flush 5-40 mL  5-40 mL IntraVENous PRN    acetaminophen (TYLENOL) tablet 650 mg  650 mg Oral Q6H PRN    Or    acetaminophen (TYLENOL) suppository 650 mg  650 mg Rectal Q6H PRN    polyethylene glycol (MIRALAX) packet 17 g  17 g Oral DAILY PRN    ondansetron (ZOFRAN ODT) tablet 4 mg  4 mg Oral Q8H PRN    Or    ondansetron (ZOFRAN) injection 4 mg  4 mg IntraVENous Q6H PRN    hydrOXYzine pamoate (VISTARIL) capsule 25 mg  25 mg Oral TID PRN    aspirin delayed-release tablet 81 mg  81 mg Oral DAILY    sevelamer carbonate (RENVELA) tab 800 mg  800 mg Oral TID WITH MEALS    sodium bicarbonate tablet 650 mg  650 mg Oral BID    calcitRIOL (ROCALTROL) capsule 0.25 mcg  0.25 mcg Oral DAILY    methylPREDNISolone (PF) (SOLU-MEDROL) injection 60 mg  60 mg IntraVENous Q8H    nicotine (NICODERM CQ) 14 mg/24 hr patch 1 Patch  1 Patch TransDERmal DAILY    albuterol-ipratropium (DUO-NEB) 2.5 MG-0.5 MG/3 ML  3 mL Nebulization Q4H PRN        Assessment/Plan:      Acute Respiratory Failure  -respiratory symptoms resolved  -likely secondary to pulmonary edema  -clinical stable on 2 LPM via NC, off the Bipap  -CXR: Diffuse interstitial lung disease has worsened, and may represent pulmonary edema.  -keep O2 saturation greater than 92%  -PRN albuterol      Pulmonary Edema  -patient with decreased urine output despite taking schedule dose of Lasix 80 mg daily  -CXR:Diffuse interstitial lung disease has worsened, and may represent pulmonary edema.   -keep O2 saturation greater than 92%  -BNP: 75,243  -patient received HD last night, plan to continue scheduled HD  -holding Lasix due to Hypotension     ESRD requiring emergent HD  -volume overloaded  -nephrologist consulted HD last night, HD nurse to get orders for HD today  -patient weaned off Bipap currently on 2 L via NC  -renal panel in the am  -continue Renvela     NSTEMI Type 2  -troponin highest troponin 2605, trending down, current 1623, will continue Heparin gtt for now  -likely secondary to volume overload versus tachycardia  -denies chest pain  -cardiologist consulted and feels elevated troponin likely secondary to ST versus pulmonary edema  -recent ECHO: EF 48%. Left ventricle is mildly dilated. Mildly increased wall thickness. VIsual EF 40-45% Mild global hypokinesis present, repeat limited ECHO ordered  -cardiac monitoring     COPD Exacerbation  -will start solumedrol and nebulizer  -continue PRN oxygen support, weaned off Bipap, currently on 2 LPM via NC  -patient uses tobacco, cessation education provided     Hypotension  -hypotensive upon arrival in the ED, improved  -ordered Midodrine prior to HD      Anemia of Chronic Disease  -HH 7.2/23.3  -patient denies blood loss  -repeat CBC in the am     Cardiomyopathy  -recent ECHO: EF 48%. Left ventricle is mildly dilated. Mildly increased wall thickness.  VIsual EF 40-45% Mild global hypokinesis present, repeat limited ECHO ordered  -ACEI/BB never initiated due to ongoing hypotension     Aortic stenosis  -moderate stenosis seen on recent ECHO, cessation of tobacco education performed with the patient     Tobacco Abuse  -nicotine patch ordered  -cessation education provided      DVT Prophylaxis: Heparin gtt    Code Status: Full    Care Plan discussed with: Patient and nursing    Clinical time 25 minutes with >50% of visit spent in counseling and coordination of care    Signed by: MARY Zuniga-BC 1/30/2023

## 2023-01-30 NOTE — ROUTINE PROCESS
0700 Bedside shift change report given to KELLE Jane RN (oncoming nurse) by SHANIKA Francis RN (offgoing nurse).  Report included the following information SBAR, Kardex, Intake/Output, MAR, Accordion, Recent Results, Med Rec Status, and Cardiac Rhythm ST .

## 2023-01-30 NOTE — CONSULTS
CARDIOLOGY CONSULTATION      REASON FOR CONSULT: Elevated HS troponin    REQUESTING PROVIDER: Delphine Fuentes MD    CHIEF COMPLAINT:  Elevated HS troponin    HISTORY OF PRESENT ILLNESS:  Jes Perry is a 45y.o. year-old female with past medical history significant for end-stage renal disease on hemodialysis MWF, COPD, caardiomegaly, and aortic stenoisis, and tobacco abuse, patient presents to the ED with a chief complaint of ongoing shortness of breath. Patient reports that the shortness of breath started yesterday evening has worsened over the last 24 hours, she states at that time she took her scheduled dose of oral Lasix, and then took her scheduled dose on oral Lasix this a.m., and she noticed that she had not had any urine output in 24+ hours. Other accompanying symptoms include a nonproductive cough, bilateral lower extremity edema, and abdominal distention, patient denies missing HD, chest pain, palpitations, nausea, vomiting, abdominal pain, fevers and chills. In the ED patient was hypotensive and tachycardic, H/H 7.6/24.3, BUN 47, creatinine 5.82, troponin 1418, BNP shown 75,243, EKG shown sinus tachycardia with ST depressions which was seen on previous EKG, and chest x-ray is pending but appears to show volume overload. In the ED patient was administered a nebulizer treatment, nephrology was consulted and orders was placed for emergent dialysis, placed on BiPAP until dialysis can be completed, and cardiology was consulted due to elevated troponin, which they states no need to start heparin this is likely due to tachycardia versus for overload. Hospital medicine consulted for admission for evaluation and treatment. Past Medical History:   Diagnosis Date    ESRD on hemodialysis ([address]     Kidney failure    Seen and examined. Tachypnea, hypoxia with noted confusion noted on exam. Presently removing all medical devices at this time. Restless and ambulating the room.  RN staff present in room attempting to restart PIV and connecting her back to tele. No reports of chest pain. Discussed with critical care provider with plans to transfer to OSH. She is COVID + and is on respiratory isolation at present. Respiratory present and placed on HFNC. Records from hospital admission course thus far reviewed. Telemetry reviewed. Sinus tachy on tele - rate 120-130s     INPATIENT MEDICATIONS:  Home medications reviewed.     Current Facility-Administered Medications:     heparin 25,000 units in D5W 250 ml infusion, 12-25 Units/kg/hr, IntraVENous, TITRATE, Oba, Anthony S, NP, Last Rate: 8.2 mL/hr at 01/30/23 0707, 12 Units/kg/hr at 01/30/23 0707    sodium chloride (NS) flush 5-40 mL, 5-40 mL, IntraVENous, Q8H, Drea Duke S, ACNP, 10 mL at 01/30/23 0836    sodium chloride (NS) flush 5-40 mL, 5-40 mL, IntraVENous, PRN, Tong Dukeyl S, ACNP    acetaminophen (TYLENOL) tablet 650 mg, 650 mg, Oral, Q6H PRN **OR** acetaminophen (TYLENOL) suppository 650 mg, 650 mg, Rectal, Q6H PRN, Tong Dukeyl S, ACNP    polyethylene glycol (MIRALAX) packet 17 g, 17 g, Oral, DAILY PRN, Tong Dukeyl S, ACNP    ondansetron (ZOFRAN ODT) tablet 4 mg, 4 mg, Oral, Q8H PRN **OR** ondansetron (ZOFRAN) injection 4 mg, 4 mg, IntraVENous, Q6H PRN, Tong Dukeyl S, ACNP    hydrOXYzine pamoate (VISTARIL) capsule 25 mg, 25 mg, Oral, TID PRN, Drea Duke S, ACNP, 25 mg at 01/29/23 2347    aspirin delayed-release tablet 81 mg, 81 mg, Oral, DAILY, Tong Dukeyl S, ACNP, 81 mg at 01/30/23 0858    sevelamer carbonate (RENVELA) tab 800 mg, 800 mg, Oral, TID WITH MEALS, Tong Dukeyl S, ACNP, 800 mg at 01/30/23 0915    sodium bicarbonate tablet 650 mg, 650 mg, Oral, BID, Drea Duke, JOONP, 650 mg at 01/30/23 9602    calcitRIOL (ROCALTROL) capsule 0.25 mcg, 0.25 mcg, Oral, DAILY, Drea Duke, JOONP, 0.25 mcg at 01/30/23 0858    methylPREDNISolone (PF) (SOLU-MEDROL) injection 60 mg, 60 mg, IntraVENous, Q8H, Drea Duke ACNP, 60 mg at 01/30/23 0544    nicotine (NICODERM CQ) 14 mg/24 hr patch 1 Patch, 1 Patch, TransDERmal, DAILY, Leilani JOON BarrigaP, 1 Patch at 01/30/23 7618    albuterol-ipratropium (DUO-NEB) 2.5 MG-0.5 MG/3 ML, 3 mL, Nebulization, Q4H PRN, Leilani Drea BACK ACNP, 3 mL at 01/30/23 1034     ALLERGIES:  Allergies reviewed with the patient,  Allergies   Allergen Reactions    Dulaglutide Other (comments)     Nausea with vomiting and dizziness    Sitagliptin Other (comments)     Headache, runny nose, back pain and low blood sugars    Codeine Palpitations    Pcn [Penicillins] Hives    . FAMILY HISTORY:  Family history reviewed. Family History   Problem Relation Age of Onset    Diabetes Maternal Grandmother          SOCIAL HISTORY:  Notable for   Social History     Socioeconomic History    Marital status:    Tobacco Use    Smoking status: Every Day     Packs/day: 0.25     Years: 35.00     Pack years: 8.75     Types: Cigarettes    Smokeless tobacco: Never   Vaping Use    Vaping Use: Never used   Substance and Sexual Activity    Alcohol use: Not Currently    Drug use: Not Currently     tobacco use, no heavy alcohol or illicit drug use. REVIEW OF SYSTEMS:  Complete review of systems performed, pertinents noted above, all other systems are negative. PHYSICAL EXAMINATION:  Vital sign assessment reveal a blood pressure and pulse of  Visit Vitals  BP (!) 76/43   Pulse (!) 129   Temp 98 °F (36.7 °C)   Resp 21   Ht 5' 4\" (1.626 m)   Wt 68.5 kg (151 lb)   SpO2 98%   Breastfeeding No   BMI 25.92 kg/m²   . Cardiovascular exam has a heart with a normal S1 and S2. Tachycardia rate 130-140's. no murmur present. There are no rubs or gallops. Good peripheral pulses. No jugular venous distension. no carotid bruits are present. Respiratory exam reveals coarse breath sounds throughout.    Gastrointestinal exam has soft, nontender abdomen Lymphatic exam reveals No edema and no varicosities. No notable skin changes. Neurologic exam is nonfocal.  Musculoskeletal exam is notable for a normal gait. Recent labs results and imaging reviewed. Discussed case with Dr. Everlena Litten and our impression and recommendations are as follows:  Elevated troponins/NSTEMI -   - tachycardia on tele, rate 130-140s  - trop elevated, likely secondary to demand ischemia vs fluid overload  - Heparin gtt per protocol  - continue ASA    - Recent echo 1/19/23: EF 40-45% Mildly reduced left ventricular systolic function. EF by 2D [employer] Biplane is 48%. Left ventricle is mildly dilated. Mildly increased wall thickness. VIsual EF 40-45% Mild global hypokinesis present. - transferring to [facility]    EDRD/HD requiring emergent HD  Sepsis  Covid + - managed by Critical care team      Patient scheduled to OSH transfer to [facility]  Thank you for involving us in the care of this patient. Please do not hesitate to call me or Dr. Everlena Litten if additional questions arise.     Tess Collet, ARNP  1/30/2023

## 2023-01-30 NOTE — PROGRESS NOTES
Pt finished with dialysis and feeling much improved. Pt taken off NIV and placed on 2L NC. NAD. Will continue to monitor.

## 2023-01-30 NOTE — PROGRESS NOTES
1200- Verbal shift report received at this time from [facility]. Assumed care of patient at this time. Heparin gtt dual verification completed at this time with off going nurse. Patient currently on 2L NC, respirations even and unlabored, denies pain or needs. 1213- Critical results relayed to provider at this time, no new orders received. 1230- Patient placed on DROPLET(+) isolation at this time. 1238- Call to provider ZUHAIR Duke NP aware of elevated HR, awaiting cardiology consult. Patient asymptomatic and on heparin gtt. 1315- aPTT results reveal nontherapeutic range. 3,000u heparin bolus and increase gtt x2u/kg/hr as per protocol. aPTT recheck x6hrs.     1405- Call received from phlebo at this time, aPTT orders verified at this time. 1415- Patient c/o feeling wet. New pure-wick system and brief applied at this time. Patient requests PRN duo-neb. Call out to RUSTe [facility]. 1430- RT at bedside for duo-med administration. 0- Call received from HD nurse Dom Abbasi at this time. HD nurse to call ICU nurse 1hr prior to initiation of patient HD for Midodrine administration as per order. 1530- Patient c/o anxiety at this time, expresses concerns of side effects from solu-medrol which she had on last admission. Patient requests PRN anxiety medication at this time. To be given. 1545- Echo at bedside. 1605- Echo completed, patient states feeling much better since receiving PRN anxiety medication. 200- HD nurse at bedside. [address] at bedside for aPTT draw. 0047- Call out to provider at this time to discuss aPTT results. New aPTT results 49sec. Patient currently undergoing HD this time, verbal orders received at this time to not change gtt rate and not to give bolus as per heparin protocol. aPTT to be redrawn 6 hours post HD as per provider.

## 2023-01-30 NOTE — PROCEDURES
Hemodialysis / [phone]    Vitals Pre Post Assessment Pre Post   /71 115/86 LOC A&O x3 A&O x3    95 Lungs Bi-pap Bi-pap   Resp 24 17 Cardiac tachycardia regular   Temp 98.1 98.1 Skin Dry, warm Dry, warm   Weight    Edema Mild  mild   Tele status Bedside monitor Bedside monitor Pain 0 0     Orders   Duration: Start: 2005 End: 2929 Total: 2 hrs   Dialyzer: Dialyzer/Set Up Inspection: Sylvain Almeida (01/29/23 2005)   K Bath: Dialysate K (mEq/L): 2 (01/29/23 2005)   Ca Bath: Dialysate CA (mEq/L): 2.5 (01/29/23 2005)   Na: Dialysate NA (mEq/L): 140 (01/29/23 2005)   Bicarb: Dialysate HCO3 (mEq/L): 35 (01/29/23 2005)   Target Fluid Removal: Goal/Amount of Fluid to Remove (mL): 3000 mL (01/29/23 2005)     Access   Type & Location: Right PC: dressing DCI, no S&S of infection, some crusting and mild pink color at the insertion, ports cleaned per P&P, flushing well   Comments:                                        Labs   HBsAg (Antigen) / date: 01/06/23 negative                                              HBsAb (Antibody) / date: 01/06/23 susceptible   Source:    Obtained/Reviewed  Critical Results Called HGB   Date Value Ref Range Status   01/29/2023 7.6 (L) 12.0 - 16.0 g/dL Final     Potassium   Date Value Ref Range Status   01/29/2023 5.3 3.5 - 5.5 mmol/L Final     Comment:     NO HEMOLYSIS     Calcium   Date Value Ref Range Status   01/29/2023 8.9 8.5 - 10.1 mg/dL Final     BUN   Date Value Ref Range Status   01/29/2023 47 (H) 7 - 18 mg/dL Final     Creatinine   Date Value Ref Range Status   01/29/2023 5.82 (H) 0.60 - 1.30 mg/dL Final        Meds Given   Name Dose Route                    Adequacy / Fluid    Total Liters Process: 32 L   Net Fluid Removed: 2200 cc      Comments   Time Out Done:   (Time) 2000   Admitting Diagnosis:    Consent obtained/signed:     Sinan Schilling / rEma Robert # Machine Number: [id number] (01/29/23 2005)   Primary Nurse Rpt Pre: Tresa Flowers, RN   Primary Nurse Rpt Post: Tresa Flowers, RN   Pt Education: Access care, procedure   Care Plan: Continue HD tx as per MD order   Pts outpatient clinic:      Tx Summary   Comments: Tolerated tx fair, BP low at first asymptomatic, towards the end of the tx c/o mild dizziness, UF lowered. Tx completed, blood rinsed back, ports flushed, cleaned per P&P, caps applied.  Dressing changed, dated

## 2023-01-30 NOTE — PROGRESS NOTES
6549 Received care of pt, bedside report given by [facility] JOE LIEBERMAN, a and o x 4, BiPAP continues, pt asleep in bed     0845 pt asking for RT to remove BiPAP due to her wanting to eat and take meds. RT called and put on NC. Pt tolerating well.      Bob.Jose Alberto LOPEZ into see pt on rounds     1035 Pt requesting HHN Rt called, repositioned in bed and HOB up      1048 FLU/Covid test done     1215 report given to flo lieberman

## 2023-01-30 NOTE — PROGRESS NOTES
Pt complaining of SOB. Pt tachypneic with accessory muscle use. Pt placed back on NIV PC14 F12 +8 24%. Pt tolerating well.  Will continue to monitor

## 2023-01-30 NOTE — PROGRESS NOTES
Care Management Interventions  PCP Verified by CM: Yes  Transition of Care Consult (CM Consult): Discharge Planning  Physical Therapy Consult: No  Occupational Therapy Consult: No  Speech Therapy Consult: No  Support Systems: Child(jacob)  Confirm Follow Up Transport: Family  The Plan for Transition of Care is Related to the Following Treatment Goals : Patient centered discharge planning to ensure smooth transition to community and OF. Discharge Location  Patient Expects to be Discharged to[de-identified] Home    Reason for Admission:  Chart reviewed to show pt presented to ED with complaints of SOB, nonproductive cough, BLE edema and abdominal distention. Patient is dialysis pt MWF. Found to be hypotensive and tachycardic. Nephrology consulted and orders for emergent dialysis placed. Placed on bipap. Nephorology and Hospitalist consulted. RUR Score:   20%         PCP: First and Last name:  Salvador Gilbert 32     Name of Practice:   Are you a current patient: Yes/No:   Approximate date of last visit:    Can you do a virtual visit with your PCP:              Resources/supports as identified by patient/family:                   Top Challenges facing patient (as identified by patient/family and CM): Finances/Medication cost?                    Transportation? Support system or lack thereof? Living arrangements? Self-care/ADLs/Cognition?             Current Advanced Directive/Advance Care Plan:  Full Code      Healthcare Decision Maker:   Click here to complete HealthCare Decision Makers including selection of the Healthcare Decision Maker Relationship (ie \"Primary\")        Payor Source Payor: [employer] MEDICAID / Plan: [address] [place] [address] / Product Type: Managed Care Medicaid /                                           Transition of Care Plan:   Plan of care goals includes medication reconciliation, aligning pt with post-acute provider to ensure monitoring pt after discharge, and follow up appointment to be made by nursing staff at discharge. Patient centered discharge planning to ensure smooth transition to community and PLOF. Patient DC POC is will return home and resume OP dialysis. CM following for DC needs.

## 2023-01-31 LAB
ANION GAP SERPL CALC-SCNC: 11 MMOL/L (ref 3–18)
APTT PPP: 36.9 SEC (ref 23–36.4)
BUN SERPL-MCNC: 35 MG/DL (ref 7–18)
BUN/CREAT SERPL: 10 (ref 12–20)
CA-I BLD-MCNC: 9.3 MG/DL (ref 8.5–10.1)
CHLORIDE SERPL-SCNC: 95 MMOL/L (ref 100–111)
CO2 SERPL-SCNC: 29 MMOL/L (ref 21–32)
CREAT SERPL-MCNC: 3.52 MG/DL (ref 0.6–1.3)
ERYTHROCYTE [DISTWIDTH] IN BLOOD BY AUTOMATED COUNT: 17.6 % (ref 11.6–14.5)
GLUCOSE SERPL-MCNC: 298 MG/DL (ref 74–99)
HCT VFR BLD AUTO: 22.4 % (ref 35–45)
HGB BLD-MCNC: 7 G/DL (ref 12–16)
MAGNESIUM SERPL-MCNC: 2.3 MG/DL (ref 1.6–2.6)
MCH RBC QN AUTO: 31.3 PG (ref 24–34)
MCHC RBC AUTO-ENTMCNC: 31.3 G/DL (ref 31–37)
MCV RBC AUTO: 100 FL (ref 78–100)
NRBC # BLD: 0.03 K/UL (ref 0–0.01)
NRBC BLD-RTO: 0.3 PER 100 WBC
PLATELET # BLD AUTO: 153 K/UL (ref 135–420)
PMV BLD AUTO: 10.4 FL (ref 9.2–11.8)
POTASSIUM SERPL-SCNC: 4.4 MMOL/L (ref 3.5–5.5)
RBC # BLD AUTO: 2.24 M/UL (ref 4.2–5.3)
SODIUM SERPL-SCNC: 135 MMOL/L (ref 136–145)
THERAPEUTIC RANGE,PTTT: ABNORMAL SEC (ref 82–109)
TROPONIN-HIGH SENSITIVITY: 738 NG/L (ref 0–54)
WBC # BLD AUTO: 11.4 K/UL (ref 4.6–13.2)

## 2023-01-31 PROCEDURE — 83735 ASSAY OF MAGNESIUM: CPT

## 2023-01-31 PROCEDURE — 85027 COMPLETE CBC AUTOMATED: CPT

## 2023-01-31 PROCEDURE — 74011250637 HC RX REV CODE- 250/637: Performed by: NURSE PRACTITIONER

## 2023-01-31 PROCEDURE — 94761 N-INVAS EAR/PLS OXIMETRY MLT: CPT

## 2023-01-31 PROCEDURE — 36415 COLL VENOUS BLD VENIPUNCTURE: CPT

## 2023-01-31 PROCEDURE — 94640 AIRWAY INHALATION TREATMENT: CPT

## 2023-01-31 PROCEDURE — 84484 ASSAY OF TROPONIN QUANT: CPT

## 2023-01-31 PROCEDURE — 85730 THROMBOPLASTIN TIME PARTIAL: CPT

## 2023-01-31 PROCEDURE — 87340 HEPATITIS B SURFACE AG IA: CPT

## 2023-01-31 PROCEDURE — 74011250636 HC RX REV CODE- 250/636: Performed by: NURSE PRACTITIONER

## 2023-01-31 PROCEDURE — 65610000006 HC RM INTENSIVE CARE

## 2023-01-31 PROCEDURE — 74011000250 HC RX REV CODE- 250: Performed by: NURSE PRACTITIONER

## 2023-01-31 PROCEDURE — 80048 BASIC METABOLIC PNL TOTAL CA: CPT

## 2023-01-31 PROCEDURE — 77010033678 HC OXYGEN DAILY

## 2023-01-31 RX ORDER — ALBUTEROL SULFATE 90 UG/1
2 AEROSOL, METERED RESPIRATORY (INHALATION)
Status: DISCONTINUED | OUTPATIENT
Start: 2023-01-31 | End: 2023-02-01 | Stop reason: HOSPADM

## 2023-01-31 RX ORDER — HEPARIN SODIUM 1000 [USP'U]/ML
3000 INJECTION, SOLUTION INTRAVENOUS; SUBCUTANEOUS ONCE
Status: COMPLETED | OUTPATIENT
Start: 2023-01-31 | End: 2023-01-31

## 2023-01-31 RX ORDER — ALBUTEROL SULFATE 0.83 MG/ML
2.5 SOLUTION RESPIRATORY (INHALATION)
Status: DISCONTINUED | OUTPATIENT
Start: 2023-01-31 | End: 2023-02-01 | Stop reason: HOSPADM

## 2023-01-31 RX ORDER — ALBUTEROL SULFATE 90 UG/1
2 AEROSOL, METERED RESPIRATORY (INHALATION)
COMMUNITY

## 2023-01-31 RX ADMIN — SODIUM CHLORIDE, PRESERVATIVE FREE 10 ML: 5 INJECTION INTRAVENOUS at 05:14

## 2023-01-31 RX ADMIN — SEVELAMER CARBONATE 800 MG: 800 TABLET, FILM COATED ORAL at 11:43

## 2023-01-31 RX ADMIN — POLYETHYLENE GLYCOL 3350 17 G: 17 POWDER, FOR SOLUTION ORAL at 18:27

## 2023-01-31 RX ADMIN — CALCITRIOL CAPSULES 0.25 MCG 0.25 MCG: 0.25 CAPSULE ORAL at 08:29

## 2023-01-31 RX ADMIN — HYDROXYZINE PAMOATE 25 MG: 25 CAPSULE ORAL at 05:09

## 2023-01-31 RX ADMIN — HYDROXYZINE PAMOATE 25 MG: 25 CAPSULE ORAL at 12:17

## 2023-01-31 RX ADMIN — ASPIRIN 81 MG: 81 TABLET, COATED ORAL at 08:29

## 2023-01-31 RX ADMIN — ALBUTEROL SULFATE 2.5 MG: 2.5 SOLUTION RESPIRATORY (INHALATION) at 11:25

## 2023-01-31 RX ADMIN — IPRATROPIUM BROMIDE AND ALBUTEROL SULFATE 3 ML: .5; 2.5 SOLUTION RESPIRATORY (INHALATION) at 07:08

## 2023-01-31 RX ADMIN — HYDROXYZINE PAMOATE 25 MG: 25 CAPSULE ORAL at 21:05

## 2023-01-31 RX ADMIN — SEVELAMER CARBONATE 800 MG: 800 TABLET, FILM COATED ORAL at 08:29

## 2023-01-31 RX ADMIN — METHYLPREDNISOLONE SODIUM SUCCINATE 60 MG: 40 INJECTION, POWDER, FOR SOLUTION INTRAMUSCULAR; INTRAVENOUS at 05:06

## 2023-01-31 RX ADMIN — SODIUM BICARBONATE 650 MG: 650 TABLET ORAL at 08:29

## 2023-01-31 RX ADMIN — SODIUM CHLORIDE, PRESERVATIVE FREE 10 ML: 5 INJECTION INTRAVENOUS at 21:08

## 2023-01-31 RX ADMIN — SEVELAMER CARBONATE 800 MG: 800 TABLET, FILM COATED ORAL at 17:00

## 2023-01-31 RX ADMIN — SODIUM BICARBONATE 650 MG: 650 TABLET ORAL at 18:27

## 2023-01-31 RX ADMIN — IPRATROPIUM BROMIDE AND ALBUTEROL SULFATE 3 ML: .5; 2.5 SOLUTION RESPIRATORY (INHALATION) at 01:02

## 2023-01-31 RX ADMIN — HEPARIN SODIUM 3000 UNITS: 1000 INJECTION INTRAVENOUS; SUBCUTANEOUS at 05:03

## 2023-01-31 RX ADMIN — ALBUTEROL SULFATE 2.5 MG: 2.5 SOLUTION RESPIRATORY (INHALATION) at 21:43

## 2023-01-31 NOTE — PROGRESS NOTES
Hospitalist Progress Note    Daily Progress Note: 2023 12:47 PM      Kat Pereira                                            MRN: [id number]                                  :1970      Subjective:     Pt examined and seen at bedside. Patient alert and oriented x 4, she continue to complains of shortness of breath, she is currently stable on 2 LPM via NC. Patient has received x 2 runs of HD, HD days are MWF. No acute events reported overnight. Today's Plan: 6 minute ambulatory study in the am, hopefully we can get patient discharge tomorrow, patient will need oncologist appointment for new diagnosis of renal cell carcinoma. Objective:     Visit Vitals  /87 (BP 1 Location: Right leg, BP Patient Position: At rest)   Pulse (!) 115   Temp 97.8 °F (36.6 °C)   Resp 20   Ht 5' 4\" (1.626 m)   Wt 68.9 kg (151 lb 14.4 oz)   SpO2 92%   Breastfeeding No   BMI 26.07 kg/m²    O2 Flow Rate (L/min): 2 l/min O2 Device: None (Room air)    Temp (24hrs), Av.1 °F (36.7 °C), Min:97.8 °F (36.6 °C), Max:98.4 °F (36.9 °C)      No intake/output data recorded.  1901 -  0700  In: 638.7 [P.O.:420; I.V.:218.7]  Out: 4450 [Urine:250]    PHYSICAL EXAM:  Physical Exam  Vitals and nursing note reviewed. Constitutional:       Appearance: Normal appearance. She is normal weight. HENT:      Head: Normocephalic and atraumatic. Mouth/Throat:      Mouth: Mucous membranes are moist.   Eyes:      Extraocular Movements: Extraocular movements intact. Pupils: Pupils are equal, round, and reactive to light. Cardiovascular:      Rate and Rhythm: Normal rate and regular rhythm. Pulses: Normal pulses. Heart sounds: Normal heart sounds. Pulmonary:      Effort: Pulmonary effort is normal.      Breath sounds: Diminished  Abdominal:      General: Abdomen is flat. Bowel sounds are normal.      Palpations: Abdomen is soft.    Musculoskeletal:      Cervical back: Normal range of motion and neck supple. Skin:     General: Skin is warm and dry. Capillary Refill: Capillary refill takes less than 2 seconds. Neurological:      General: No focal deficit present. Mental Status: She is alert and oriented to person, place, and time.    Psychiatric:         Mood and Affect: Mood normal.     Current Facility-Administered Medications   Medication Dose Route Frequency    albuterol (PROVENTIL HFA, VENTOLIN HFA, PROAIR HFA) inhaler 2 Puff  2 Puff Inhalation Q4H PRN    [START ON 2/1/2023] tiotropium bromide (SPIRIVA RESPIMAT) 2.5 mcg /actuation  2 Puff Inhalation QAM RT    albuterol (PROVENTIL VENTOLIN) nebulizer solution 2.5 mg  2.5 mg Nebulization Q4H PRN    0.9% sodium chloride infusion 2,000 mL  2,000 mL IntraVENous DIALYSIS PRN    epoetin carmelo-epbx (RETACRIT) injection 10,000 Units  10,000 Units IntraVENous DIALYSIS MON, WED & FRI    albumin human 25% (BUMINATE) solution 25 g  25 g IntraVENous DIALYSIS PRN    sodium chloride (NS) flush 5-40 mL  5-40 mL IntraVENous Q8H    sodium chloride (NS) flush 5-40 mL  5-40 mL IntraVENous PRN    acetaminophen (TYLENOL) tablet 650 mg  650 mg Oral Q6H PRN    Or    acetaminophen (TYLENOL) suppository 650 mg  650 mg Rectal Q6H PRN    polyethylene glycol (MIRALAX) packet 17 g  17 g Oral DAILY PRN    ondansetron (ZOFRAN ODT) tablet 4 mg  4 mg Oral Q8H PRN    Or    ondansetron (ZOFRAN) injection 4 mg  4 mg IntraVENous Q6H PRN    hydrOXYzine pamoate (VISTARIL) capsule 25 mg  25 mg Oral TID PRN    aspirin delayed-release tablet 81 mg  81 mg Oral DAILY    sevelamer carbonate (RENVELA) tab 800 mg  800 mg Oral TID WITH MEALS    sodium bicarbonate tablet 650 mg  650 mg Oral BID    calcitRIOL (ROCALTROL) capsule 0.25 mcg  0.25 mcg Oral DAILY    nicotine (NICODERM CQ) 14 mg/24 hr patch 1 Patch  1 Patch TransDERmal DAILY        Assessment/Plan:     Acute respiratory failure with hypoxia  -Patient continues to complain of shortness of breath with tachypnea  -stable on 2 L of oxygen via nasal cannula, weaned off BiPAP  -Chest x-ray showing diffuse interstitial lung disease which has worsened, and may represent pulmonary edema  -Keep oxygen saturation greater than 92%  -Plan to do a 6-minute ambulation study tomorrow  -Continue with as needed albuterol    Pulmonary edema  -Despite patient receiving 2 rounds of hemodialysis, she continues complain of shortness of breath especially on exertion  -On admission BNP 75,243  -Plan to continue scheduled dialysis on minute Monday Wednesdays and Fridays    End-stage renal disease requiring emergent hemodialysis  -Chest x-ray showed pulmonary edema, patient has received 2 rounds of hemodialysis since admission  -Patient continues to complain of shortness of breath  -Appreciate nephrology consultation  -Clara Landeros, and continue hemodialysis MWF    Renal Cell Carcinoma  -recently diagnosed in 12/22, patient has not followed up with oncologist yet to discuss treatment options  -patient reports she suppose to follow-up with  [facility]    NSTEMI type II  -Likely secondary to pulmonary edema versus tachycardia  -Highest troponin was 2605, troponin trending down  -Patient was initially started on heparin drip, but heparin drip has been discontinued  -[email or web address] cardiology consultation, [facility] cardiology would like the patient to follow-up in the office in 1 to 2 weeks discussed treatment regarding moderate to severe MVR found on echo    COPD Exacerbation  -Medrol discontinued, as needed albuterol continued, continue scheduled Spiriva per RT  -Rated smoking cessation to the patient, nicotine remains intact  -Patient stable on 2 L via nasal cannula, plan to perform walk test tomorrow    Hypotension  -Resolved    Anemia of Chronic Disease  -HH 7.0/22.4  -CBC in the am    Cardiomyopathy  -recent ECHO:  EF by 2D Simpsons Biplane is 34%. Left ventricle is mildly dilated. Normal wall thickness.  Moderate global hypokinesis present with severe inferior lateral hypokinesis. Grade II diastolic dysfunction with increased LAP. -ACEI/BB never initiated due to ongoing hypotension     Aortic stenosis  -repeat ECHO: EF by 2D [employer]ons Biplane is 34%. Left ventricle is mildly dilated. Normal wall thickness. Moderate global hypokinesis present with severe inferior lateral hypokinesis. Grade II diastolic dysfunction with increased LAP.     Tobacco Abuse  -nicotine patch ordered  -cessation education provided     DVT Prophylaxis: SCDS    Code Status: Full    Care Plan discussed with: Patient and nursing    Clinical time 25 minutes with >50% of visit spent in counseling and coordination of care    Signed by: MARY Gan-BC 1/31/2023

## 2023-01-31 NOTE — PROGRESS NOTES
Chart reviewed   Patient admitted with SOB / CP , Pulmonary edema , hypotension and elevated troponins   She had recurrent admissions with the same picture , not compliant with with FR and low salt diet   She had HD last night and today , she tolerated fine with improved resp status , midodrine is being used before HD to support BP   Cardiology consulted and plan is noted , echo is pending   Will continues HD per regular schedule MWF

## 2023-01-31 NOTE — PROGRESS NOTES
conducted an initial consultation and Spiritual Assessment for Kat Garcia, who is a 45 y.o.,female. Patients Primary Language is: [place]. According to the patients EMR Denominational Affiliation is: No preference. The reason the Patient came to the hospital is:   Patient Active Problem List    Diagnosis Date Noted    CKD (chronic kidney disease) requiring chronic dialysis ([facility] 01/29/2023    Type 2 MI (myocardial infarction) ([facility] 01/29/2023    Acute respiratory failure with hypoxia ([facility]) 01/06/2023    Fluid overload 01/06/2023    End stage renal failure on dialysis ([facility] 01/06/2023    Pulmonary edema 01/06/2023    Hypotension 01/06/2023    COPD exacerbation ([facility] 01/06/2023    Chronic kidney disease (CKD), stage V [facility]) [[id number]] 05/26/2022        The  provided the following Interventions:  Initiated a relationship of care and support. Explored issues of leola, belief, spirituality and Christianity/ritual needs while hospitalized. Listened empathically. Provided chaplaincy education. Provided information about Spiritual Care Services. Offered prayer and assurance of continued prayers on patient's behalf. Chart reviewed. The following outcomes where achieved:  Patient shared limited information about both their medical narrative and spiritual journey/beliefs.  confirmed Patient's Denominational Affiliation. Patient processed feeling about current hospitalization. Patient expressed gratitude for 's visit. Assessment:  Patient does not have any Christianity/cultural needs that will affect patients preferences in health care. There are no spiritual or Christianity issues which require intervention at this time. Plan:  Chaplains will continue to follow and will provide pastoral care on an as needed/requested basis.  recommends bedside caregivers page  on duty if patient shows signs of acute spiritual or emotional distress.     Per patient, not feeling her best and having difficult time breathing. Patient admits to having advance directive and ask to bring copy to hospital.  Prayer of comfort provided.      Luis Felipe [address] Dr  [phone]

## 2023-01-31 NOTE — ROUTINE PROCESS
0700 Bedside shift change report given to SHANIKA Francis RN (oncoming nurse) by Nya Jonas RN (offgoing nurse). Report included the following information SBAR, Kardex, Intake/Output, MAR, Accordion, Recent Results, Med Rec Status, and Cardiac Rhythm ST . Pt sitting in bed receiving breathing treatment. Heparin gtt rate verified. Pt shows no signs of distress and has no c/o pain at this time. CBWR, 2 side rails up and bed locked in lowest position. 1900 Bedside shift change report given to SYEDA Jonas RN (oncoming nurse) by SHANIKA Francis RN (offgoing nurse).  Report included the following information SBAR, Kardex, Intake/Output, MAR, Accordion, Recent Results, Med Rec Status, and Cardiac Rhythm ST . Report called to 3B, and given to receiving RN.        Rabia Drake RN  10/30/22 2117

## 2023-01-31 NOTE — PROGRESS NOTES
Problem: Falls - Risk of  Goal: *Absence of Falls  Description: Document Lettie Duverney Fall Risk and appropriate interventions in the flowsheet.   Outcome: Progressing Towards Goal  Note: Fall Risk Interventions:                                Problem: Patient Education: Go to Patient Education Activity  Goal: Patient/Family Education  Outcome: Progressing Towards Goal     Problem: General Medical Care Plan  Goal: *Vital signs within specified parameters  Outcome: Progressing Towards Goal  Goal: *Labs within defined limits  Outcome: Progressing Towards Goal  Goal: *Absence of infection signs and symptoms  Outcome: Progressing Towards Goal  Goal: *Optimal pain control at patient's stated goal  Outcome: Progressing Towards Goal  Goal: *Skin integrity maintained  Outcome: Progressing Towards Goal  Goal: *Fluid volume balance  Outcome: Progressing Towards Goal  Goal: *Optimize nutritional status  Outcome: Progressing Towards Goal  Goal: *Anxiety reduced or absent  Outcome: Progressing Towards Goal  Goal: *Progressive mobility and function (eg: ADL's)  Outcome: Progressing Towards Goal     Problem: Patient Education: Go to Patient Education Activity  Goal: Patient/Family Education  Outcome: Progressing Towards Goal

## 2023-01-31 NOTE — ACP (ADVANCE CARE PLANNING)
Advance Care Planning   Advance Care Planning Inpatient Note  [address] [facility] Department    Today's Date: 1/31/2023  Unit: [facility] 2 ICU    Received request from rounding. Upon review of chart and communication with care team, patient's decision making abilities are not in question. Patient was/were present in the room during visit. Goals of ACP Conversation:  Discuss Advance Care planning documents    Health Care Decision Makers:    Patient verbally expressed her healthcare decision makers. Christel Delgado and Raya Helms.  Per patient has completed form and was asked to bring a copy to hospital.     {  [address] (Patient Wishes) on file:  Healthcare Power of /Advance Directive appointment of Health care agent         Interventions:  Requested patient/family submit existing document(s) for our records: Healthcare Power of /Advance Directive appointment of Health care agent    Care Preferences Communicated:  No    Outcomes/Plan:  ACP Discussion Refused    [facility] on 1/31/2023 at 10:55 AM

## 2023-01-31 NOTE — PROGRESS NOTES
2000  Bedside shift change report given to SYEDA Thomas RN (oncoming nurse) by Reinaldo Brown. Roland Gray RN (offgoing nurse). Report included the following information SBAR, Kardex, Intake/Output, MAR, Recent Results, Med Rec Status, and Cardiac Rhythm SR . Pt in dialysis at this time. 2250  Dialysis completed. Pt tolerated well. Pt cleaned of incont urine and new pure wick applied. HS snack given. 0000  Pt resting quietly with eyes closed. Resp easy and nonlabored. No distress noted. CBWR.     0103  Pt rang call bell requesting breathing tx. Resp tech beeped and in to give pt her neb tx.     0300  Pt resting quietly with eyes closed. Resp easy and nonlabored. No distress noted. CBWR.     0501  aPTT 36.9. Heparin gtt increased to 16units/kg/hr  and  Heparin bolus 3000unit  IV given per ACS  Heparin protocol. Repeat aPTT in 6hrs at 1100 am.    0700  Bedside shift change report given to SHANIKA Perez RN BSN(oncoming nurse) by Clarene Kehr. Jose Manuel Thomas RN (offgoing nurse). Report included the following information SBAR, Kardex, Intake/Output, MAR, Recent Results, Med Rec Status, and Cardiac Rhythm NSR .

## 2023-01-31 NOTE — PROGRESS NOTES
Problem: Falls - Risk of  Goal: *Absence of Falls  Description: Document Danielle Adameer Fall Risk and appropriate interventions in the flowsheet.   Outcome: Progressing Towards Goal  Note: Fall Risk Interventions:                                Problem: Patient Education: Go to Patient Education Activity  Goal: Patient/Family Education  Outcome: Progressing Towards Goal     Problem: General Medical Care Plan  Goal: *Vital signs within specified parameters  Outcome: Progressing Towards Goal  Goal: *Labs within defined limits  Outcome: Progressing Towards Goal  Goal: *Absence of infection signs and symptoms  Outcome: Progressing Towards Goal  Goal: *Optimal pain control at patient's stated goal  Outcome: Progressing Towards Goal  Goal: *Skin integrity maintained  Outcome: Progressing Towards Goal  Goal: *Fluid volume balance  Outcome: Progressing Towards Goal  Goal: *Optimize nutritional status  Outcome: Progressing Towards Goal  Goal: *Anxiety reduced or absent  Outcome: Progressing Towards Goal  Goal: *Progressive mobility and function (eg: ADL's)  Outcome: Progressing Towards Goal     Problem: Patient Education: Go to Patient Education Activity  Goal: Patient/Family Education  Outcome: Progressing Towards Goal

## 2023-01-31 NOTE — PROGRESS NOTES
[facility] Progress [facility]      Patient Name: Luis Ray  Gender: female  : 1970  Age:52 y.o. Patient seen and examined. This is a patient who is followed for Elevated HS troponin. Resting comfortably in bed in NAD. Remains on 2L via NC with saturations > 95%. Continues to complain of some shortness of breath requesting use of her daily Spiriva. HOB remains elevated for comfort. Denies any chest pain, or palpitations. Remains mildly tachycardia but reports some continued anxiety. No other complaints reported. Telemetry reviewed, there were no events noted in the past 24 hours. Pertinent review of system items noted above, all other systems are negative. Current medications reviewed. Physical Examination  Vital signs are stable, Blood Pressure 109/87, Pulse 101  No apparent distress. Heart is regular, rate and rhythm. Normal S1, S2, no murmurs are appreciated. Lungs are clear bilaterally; mildly diminished bases  Abdomen is soft, nontender, normal bowel sounds. Extremities have no edema. Labs reviewed:     Visit Vitals  /87 (BP 1 Location: Right leg, BP Patient Position: At rest)   Pulse (!) 115   Temp 97.8 °F (36.6 °C)   Resp 20   Ht 5' 4\" (1.626 m)   Wt 68.9 kg (151 lb 14.4 oz)   SpO2 97%   Breastfeeding No   BMI 26.07 kg/m²       Impression and Recommendations    Elevated HS troponin; chest pain free; likely related to demand ischemia in the presence of COPD exacerbation  - ST continues via telemetry  - Heparin gtt discontinued  - continue tele  - Recent echo EF 34%; with  moderate to severe MVR  - will need cardiology management in the OP for possible repair. Discussed with patient and plans to schedule with [facility] 1-2 weeks post hospital discharge. Please do no hesitate to call if additional questions arise.     MAGI Navarrete  2023

## 2023-02-01 VITALS
DIASTOLIC BLOOD PRESSURE: 54 MMHG | HEART RATE: 95 BPM | TEMPERATURE: 97.7 F | WEIGHT: 152.6 LBS | RESPIRATION RATE: 22 BRPM | SYSTOLIC BLOOD PRESSURE: 97 MMHG | HEIGHT: 64 IN | BODY MASS INDEX: 26.05 KG/M2 | OXYGEN SATURATION: 97 %

## 2023-02-01 LAB
ANION GAP SERPL CALC-SCNC: 11 MMOL/L (ref 3–18)
BUN SERPL-MCNC: 68 MG/DL (ref 7–18)
BUN/CREAT SERPL: 14 (ref 12–20)
CA-I BLD-MCNC: 9.1 MG/DL (ref 8.5–10.1)
CHLORIDE SERPL-SCNC: 97 MMOL/L (ref 100–111)
CO2 SERPL-SCNC: 29 MMOL/L (ref 21–32)
CREAT SERPL-MCNC: 4.71 MG/DL (ref 0.6–1.3)
ERYTHROCYTE [DISTWIDTH] IN BLOOD BY AUTOMATED COUNT: 18 % (ref 11.6–14.5)
GLUCOSE SERPL-MCNC: 175 MG/DL (ref 74–99)
HBV SURFACE AG SER QL: <0.1 INDEX
HBV SURFACE AG SER QL: NEGATIVE
HCT VFR BLD AUTO: 22.8 % (ref 35–45)
HGB BLD-MCNC: 7 G/DL (ref 12–16)
MAGNESIUM SERPL-MCNC: 2.3 MG/DL (ref 1.6–2.6)
MCH RBC QN AUTO: 31 PG (ref 24–34)
MCHC RBC AUTO-ENTMCNC: 30.7 G/DL (ref 31–37)
MCV RBC AUTO: 100.9 FL (ref 78–100)
NRBC # BLD: 0.08 K/UL (ref 0–0.01)
NRBC BLD-RTO: 0.6 PER 100 WBC
PLATELET # BLD AUTO: 191 K/UL (ref 135–420)
PMV BLD AUTO: 10.2 FL (ref 9.2–11.8)
POTASSIUM SERPL-SCNC: 3.9 MMOL/L (ref 3.5–5.5)
RBC # BLD AUTO: 2.26 M/UL (ref 4.2–5.3)
SODIUM SERPL-SCNC: 137 MMOL/L (ref 136–145)
WBC # BLD AUTO: 12.8 K/UL (ref 4.6–13.2)

## 2023-02-01 PROCEDURE — 90935 HEMODIALYSIS ONE EVALUATION: CPT

## 2023-02-01 PROCEDURE — 94640 AIRWAY INHALATION TREATMENT: CPT

## 2023-02-01 PROCEDURE — P9047 ALBUMIN (HUMAN), 25%, 50ML: HCPCS | Performed by: SPECIALIST

## 2023-02-01 PROCEDURE — 94618 PULMONARY STRESS TESTING: CPT

## 2023-02-01 PROCEDURE — 36415 COLL VENOUS BLD VENIPUNCTURE: CPT

## 2023-02-01 PROCEDURE — 80048 BASIC METABOLIC PNL TOTAL CA: CPT

## 2023-02-01 PROCEDURE — 85027 COMPLETE CBC AUTOMATED: CPT

## 2023-02-01 PROCEDURE — 77010033678 HC OXYGEN DAILY

## 2023-02-01 PROCEDURE — 74011250636 HC RX REV CODE- 250/636: Performed by: SPECIALIST

## 2023-02-01 PROCEDURE — 74011250637 HC RX REV CODE- 250/637: Performed by: NURSE PRACTITIONER

## 2023-02-01 PROCEDURE — 83735 ASSAY OF MAGNESIUM: CPT

## 2023-02-01 PROCEDURE — 5A1D70Z PERFORMANCE OF URINARY FILTRATION, INTERMITTENT, LESS THAN 6 HOURS PER DAY: ICD-10-PCS | Performed by: SPECIALIST

## 2023-02-01 PROCEDURE — 74011000250 HC RX REV CODE- 250: Performed by: NURSE PRACTITIONER

## 2023-02-01 PROCEDURE — 94761 N-INVAS EAR/PLS OXIMETRY MLT: CPT

## 2023-02-01 RX ADMIN — SODIUM BICARBONATE 650 MG: 650 TABLET ORAL at 08:53

## 2023-02-01 RX ADMIN — ASPIRIN 81 MG: 81 TABLET, COATED ORAL at 08:53

## 2023-02-01 RX ADMIN — CALCITRIOL CAPSULES 0.25 MCG 0.25 MCG: 0.25 CAPSULE ORAL at 08:53

## 2023-02-01 RX ADMIN — SODIUM CHLORIDE 2000 ML: 9 INJECTION, SOLUTION INTRAVENOUS at 15:52

## 2023-02-01 RX ADMIN — TIOTROPIUM BROMIDE INHALATION SPRAY 2 PUFF: 3.12 SPRAY, METERED RESPIRATORY (INHALATION) at 07:16

## 2023-02-01 RX ADMIN — ALBUTEROL SULFATE 2.5 MG: 2.5 SOLUTION RESPIRATORY (INHALATION) at 07:19

## 2023-02-01 RX ADMIN — SODIUM CHLORIDE, PRESERVATIVE FREE 10 ML: 5 INJECTION INTRAVENOUS at 05:25

## 2023-02-01 RX ADMIN — ALBUMIN (HUMAN) 25 G: 0.25 INJECTION, SOLUTION INTRAVENOUS at 15:52

## 2023-02-01 RX ADMIN — SEVELAMER CARBONATE 800 MG: 800 TABLET, FILM COATED ORAL at 08:53

## 2023-02-01 RX ADMIN — HYDROXYZINE PAMOATE 25 MG: 25 CAPSULE ORAL at 14:09

## 2023-02-01 NOTE — PROGRESS NOTES
Problem: Falls - Risk of  Goal: *Absence of Falls  Description: Document Darlen Paris Fall Risk and appropriate interventions in the flowsheet.   2/1/2023 1811 by Fito West  Outcome: Resolved/Met  2/1/2023 0747 by Fito West  Outcome: Progressing Towards Goal  Note: Fall Risk Interventions:                                Problem: Patient Education: Go to Patient Education Activity  Goal: Patient/Family Education  2/1/2023 1811 by Fito West  Outcome: Resolved/Met  2/1/2023 0747 by Fito West  Outcome: Progressing Towards Goal     Problem: General Medical Care Plan  Goal: *Vital signs within specified parameters  2/1/2023 1811 by Fito West  Outcome: Resolved/Met  2/1/2023 0747 by Fito West  Outcome: Progressing Towards Goal  Goal: *Labs within defined limits  2/1/2023 1811 by Fito West  Outcome: Resolved/Met  2/1/2023 0747 by Fito West  Outcome: Progressing Towards Goal  Goal: *Absence of infection signs and symptoms  2/1/2023 1811 by Fito West  Outcome: Resolved/Met  2/1/2023 0747 by Fito West  Outcome: Progressing Towards Goal  Goal: *Optimal pain control at patient's stated goal  2/1/2023 1811 by Fito West  Outcome: Resolved/Met  2/1/2023 0747 by Fito West  Outcome: Progressing Towards Goal  Goal: *Skin integrity maintained  2/1/2023 1811 by Fito West  Outcome: Resolved/Met  2/1/2023 0747 by Fito West  Outcome: Progressing Towards Goal  Goal: *Fluid volume balance  2/1/2023 1811 by Fito West  Outcome: Resolved/Met  2/1/2023 0747 by Fito West  Outcome: Progressing Towards Goal  Goal: *Optimize nutritional status  2/1/2023 1811 by Fito West  Outcome: Resolved/Met  2/1/2023 0747 by Fito West  Outcome: Progressing Towards Goal  Goal: *Anxiety reduced or absent  2/1/2023 1811 by Fito West  Outcome: Resolved/Met  2/1/2023 0747 by Fito West  Outcome: Progressing Towards Goal  Goal: *Progressive mobility and function (eg: ADL's)  2/1/2023 1811 by Mayela Thompson  Outcome: Resolved/Met  2/1/2023 0747 by Mayela Thompson  Outcome: Progressing Towards Goal     Problem: Patient Education: Go to Patient Education Activity  Goal: Patient/Family Education  2/1/2023 1811 by Mayela Thompson  Outcome: Resolved/Met  2/1/2023 0747 by Mayela Thompson  Outcome: Progressing Towards Goal     Problem: Nutrition Deficit  Goal: *Optimize nutritional status  Outcome: Resolved/Met

## 2023-02-01 NOTE — PROGRESS NOTES
1900  Bedside shift change report given to SYEDA George RN (oncoming nurse) by SHANIKA Smith RN BSN (offgoing nurse). Report included the following information SBAR, Kardex, Intake/Output, MAR, Recent Results, Med Rec Status, and Cardiac Rhythm SR .     2105  Pt rang call requesting her nerve pill. Vistaril  25mg po given. 2143  Pt rang call bell for breathing tx. Resp tech notified and in to given neb tx.     0000  Pt's brief saturated with urine. Cleaned pt up and new brief applied. 0230  Pt resting quietly with eyes closed. Resp easy and nonlabored. No distress noted. CBWR.     0400   Rounding complete at this time. Pt. Resting quietly with call bell in reach. VSS. Blood pressure 110/62, pulse 100, temperature 97.7 °F (36.5 °C), resp. rate 28, height 5' 4\" (1.626 m), weight 69.2 kg (152 lb 9.6 oz), SpO2 97 %, not currently breastfeeding.    0700  Verbal shift change report given to Miryam Hwang RN BSN (oncoming nurse) by Lena Concepcion. Mina George RN (offgoing nurse). Report included the following information SBAR, Kardex, Intake/Output, MAR, Recent Results, Med Rec Status, and Cardiac Rhythm NSR .

## 2023-02-01 NOTE — DISCHARGE INSTR - DIET

## 2023-02-01 NOTE — PROGRESS NOTES
Six Minute Walk         02/01/23 1813   Resting (Room Air)   SpO2 97   HR 1041   During Walk (Room Air)   SpO2 94      Walk/Assistance Device Ambulation   Rate of Dyspnea 1   Symptoms Fatigue  (Leg)   After Walk   SpO2 92      Rate of Dyspnea 1   Symptoms Fatigue   Comments leg fatigue   Does the Patient Qualify for Home O2 No   Does the Patient Need Portable Oxygen Tanks No     Patient completed six minute walk on RA with no complications. Patient does not qualify for oxygen.

## 2023-02-01 NOTE — DIALYSIS
Hemodialysis / [phone]    Vitals Pre Post Assessment Pre Post   BP BP: 102/60 (02/01/23 0739) 106/55 LOC A & O x 4 A / O x 4   HR Pulse (Heart Rate): (!) 113 (02/01/23 0739)   93 Lungs Coarse / room air Coarse, room air   Resp Resp Rate: 14 (02/01/23 0739) 20 Cardiac Murmur / tachycardia Murmur / tachycardia   Temp Temp: 98.1 °F (36.7 °C) (02/01/23 0739) 97.7 Skin No uncovered wounds noted No uncovered wounds noted   Weight  Obtain by hospital staff  Edema none none   Tele status Monitor by hospital staff  Pain Pain Intensity 1: 0 (02/01/23 0739) none     Orders   Duration: Start: 1747 End: 1735 Total: 3   Dialyzer: Dialyzer/Set Up Inspection: Samira Collazo (01/30/23 1920)   K [place]: Dialysate K (mEq/L): 2 (01/30/23 1920)   Ca [place]: Dialysate CA (mEq/L): 2.5 (01/30/23 1920)   Na: Dialysate NA (mEq/L): 135 (01/30/23 1920)   Bicarb: Dialysate HCO3 (mEq/L): 35 (01/30/23 1920)   Target Fluid Removal: Goal/Amount of Fluid to Remove (mL): 2500 mL (01/30/23 1920)     Access   Type & Location: R CVC / dressing changed today / no s/s of infection / disinfected per P & P / aspirates and flushes without difficulty   Comments:                                        Labs   HBsAg (Antigen) / date:              01/06/23 negative                                 HBsAb (Antibody) / date: 01/06/23 susceptible   Source: Hospital labs   Obtained/Reviewed  Critical Results Called HGB   Date Value Ref Range Status   02/01/2023 7.0 (L) 12.0 - 16.0 g/dL Final     Potassium   Date Value Ref Range Status   02/01/2023 3.9 3.5 - 5.5 mmol/L Final     Calcium   Date Value Ref Range Status   02/01/2023 9.1 8.5 - 10.1 mg/dL Final     BUN   Date Value Ref Range Status   02/01/2023 68 (H) 7 - 18 mg/dL Final     Creatinine   Date Value Ref Range Status   02/01/2023 4.71 (H) 0.60 - 1.30 mg/dL Final        Meds Given   Name Dose Route   albumin 25 grams IV               Adequacy / Fluid    Total Liters Process:    Net Fluid Removed:       Comments Time Out Done:   (Time) 0060   Admitting Diagnosis: SOB   Consent obtained/signed: Informed Consent Verified: Yes (01/30/23 1920)   Machine / RO # Machine Number: [id number] (01/30/23 1920)   Primary Nurse Rpt Pre: Vishal Pryor RN   Primary Nurse Rpt Post: Vishal Pryor RN   Pt Education: Fluid intake monitoring   Care Plan: Continue with hemodialysis as scheduled   Pts outpatient clinic: [facility]     Tx Summary   Comments:           no complaints, no SOB, no pain, no distress / rested and watched tv much of treatment / post treatment, disinfected per P and P / all possible blood returned /  normal saline instilled to CVC ports /  capped tightly and secure

## 2023-02-01 NOTE — ROUTINE PROCESS
0700 Bedside shift change report given to SHANIKA Francis RN (oncoming nurse) by Rolan Jonas RN (offgoing nurse). Report included the following information SBAR, Kardex, Intake/Output, MAR, Accordion, Recent Results, Med Rec Status, and Cardiac Rhythm NSR .     1145 Pt resting in bed with no signs of distress and no c/o pain at this time. CBWR, 2 side rails up and bed locked in lowest position. 1430 Pt taken to dialysis. 1730 Pt back from dialysis and performing walking pulse ox test with RT.      1825 Discharge instructions given to pt. Pt taken down to POV via wheelchair.

## 2023-02-01 NOTE — PROGRESS NOTES
RT gave PRN tx for increase coughing and diminished BS, sats good at 99% with 2 liters so removed for titration and check on monitor after tx complete, Spiriva also given. No resp distress sats 99 and HR 99.

## 2023-02-01 NOTE — PROGRESS NOTES
Problem: Falls - Risk of  Goal: *Absence of Falls  Description: Document Deon Basilio Fall Risk and appropriate interventions in the flowsheet.   Outcome: Progressing Towards Goal  Note: Fall Risk Interventions:                                Problem: Patient Education: Go to Patient Education Activity  Goal: Patient/Family Education  Outcome: Progressing Towards Goal     Problem: General Medical Care Plan  Goal: *Vital signs within specified parameters  Outcome: Progressing Towards Goal  Goal: *Labs within defined limits  Outcome: Progressing Towards Goal  Goal: *Absence of infection signs and symptoms  Outcome: Progressing Towards Goal  Goal: *Optimal pain control at patient's stated goal  Outcome: Progressing Towards Goal  Goal: *Skin integrity maintained  Outcome: Progressing Towards Goal  Goal: *Fluid volume balance  Outcome: Progressing Towards Goal  Goal: *Optimize nutritional status  Outcome: Progressing Towards Goal  Goal: *Anxiety reduced or absent  Outcome: Progressing Towards Goal  Goal: *Progressive mobility and function (eg: ADL's)  Outcome: Progressing Towards Goal     Problem: Patient Education: Go to Patient Education Activity  Goal: Patient/Family Education  Outcome: Progressing Towards Goal

## 2023-02-01 NOTE — PROGRESS NOTES
[facility] Progress Note      Patient Name: Luis Ray  Gender: female  : 1970  Age:52 y.o. Patient seen and examined. This is a patient who is followed for Elevated HS troponins. Resting comfortably in bed. C/o continued SOB but does report she received her treatment this morning with noted improvement. Sats> 95% on RA. Remains mildly tachycardic but has some underlying anxiety. No other complaints reported. Telemetry reviewed, there were no events noted in the past 24 hours. Pertinent review of system items noted above, all other systems are negative. Current medications reviewed. Physical Examination  Vital signs are stable,   No apparent distress. Heart is tachycardic rate and rhythm. Normal S1, S2, no murmurs are appreciated. Lungs are clear bilaterally. Diminished bilateral bases secondary to poor respiratory effort  Abdomen is soft, nontender, normal bowel sounds. Extremities have no edema. Visit Vitals  /60 (BP 1 Location: Right upper arm, BP Patient Position: Semi fowlers)   Pulse (!) 113   Temp 98.1 °F (36.7 °C)   Resp 14   Ht 5' 4\" (1.626 m)   Wt 69.2 kg (152 lb 9.6 oz)   SpO2 97%   Breastfeeding No   BMI 26.19 kg/m²       Impression and Recommendations  Elevated HS troponin; chest pain free; likely related to demand ischemia in the presence of COPD exacerbation  - ST continues via telemetry  - continue tele  - Recent echo EF 34%; with  moderate to severe MVR  - will need cardiology management in the OP for possible repair. Discussed with patient and plans to schedule with [facility] 1-2 weeks post hospital discharge. From a cardiac standpoint patient is stable for hospital discharge with cardiology follow-up for moderate to severe MVR and EF noted on echo, as above. Please do no hesitate to call if additional questions arise.     MAGI Navarrete  2023

## 2023-02-01 NOTE — DISCHARGE INSTRUCTIONS
Please follow strict diet instructions from your nephrologist to prevent readmission. Please see your oncologist for treatment of Renal Cell Carcinoma, cancer.

## 2023-02-01 NOTE — PROGRESS NOTES
Comprehensive Nutrition Assessment    Type and Reason for Visit: Initial, RD nutrition re-screen/LOS    Nutrition Recommendations/Plan:   Carbohydrate consistent, 2 gm Na, moderate K with labs/intake  Check vit D, iPTH, B12, phosphorus     Malnutrition Assessment:  Malnutrition Status:  No malnutrition (02/01/23 1524)    Context:  Chronic illness     Findings of the 6 clinical characteristics of malnutrition:   Energy Intake:  No significant decrease in energy intake  Weight Loss:  No significant weight loss     Body Fat Loss:  No significant body fat loss,     Muscle Mass Loss:  Mild muscle mass loss, Calf (gastrocnemius), Thigh (quadriceps)  Fluid Accumulation:  Severe, Generalized   Strength:        Nutrition Assessment:    Pt admitted for acute respiratory failure, fluid overload on ESRD on HD. Pt reiterates 1500 ml fluid restriction, but ate crab with [place] 3 weeks ago and drank more than the 1.5 liters per day. She states since then she has kept to the 1500 ml fluid restritction, washed all her canned vegetables, drained her soup and used tap water to cook. She states she does not know why her fluid buit up so quickly and she needed more than the scheduled dialysis. She states she still has fluid in her abdomen and around her back. Pt continues to take supplements of B12, B6, Vitamin D and Fe daily. Labs K3.9- should level drop would liberalize diet. Intake % meals. Nutrition Related Findings:    Pt states no constipation or diarrhea. She has been doing more with her diet at home  including plant based products, rinsing canned goods, limiting fluids and leaner meats. Wound Type: None    Current Nutrition Intake & Therapies:  Average Meal Intake: %  Average Supplement Intake: None ordered  ADULT DIET Regular; 4 carb choices (60 gm/meal); Low Sodium (2 gm);  Low Potassium (Less than 3000 mg/day); 1500 ml    Anthropometric Measures:  Height: 5' 4\" (162.6 cm)  Ideal Body Weight (IBW): 120 lbs (55 kg)     Current Body Wt:  69.2 kg (152 lb 8.9 oz), 127.1 % IBW. Current BMI (kg/m2): 26.2  Usual Body Weight: 68 kg (149 lb 14.6 oz)  % Weight Change (Calculated): 1.8      BMI Category: Overweight (BMI 25.0-29. 9)    Estimated Daily Nutrient Needs:  Energy Requirements Based On: Kcal/kg     Energy (kcal/day): 2712-2384 Kcal/d (25-30 Kcal/Kg)  Weight Used for Protein Requirements: Current  Protein (g/day): 69-83 gm/d (1.0-1.2 gm/KG)     Fluid (ml/day): 1500 ml    Nutrition Diagnosis:   Altered nutrition-related lab values related to renal dysfunction, endocrine dysfunction as evidenced by dialysis, lab values    Nutrition Interventions:   Food and/or Nutrient Delivery: Continue current diet  Nutrition Education/Counseling: Education initiated (Reviewed fluid restriction using standard 8 oz cup, menus for carbohydrate consistent and 2 gm Na.)     Plan of Care discussed with: Patient, Significant other    Goals:   Pt  will maintain K,phosphorus, vit D WNL. Intake 50% of meals. Verbalize with 90% accuracy 1500 ml fluid restriction, low sodium carbohydrate consistent diet with menus meeting guidelines by next review.       Nutrition Monitoring and Evaluation:   Behavioral-Environmental Outcomes: Knowledge or skill  Food/Nutrient Intake Outcomes: Food and nutrient intake  Physical Signs/Symptoms Outcomes: Fluid status or edema, Biochemical data    Discharge Planning:    Continue current diet    [place]  Contact: [phone] or [employer]

## 2023-02-01 NOTE — DISCHARGE SUMMARY
Discharge Summary       PATIENT ID: Gaurav Rodriguez  MRN: [id number]   YOB: 1970    DATE OF ADMISSION: 1/29/2023  3:12 PM    DATE OF DISCHARGE: 02/01/23    PRIMARY CARE PROVIDER: ABAD Reid     ATTENDING PHYSICIAN: Bob Manjarrez MD  DISCHARGING PROVIDER: Bob Manjarrez MD        CONSULTATIONS: IP CONSULT TO CARDIOLOGY  IP CONSULT TO CARDIOLOGY  IP CONSULT TO NEPHROLOGY    PROCEDURES/SURGERIES: * No surgery found *    [id number] [facility] COURSE:   Gaurav Rodriguez is a 45 y.o. female with a past medical history for end-stage renal disease on hemodialysis MWF, COPD, caardiomegaly, and aortic stenoisis, and tobacco abuse, patient presents to the ED with a chief complaint of ongoing shortness of breath. Patient reports that the shortness of breath started yesterday evening has worsened over the last 24 hours, she states at that time she took her scheduled dose of oral Lasix, and then took her scheduled dose on oral Lasix this a.m., and she noticed that she had not had any urine output in 24+ hours. Other accompanying symptoms include a nonproductive cough, bilateral lower extremity edema, and abdominal distention, patient denies missing HD, chest pain, palpitations, nausea, vomiting, abdominal pain, fevers and chills. In the ED patient was hypotensive and tachycardic, H/H 7.6/24.3, BUN 47, creatinine 5.82, troponin 1418, BNP shown 75,243, EKG shown sinus tachycardia with ST depressions which was seen on previous EKG, and chest x-ray is pending but appears to show volume overload. In the ED patient was administered a nebulizer treatment, nephrology was consulted and orders was placed for emergent dialysis, placed on BiPAP until dialysis can be completed, and cardiology was consulted due to elevated troponin, which they states no need to start heparin this is likely due to tachycardia versus for overload.  Hospital medicine consulted for admission for evaluation and treatment. Patient assessed in the ED, at the bedside, patient is alert and oriented, there is noted acute distress, patient currently on a nonrebreather mask, with awaiting to be placed on BiPAP and she is complaining of severe shortness of breath.  Patient agrees to admission for a diagnosis of fluid overload requiring emergent dialysis, treatment to include emergent dialysis, nephrology consultation, and cardiology consultation        Marquita Pedersen / PLAN:      Assessment/Plan:      Acute respiratory failure with hypoxia  -Patient continues to complain of shortness of breath with tachypnea  -stable on 2 L of oxygen via nasal cannula, weaned off BiPAP  -Chest x-ray showing diffuse interstitial lung disease which has worsened, and may represent pulmonary edema  -Keep oxygen saturation greater than 92%  -Plan to do a 6-minute ambulation study tomorrow  -Continue with as needed albuterol     Pulmonary edema  -Despite patient receiving 2 rounds of hemodialysis, she continues complain of shortness of breath especially on exertion  -On admission BNP 75,243  -Plan to continue scheduled dialysis on minute Monday Wednesdays and Fridays    End-stage renal disease requiring emergent hemodialysis  -Chest x-ray showed pulmonary edema, patient has received 2 rounds of hemodialysis since admission  -Patient continues to complain of shortness of breath  -Appreciate nephrology consultation  -Singh Wilson, and continue hemodialysis MWF     Renal Cell Carcinoma  -recently diagnosed in 12/22, patient has not followed up with oncologist yet to discuss treatment options  -patient reports she suppose to follow-up with  [facility]    NSTEMI type II  -Likely secondary to pulmonary edema versus tachycardia  -Highest troponin was 2605, troponin trending down  -Patient was initially started on heparin drip, but heparin drip has been discontinued  -[address] cardiology consultation, [facility] cardiology would like the patient to follow-up in the office in 1 to 2 weeks discussed treatment regarding moderate to severe MVR found on echo     COPD Exacerbation  -Medrol discontinued, as needed albuterol continued, continue scheduled Spiriva per RT  -Rated smoking cessation to the patient, nicotine remains intact  -Patient stable on 2 L via nasal cannula, plan to perform walk test tomorrow     Hypotension  -Resolved    Anemia of Chronic Disease  -HH 7.0/22.4  -CBC in the am     CardiomyopathyModerately reduced left ventricular systolic function. EF by 2D Simpsons Biplane is 34%. Left ventricle is mildly dilated. Normal wall thickness. Moderate global hypokinesis present with severe inferior lateral hypokinesis. -recent ECHO:  EF by 2D Simpsons Biplane is 34%. Left ventricle is mildly dilated. Normal wall thickness. Moderate global hypokinesis present with severe inferior lateral hypokinesis. Grade II diastolic dysfunction with increased LAP. -ACEI/BB never initiated due to ongoing hypotension     Aortic stenosis  -repeat ECHO: EF by 2D Simpsons Biplane is 34%. Left ventricle is mildly dilated. Normal wall thickness. Moderate global hypokinesis present with severe inferior lateral hypokinesis. Grade II diastolic dysfunction with increased LAP. Tobacco Abuse  -nicotine patch ordered  -cessation education provided      Pt is doing better sp HD. Her nephrologist feels she is not compliant, but she says she is, and that her urine outpt has greatly changed at home, seemingly lasix has stopped working. Ok for home after hd today  Follow up with cardiology as outpt for eval for MV repair.     FOLLOW UP APPOINTMENTS:    Follow-up Information       Follow up With Specialties Details Why [address] [facility], [address], [address] Nurse Practitioner   [facility]  [phone]                 DIET: Renal Diet      DISCHARGE MEDICATIONS:  Current Discharge Medication List        CONTINUE these medications which have NOT CHANGED    Details   albuterol (Ventolin HFA) 90 mcg/actuation inhaler Take 2 Puffs by inhalation every four (4) hours as needed for Wheezing. tiotropium bromide (SPIRIVA RESPIMAT IN) Take  by inhalation. furosemide (LASIX) 80 mg tablet Take 80 mg by mouth daily. aspirin delayed-release 81 mg tablet Take 1 Tablet by mouth daily. Qty: 30 Tablet, Refills: 5      sevelamer carbonate (RENVELA) 800 mg tab tab TAKE 1 TABLET BY MOUTH THREE TIMES DAILY WITH MEALS  Qty: 45 Tablet, Refills: 0      calcitRIOL (ROCALTROL) 0.25 mcg capsule Take 1 capsule by mouth once daily  Qty: 30 Capsule, Refills: 0      cholecalciferol (VITAMIN D3) (1000 Units /25 mcg) tablet Take 1 Tablet by mouth daily. Qty: 30 Tablet, Refills: 3      epoetin carmelo (Epogen) 10,000 unit/mL injection 1 mL by SubCUTAneous route Once every 2 weeks. Qty: 1 mL, Refills: 5    Comments: Please dispense as a 30 day supply  Associated Diagnoses: Chronic kidney disease (CKD), stage V ([facility])      sodium bicarbonate 650 mg tablet Take 1 Tablet by mouth two (2) times a day. Qty: 60 Tablet, Refills: 3      cyanocobalamin, vitamin B-12, 5,000 mcg subl 1 Tablet by SubLINGual route daily. STOP taking these medications       albuterol-ipratropium (DUO-NEB) 2.5 mg-0.5 mg/3 ml nebu Comments:   Reason for Stopping:                 NOTIFY YOUR PHYSICIAN FOR ANY OF THE FOLLOWING:   Fever over 101 degrees for 24 hours. Chest pain, shortness of breath, fever, chills, nausea, vomiting, diarrhea, change in mentation, falling, weakness, bleeding. Severe pain or pain not relieved by medications. Or, any other signs or symptoms that you may have questions about.     DISPOSITION:home    Home With:   OT  PT  HH  RN       Long term SNF/Inpatient Rehab    Independent/assisted living    Hospice    Other:       PATIENT CONDITION AT DISCHARGE:     Functional status    Poor     Deconditioned    x Independent      Cognition   x  Lucid     Forgetful Dementia      Catheters/lines (plus indication)    Henson     PICC     PEG    x None      Code status   x  Full code     DNR      PHYSICAL EXAMINATION AT DISCHARGE:  General:          Alert, cooperative, no distress, appears stated age. HEENT:           Atraumatic, anicteric sclerae, pink conjunctivae                          No oral ulcers, mucosa moist, throat clear, dentition fair  Neck:               Supple, symmetrical  Lungs:             bibasilar crackles  Chest wall:      No tenderness  No Accessory muscle use. Heart:              Regular  rhythm,  +  murmur   No edema  Abdomen:        Soft, non-tender. Not distended. Bowel sounds normal  Extremities:     No cyanosis. No clubbing,                            Skin turgor normal, Capillary refill normal  Skin:                Not pale. Not Jaundiced  No rashes   Psych:             Not anxious or agitated. Neurologic:      Alert, moves all extremities, answers questions appropriately and responds to commands       CHRONIC MEDICAL DIAGNOSES:  Problem List as of 2/1/2023 Date Reviewed: 7/7/2022            Codes Class Noted - Resolved    CKD (chronic kidney disease) requiring chronic dialysis [facility]) ICD-10-CM: N18.6, Z99.2  ICD-9-CM: 585.6, V45.11  1/29/2023 - Present        Type 2 MI (myocardial infarction) ([facility] ICD-10-CM: I21. A1  ICD-9-CM: 410.90  1/29/2023 - Present        Acute respiratory failure with hypoxia [facility]) ICD-10-CM: J96.01  ICD-9-CM: 518.81  1/6/2023 - Present        Fluid overload ICD-10-CM: E87.70  ICD-9-CM: 276.69  1/6/2023 - Present        End stage renal failure on dialysis [facility]) ICD-10-CM: N18.6, Z99.2  ICD-9-CM: 585.6, V45.11  1/6/2023 - Present        Pulmonary edema ICD-10-CM: J81.1  ICD-9-CM: 061  1/6/2023 - Present        Hypotension ICD-10-CM: I95.9  ICD-9-CM: 458.9  1/6/2023 - Present        COPD exacerbation ([facility] ICD-10-CM: J44.1  ICD-9-CM: 491.21  1/6/2023 - Present        Chronic kidney disease (CKD), stage V ([facility] [[id number]] ICD-10-CM: N18.5  ICD-9-CM: 585.5  5/26/2022 - Present           Greater than 35 minutes were spent with the patient on counseling and coordination of care    Signed:   Ann Andres MD  2/1/2023  11:46 AM

## 2023-02-02 ENCOUNTER — PATIENT OUTREACH (OUTPATIENT)
Dept: OTHER | Age: 53
End: 2023-02-02

## 2023-02-04 ENCOUNTER — APPOINTMENT (OUTPATIENT)
Dept: CT IMAGING | Age: 53
End: 2023-02-04
Attending: EMERGENCY MEDICINE
Payer: MEDICAID

## 2023-02-04 ENCOUNTER — APPOINTMENT (OUTPATIENT)
Dept: GENERAL RADIOLOGY | Age: 53
End: 2023-02-04
Attending: EMERGENCY MEDICINE
Payer: MEDICAID

## 2023-02-04 ENCOUNTER — HOSPITAL ENCOUNTER (INPATIENT)
Age: 53
LOS: 4 days | Discharge: HOME | End: 2023-02-09
Attending: EMERGENCY MEDICINE | Admitting: INTERNAL MEDICINE
Payer: MEDICAID

## 2023-02-04 DIAGNOSIS — J44.1 COPD EXACERBATION (HCC): ICD-10-CM

## 2023-02-04 DIAGNOSIS — J18.9 PNEUMONIA OF BOTH LUNGS DUE TO INFECTIOUS ORGANISM, UNSPECIFIED PART OF LUNG: ICD-10-CM

## 2023-02-04 DIAGNOSIS — R09.02 HYPOXEMIA: ICD-10-CM

## 2023-02-04 DIAGNOSIS — U07.1 COVID-19: ICD-10-CM

## 2023-02-04 DIAGNOSIS — J90 PLEURAL EFFUSION: Primary | ICD-10-CM

## 2023-02-04 LAB
ANION GAP SERPL CALC-SCNC: 10 MMOL/L (ref 3–18)
BASOPHILS # BLD: 0.1 K/UL (ref 0–0.1)
BASOPHILS NFR BLD: 1 % (ref 0–2)
BUN SERPL-MCNC: 39 MG/DL (ref 7–18)
BUN/CREAT SERPL: 9 (ref 12–20)
CA-I BLD-MCNC: 8.7 MG/DL (ref 8.5–10.1)
CHLORIDE SERPL-SCNC: 97 MMOL/L (ref 100–111)
CO2 SERPL-SCNC: 30 MMOL/L (ref 21–32)
CREAT SERPL-MCNC: 4.13 MG/DL (ref 0.6–1.3)
D DIMER PPP FEU-MCNC: 1.52 UG/ML(FEU)
DIFFERENTIAL METHOD BLD: ABNORMAL
EOSINOPHIL # BLD: 0.1 K/UL (ref 0–0.4)
EOSINOPHIL NFR BLD: 1 % (ref 0–5)
ERYTHROCYTE [DISTWIDTH] IN BLOOD BY AUTOMATED COUNT: 18.7 % (ref 11.6–14.5)
GLUCOSE SERPL-MCNC: 148 MG/DL (ref 74–99)
HCT VFR BLD AUTO: 24.3 % (ref 35–45)
HGB BLD-MCNC: 7.6 G/DL (ref 12–16)
IMM GRANULOCYTES # BLD AUTO: 0.5 K/UL (ref 0–0.04)
IMM GRANULOCYTES NFR BLD AUTO: 5 % (ref 0–0.5)
LACTATE SERPL-SCNC: 1.4 MMOL/L (ref 0.4–2)
LYMPHOCYTES # BLD: 1.7 K/UL (ref 0.9–3.6)
LYMPHOCYTES NFR BLD: 18 % (ref 21–52)
MAGNESIUM SERPL-MCNC: 2.2 MG/DL (ref 1.6–2.6)
MCH RBC QN AUTO: 31.5 PG (ref 24–34)
MCHC RBC AUTO-ENTMCNC: 31.3 G/DL (ref 31–37)
MCV RBC AUTO: 100.8 FL (ref 78–100)
MONOCYTES # BLD: 0.9 K/UL (ref 0.05–1.2)
MONOCYTES NFR BLD: 9 % (ref 3–10)
NEUTS SEG # BLD: 6.2 K/UL (ref 1.8–8)
NEUTS SEG NFR BLD: 66 % (ref 40–73)
NRBC # BLD: 0 K/UL (ref 0–0.01)
NRBC BLD-RTO: 0 PER 100 WBC
PLATELET # BLD AUTO: 196 K/UL (ref 135–420)
PMV BLD AUTO: 9.6 FL (ref 9.2–11.8)
POTASSIUM SERPL-SCNC: 3.5 MMOL/L (ref 3.5–5.5)
RBC # BLD AUTO: 2.41 M/UL (ref 4.2–5.3)
RBC MORPH BLD: ABNORMAL
SODIUM SERPL-SCNC: 137 MMOL/L (ref 136–145)
TROPONIN I SERPL HS-MCNC: 287 NG/L (ref 0–54)
WBC # BLD AUTO: 9.5 K/UL (ref 4.6–13.2)

## 2023-02-04 PROCEDURE — 74011000636 HC RX REV CODE- 636: Performed by: EMERGENCY MEDICINE

## 2023-02-04 PROCEDURE — 85025 COMPLETE CBC W/AUTO DIFF WBC: CPT

## 2023-02-04 PROCEDURE — 83735 ASSAY OF MAGNESIUM: CPT

## 2023-02-04 PROCEDURE — 96361 HYDRATE IV INFUSION ADD-ON: CPT

## 2023-02-04 PROCEDURE — 83605 ASSAY OF LACTIC ACID: CPT

## 2023-02-04 PROCEDURE — 96365 THER/PROPH/DIAG IV INF INIT: CPT

## 2023-02-04 PROCEDURE — 74011000250 HC RX REV CODE- 250: Performed by: EMERGENCY MEDICINE

## 2023-02-04 PROCEDURE — 99285 EMERGENCY DEPT VISIT HI MDM: CPT

## 2023-02-04 PROCEDURE — 71045 X-RAY EXAM CHEST 1 VIEW: CPT

## 2023-02-04 PROCEDURE — 71275 CT ANGIOGRAPHY CHEST: CPT

## 2023-02-04 PROCEDURE — 94640 AIRWAY INHALATION TREATMENT: CPT

## 2023-02-04 PROCEDURE — 84484 ASSAY OF TROPONIN QUANT: CPT

## 2023-02-04 PROCEDURE — 80048 BASIC METABOLIC PNL TOTAL CA: CPT

## 2023-02-04 PROCEDURE — 93005 ELECTROCARDIOGRAM TRACING: CPT

## 2023-02-04 PROCEDURE — 85379 FIBRIN DEGRADATION QUANT: CPT

## 2023-02-04 PROCEDURE — 74011250636 HC RX REV CODE- 250/636: Performed by: EMERGENCY MEDICINE

## 2023-02-04 RX ORDER — IPRATROPIUM BROMIDE AND ALBUTEROL SULFATE 2.5; .5 MG/3ML; MG/3ML
3 SOLUTION RESPIRATORY (INHALATION)
Status: COMPLETED | OUTPATIENT
Start: 2023-02-04 | End: 2023-02-04

## 2023-02-04 RX ADMIN — SODIUM CHLORIDE 250 ML: 9 INJECTION, SOLUTION INTRAVENOUS at 22:30

## 2023-02-04 RX ADMIN — METHYLPREDNISOLONE SODIUM SUCCINATE 125 MG: 125 INJECTION, POWDER, FOR SOLUTION INTRAMUSCULAR; INTRAVENOUS at 23:45

## 2023-02-04 RX ADMIN — IPRATROPIUM BROMIDE AND ALBUTEROL SULFATE 3 ML: 2.5; .5 SOLUTION RESPIRATORY (INHALATION) at 23:44

## 2023-02-04 RX ADMIN — IOPAMIDOL 93 ML: 755 INJECTION, SOLUTION INTRAVENOUS at 23:20

## 2023-02-04 NOTE — Clinical Note
Status[de-identified] INPATIENT [101]   Type of Bed: Intensive Care [6]   Cardiac Monitoring Required?: Yes   Inpatient Hospitalization Certified Necessary for the Following Reasons: 4.  Patient requires ICU level of care interventions (further clarification in H&P documentation)   Admitting Diagnosis: COVID-19 [[id number]]   Admitting Diagnosis: Pleural effusion [[id number]]   Admitting Diagnosis: Pulmonary edema [[id number]]   Admitting Diagnosis: COPD exacerbation ([facility] [[id number]]   Admitting Diagnosis: Sepsis [facility]) [[id number]]   Admitting Physician: Janneth Pulliam [[id number]]   Attending Physician: Janneth Pulliam [[id number]]   Estimated Length of Stay: 2 Midnights   Discharge Plan[de-identified] Home with Office Follow-up

## 2023-02-05 PROBLEM — U07.1 COVID-19: Status: ACTIVE | Noted: 2023-02-05

## 2023-02-05 PROBLEM — J90 PLEURAL EFFUSION: Status: ACTIVE | Noted: 2023-02-05

## 2023-02-05 PROBLEM — A41.9 SEPSIS (HCC): Status: ACTIVE | Noted: 2023-02-05

## 2023-02-05 LAB
ARTERIAL PATENCY WRIST A: ABNORMAL
ATRIAL RATE: 109 BPM
BASE EXCESS BLDA CALC-SCNC: 1.4 MMOL/L (ref 0–3)
BDY SITE: ABNORMAL
BNP SERPL-MCNC: ABNORMAL PG/ML (ref 0–900)
CALCULATED P AXIS, ECG09: 79 DEGREES
CALCULATED R AXIS, ECG10: 30 DEGREES
CALCULATED T AXIS, ECG11: -175 DEGREES
COHGB MFR BLD: 4.4 % (ref 1–2)
DIAGNOSIS, 93000: NORMAL
FIO2 ON VENT: 28 %
FLUAV AG NPH QL IA: NEGATIVE
FLUBV AG NOSE QL IA: NEGATIVE
GAS FLOW.O2 O2 DELIVERY SYS: 2 L/MIN
HCO3 BLDA-SCNC: 26 MMOL/L (ref 22–26)
LACTATE SERPL-SCNC: 1.1 MMOL/L (ref 0.4–2)
METHGB MFR BLD: 0.3 % (ref 0–1.4)
OXYHGB MFR BLD: 88.7 % (ref 95–99)
P-R INTERVAL, ECG05: 138 MS
PCO2 BLDA: 37 MMHG (ref 35–45)
PERFORMED BY, TECHID: ABNORMAL
PH BLDA: 7.45 (ref 7.35–7.45)
PO2 BLDA: 65 MMHG (ref 80–100)
PROCALCITONIN SERPL-MCNC: 0.26 NG/ML
Q-T INTERVAL, ECG07: 309 MS
QRS DURATION, ECG06: 81 MS
QTC CALCULATION (BEZET), ECG08: 417 MS
SAO2 % BLD: 93 % (ref 95–99)
SAO2% DEVICE SAO2% SENSOR NAME: ABNORMAL
SARS-COV-2 RDRP RESP QL NAA+PROBE: DETECTED
SPECIMEN SITE: ABNORMAL
TROPONIN I SERPL HS-MCNC: 270 NG/L (ref 0–54)
VENTRICULAR RATE, ECG03: 109 BPM

## 2023-02-05 PROCEDURE — 94640 AIRWAY INHALATION TREATMENT: CPT

## 2023-02-05 PROCEDURE — 74011250637 HC RX REV CODE- 250/637: Performed by: PHYSICIAN ASSISTANT

## 2023-02-05 PROCEDURE — 65610000006 HC RM INTENSIVE CARE

## 2023-02-05 PROCEDURE — 36600 WITHDRAWAL OF ARTERIAL BLOOD: CPT

## 2023-02-05 PROCEDURE — 84484 ASSAY OF TROPONIN QUANT: CPT

## 2023-02-05 PROCEDURE — 77010033678 HC OXYGEN DAILY

## 2023-02-05 PROCEDURE — 94761 N-INVAS EAR/PLS OXIMETRY MLT: CPT

## 2023-02-05 PROCEDURE — 83605 ASSAY OF LACTIC ACID: CPT

## 2023-02-05 PROCEDURE — 36415 COLL VENOUS BLD VENIPUNCTURE: CPT

## 2023-02-05 PROCEDURE — 87804 INFLUENZA ASSAY W/OPTIC: CPT

## 2023-02-05 PROCEDURE — 82803 BLOOD GASES ANY COMBINATION: CPT

## 2023-02-05 PROCEDURE — 83880 ASSAY OF NATRIURETIC PEPTIDE: CPT

## 2023-02-05 PROCEDURE — 74011250637 HC RX REV CODE- 250/637: Performed by: EMERGENCY MEDICINE

## 2023-02-05 PROCEDURE — 74011250637 HC RX REV CODE- 250/637: Performed by: NURSE PRACTITIONER

## 2023-02-05 PROCEDURE — 87040 BLOOD CULTURE FOR BACTERIA: CPT

## 2023-02-05 PROCEDURE — 74011000250 HC RX REV CODE- 250: Performed by: PHYSICIAN ASSISTANT

## 2023-02-05 PROCEDURE — 74011250636 HC RX REV CODE- 250/636: Performed by: EMERGENCY MEDICINE

## 2023-02-05 PROCEDURE — 84145 PROCALCITONIN (PCT): CPT

## 2023-02-05 PROCEDURE — 74011250636 HC RX REV CODE- 250/636: Performed by: PHYSICIAN ASSISTANT

## 2023-02-05 PROCEDURE — 87635 SARS-COV-2 COVID-19 AMP PRB: CPT

## 2023-02-05 RX ORDER — ACETAMINOPHEN 650 MG/1
650 SUPPOSITORY RECTAL
Status: DISCONTINUED | OUTPATIENT
Start: 2023-02-05 | End: 2023-02-09 | Stop reason: HOSPADM

## 2023-02-05 RX ORDER — SODIUM CHLORIDE 9 MG/ML
250 INJECTION, SOLUTION INTRAVENOUS ONCE
Status: COMPLETED | OUTPATIENT
Start: 2023-02-05 | End: 2023-02-05

## 2023-02-05 RX ORDER — ONDANSETRON 2 MG/ML
4 INJECTION INTRAMUSCULAR; INTRAVENOUS
Status: DISCONTINUED | OUTPATIENT
Start: 2023-02-05 | End: 2023-02-09 | Stop reason: HOSPADM

## 2023-02-05 RX ORDER — SEVELAMER CARBONATE 800 MG/1
800 TABLET, FILM COATED ORAL
Status: DISCONTINUED | OUTPATIENT
Start: 2023-02-05 | End: 2023-02-09 | Stop reason: HOSPADM

## 2023-02-05 RX ORDER — ACETAMINOPHEN 325 MG/1
650 TABLET ORAL
Status: COMPLETED | OUTPATIENT
Start: 2023-02-05 | End: 2023-02-05

## 2023-02-05 RX ORDER — HYDROXYZINE PAMOATE 25 MG/1
50 CAPSULE ORAL
Status: DISCONTINUED | OUTPATIENT
Start: 2023-02-05 | End: 2023-02-09 | Stop reason: HOSPADM

## 2023-02-05 RX ORDER — LANOLIN ALCOHOL/MO/W.PET/CERES
9 CREAM (GRAM) TOPICAL
Status: DISCONTINUED | OUTPATIENT
Start: 2023-02-05 | End: 2023-02-09 | Stop reason: HOSPADM

## 2023-02-05 RX ORDER — SODIUM CHLORIDE 0.9 % (FLUSH) 0.9 %
5-40 SYRINGE (ML) INJECTION EVERY 8 HOURS
Status: DISCONTINUED | OUTPATIENT
Start: 2023-02-05 | End: 2023-02-09 | Stop reason: HOSPADM

## 2023-02-05 RX ORDER — MELATONIN
1000 DAILY
Status: DISCONTINUED | OUTPATIENT
Start: 2023-02-05 | End: 2023-02-09 | Stop reason: HOSPADM

## 2023-02-05 RX ORDER — IBUPROFEN 200 MG
1 TABLET ORAL DAILY
Status: DISCONTINUED | OUTPATIENT
Start: 2023-02-05 | End: 2023-02-09 | Stop reason: HOSPADM

## 2023-02-05 RX ORDER — HEPARIN SODIUM 5000 [USP'U]/ML
5000 INJECTION, SOLUTION INTRAVENOUS; SUBCUTANEOUS EVERY 8 HOURS
Status: DISCONTINUED | OUTPATIENT
Start: 2023-02-05 | End: 2023-02-09 | Stop reason: HOSPADM

## 2023-02-05 RX ORDER — PROMETHAZINE HYDROCHLORIDE 25 MG/1
12.5 TABLET ORAL
Status: DISCONTINUED | OUTPATIENT
Start: 2023-02-05 | End: 2023-02-09 | Stop reason: HOSPADM

## 2023-02-05 RX ORDER — POLYETHYLENE GLYCOL 3350 17 G/17G
17 POWDER, FOR SOLUTION ORAL DAILY PRN
Status: DISCONTINUED | OUTPATIENT
Start: 2023-02-05 | End: 2023-02-09 | Stop reason: HOSPADM

## 2023-02-05 RX ORDER — SODIUM CHLORIDE 0.9 % (FLUSH) 0.9 %
5-40 SYRINGE (ML) INJECTION AS NEEDED
Status: DISCONTINUED | OUTPATIENT
Start: 2023-02-05 | End: 2023-02-09 | Stop reason: HOSPADM

## 2023-02-05 RX ORDER — LEVOFLOXACIN 5 MG/ML
500 INJECTION, SOLUTION INTRAVENOUS
Status: DISCONTINUED | OUTPATIENT
Start: 2023-02-05 | End: 2023-02-09 | Stop reason: HOSPADM

## 2023-02-05 RX ORDER — DEXAMETHASONE SODIUM PHOSPHATE 10 MG/ML
6 INJECTION INTRAMUSCULAR; INTRAVENOUS EVERY 24 HOURS
Status: DISCONTINUED | OUTPATIENT
Start: 2023-02-05 | End: 2023-02-09 | Stop reason: HOSPADM

## 2023-02-05 RX ORDER — GUAIFENESIN/DEXTROMETHORPHAN 100-10MG/5
5 SYRUP ORAL
Status: DISCONTINUED | OUTPATIENT
Start: 2023-02-05 | End: 2023-02-09 | Stop reason: HOSPADM

## 2023-02-05 RX ORDER — ACETAMINOPHEN 325 MG/1
650 TABLET ORAL
Status: DISCONTINUED | OUTPATIENT
Start: 2023-02-05 | End: 2023-02-05

## 2023-02-05 RX ORDER — ASPIRIN 81 MG/1
81 TABLET ORAL DAILY
Status: DISCONTINUED | OUTPATIENT
Start: 2023-02-05 | End: 2023-02-09 | Stop reason: HOSPADM

## 2023-02-05 RX ORDER — SODIUM BICARBONATE 650 MG/1
650 TABLET ORAL 2 TIMES DAILY
Status: DISCONTINUED | OUTPATIENT
Start: 2023-02-05 | End: 2023-02-06

## 2023-02-05 RX ORDER — CALCITRIOL 0.25 UG/1
0.25 CAPSULE ORAL DAILY
Status: DISCONTINUED | OUTPATIENT
Start: 2023-02-05 | End: 2023-02-09 | Stop reason: HOSPADM

## 2023-02-05 RX ORDER — LEVOFLOXACIN 5 MG/ML
750 INJECTION, SOLUTION INTRAVENOUS
Status: COMPLETED | OUTPATIENT
Start: 2023-02-05 | End: 2023-02-05

## 2023-02-05 RX ORDER — ENOXAPARIN SODIUM 100 MG/ML
30 INJECTION SUBCUTANEOUS EVERY 12 HOURS
Status: DISCONTINUED | OUTPATIENT
Start: 2023-02-05 | End: 2023-02-05

## 2023-02-05 RX ORDER — LORAZEPAM 1 MG/1
1 TABLET ORAL
Status: DISCONTINUED | OUTPATIENT
Start: 2023-02-05 | End: 2023-02-09 | Stop reason: HOSPADM

## 2023-02-05 RX ORDER — ALBUTEROL SULFATE 90 UG/1
2 AEROSOL, METERED RESPIRATORY (INHALATION)
Status: DISCONTINUED | OUTPATIENT
Start: 2023-02-05 | End: 2023-02-09 | Stop reason: HOSPADM

## 2023-02-05 RX ADMIN — SEVELAMER CARBONATE 800 MG: 800 TABLET, FILM COATED ORAL at 09:36

## 2023-02-05 RX ADMIN — LORAZEPAM 1 MG: 1 TABLET ORAL at 12:14

## 2023-02-05 RX ADMIN — ACETAMINOPHEN 650 MG: 325 TABLET ORAL at 03:30

## 2023-02-05 RX ADMIN — ALBUTEROL SULFATE 2 PUFF: 108 AEROSOL, METERED RESPIRATORY (INHALATION) at 21:13

## 2023-02-05 RX ADMIN — CHOLECALCIFEROL TAB 25 MCG (1000 UNIT) 1000 UNITS: 25 TAB at 09:36

## 2023-02-05 RX ADMIN — SODIUM CHLORIDE, PRESERVATIVE FREE 10 ML: 5 INJECTION INTRAVENOUS at 14:35

## 2023-02-05 RX ADMIN — HYDROXYZINE PAMOATE 50 MG: 25 CAPSULE ORAL at 21:48

## 2023-02-05 RX ADMIN — SODIUM CHLORIDE, PRESERVATIVE FREE 10 ML: 5 INJECTION INTRAVENOUS at 05:59

## 2023-02-05 RX ADMIN — SODIUM CHLORIDE, PRESERVATIVE FREE 10 ML: 5 INJECTION INTRAVENOUS at 21:49

## 2023-02-05 RX ADMIN — CALCITRIOL CAPSULES 0.25 MCG 0.25 MCG: 0.25 CAPSULE ORAL at 09:36

## 2023-02-05 RX ADMIN — HEPARIN SODIUM 5000 UNITS: 5000 INJECTION INTRAVENOUS; SUBCUTANEOUS at 14:34

## 2023-02-05 RX ADMIN — ASPIRIN 81 MG: 81 TABLET, COATED ORAL at 09:37

## 2023-02-05 RX ADMIN — LEVOFLOXACIN 500 MG: 5 INJECTION, SOLUTION INTRAVENOUS at 05:59

## 2023-02-05 RX ADMIN — DEXAMETHASONE SODIUM PHOSPHATE 6 MG: 10 INJECTION, SOLUTION INTRAMUSCULAR; INTRAVENOUS at 05:59

## 2023-02-05 RX ADMIN — SODIUM BICARBONATE 650 MG: 650 TABLET ORAL at 17:50

## 2023-02-05 RX ADMIN — LEVOFLOXACIN 750 MG: 5 INJECTION, SOLUTION INTRAVENOUS at 00:58

## 2023-02-05 RX ADMIN — ALBUTEROL SULFATE 2 PUFF: 108 AEROSOL, METERED RESPIRATORY (INHALATION) at 16:56

## 2023-02-05 RX ADMIN — ALBUTEROL SULFATE 2 PUFF: 108 AEROSOL, METERED RESPIRATORY (INHALATION) at 10:27

## 2023-02-05 RX ADMIN — SEVELAMER CARBONATE 800 MG: 800 TABLET, FILM COATED ORAL at 16:19

## 2023-02-05 RX ADMIN — HEPARIN SODIUM 5000 UNITS: 5000 INJECTION INTRAVENOUS; SUBCUTANEOUS at 21:48

## 2023-02-05 RX ADMIN — SEVELAMER CARBONATE 800 MG: 800 TABLET, FILM COATED ORAL at 11:33

## 2023-02-05 RX ADMIN — SODIUM CHLORIDE 250 ML: 9 INJECTION, SOLUTION INTRAVENOUS at 00:58

## 2023-02-05 RX ADMIN — SODIUM BICARBONATE 650 MG: 650 TABLET ORAL at 09:37

## 2023-02-05 RX ADMIN — HEPARIN SODIUM 5000 UNITS: 5000 INJECTION INTRAVENOUS; SUBCUTANEOUS at 05:58

## 2023-02-05 RX ADMIN — TIOTROPIUM BROMIDE INHALATION SPRAY 2 PUFF: 3.12 SPRAY, METERED RESPIRATORY (INHALATION) at 08:34

## 2023-02-05 NOTE — ED NOTES
[facility] with the transfer center called and states no beds available at any [facility] facility. Patient request [facility]. Waiting for return call from physician or bed availability.

## 2023-02-05 NOTE — PROGRESS NOTES
INTERIM UPDATE - 6629 EST on 2/05/2023    [facility] ER Physician calls requesting admission for a 40-year-old female Patient who has a history of ESRD on HD and who has recently been admitted numerous times for Persistent Bilateral Effusions and then returned with a similar issues days later. [facility] ER reports that [facility] does not have Pulmonological services when asked if they have the capability to perform and managed Thoracenteses (implying that they are either incapable of performing this procedure or, more likely, Hospitalist service is not comfortable with Patients who are S/P Thoracentesis to be managed on at their Facility as Inpatients). Hemodialysis has reportedly been insufficient to eliminate or significantly reduce Bilateral Pleural Effusions seen on previous visits. Given the Persistent nature of these Bilateral Pleural Effusions, it does sound like Patient will benefit from a Thoracentesis. [facility] ER Physician reports that Patient has Acute Respiratory Failure, newly diagnosed SARS-CoV-2+ Infection, and Acute Exacerbation of COPD this visit (in addition to Persistent Bilateral Pleural Effusions). Additionally, Patient has an elevated Troponin; however, Patient recently had a Cardiac Event in the last week and Troponins have slowly been declining since that event occurred. [facility] ER Physician reports that Patient's Blood Pressure runs low and notes that initial Lactic Acid was negative upon arrival.    Plan:  Given Blood Pressure measurements of 66/32 mm Hg, 84/50 mm Hg, and 79/39 mm Hg, this Clinician has requested that a Lactic Acid be obtained in addition to the one obtained already at [facility].   If the Lactic Acid is negative, Patient is accepted to [facility] for management of Persistent Bilateral Pleural Effusions (with anticipation of the requirement for Thoracentesis), as well as Acute Respiratory Failure, AECOPD, and SARS-CoV-2+ Infection. Should this Lactic Acid be elevated, [facility] Physician was instructed to contact [facility] Intensivist services to possible accept Patient with IV Pressors support.

## 2023-02-05 NOTE — PROGRESS NOTES
[facility] - IN REPORT:    Verbal report received from Roland Noriega RN(name) on [address]  being received from ED(unit) for routine progression of care      Report consisted of patients Situation, Background, Assessment and   Recommendations(SBAR). Information from the following report(s) SBAR, ED Summary, Procedure Summary, Intake/Output, MAR, Recent Results, and Quality Measures was reviewed with the receiving nurse. Opportunity for questions and clarification was provided. Assessment completed upon patients arrival to unit and care assumed. Pt A&Ox4 and able to transfer self to ICU Bed #7. O2 sats 99% on 4L O2 via NC. Respirations 24, : ST with ST depression and inverted T wave. BP 91/60 (70). Lungs clear but diminished in all lobes. Pt has a strong cough. BS present in all quadrants. No skin breakdown. Pt requesting a sandwich and a Sprite. Water also given. CBWR.     0645  Bedside shift change report given to A. Cullen Lesches (oncoming nurse) by GEOVANNY Gibson RN (offgoing nurse). Report included the following information SBAR, ED Summary, Intake/Output, MAR, Recent Results, and Quality Measures.

## 2023-02-05 NOTE — ED NOTES
Per Dr. Irlanda Cruz, the transfer center called and states [facility] doesn't have any beds and they will try other [facility] facilities at patient request.

## 2023-02-05 NOTE — ASSESSMENT & PLAN NOTE
- Continue Levaquin  -Maintain oxygen saturation 93% currently at 4 L by nasal cannula  -Respiratory therapy following  -Continuing with Decadron for COVID

## 2023-02-05 NOTE — ED NOTES
Patient complains of feeling hot. 2 blankets removed and one blanket left on patient. Ice water administered.

## 2023-02-05 NOTE — ED NOTES
Transfer center called, spoke with Junior Muro to give her patient lactic acid level. Junior Muro states she will register her for a bed at [facility] but will be on waiting list, Dr. Jovany King made aware.

## 2023-02-05 NOTE — PROGRESS NOTES
Care Management Interventions  PCP Verified by CM: Yes  Transition of Care Consult (CM Consult): Discharge Planning  Physical Therapy Consult: Yes  Occupational Therapy Consult: Yes  Support Systems: Child(jacob)  Confirm Follow Up Transport: Family  The Plan for Transition of Care is Related to the Following Treatment Goals : Patient centered discharge planning to ensure smooth transition to community and OF. Discharge Location  Patient Expects to be Discharged to[de-identified] Transferred to higher level of care      Reason for Admission:   Chart reviewed to show patient presented to ED with complaints of SOB and palpitations, pt found to be COVID positive. Accepted to [facility] and admitted to ICU pending bed. RUR Score:  24%         PCP: First and Last name:  Salvador Anguiano 32     Name of Practice:   Are you a current patient: Yes/No:   Approximate date of last visit:    Can you do a virtual visit with your PCP:              Resources/supports as identified by patient/family:                   Top Challenges facing patient (as identified by patient/family and CM): Finances/Medication cost?                    Transportation? Support system or lack thereof? Living arrangements? Self-care/ADLs/Cognition?             Current Advanced Directive/Advance Care Plan:  Full Code      Healthcare Decision Maker:   Click here to complete HealthCare Decision Makers including selection of the Healthcare Decision Maker Relationship (ie \"Primary\")        Payor Source Payor: [employer] MEDICAID / Plan: [address] [place] [address] / Product Type: Managed Care Medicaid /                             Plan for utilizing home health:   Not needed                    Transition of Care Plan:   Plan of care goals includes medication reconciliation, aligning pt with post-acute provider to ensure monitoring pt after discharge, and follow up appointment to be made by nursing staff at discharge. Patient centered discharge planning to ensure smooth transition to community and PLOF. Patient lives at home and goes to OP dialysis. DC POC is will return home after transfer to [facility]. CM following for DC needs.

## 2023-02-05 NOTE — PROGRESS NOTES
Problem: Airway Clearance - Ineffective  Goal: Achieve or maintain patent airway  Outcome: Progressing Towards Goal     Problem: Gas Exchange - Impaired  Goal: Absence of hypoxia  Outcome: Progressing Towards Goal  Goal: Promote optimal lung function  Outcome: Progressing Towards Goal     Problem: Breathing Pattern - Ineffective  Goal: Ability to achieve and maintain a regular respiratory rate  Outcome: Progressing Towards Goal     Problem: Body Temperature -  Risk of, Imbalanced  Goal: Ability to maintain a body temperature within defined limits  Outcome: Progressing Towards Goal  Goal: Will regain or maintain usual level of consciousness  Outcome: Progressing Towards Goal  Goal: Complications related to the disease process, condition or treatment will be avoided or minimized  Outcome: Progressing Towards Goal     Problem: Isolation Precautions - Risk of Spread of Infection  Goal: Prevent transmission of infectious organism to others  Outcome: Progressing Towards Goal     Problem: Nutrition Deficits  Goal: Optimize nutrtional status  Outcome: Progressing Towards Goal     Problem: Risk for Fluid Volume Deficit  Goal: Maintain normal heart rhythm  Outcome: Progressing Towards Goal  Goal: Maintain absence of muscle cramping  Outcome: Progressing Towards Goal  Goal: Maintain normal serum potassium, sodium, calcium, phosphorus, and pH  Outcome: Progressing Towards Goal     Problem: Fatigue  Goal: Verbalize increase energy and improved vitality  Outcome: Progressing Towards Goal     Problem: Patient Education: Go to Patient Education Activity  Goal: Patient/Family Education  Outcome: Progressing Towards Goal     Problem: Falls - Risk of  Goal: *Absence of Falls  Description: Document Arjun Fall Risk and appropriate interventions in the flowsheet.   Outcome: Progressing Towards Goal  Note: Fall Risk Interventions:

## 2023-02-05 NOTE — ASSESSMENT & PLAN NOTE
- Bilateral noted as moderate on chest CT  -Improved from previous visit  -Hemodialysis for removal of fluid  -Continue with oxygen by nasal cannula  -Continue to monitor

## 2023-02-05 NOTE — H&P
Hospitalist Admission Note    NAME: Cameron Martinez   :  1970   MRN:  [id number]     Date/Time:  2023 4:57 AM    Attending: Issa Singletary MD  Patient PCP: ABAD Tripp  ______________________________________________________________________  Given the patient's current clinical presentation, I have a high level of concern for decompensation if discharged from the emergency department.   Complex decision making was performed, which includes reviewing the patient's available past medical records, lab, and x-rays    Assessment / Plan:  Patient Active Problem List    Diagnosis Date Noted    Sepsis ([facility] 2023    COVID-19 2023    Pulmonary edema 2023    Pleural effusion 2023    COPD exacerbation ([facility] 2023    End stage renal failure on dialysis ([facility] 2023    CKD (chronic kidney disease) requiring chronic dialysis ([facility] 2023    Type 2 MI (myocardial infarction) ([facility] 2023    Acute respiratory failure with hypoxia (HCC) 2023    Fluid overload 2023    Hypotension 2023    Chronic kidney disease (CKD), stage V [facility]) [485724] 2022        Sepsis (HCC)  - No leukocytosis  -Lactic acid normal x 2  -Small fluid bolus given in ED, volume overloaded today    COVID-19  - started on Levaquin with multiple allergies otherwise, will continue  -Initial Solu-Medrol given but will continue with Decadron  -COVID protocol monitoring   -Hold Baricitnib, altough GFR is 14.9 however on HD  -Supportive care  -Contact precautions droplet plus    End stage renal failure on dialysis [facility])  - Nephrology consultation  -Hemodialysis  and Friday    COPD exacerbation ([facility]  - Continue Levaquin  -Maintain oxygen saturation 93% currently at 4 L by nasal cannula  -Respiratory therapy following  -Continuing with Decadron for COVID    Pleural effusion  - Bilateral noted as moderate on chest CT  -Improved from previous visit  -Hemodialysis for removal of fluid  -Continue with oxygen by nasal cannula  -Continue to monitor        Code Status: Full      DVT Prophylaxis: Heparin    GI Prophylaxis: not indicated      Subjective:   CHIEF COMPLAINT: Palpitations and shortness of breath    HISTORY OF PRESENT ILLNESS:     Kat Li is a 45 y.o. female with a past medical history for end-stage renal disease on hemodialysis MWF, COPD, caardiomegaly, and aortic stenoisis, and tobacco abuse, patient presents to the ED with a chief complaint of ongoing shortness of breath. Patient reports that the shortness of breath and palpitations that started yesterday worsening prior to arrival.  She denies chest pain, palpitations, nausea, vomiting, abdominal pain, fevers and chills. The emergency department evaluation found patient to be COVID-positive. She remains hypotensive with but stable similar to presentation of 3 days ago. She does have tachycardia with a pulse ranging from 105-140. Continues with chronic anemia which is stable with a hemoglobin of 7.6 and 24.3 hematocrit. Patient has been admitted for evaluation and treatment of the problems noted in the plan above.     Admit to ICU    Past Medical History:   Diagnosis Date    ESRD on hemodialysis ([facility]     Kidney failure         Past Surgical History:   Procedure Laterality Date    HX  SECTION      PARTIAL REMOVAL OF KIDNEY         Social History     Tobacco Use    Smoking status: Every Day     Packs/day: 0.25     Years: 35.00     Pack years: 8.75     Types: Cigarettes    Smokeless tobacco: Never   Substance Use Topics    Alcohol use: Not Currently        Family History   Problem Relation Age of Onset    Diabetes Maternal Grandmother      Allergies   Allergen Reactions    Dulaglutide Other (comments)     Nausea with vomiting and dizziness    Sitagliptin Other (comments)     Headache, runny nose, back pain and low blood sugars    Codeine Palpitations    Pcn [Penicillins] Hives        Prior to Admission medications    Medication Sig Start Date End Date Taking? Authorizing Provider   albuterol (PROVENTIL HFA, VENTOLIN HFA, PROAIR HFA) 90 mcg/actuation inhaler Take 2 Puffs by inhalation every four (4) hours as needed for Wheezing. Yes Provider, Historical   tiotropium bromide (SPIRIVA RESPIMAT IN) Take  by inhalation. Yes Provider, Historical   furosemide (LASIX) 80 mg tablet Take 80 mg by mouth daily. 1/12/23  Yes Provider, Historical   aspirin delayed-release 81 mg tablet Take 1 Tablet by mouth daily. 1/21/23  Yes Ruthann Mendoza MD   sevelamer carbonate (RENVELA) 800 mg tab tab TAKE 1 TABLET BY MOUTH THREE TIMES DAILY WITH MEALS 8/18/22  Yes Gibran Harvey MD   calcitRIOL (ROCALTROL) 0.25 mcg capsule Take 1 capsule by mouth once daily 8/17/22  Yes Gibran Harvey MD   cholecalciferol (VITAMIN D3) (1000 Units /25 mcg) tablet Take 1 Tablet by mouth daily. 7/7/22  Yes Gibran Harvey MD   epoetin carmelo (Epogen) 10,000 unit/mL injection 1 mL by SubCUTAneous route Once every 2 weeks. 7/7/22  Yes Gibran Harvey MD   sodium bicarbonate 650 mg tablet Take 1 Tablet by mouth two (2) times a day. 5/5/22  Yes Gibran Harvey MD   cyanocobalamin, vitamin B-12, 5,000 mcg subl 1 Tablet by SubLINGual route daily. 6/3/21  Yes Other, MD Sera       REVIEW OF SYSTEMS:   depression, agitation    Objective:   VITALS:    Visit Vitals  BP 91/60   Pulse (!) 106   Temp 98.5 °F (36.9 °C)   Resp 24   Ht 5' 4\" (1.626 m)   Wt 69.5 kg (153 lb 4.8 oz)   SpO2 99%   Breastfeeding No   BMI 26.31 kg/m²       PHYSICAL EXAM:    General:    Alert, cooperative, no distress, appears stated age. HEENT: Atraumatic, anicteric sclerae, pink conjunctivae     No oral ulcers, mucosa moist, throat clear, dentition fair  Neck:  Supple, symmetrical,  thyroid: non tender  Lungs:   Clear to auscultation bilaterally. No Wheezing or Rhonchi. No rales. Chest wall:  No tenderness  No Accessory muscle use.   Heart: Regular  rhythm,  No  murmur   No edema  Abdomen:   Soft, non-tender. Not distended. Bowel sounds normal  Extremities: No cyanosis. No clubbing,      Skin turgor normal, Capillary refill normal, Radial dial pulse 2+  Skin:     Not pale. Not Jaundiced  No rashes   Psych:  Good insight. Not depressed. Not anxious or agitated. Neurologic: EOMs intact. No facial asymmetry. No aphasia or slurred speech. Symmetrical strength, Sensation grossly intact. Alert and oriented X 4.     _______________________________________________________________________  Care Plan discussed with:    Comments   Patient X    Family      RN X    Care Manager                    Consultant:      _______________________________________________________________________  Expected  Disposition:   Home with Family X   HH/PT/OT/RN    SNF/LTC    ATILIO    ________________________________________________________________________  TOTAL TIME:  46  Minutes    Critical Care Provided     Minutes non procedure based      Comments    X Reviewed previous records    X Discussion with patient and/or family and questions answered       ________________________________________________________________________  Signed: Roberto Woodard, PhD, PA-C, [facility] Medicine Service    Procedures: see electronic medical records for all procedures/Xrays and details which were not copied into this note but were reviewed prior to creation of Plan.     LAB DATA REVIEWED:    Recent Results (from the past 24 hour(s))   EKG, 12 LEAD, INITIAL    Collection Time: 02/04/23  9:28 PM   Result Value Ref Range    Ventricular Rate 109 BPM    Atrial Rate 109 BPM    P-R Interval 138 ms    QRS Duration 81 ms    Q-T Interval 309 ms    QTC Calculation (Bezet) 417 ms    Calculated P Axis 79 degrees    Calculated R Axis 30 degrees    Calculated T Axis -175 degrees    Diagnosis       Sinus tachycardia  Probable left atrial enlargement  Probable LVH with secondary repol abnrm  Baseline wander in lead(s) II,III,aVL,aVF,V1,V4     CBC WITH AUTOMATED DIFF    Collection Time: 02/04/23  9:45 PM   Result Value Ref Range    WBC 9.5 4.6 - 13.2 K/uL    RBC 2.41 (L) 4.20 - 5.30 M/uL    HGB 7.6 (L) 12.0 - 16.0 g/dL    HCT 24.3 (L) 35.0 - 45.0 %    .8 (H) 78.0 - 100.0 FL    MCH 31.5 24.0 - 34.0 PG    MCHC 31.3 31.0 - 37.0 g/dL    RDW 18.7 (H) 11.6 - 14.5 %    PLATELET 961 497 - 244 K/uL    MPV 9.6 9.2 - 11.8 FL    NRBC 0.0 0.0  WBC    ABSOLUTE NRBC 0.00 0.00 - 0.01 K/uL    NEUTROPHILS 66 40 - 73 %    LYMPHOCYTES 18 (L) 21 - 52 %    MONOCYTES 9 3 - 10 %    EOSINOPHILS 1 0 - 5 %    BASOPHILS 1 0 - 2 %    IMMATURE GRANULOCYTES 5 (H) 0 - 0.5 %    ABS. NEUTROPHILS 6.2 1.8 - 8.0 K/UL    ABS. LYMPHOCYTES 1.7 0.9 - 3.6 K/UL    ABS. MONOCYTES 0.9 0.05 - 1.2 K/UL    ABS. EOSINOPHILS 0.1 0.0 - 0.4 K/UL    ABS. BASOPHILS 0.1 0.0 - 0.1 K/UL    ABS. IMM.  GRANS. 0.5 (H) 0.00 - 0.04 K/UL    DF AUTOMATED      RBC COMMENTS Anisocytosis  1+       METABOLIC PANEL, BASIC    Collection Time: 02/04/23  9:45 PM   Result Value Ref Range    Sodium 137 136 - 145 mmol/L    Potassium 3.5 3.5 - 5.5 mmol/L    Chloride 97 (L) 100 - 111 mmol/L    CO2 30 21 - 32 mmol/L    Anion gap 10 3.0 - 18.0 mmol/L    Glucose 148 (H) 74 - 99 mg/dL    BUN 39 (H) 7 - 18 mg/dL    Creatinine 4.13 (H) 0.60 - 1.30 mg/dL    BUN/Creatinine ratio 9 (L) 12 - 20      eGFR 12 (L) >60 ml/min/1.73m2    Calcium 8.7 8.5 - 10.1 mg/dL   TROPONIN-HIGH SENSITIVITY    Collection Time: 02/04/23  9:45 PM   Result Value Ref Range    Troponin-High Sensitivity 287 (HH) 0 - 54 ng/L   MAGNESIUM    Collection Time: 02/04/23  9:45 PM   Result Value Ref Range    Magnesium 2.2 1.6 - 2.6 mg/dL   LACTIC ACID    Collection Time: 02/04/23  9:45 PM   Result Value Ref Range    Lactic acid 1.4 0.4 - 2.0 mmol/L   D DIMER    Collection Time: 02/04/23  9:45 PM   Result Value Ref Range    D DIMER 1.52 (H) <0.46 ug/ml(FEU)   INFLUENZA A & B AG (RAPID TEST)    Collection Time: 02/05/23 12:45 AM Result Value Ref Range    Influenza A Antigen Negative Negative      Influenza B Antigen Negative Negative     COVID-19 RAPID TEST    Collection Time: 02/05/23 12:45 AM   Result Value Ref Range    COVID-19 rapid test DETECTED (A) Not Detected     BLOOD GAS, ARTERIAL    Collection Time: 02/05/23  1:18 AM   Result Value Ref Range    pH 7.45 7.35 - 7.45      PCO2 37 35 - 45 mmHg    PO2 65 (L) 80 - 100 mmHg    O2 SATURATION 93 (L) 95 - 99 %    BICARBONATE 26 22 - 26 mmol/L    BASE EXCESS 1.4 0 - 3 mmol/L    O2 METHOD Nasal Cannula      O2 FLOW RATE 2.00 L/min    FIO2 28 %    Sample source Arterial      SITE Right Brachial      CHRISTIANO'S TEST NOT APPLICABLE      Carboxy-Hgb 4.4 (H) 1 - 2 %    Methemoglobin 0.3 0 - 1.4 %    Oxyhemoglobin 88.7 (L) 95 - 99 %    Performed by Lisa Virk    LACTIC ACID    Collection Time: 02/05/23  2:05 AM   Result Value Ref Range    Lactic acid 1.1 0.4 - 2.0 mmol/L   TROPONIN-HIGH SENSITIVITY    Collection Time: 02/05/23  2:05 AM   Result Value Ref Range    Troponin-High Sensitivity 270 (HH) 0 - 54 ng/L

## 2023-02-05 NOTE — PROGRESS NOTES
Problem: Airway Clearance - Ineffective  Goal: Achieve or maintain patent airway  Outcome: Progressing Towards Goal     Problem: Gas Exchange - Impaired  Goal: Absence of hypoxia  Outcome: Progressing Towards Goal  Goal: Promote optimal lung function  Outcome: Progressing Towards Goal     Problem: Breathing Pattern - Ineffective  Goal: Ability to achieve and maintain a regular respiratory rate  Outcome: Progressing Towards Goal     Problem: Body Temperature -  Risk of, Imbalanced  Goal: Ability to maintain a body temperature within defined limits  Outcome: Progressing Towards Goal  Goal: Will regain or maintain usual level of consciousness  Outcome: Progressing Towards Goal  Goal: Complications related to the disease process, condition or treatment will be avoided or minimized  Outcome: Progressing Towards Goal     Problem: Isolation Precautions - Risk of Spread of Infection  Goal: Prevent transmission of infectious organism to others  Outcome: Progressing Towards Goal     Problem: Nutrition Deficits  Goal: Optimize nutrtional status  Outcome: Progressing Towards Goal     Problem: Risk for Fluid Volume Deficit  Goal: Maintain normal heart rhythm  Outcome: Progressing Towards Goal  Goal: Maintain absence of muscle cramping  Outcome: Progressing Towards Goal  Goal: Maintain normal serum potassium, sodium, calcium, phosphorus, and pH  Outcome: Progressing Towards Goal     Problem: Loneliness or Risk for Loneliness  Goal: Demonstrate positive use of time alone when socialization is not possible  Outcome: Progressing Towards Goal     Problem: Fatigue  Goal: Verbalize increase energy and improved vitality  Outcome: Progressing Towards Goal     Problem: Patient Education: Go to Patient Education Activity  Goal: Patient/Family Education  Outcome: Progressing Towards Goal     Problem: Falls - Risk of  Goal: *Absence of Falls  Description: Document Arjun Fall Risk and appropriate interventions in the flowsheet.   Outcome: Progressing Towards Goal  Note: Fall Risk Interventions:                                Problem: Patient Education: Go to Patient Education Activity  Goal: Patient/Family Education  Outcome: Progressing Towards Goal     Problem:  Activity Intolerance  Goal: *Oxygen saturation during activity within specified parameters  Outcome: Progressing Towards Goal  Goal: *Able to remain out of bed as prescribed  Outcome: Progressing Towards Goal     Problem: Patient Education: Go to Patient Education Activity  Goal: Patient/Family Education  Outcome: Progressing Towards Goal     Problem: Tissue Perfusion - Cardiopulmonary, Altered  Goal: *Optimize tissue perfusion  Outcome: Progressing Towards Goal  Goal: *Absence of hypoxia  Outcome: Progressing Towards Goal     Problem: Patient Education: Go to Patient Education Activity  Goal: Patient/Family Education  Outcome: Progressing Towards Goal

## 2023-02-05 NOTE — ED NOTES
Patient continuing to have cough. Dr. Cathleen Barber aware and orders given for [facility]. Jevon La from RT called for treatment.

## 2023-02-05 NOTE — PROGRESS NOTES
Pt is seen by Dr Richard Aceves for esrd . MD paged to be notified of admission. Dr Arnie Raymundo covering physician. . Per MD pt can continue with normal MWF and orders will be placed to dialyze tomorrow.

## 2023-02-05 NOTE — PROGRESS NOTES
1694 -  Report received from GEOVANNY Gibson RN. Pt stable. Awake and up ad courtney in room. 0730 - breakfast tray provided. Decreaed O2 to 3LNC. Sats are 100%. 1065 -  Morning rounds completed per Dr. Emelina Rangel. Awaiting bed at .   0930 -  Morning assessment and med pass complete. Pt reports she is so tired. Ate 100% of her BKFST. 1020 -  RT made aware that pt is requesting a breathing tx. States she is having a hard time. And she is wheezing. Pt was coarse with faint wheezes noted upon AM assessment. Sats are 100% on 3LNC. 1027- RT at [facility]. 1130 -  Lunch provided. Pt reports she feels better but needs something for her Rhys Deacon' [address] made aware. 1215 -  pt medicated with Ativan 1mg PO for her anxiety  1250 -  pt up to [facility]. C/O her heart \"racing\" HR is . Will follow up with Hospitalist  1320 - Pt sleeping in bed  1400 - Pt states that her heart rate is too high, currently 102. States that she needs to be transferred to [facility]. Explained to pt that there are no beds available but we are working on her request. Will continue to monitor. 1500 - Pt resting in bed.  1615-  In to bring pt's supper tray. C/o feeling anxious. Requesting Benadryl and vistaril. [address] [facility] NP aware. PT requesting a sleep aid as well. States she cant breath. O2 sats 93-94% on RA. Placed pt back on 3LNC. Sats increased to 98%. VSS  1630 - PT took NC off. RT at bedside. 1715 -  PT lying in bed watching TV  1815 -  Pt requesting a sleep aid. Melatonin ordered. Pt resting in bed VSS  1900 -  Report to GEOVANNY Gibson RN

## 2023-02-05 NOTE — ROUTINE PROCESS
TRANSFER - OUT REPORT:    Verbal report given to Monica Moore RN(name) on [address]  being transferred to ICU(unit) for routine progression of care       Report consisted of patients Situation, Background, Assessment and   Recommendations(SBAR). Information from the following report(s) ED Summary was reviewed with the receiving nurse. Lines:   Venous Access Device AV fistula  Arm, left (Active)       Peripheral IV 02/04/23 Right Antecubital (Active)   Site Assessment Clean, dry, & intact 02/04/23 2133   Phlebitis Assessment 0 02/04/23 2133   Infiltration Assessment 0 02/04/23 2133   Dressing Status Clean, dry, & intact 02/04/23 2133   Dressing Type Transparent 02/04/23 2133   Hub Color/Line Status Pink;Flushed;Patent 02/04/23 2133        Opportunity for questions and clarification was provided.       Patient transported with:   Monitor  O2 @ 4 liters  Registered Nurse

## 2023-02-05 NOTE — ASSESSMENT & PLAN NOTE
- started on Levaquin with multiple allergies otherwise, will continue  -Initial Solu-Medrol given but will continue with Decadron  -COVID protocol monitoring   -Hold mitzi De Jesus GFR is 14.9 however on HD  -Supportive care  -Contact precautions droplet plus

## 2023-02-05 NOTE — ED NOTES
Dr. Lennox Hubbard at bedside talking with patient about transfer procedure. Patient states she will only go to [facility] or [place] and if she can't go to one of those she would leave AMA.

## 2023-02-05 NOTE — ED NOTES
Transfer center called to arrange transfer to [facility] at patient request. Waiting for physician or bed status.

## 2023-02-05 NOTE — ED NOTES
Patient medicated with Tylenol by Rolan Vee, RN student,per Dr. Irina Upton orders and tolerated well.

## 2023-02-05 NOTE — ED TRIAGE NOTES
Patient reports she has been laying around all day and not feeling good. Reports her blood pressure has been low 70s/50s and feels like her heart is racing. Patient reports she was admitted Monday and discharged Wednesday night. Patient states \"I don't know why they discharged me home. I am not any better. \" Patient reports she went to dialysis yesterday.

## 2023-02-05 NOTE — ED PROVIDER NOTES
Pt c/o palpitations and sob, says bernadette h/o same, just dc from here 3 days ago. No chest pain. No n/v. No abd pain. On hd, compliant, had yesterday as planned. No fever. No chest pain. No leg pain or swelling. Notes low blood pressure on home check, says was low at last admit. Past Medical History:   Diagnosis Date    ESRD on hemodialysis ([facility] [address]     Kidney failure        Past Surgical History:   Procedure Laterality Date    HX  SECTION      PARTIAL REMOVAL OF KIDNEY           Family History:   Problem Relation Age of Onset    Diabetes Maternal Grandmother        Social History     Socioeconomic History    Marital status:      Spouse name: Not on file    Number of children: Not on file    Years of education: Not on file    Highest education level: Not on file   Occupational History    Not on file   Tobacco Use    Smoking status: Every Day     Packs/day: 0.25     Years: 35.00     Pack years: 8.75     Types: Cigarettes    Smokeless tobacco: Never   Vaping Use    Vaping Use: Never used   Substance and Sexual Activity    Alcohol use: Not Currently    Drug use: Not Currently    Sexual activity: Not on file   Other Topics Concern    Not on file   Social History Narrative    Not on file     Social Determinants of Health     Financial Resource Strain: Not on file   Food Insecurity: Not on file   Transportation Needs: Not on file   Physical Activity: Not on file   Stress: Not on file   Social Connections: Not on file   Intimate Partner Violence: Not on file   Housing Stability: Not on file         ALLERGIES: Dulaglutide, Sitagliptin, Codeine, and Pcn [penicillins]    Review of Systems   Constitutional:  Negative for fever. HENT:  Negative for congestion. Respiratory:  Positive for shortness of breath. Negative for cough. Cardiovascular:  Positive for palpitations. Negative for chest pain. Gastrointestinal:  Negative for abdominal pain and vomiting.    Musculoskeletal:  Negative for back pain.   Skin:  Negative for rash. Neurological:  Negative for light-headedness. All other systems reviewed and are negative. Vitals:    02/05/23 0353 02/05/23 0430 02/05/23 0504 02/05/23 0509   BP: 98/63  91/60    Pulse: (!) 122  (!) 106    Resp: 22  24    Temp:   98.5 °F (36.9 °C)    SpO2: 99% 100% 99%    Weight:    69.5 kg (153 lb 4.8 oz)   Height:    5' 4\" (1.626 m)            Physical Exam  Vitals and nursing note reviewed. Constitutional:       Appearance: She is well-developed. She is not diaphoretic. HENT:      Head: Normocephalic and atraumatic. Eyes:      Pupils: Pupils are equal, round, and reactive to light. Cardiovascular:      Rate and Rhythm: Regular rhythm. Tachycardia present. Heart sounds: No murmur heard. Pulmonary:      Effort: Tachypnea present. Breath sounds: Rales (bases) present. No wheezing. Abdominal:      Palpations: Abdomen is soft. Tenderness: There is no abdominal tenderness. Musculoskeletal:         General: No tenderness. Cervical back: Normal range of motion. Skin:     General: Skin is dry. Capillary Refill: Capillary refill takes less than 2 seconds. Findings: No rash. Neurological:      Mental Status: She is alert and oriented to person, place, and time. Psychiatric:         Mood and Affect: Mood normal.        Medical Decision Making  Amount and/or Complexity of Data Reviewed  Labs: ordered. Radiology: ordered. ECG/medicine tests: ordered. Risk  OTC drugs. Prescription drug management. Decision regarding hospitalization.            Procedures    Vitals:  Patient Vitals for the past 12 hrs:   Temp Pulse Resp BP SpO2   02/05/23 0504 98.5 °F (36.9 °C) (!) 106 24 91/60 99 %   02/05/23 0430 -- -- -- -- 100 %   02/05/23 0353 -- (!) 122 22 98/63 99 %   02/05/23 0325 -- (!) 134 24 (!) 101/54 98 %   02/05/23 0316 -- (!) 105 25 (!) 82/42 100 %   02/05/23 0257 99 °F (37.2 °C) (!) 136 28 (!) 87/45 100 %   02/05/23 0236 -- (!) 243 61 (!) 100/51 99 %   02/05/23 0216 -- (!) 112 28 (!) 85/41 96 %   02/05/23 0213 -- (!) 138 28 (!) 84/49 93 %   02/05/23 0205 -- (!) 114 26 (!) 81/50 94 %   02/05/23 0147 -- (!) 122 18 (!) 84/50 94 %   02/05/23 0144 -- (!) 128 20 (!) 66/32 94 %   02/05/23 0126 -- (!) 127 27 (!) 79/39 94 %   02/05/23 0107 -- (!) 136 22 (!) 98/40 94 %   02/05/23 0054 -- (!) 138 -- (!) 78/44 92 %   02/05/23 0045 -- (!) 136 27 (!) 74/41 90 %   02/05/23 0014 -- (!) 140 20 (!) 93/44 93 %   02/04/23 2350 -- -- -- -- 94 %   02/04/23 2347 -- (!) 118 22 106/65 92 %   02/04/23 2329 -- (!) 129 22 (!) 92/55 95 %   02/04/23 2247 -- (!) 137 20 (!) 101/56 94 %   02/04/23 2233 -- (!) 138 25 95/67 94 %   02/04/23 2216 -- (!) 137 18 (!) 93/55 96 %   02/04/23 2201 -- (!) 136 19 (!) 87/52 95 %   02/04/23 2147 -- (!) 136 -- (!) 92/57 96 %   02/04/23 2136 -- -- -- -- 98 %   02/04/23 2123 98 °F (36.7 °C) (!) 110 22 (!) 100/59 98 %         Medications ordered:   Medications   acetaminophen (TYLENOL) suppository 650 mg (has no administration in time range)   dexamethasone (PF) (DECADRON) 10 mg/mL injection 6 mg (6 mg IntraVENous Given 2/5/23 0559)   guaiFENesin-dextromethorphan (ROBITUSSIN DM) 100-10 mg/5 mL syrup 5 mL (has no administration in time range)   levoFLOXacin (LEVAQUIN) 500 mg in D5W IVPB (500 mg IntraVENous New Bag 2/5/23 0559)   albuterol (PROVENTIL HFA, VENTOLIN HFA, PROAIR HFA) inhaler 2 Puff (has no administration in time range)   aspirin delayed-release tablet 81 mg (has no administration in time range)   calcitRIOL (ROCALTROL) capsule 0.25 mcg (has no administration in time range)   cholecalciferol (VITAMIN D3) (1000 Units /25 mcg) tablet 1,000 Units (has no administration in time range)   . PHARMACY TO SUBSTITUTE PER PROTOCOL (Reordered from: epoetin carmelo (Epogen) 10,000 unit/mL injection) (has no administration in time range)   sevelamer carbonate (RENVELA) tab 800 mg (has no administration in time range)   sodium bicarbonate tablet 650 mg (has no administration in time range)   . PHARMACY TO SUBSTITUTE PER PROTOCOL (Reordered from: tiotropium bromide (SPIRIVA RESPIMAT IN)) (has no administration in time range)   sodium chloride (NS) flush 5-40 mL (10 mL IntraVENous Given 2/5/23 0559)   sodium chloride (NS) flush 5-40 mL (has no administration in time range)   polyethylene glycol (MIRALAX) packet 17 g (has no administration in time range)   promethazine (PHENERGAN) tablet 12.5 mg (has no administration in time range)     Or   ondansetron (ZOFRAN) injection 4 mg (has no administration in time range)   heparin (porcine) injection 5,000 Units (5,000 Units SubCUTAneous Given 2/5/23 0558)   sodium chloride 0.9 % bolus infusion 250 mL (0 mL IntraVENous IV Completed 2/4/23 2330)   albuterol-ipratropium (DUO-NEB) 2.5 MG-0.5 MG/3 ML (3 mL Nebulization Given 2/4/23 2344)   iopamidoL (ISOVUE-370) 370 mg iodine /mL (76 %) injection  mL (93 mL IntraVENous Given 2/4/23 2320)   methylPREDNISolone (PF) (Solu-MEDROL) injection 125 mg (125 mg IntraVENous Given 2/4/23 2345)   levoFLOXacin (LEVAQUIN) 750 mg in D5W IVPB (0 mg IntraVENous IV Completed 2/5/23 0228)   0.9% sodium chloride infusion 250 mL (0 mL IntraVENous IV Completed 2/5/23 0158)   acetaminophen (TYLENOL) tablet 650 mg (650 mg Oral Given 2/5/23 0330)         Lab findings:  Recent Results (from the past 12 hour(s))   EKG, 12 LEAD, INITIAL    Collection Time: 02/04/23  9:28 PM   Result Value Ref Range    Ventricular Rate 109 BPM    Atrial Rate 109 BPM    P-R Interval 138 ms    QRS Duration 81 ms    Q-T Interval 309 ms    QTC Calculation (Bezet) 417 ms    Calculated P Axis 79 degrees    Calculated R Axis 30 degrees    Calculated T Axis -175 degrees    Diagnosis       Sinus tachycardia  Probable left atrial enlargement  Probable LVH with secondary repol abnrm  Baseline wander in lead(s) II,III,aVL,aVF,V1,V4     CBC WITH AUTOMATED DIFF    Collection Time: 02/04/23  9:45 PM   Result Value Ref Range    WBC 9.5 4.6 - 13.2 K/uL    RBC 2.41 (L) 4.20 - 5.30 M/uL    HGB 7.6 (L) 12.0 - 16.0 g/dL    HCT 24.3 (L) 35.0 - 45.0 %    .8 (H) 78.0 - 100.0 FL    MCH 31.5 24.0 - 34.0 PG    MCHC 31.3 31.0 - 37.0 g/dL    RDW 18.7 (H) 11.6 - 14.5 %    PLATELET 223 912 - 676 K/uL    MPV 9.6 9.2 - 11.8 FL    NRBC 0.0 0.0  WBC    ABSOLUTE NRBC 0.00 0.00 - 0.01 K/uL    NEUTROPHILS 66 40 - 73 %    LYMPHOCYTES 18 (L) 21 - 52 %    MONOCYTES 9 3 - 10 %    EOSINOPHILS 1 0 - 5 %    BASOPHILS 1 0 - 2 %    IMMATURE GRANULOCYTES 5 (H) 0 - 0.5 %    ABS. NEUTROPHILS 6.2 1.8 - 8.0 K/UL    ABS. LYMPHOCYTES 1.7 0.9 - 3.6 K/UL    ABS. MONOCYTES 0.9 0.05 - 1.2 K/UL    ABS. EOSINOPHILS 0.1 0.0 - 0.4 K/UL    ABS. BASOPHILS 0.1 0.0 - 0.1 K/UL    ABS. IMM.  GRANS. 0.5 (H) 0.00 - 0.04 K/UL    DF AUTOMATED      RBC COMMENTS Anisocytosis  1+       METABOLIC PANEL, BASIC    Collection Time: 02/04/23  9:45 PM   Result Value Ref Range    Sodium 137 136 - 145 mmol/L    Potassium 3.5 3.5 - 5.5 mmol/L    Chloride 97 (L) 100 - 111 mmol/L    CO2 30 21 - 32 mmol/L    Anion gap 10 3.0 - 18.0 mmol/L    Glucose 148 (H) 74 - 99 mg/dL    BUN 39 (H) 7 - 18 mg/dL    Creatinine 4.13 (H) 0.60 - 1.30 mg/dL    BUN/Creatinine ratio 9 (L) 12 - 20      eGFR 12 (L) >60 ml/min/1.73m2    Calcium 8.7 8.5 - 10.1 mg/dL   TROPONIN-HIGH SENSITIVITY    Collection Time: 02/04/23  9:45 PM   Result Value Ref Range    Troponin-High Sensitivity 287 (HH) 0 - 54 ng/L   MAGNESIUM    Collection Time: 02/04/23  9:45 PM   Result Value Ref Range    Magnesium 2.2 1.6 - 2.6 mg/dL   LACTIC ACID    Collection Time: 02/04/23  9:45 PM   Result Value Ref Range    Lactic acid 1.4 0.4 - 2.0 mmol/L   D DIMER    Collection Time: 02/04/23  9:45 PM   Result Value Ref Range    D DIMER 1.52 (H) <0.46 ug/ml(FEU)   INFLUENZA A & B AG (RAPID TEST)    Collection Time: 02/05/23 12:45 AM   Result Value Ref Range    Influenza A Antigen Negative Negative      Influenza B Antigen Negative Negative     COVID-19 RAPID TEST    Collection Time: 02/05/23 12:45 AM   Result Value Ref Range    COVID-19 rapid test DETECTED (A) Not Detected     BLOOD GAS, ARTERIAL    Collection Time: 02/05/23  1:18 AM   Result Value Ref Range    pH 7.45 7.35 - 7.45      PCO2 37 35 - 45 mmHg    PO2 65 (L) 80 - 100 mmHg    O2 SATURATION 93 (L) 95 - 99 %    BICARBONATE 26 22 - 26 mmol/L    BASE EXCESS 1.4 0 - 3 mmol/L    O2 METHOD Nasal Cannula      O2 FLOW RATE 2.00 L/min    FIO2 28 %    Sample source Arterial      SITE Right Brachial      CHRISTIANO'S TEST NOT APPLICABLE      Carboxy-Hgb 4.4 (H) 1 - 2 %    Methemoglobin 0.3 0 - 1.4 %    Oxyhemoglobin 88.7 (L) 95 - 99 %    Performed by Denisse Robles    LACTIC ACID    Collection Time: 02/05/23  2:05 AM   Result Value Ref Range    Lactic acid 1.1 0.4 - 2.0 mmol/L   TROPONIN-HIGH SENSITIVITY    Collection Time: 02/05/23  2:05 AM   Result Value Ref Range    Troponin-High Sensitivity 270 (HH) 0 - 54 ng/L           X-Ray, CT or other radiology findings or impressions:  CTA CHEST W OR W WO CONT   Final Result   No evidence of acute pulmonary embolus. Moderately large bilateral pleural   effusions. Bilateral dependent airspace disease may represent atelectasis or   pneumonia. XR CHEST PORT   Final Result   Mild pulmonary interstitial edema. Progress notes, Consult notes or additional Procedure notes:   12:29 AM ct results noted. Pt w cont sob. D/w Mary Riley, rec tx for pulmonary consult/availability. Pt says she will not be [facility] but leaving [facility] otherwise. 1:27 AM pt w mild improvement w tx so far. Covid +  [facility] says no bed avail.  Pt still refusing to be transferred [facility] w tx to [facility]  Pt needs steroids for covid w o2 requirement, has already received solu merol  1:57 AM d/w dr Sari Meyer, [facility] accepts if repeat lactate done and wnl, doesn't need call back if wnl, to admit to [facility]  2:57 AM repeat lactic acid 1.1.    3:29 AM bp repeat with pt laying flat, 101/54. To continue close monitoring. 3:30 AM transfer center updated re lactate, posted for step down bed, but no beds now, to board pt here pending bed placement  3:30 AM d/w christine Anderson, to admit pending bed placement    CRITICAL CARE NOTE:  6:34 AM  I have spent 38 minutes of critical care time involved in lab review, consultations with specialist, family decision-making, and documentation. During this entire length of time I was immediately available to the patient. this time excludes time spent on separately billable procedures. Critical Care: The reason for providing this level of medical care for this critically ill patient was due a critical illness that impaired one or more vital organ systems such that there was a high probability of imminent or life threatening deterioration in the patients condition. This care involved high complexity decision making to assess, manipulate, and support vital system functions, to treat this degree vital organ system failure and to prevent further life threatening deterioration of the patients condition. Diagnosis:   1. Pleural effusion    2. Pneumonia of both lungs due to infectious organism, unspecified part of lung    3. COPD exacerbation ([facility] [address]    4. COVID-19    5. Hypoxemia        Disposition: admit    Follow-up Information    None          Current Discharge Medication List        CONTINUE these medications which have NOT CHANGED    Details   albuterol (PROVENTIL HFA, VENTOLIN HFA, PROAIR HFA) 90 mcg/actuation inhaler Take 2 Puffs by inhalation every four (4) hours as needed for Wheezing. tiotropium bromide (SPIRIVA RESPIMAT IN) Take  by inhalation. furosemide (LASIX) 80 mg tablet Take 80 mg by mouth daily. aspirin delayed-release 81 mg tablet Take 1 Tablet by mouth daily.   Qty: 30 Tablet, Refills: 5      sevelamer carbonate (RENVELA) 800 mg tab tab TAKE 1 TABLET BY MOUTH THREE TIMES DAILY WITH MEALS  Qty: 45 Tablet, Refills: 0      calcitRIOL (ROCALTROL) 0.25 mcg capsule Take 1 capsule by mouth once daily  Qty: 30 Capsule, Refills: 0      cholecalciferol (VITAMIN D3) (1000 Units /25 mcg) tablet Take 1 Tablet by mouth daily. Qty: 30 Tablet, Refills: 3      epoetin carmelo (Epogen) 10,000 unit/mL injection 1 mL by SubCUTAneous route Once every 2 weeks. Qty: 1 mL, Refills: 5    Comments: Please dispense as a 30 day supply  Associated Diagnoses: Chronic kidney disease (CKD), stage V (HCC)      sodium bicarbonate 650 mg tablet Take 1 Tablet by mouth two (2) times a day. Qty: 60 Tablet, Refills: 3      cyanocobalamin, vitamin B-12, 5,000 mcg subl 1 Tablet by SubLINGual route daily.

## 2023-02-05 NOTE — ED NOTES
In to see patient and 02 sats 86% on room air and blood pressure low. Dr. Jovany King at bedside.  Patient placed on 2 L NC with increase of sats to 94%

## 2023-02-06 LAB
25(OH)D3 SERPL-MCNC: 31.7 NG/ML (ref 30–100)
ANION GAP SERPL CALC-SCNC: 15 MMOL/L (ref 3–18)
BASOPHILS # BLD: 0 K/UL (ref 0–0.1)
BASOPHILS NFR BLD: 0 % (ref 0–2)
BUN SERPL-MCNC: 62 MG/DL (ref 7–18)
BUN/CREAT SERPL: 11 (ref 12–20)
CA-I BLD-MCNC: 8.5 MG/DL (ref 8.5–10.1)
CHLORIDE SERPL-SCNC: 92 MMOL/L (ref 100–111)
CO2 SERPL-SCNC: 24 MMOL/L (ref 21–32)
CREAT SERPL-MCNC: 5.71 MG/DL (ref 0.6–1.3)
DIFFERENTIAL METHOD BLD: ABNORMAL
EOSINOPHIL # BLD: 0 K/UL (ref 0–0.4)
EOSINOPHIL NFR BLD: 0 % (ref 0–5)
ERYTHROCYTE [DISTWIDTH] IN BLOOD BY AUTOMATED COUNT: 18.6 % (ref 11.6–14.5)
GLUCOSE SERPL-MCNC: 197 MG/DL (ref 74–99)
HCT VFR BLD AUTO: 22.3 % (ref 35–45)
HGB BLD-MCNC: 7 G/DL (ref 12–16)
IMM GRANULOCYTES # BLD AUTO: 0.6 K/UL (ref 0–0.04)
IMM GRANULOCYTES NFR BLD AUTO: 4 % (ref 0–0.5)
LACTATE SERPL-SCNC: 1.6 MMOL/L (ref 0.4–2)
LYMPHOCYTES # BLD: 1.2 K/UL (ref 0.9–3.6)
LYMPHOCYTES NFR BLD: 9 % (ref 21–52)
MCH RBC QN AUTO: 31.8 PG (ref 24–34)
MCHC RBC AUTO-ENTMCNC: 31.4 G/DL (ref 31–37)
MCV RBC AUTO: 101.4 FL (ref 78–100)
MONOCYTES # BLD: 1.2 K/UL (ref 0.05–1.2)
MONOCYTES NFR BLD: 9 % (ref 3–10)
NEUTS SEG # BLD: 10.9 K/UL (ref 1.8–8)
NEUTS SEG NFR BLD: 78 % (ref 40–73)
NRBC # BLD: 0 K/UL (ref 0–0.01)
NRBC BLD-RTO: 0 PER 100 WBC
PLATELET # BLD AUTO: 174 K/UL (ref 135–420)
PMV BLD AUTO: 10.2 FL (ref 9.2–11.8)
POTASSIUM SERPL-SCNC: 4.4 MMOL/L (ref 3.5–5.5)
RBC # BLD AUTO: 2.2 M/UL (ref 4.2–5.3)
SODIUM SERPL-SCNC: 131 MMOL/L (ref 136–145)
WBC # BLD AUTO: 14 K/UL (ref 4.6–13.2)

## 2023-02-06 PROCEDURE — 36415 COLL VENOUS BLD VENIPUNCTURE: CPT

## 2023-02-06 PROCEDURE — 85025 COMPLETE CBC W/AUTO DIFF WBC: CPT

## 2023-02-06 PROCEDURE — 82306 VITAMIN D 25 HYDROXY: CPT

## 2023-02-06 PROCEDURE — 74011250636 HC RX REV CODE- 250/636: Performed by: SPECIALIST

## 2023-02-06 PROCEDURE — 74011000250 HC RX REV CODE- 250: Performed by: PHYSICIAN ASSISTANT

## 2023-02-06 PROCEDURE — P9047 ALBUMIN (HUMAN), 25%, 50ML: HCPCS | Performed by: SPECIALIST

## 2023-02-06 PROCEDURE — 80048 BASIC METABOLIC PNL TOTAL CA: CPT

## 2023-02-06 PROCEDURE — 74011250637 HC RX REV CODE- 250/637: Performed by: PHYSICIAN ASSISTANT

## 2023-02-06 PROCEDURE — 74011250637 HC RX REV CODE- 250/637: Performed by: NURSE PRACTITIONER

## 2023-02-06 PROCEDURE — 65610000006 HC RM INTENSIVE CARE

## 2023-02-06 PROCEDURE — 94640 AIRWAY INHALATION TREATMENT: CPT

## 2023-02-06 PROCEDURE — 74011250636 HC RX REV CODE- 250/636: Performed by: PHYSICIAN ASSISTANT

## 2023-02-06 PROCEDURE — 83605 ASSAY OF LACTIC ACID: CPT

## 2023-02-06 PROCEDURE — 90935 HEMODIALYSIS ONE EVALUATION: CPT

## 2023-02-06 PROCEDURE — 94761 N-INVAS EAR/PLS OXIMETRY MLT: CPT

## 2023-02-06 PROCEDURE — 77010033678 HC OXYGEN DAILY

## 2023-02-06 RX ORDER — ALBUMIN HUMAN 250 G/1000ML
SOLUTION INTRAVENOUS
Status: DISPENSED
Start: 2023-02-06 | End: 2023-02-07

## 2023-02-06 RX ORDER — ALBUMIN HUMAN 250 G/1000ML
12.5 SOLUTION INTRAVENOUS
Status: DISCONTINUED | OUTPATIENT
Start: 2023-02-06 | End: 2023-02-07

## 2023-02-06 RX ORDER — MIDODRINE HYDROCHLORIDE 5 MG/1
10 TABLET ORAL
Status: DISCONTINUED | OUTPATIENT
Start: 2023-02-06 | End: 2023-02-09 | Stop reason: HOSPADM

## 2023-02-06 RX ORDER — SODIUM CHLORIDE 9 MG/ML
150 INJECTION, SOLUTION INTRAVENOUS ONCE
Status: COMPLETED | OUTPATIENT
Start: 2023-02-06 | End: 2023-02-06

## 2023-02-06 RX ADMIN — ALBUTEROL SULFATE 2 PUFF: 108 AEROSOL, METERED RESPIRATORY (INHALATION) at 05:00

## 2023-02-06 RX ADMIN — CHOLECALCIFEROL TAB 25 MCG (1000 UNIT) 1000 UNITS: 25 TAB at 09:00

## 2023-02-06 RX ADMIN — CALCITRIOL CAPSULES 0.25 MCG 0.25 MCG: 0.25 CAPSULE ORAL at 09:19

## 2023-02-06 RX ADMIN — ASPIRIN 81 MG: 81 TABLET, COATED ORAL at 09:19

## 2023-02-06 RX ADMIN — SODIUM CHLORIDE, PRESERVATIVE FREE 10 ML: 5 INJECTION INTRAVENOUS at 06:17

## 2023-02-06 RX ADMIN — ALBUTEROL SULFATE 2 PUFF: 108 AEROSOL, METERED RESPIRATORY (INHALATION) at 07:10

## 2023-02-06 RX ADMIN — SODIUM BICARBONATE 650 MG: 650 TABLET ORAL at 09:20

## 2023-02-06 RX ADMIN — TIOTROPIUM BROMIDE INHALATION SPRAY 2 PUFF: 3.12 SPRAY, METERED RESPIRATORY (INHALATION) at 07:10

## 2023-02-06 RX ADMIN — SEVELAMER CARBONATE 800 MG: 800 TABLET, FILM COATED ORAL at 11:29

## 2023-02-06 RX ADMIN — HYDROXYZINE PAMOATE 50 MG: 25 CAPSULE ORAL at 13:35

## 2023-02-06 RX ADMIN — SODIUM CHLORIDE, PRESERVATIVE FREE 10 ML: 5 INJECTION INTRAVENOUS at 16:37

## 2023-02-06 RX ADMIN — ALBUTEROL SULFATE 2 PUFF: 108 AEROSOL, METERED RESPIRATORY (INHALATION) at 00:39

## 2023-02-06 RX ADMIN — HEPARIN SODIUM 5000 UNITS: 5000 INJECTION INTRAVENOUS; SUBCUTANEOUS at 06:03

## 2023-02-06 RX ADMIN — EPOETIN ALFA-EPBX 10000 UNITS: 10000 INJECTION, SOLUTION INTRAVENOUS; SUBCUTANEOUS at 22:33

## 2023-02-06 RX ADMIN — HEPARIN SODIUM 5000 UNITS: 5000 INJECTION INTRAVENOUS; SUBCUTANEOUS at 22:35

## 2023-02-06 RX ADMIN — HYDROXYZINE PAMOATE 50 MG: 25 CAPSULE ORAL at 06:11

## 2023-02-06 RX ADMIN — SODIUM CHLORIDE, PRESERVATIVE FREE 10 ML: 5 INJECTION INTRAVENOUS at 22:00

## 2023-02-06 RX ADMIN — SODIUM CHLORIDE 150 ML/HR: 9 INJECTION, SOLUTION INTRAVENOUS at 15:12

## 2023-02-06 RX ADMIN — ALBUTEROL SULFATE 2 PUFF: 108 AEROSOL, METERED RESPIRATORY (INHALATION) at 11:51

## 2023-02-06 RX ADMIN — ALBUMIN (HUMAN) 25 G: 0.25 INJECTION, SOLUTION INTRAVENOUS at 23:30

## 2023-02-06 RX ADMIN — MIDODRINE HYDROCHLORIDE 10 MG: 5 TABLET ORAL at 16:36

## 2023-02-06 RX ADMIN — DEXAMETHASONE SODIUM PHOSPHATE 6 MG: 10 INJECTION, SOLUTION INTRAMUSCULAR; INTRAVENOUS at 06:04

## 2023-02-06 RX ADMIN — SEVELAMER CARBONATE 800 MG: 800 TABLET, FILM COATED ORAL at 16:36

## 2023-02-06 RX ADMIN — SEVELAMER CARBONATE 800 MG: 800 TABLET, FILM COATED ORAL at 09:20

## 2023-02-06 RX ADMIN — HEPARIN SODIUM 5000 UNITS: 5000 INJECTION INTRAVENOUS; SUBCUTANEOUS at 13:35

## 2023-02-06 RX ADMIN — MIDODRINE HYDROCHLORIDE 10 MG: 5 TABLET ORAL at 12:30

## 2023-02-06 RX ADMIN — ALBUTEROL SULFATE 2 PUFF: 108 AEROSOL, METERED RESPIRATORY (INHALATION) at 16:46

## 2023-02-06 NOTE — ROUTINE PROCESS
Bedside shift change report given to GEOVANNY Gibson RN (oncoming nurse) by SHANIKA Suarez RN (offgoing nurse). Report included the following information SBAR, Intake/Output, MAR, Recent Results, Med Rec Status, and Quality Measures. 1930  Shift assessment completed. Pt A&Ox3 and anxious and agitated and states she needs medication for her agitation. Will contact provider. Lungs on the right have expiratory wheezing and sounds tight. BS present present in all quadrants. Pt is ambulatory to the [facility] and had urinated and had a moderate BM. HR elevated at 113, respirations 30. BP WNL. ST with a ST depression and an inverted T wave on telemetry. CBWR.     2140  Spoke with provider and advised of pt's agitation and anxiety. New order received for 50 mg PO Vistaril Q8 PRN. 2250  Pt lying in bed with eyes closed. No AD noted. CBWR.     0020  2400 VSS with the exception of a HR of 109. Room temperature is cool per pt request. Pt also asked for a breathing Tx. RT advised. CBWR.    0230 Pt. Resting in bed quietly in bed, with eyes closed. No acute distress noted. Respirations even and unlabored. Occasional cough noted. 0430 Pt lying in bed with eyes closed. Easily aroused. Vital signs taken. Pt. coughing and expressed need for a neb treatment. RT called and made aware. Oxygen sat: 94% on RA. Pt. Able to reposition self and sitting up in bed supported by pillows. 0605  0600 medication administered per order. Pt requested Vistaril and it was given along with 500 ml of ice water. CBWR.     0645  Bedside shift change report given to SHANIKA Arrieta RN and [employer], RN student (oncoming nurse) by GEOVANNY Gibson RN (offgoing nurse). Report included the following information SBAR, Intake/Output, MAR, Recent Results, Med Rec Status, and Quality Measures.

## 2023-02-06 NOTE — CONSULTS
CARDIOLOGY CONSULTATION      REASON FOR CONSULT: Elevated troponin    REQUESTING PROVIDER: PAKO Hutchinson    CHIEF COMPLAINT:  Palpitations and shortness of breath    HISTORY OF PRESENT ILLNESS:  Domonique Khan is a 45y.o. year-old female with past medical history significant for end-stage renal disease on hemodialysis MWF, COPD, cardiomegaly, and aortic stenoisis, and tobacco abuse, patient presents to the ED with a chief complaint of ongoing shortness of breath and palpitations. She states her heart is racing and she believes that is causing her to fill with fluids. In the ED, she was also found to be COVID 19 positive. She remains hypotensive  and tachycardic which was similar to last admission. H/H 7.6/24.3, BUN 39 creatinine 4.13, troponin 287>270, BNP shown 89,213, EKG shown sinus tachycardia with diffuse t-wave inversion and anterolateral ST depressions. CTA chest negative for PE but showed bilateral pleural effusions. CXR w/ interstitial edema. She denies chest pain, fever, chills. On exam, she states she wants to be transferred to [address] [place] [address] that is where her doctors are. She is supposed to see vascular surgeon this week about AV fistula. She denies seeing cardiology other than at [facility]. Records from hospital admission course thus far reviewed. She was admitted to the hospital 1/5-1/6/2023, 1/19-1/20/2023 and again 1/29-2/1/2023 for similar symptoms. On last admission, noted to have NSTEMI but documented as type 2 from demand ischemia r/t COPD. Troponin peaked at 2600 just 7 days ago. She had 2D echo on 1/19/23, EF noted 48% w/mild global hypokinesis, moderate AS and AR. Limited echo on 1/30/23 showed EF 34% w/moderate global HK and severe inferior lateral HK. Diffuse T wave inversions and ST depression also noted on previous ecgs. Telemetry reviewed. No events overnight. Remains sinus tachycardia. INPATIENT MEDICATIONS:  Home medications reviewed.     Current Facility-Administered Medications:     [DISCONTINUED] acetaminophen (TYLENOL) tablet 650 mg, 650 mg, Oral, Q6H PRN **OR** acetaminophen (TYLENOL) suppository 650 mg, 650 mg, Rectal, Q6H PRN, PAKO Ty    dexamethasone (PF) (DECADRON) 10 mg/mL injection 6 mg, 6 mg, IntraVENous, Q24H, PAKO Ty, 6 mg at 02/06/23 0604    guaiFENesin-dextromethorphan (ROBITUSSIN DM) 100-10 mg/5 mL syrup 5 mL, 5 mL, Oral, Q4H PRN, PAKO Ty    levoFLOXacin (LEVAQUIN) 500 mg in D5W IVPB, 500 mg, IntraVENous, Q48H, PAKO Mcfarland, Last Rate: 100 mL/hr at 02/05/23 0559, 500 mg at 02/05/23 0559    albuterol (PROVENTIL HFA, VENTOLIN HFA, PROAIR HFA) inhaler 2 Puff, 2 Puff, Inhalation, Q4H PRN, PAKO Mcfarland, 2 Puff at 02/06/23 0710    aspirin delayed-release tablet 81 mg, 81 mg, Oral, DAILY, PAKO Mcfarland, 81 mg at 02/06/23 0279    calcitRIOL (ROCALTROL) capsule 0.25 mcg, 0.25 mcg, Oral, DAILY, PAKO Mcfarland, 0.25 mcg at 02/06/23 5583    cholecalciferol (VITAMIN D3) (1000 Units /25 mcg) tablet 1,000 Units, 1,000 Units, Oral, DAILY, PAKO Mcfarland, 1,000 Units at 02/06/23 0900    . PHARMACY TO SUBSTITUTE PER PROTOCOL (Reordered from: epoetin carmelo (Epogen) 10,000 unit/mL injection), , , Per Protocol, PAKO Mcfarland    sevelamer carbonate (RENVELA) tab 800 mg, 800 mg, Oral, TID WITH MEALS, PAKO Mcfarland, 800 mg at 02/06/23 0920    sodium bicarbonate tablet 650 mg, 650 mg, Oral, BID, PAKO Mcfarland, 650 mg at 02/06/23 0920    tiotropium bromide (SPIRIVA RESPIMAT) 2.5 mcg /actuation- INHALER IN PATIENT ROOM- ISOLATION, 2 Puff, Inhalation, QAM RT, PAKO Mcfarland, 2 Puff at 02/06/23 0710    sodium chloride (NS) flush 5-40 mL, 5-40 mL, IntraVENous, Q8H, PAKO Ty, 10 mL at 02/06/23 0617    sodium chloride (NS) flush 5-40 mL, 5-40 mL, IntraVENous, PRN, PAKO Ty    polyethylene glycol (MIRALAX) packet 17 g, 17 g, Oral, DAILY PRN, PAKO Mcfarland promethazine (PHENERGAN) tablet 12.5 mg, 12.5 mg, Oral, Q6H PRN **OR** ondansetron (ZOFRAN) injection 4 mg, 4 mg, IntraVENous, Q6H PRN, PAKO Caraballo    heparin (porcine) injection 5,000 Units, 5,000 Units, SubCUTAneous, Q8H, [place], 5,000 Units at 02/06/23 0603    LORazepam (ATIVAN) tablet 1 mg, 1 mg, Oral, BID PRN, Charles BACK, ACNP, 1 mg at 02/05/23 1214    nicotine (NICODERM CQ) 14 mg/24 hr patch 1 Patch, 1 Patch, TransDERmal, DAILY, Drea Duke ACNP, 1 Patch at 02/05/23 1435    melatonin tablet 9 mg, 9 mg, Oral, QHS PRN, Drea Duke S, ACNP    hydrOXYzine pamoate (VISTARIL) capsule 50 mg, 50 mg, Oral, TID PRN, PAKO Reynolds, 50 mg at 02/06/23 1523     ALLERGIES:  Allergies reviewed with the patient,  Allergies   Allergen Reactions    Dulaglutide Other (comments)     Nausea with vomiting and dizziness    Sitagliptin Other (comments)     Headache, runny nose, back pain and low blood sugars    Codeine Palpitations    Pcn [Penicillins] Hives    . FAMILY HISTORY:  Family history reviewed. SOCIAL HISTORY:  Notable for ongoing daily tobacco use, no heavy alcohol or illicit drug use. REVIEW OF SYSTEMS:  Complete review of systems performed, pertinents noted above, all other systems are negative. PHYSICAL EXAMINATION:  Vital sign assessment reveal a blood pressure of  89/52  and pulse rate of 102. Cardiovascular exam has a heart with a regular rhythm, tachycardic rate, normal S1 and S2. Soft murmur present. There are no rubs or gallops. Good peripheral pulses. No jugular venous distension. No carotid bruits are present. Respiratory exam reveals clear lung fields, no rales or rhonchi. Gastrointestinal exam has soft, nontender abdomen with normal bowel sounds. Lymphatic exam reveals trace BLE edema and no varicosities. No notable skin changes. Neurologic exam is nonfocal.  Musculoskeletal exam is notable for a normal gait.     Visit Vitals  BP (!) 89/52 (BP 1 Location: Right upper arm, BP Patient Position: At rest)   Pulse (!) 112   Temp 98.6 °F (37 °C)   Resp 24   Ht 5' 4\" (1.626 m)   Wt 71.1 kg (156 lb 11.2 oz)   SpO2 90%   Breastfeeding No   BMI 26.90 kg/m²         Recent labs results and imaging reviewed. Notable findings include   Lab Results   Component Value Date/Time    WBC 14.0 (H) 02/06/2023 04:03 AM    HGB 7.0 (L) 02/06/2023 04:03 AM    HCT 22.3 (L) 02/06/2023 04:03 AM    PLATELET 485 28/38/3838 04:03 AM    .4 (H) 02/06/2023 04:03 AM     Lab Results   Component Value Date/Time    NT pro-BNP 89,213 (H) 02/05/2023 02:05 AM    NT pro-BNP 75,243 (H) 01/29/2023 03:20 PM    NT pro-BNP 47,190 (H) 01/05/2023 10:34 PM      Lab Results   Component Value Date/Time    Sodium 131 (L) 02/06/2023 04:03 AM    Potassium 4.4 02/06/2023 04:03 AM    Chloride 92 (L) 02/06/2023 04:03 AM    CO2 24 02/06/2023 04:03 AM    Anion gap 15 02/06/2023 04:03 AM    Glucose 197 (H) 02/06/2023 04:03 AM    BUN 62 (H) 02/06/2023 04:03 AM    Creatinine 5.71 (H) 02/06/2023 04:03 AM    BUN/Creatinine ratio 11 (L) 02/06/2023 04:03 AM    GFR est AA 8 (L) 08/22/2022 01:48 PM    GFR est non-AA 6 (L) 08/22/2022 01:48 PM    Calcium 8.5 02/06/2023 04:03 AM     .       Discussed case with Dr. Jozef Box and our impression and recommendations are as follows:  NSTEMI: occurred last admission w/ troponin peaking at 2600 on 1/30/2023; now trending down. Denies chest pain, but notes recurrent SOB likely anginal equivalent. Continue asa, statin therapy. Cannot tolerate beta blocker due to chronic hypotension. Given symptoms and multiple risk factors, recommend LHC when stable. Will likely need to wait until COVID resolved. Patient requests transfer to [place] but no beds are currently available. Acute on chronic CHF: EF 34% (had been 48% just 2 wks prior). HD due today to assist w/ diuresis. GDMT limited by ESRD and hypotension. Daily wt and strict I&O documentation.   Hypotension: continue midodrine. Mitral stenosis: moderate to severe on echo. Will need MARCO to further evaluate for possible repair/replacement in the near future. Thank you for involving us in the care of this patient. Please do not hesitate to call me or Dr. Mehnaz Salcido if additional questions arise.     ABAD Ramos  2/6/2023

## 2023-02-06 NOTE — ROUTINE PROCESS
0700 Bedside shift change report given to SHANIKA Francis RN (oncoming nurse) by GEOVANNY Gibson RN (offgoing nurse). Report included the following information SBAR, Kardex, Intake/Output, MAR, Accordion, Recent Results, Med Rec Status, and Cardiac Rhythm ST . Pt resting in bed with no signs of distress and no c/o pain at this time. CBWR, 2 side rails up and bed locked in lowest position. 1100 Pt resting in bed with no signs of distress and no c/o pain at this time. CBWR, 2 side rails up and bed locked in lowest position. 1600 Pt resting in bed with no signs of distress and no c/o pain at this time. CBWR, 2 side rails up and bed locked in lowest position. 1900 Bedside shift change report given to ZACHERY Meraz RN (oncoming nurse) by SHANIKA Francis RN (offgoing nurse). Report included the following information SBAR, Kardex, Intake/Output, MAR, Accordion, Recent Results, Med Rec Status, and Cardiac Rhythm NSR/ST .

## 2023-02-06 NOTE — PROGRESS NOTES
Attempt to evaluate pt this morning, her BP was low and she was sleeping will return tomorrow to attempt evaluation.    Rupert Montgomery, PT, DPT

## 2023-02-06 NOTE — PROGRESS NOTES
OT eval order received. Pt reports being I with mobility and basic ADLs. No skilled OT indicated at present.  Agrees with d/c order  Thank You,   Sunny Adams MS, OTR/L

## 2023-02-06 NOTE — CONSULTS
Consult Note    Assessment:   - ESRD   - Recurrent CHF / Pleural effusion despite compliance with dialysis / FR I think mainly related to Sever MS and low EF   - Hypotension   - Anemia of CKD   - NSTEMI   - COVID infection     Recommendations:   - HD Today ( Yon[facility] Choe was contacted this morning ), target UF 2-3 L as BP tolerates , most likely she will need extra UF session tomorrow , UF can be limited because of low BP , will use IV Albumin if needed since she responded well to that in previous admissions , currently on Midodrine TID   - Sever MS needs to be addressed here since I think it's largely contributing to her problem along with low EF , consider transfer to [facility] when condition is stable   - Renal diet / continue with Binder   - EPO with HD   - Cardiology following   - COVID Management per primary team   - Follow labs       Consult requested by: Estela Keating MD    ADMIT DATE: 2023  CONSULT DATE: 2023                 Admission diagnosis: COVID-19   Reason for Nephrology Consultation:  ESRD    HPI: Natalie Calhoun is a 45 y.o. female [address] [facility] who is well known to me from OP with Hx of ESRD on HD MWF at [address] [place] [address] who is being admitted with SOB , she has recurrent admissions because of SOB / Recurrent CHF , Last echo with decrease EF and sever MS . She is compliant with dialysis and FR as she says , CXR with Mild Pulmonary edema , she has Low BP on Midodrine .         Past Medical History:   Diagnosis Date    ESRD on hemodialysis ([address]     Kidney failure       Past Surgical History:   Procedure Laterality Date    HX  SECTION      PARTIAL REMOVAL OF KIDNEY         Social History     Socioeconomic History    Marital status:      Spouse name: Not on file    Number of children: Not on file    Years of education: Not on file    Highest education level: Not on file   Occupational History    Not on file   Tobacco Use    Smoking status: Every Day Packs/day: 0.25     Years: 35.00     Pack years: 8.75     Types: Cigarettes    Smokeless tobacco: Never   Vaping Use    Vaping Use: Never used   Substance and Sexual Activity    Alcohol use: Not Currently    Drug use: Not Currently    Sexual activity: Not on file   Other Topics Concern    Not on file   Social History Narrative    Not on file     Social Determinants of Health     Financial Resource Strain: Not on file   Food Insecurity: Not on file   Transportation Needs: Not on file   Physical Activity: Not on file   Stress: Not on file   Social Connections: Not on file   Intimate Partner Violence: Not on file   Housing Stability: Not on file       Family History   Problem Relation Age of Onset    Diabetes Maternal Grandmother      Allergies   Allergen Reactions    Dulaglutide Other (comments)     Nausea with vomiting and dizziness    Sitagliptin Other (comments)     Headache, runny nose, back pain and low blood sugars    Codeine Palpitations    Pcn [Penicillins] Hives        Home Medications:     Medications Prior to Admission   Medication Sig    albuterol (PROVENTIL HFA, VENTOLIN HFA, PROAIR HFA) 90 mcg/actuation inhaler Take 2 Puffs by inhalation every four (4) hours as needed for Wheezing. tiotropium bromide (SPIRIVA RESPIMAT IN) Take  by inhalation. furosemide (LASIX) 80 mg tablet Take 80 mg by mouth daily. aspirin delayed-release 81 mg tablet Take 1 Tablet by mouth daily. sevelamer carbonate (RENVELA) 800 mg tab tab TAKE 1 TABLET BY MOUTH THREE TIMES DAILY WITH MEALS    calcitRIOL (ROCALTROL) 0.25 mcg capsule Take 1 capsule by mouth once daily    cholecalciferol (VITAMIN D3) (1000 Units /25 mcg) tablet Take 1 Tablet by mouth daily. epoetin carmelo (Epogen) 10,000 unit/mL injection 1 mL by SubCUTAneous route Once every 2 weeks. sodium bicarbonate 650 mg tablet Take 1 Tablet by mouth two (2) times a day. cyanocobalamin, vitamin B-12, 5,000 mcg subl 1 Tablet by SubLINGual route daily. Current Inpatient Medications:     Current Facility-Administered Medications   Medication Dose Route Frequency    midodrine (PROAMATINE) tablet 10 mg  10 mg Oral TID WITH MEALS    0.9% sodium chloride infusion  150 mL/hr IntraVENous ONCE    acetaminophen (TYLENOL) suppository 650 mg  650 mg Rectal Q6H PRN    dexamethasone (PF) (DECADRON) 10 mg/mL injection 6 mg  6 mg IntraVENous Q24H    guaiFENesin-dextromethorphan (ROBITUSSIN DM) 100-10 mg/5 mL syrup 5 mL  5 mL Oral Q4H PRN    levoFLOXacin (LEVAQUIN) 500 mg in D5W IVPB  500 mg IntraVENous Q48H    albuterol (PROVENTIL HFA, VENTOLIN HFA, PROAIR HFA) inhaler 2 Puff  2 Puff Inhalation Q4H PRN    aspirin delayed-release tablet 81 mg  81 mg Oral DAILY    calcitRIOL (ROCALTROL) capsule 0.25 mcg  0.25 mcg Oral DAILY    cholecalciferol (VITAMIN D3) (1000 Units /25 mcg) tablet 1,000 Units  1,000 Units Oral DAILY    sevelamer carbonate (RENVELA) tab 800 mg  800 mg Oral TID WITH MEALS    sodium bicarbonate tablet 650 mg  650 mg Oral BID    tiotropium bromide (SPIRIVA RESPIMAT) 2.5 mcg /actuation- INHALER IN PATIENT ROOM- ISOLATION  2 Puff Inhalation QAM RT    sodium chloride (NS) flush 5-40 mL  5-40 mL IntraVENous Q8H    sodium chloride (NS) flush 5-40 mL  5-40 mL IntraVENous PRN    polyethylene glycol (MIRALAX) packet 17 g  17 g Oral DAILY PRN    promethazine (PHENERGAN) tablet 12.5 mg  12.5 mg Oral Q6H PRN    Or    ondansetron (ZOFRAN) injection 4 mg  4 mg IntraVENous Q6H PRN    heparin (porcine) injection 5,000 Units  5,000 Units SubCUTAneous Q8H    LORazepam (ATIVAN) tablet 1 mg  1 mg Oral BID PRN    nicotine (NICODERM CQ) 14 mg/24 hr patch 1 Patch  1 Patch TransDERmal DAILY    melatonin tablet 9 mg  9 mg Oral QHS PRN    hydrOXYzine pamoate (VISTARIL) capsule 50 mg  50 mg Oral TID PRN       Review of Systems:   No fever or chills. No sore throat. No cough or hemoptysis. + shortness of breath or chest pain. No orthopnea or paroxysmal nocturnal dyspnea. Good appetite. No nausea, vomiting, abdominal pain, melena or hematochezia. No constipation or diarrhea. No dysuria, no gross hematuria of voiding difficulties. No ankle swelling, no joint paints. No muscle aches. No skin changes. No dizziness or lightheadedness. No headaches. Physical Assessment:     Vitals:    02/06/23 1152 02/06/23 1200 02/06/23 1230 02/06/23 1231   BP:   (!) 88/59 (!) 88/59   Pulse:  (!) 102 (!) 102 (!) 102   Resp:    29   Temp:    98.3 °F (36.8 °C)   SpO2: 95%   95%   Weight:       Height:         Last 3 Recorded Weights in this Encounter    02/04/23 2123 02/05/23 0509 02/06/23 0612   Weight: 69.4 kg (153 lb 1.6 oz) 69.5 kg (153 lb 4.8 oz) 71.1 kg (156 lb 11.2 oz)     Admission weight: Weight: 69.4 kg (153 lb 1.6 oz) (02/04/23 2123)      Intake/Output Summary (Last 24 hours) at 2/6/2023 1445  Last data filed at 2/6/2023 2409  Gross per 24 hour   Intake 900 ml   Output 50 ml   Net 850 ml     Exam is limited because of COVID Status     Patient is Dyspneic  , using accessory muscles   HEENT: Head is normocephalic and atraumatic. Pupils are round, equal, reactive to light. Sclerae are anicteric. Oropharynx clear. Neck: no cervical lymphadenopathy or thyromegaly. Lungs: using accessory muscles   Cardiovascular system: S1, S2, regular rate and rhythm. No murmurs, gallops or rubs. Abdomen: soft, non tender, non distended. Positive bowel sounds. Extremities: no clubbing, cyanosis or edema. Integumentary: skin is grossly intact. Neurologic: Alert, oriented time three. Cooperative and appropriate.      Data Review:    Labs: Results:       Chemistry Recent Labs     02/06/23  0403 02/04/23  2145   * 148*   * 137   K 4.4 3.5   CL 92* 97*   CO2 24 30   BUN 62* 39*   CREA 5.71* 4.13*   CA 8.5 8.7   AGAP 15 10   BUCR 11* 9*         CBC w/Diff Recent Labs     02/06/23  0403 02/04/23  2145   WBC 14.0* 9.5   RBC 2.20* 2.41*   HGB 7.0* 7.6*   HCT 22.3* 24.3*    196   GRANS 78* 66   LYMPH 9* 18*   EOS 0 1         Iron/Ferritin No results for input(s): IRON in the last 72 hours. No lab exists for component: TIBCCALC   PTH/VIT D No results for input(s): PTH in the last 72 hours.     No lab exists for component: Luz Marina Bahena MD  [facility]  February 6, 2023

## 2023-02-06 NOTE — PROGRESS NOTES
Hospitalist Progress Note             Date of Service:  2023  NAME:  Aiyana Capps  :  1970  MRN:  [id number]    Assessment / Plan:       Patient Active Problem List     Diagnosis Date Noted    Sepsis ([facility] [address] 2023    COVID-19 2023    Pulmonary edema 2023    Pleural effusion 2023    COPD exacerbation ([facility] [address] 2023    End stage renal failure on dialysis ([facility] [address] [facility] 2023    CKD (chronic kidney disease) requiring chronic dialysis ([facility] [address] 2023    Type 2 MI (myocardial infarction) ([facility] 2023    Acute respiratory failure with hypoxia (HCC) 2023    Fluid overload 2023    Hypotension 2023    Chronic kidney disease (CKD), stage V [facility]) [[id number]] 2022        COVID-19  - started on Levaquin with multiple allergies otherwise, will continue  -Initial Solu-Medrol given but will continue with Decadron  -COVID protocol monitoring   -Hold Baricitnib due to ESRD  -Supportive care  -Contact precautions droplet plus     End stage renal failure on dialysis [facility])  - Nephrology consultation  -Hemodialysis  and Friday     COPD exacerbation ([facility] [address]  - Continue Levaquin  -Maintain oxygen saturation 93% currently at 4 L by nasal cannula  -Respiratory therapy following  -Continuing with Decadron for COVID     Pleural effusion  - in short term needs aggressive HD, today and tomorrow, in longer term needs mitral valve stenosis addressed, probably a Left heat cath as well, neither will be addressed this hospitalizaton due to covid, needs outpt cardiology follow up  - these effusions are chronic, bilateral, and will be adressed by way of HD and volume removal, I see no reason for transfer at this time.     Renal Cell Carcinoma  - pt following with outpt oncology    Thrombosed L arm fistula/graft  - upcoming vascular appt to address           Hospital Problems  Date Reviewed: 2/5/2023            Codes Class Noted POA    Pleural effusion ICD-10-CM: J90  ICD-9-CM: 511.9  2/5/2023 Unknown        Sepsis ([facility] ICD-10-CM: A41.9  ICD-9-CM: 038.9, 995.91  2/5/2023 Unknown        * (Principal) COVID-19 ICD-10-CM: U07.1  ICD-9-CM: 079.89  2/5/2023 Unknown        End stage renal failure on dialysis [facility]) ICD-10-CM: N18.6, Z99.2  ICD-9-CM: 585.6, V45.11  1/6/2023 Yes        COPD exacerbation ([facility] [address] [address] ICD-10-CM: J44.1  ICD-9-CM: 491.21  1/6/2023 Unknown             Review of Systems:   A comprehensive review of systems was negative except for that written in the HPI. Vital Signs:    Last 24hrs VS reviewed since prior progress note. Most recent are:  Visit Vitals  BP (!) 88/59 (BP 1 Location: Right upper arm)   Pulse (!) 102   Temp 98.3 °F (36.8 °C)   Resp 29   Ht 5' 4\" (1.626 m)   Wt 71.1 kg (156 lb 11.2 oz)   SpO2 95%   Breastfeeding No   BMI 26.90 kg/m²         Intake/Output Summary (Last 24 hours) at 2/6/2023 1434  Last data filed at 2/6/2023 7442  Gross per 24 hour   Intake 900 ml   Output 50 ml   Net 850 ml        Physical Examination:             General:          Alert, cooperative, no distress, appears stated age. HEENT:           Atraumatic, anicteric sclerae, pink conjunctivae                          No oral ulcers, mucosa moist, throat clear, dentition fair  Neck:               Supple, symmetrical  Lungs:             decreased bs at bases  Chest wall:      No tenderness  No Accessory muscle use. Heart:              Regular  rhythm,  No  murmur   No edema  Abdomen:        Soft, non-tender. Not distended. Bowel sounds normal  Extremities:     No cyanosis. No clubbing,                            Skin turgor normal, Capillary refill normal  Skin:                Not pale. Not Jaundiced  No rashes   Psych:             Not anxious or agitated.   Neurologic:      Alert, moves all extremities, answers questions appropriately and responds to commands        Data Review: Review and/or order of clinical lab test  Review and/or order of tests in the radiology section of CPT  Review and/or order of tests in the medicine section of CPT      Labs:     Recent Labs     02/06/23 0403 02/04/23  2145   WBC 14.0* 9.5   HGB 7.0* 7.6*   HCT 22.3* 24.3*    196     Recent Labs     02/06/23 0403 02/04/23  2145   * 137   K 4.4 3.5   CL 92* 97*   CO2 24 30   BUN 62* 39*   CREA 5.71* 4.13*   * 148*   CA 8.5 8.7   MG  --  2.2     No results for input(s): ALT, AP, TBIL, TBILI, TP, ALB, GLOB, GGT, AML, LPSE in the last 72 hours. No lab exists for component: SGOT, GPT, AMYP, HLPSE  No results for input(s): INR, PTP, APTT, INREXT in the last 72 hours. No results for input(s): FE, TIBC, PSAT, FERR in the last 72 hours. No results found for: FOL, RBCF   Recent Labs     02/05/23  0118   PH 7.45   PCO2 37   PO2 65*     No results for input(s): CPK, CKNDX, TROIQ in the last 72 hours.     No lab exists for component: CPKMB  No results found for: CHOL, CHOLX, CHLST, CHOLV, HDL, HDLP, LDL, LDLC, DLDLP, TGLX, TRIGL, TRIGP, CHHD, CHHDX  Lab Results   Component Value Date/Time    Glucose (POC) 380 (H) 01/09/2023 11:55 AM    Glucose (POC) 384 (H) 01/09/2023 08:54 AM    Glucose (POC) 267 (H) 01/08/2023 09:31 PM    Glucose (POC) 219 (H) 01/08/2023 04:19 PM    Glucose (POC) 240 (H) 01/08/2023 11:23 AM     Lab Results   Component Value Date/Time    Color Yellow 01/05/2023 11:45 PM    Appearance Clear 01/05/2023 11:45 PM    Specific gravity 1.018 01/05/2023 11:45 PM    pH (UA) 7.5 01/05/2023 11:45 PM    Protein 300 (A) 01/05/2023 11:45 PM    Glucose 500 (A) 01/05/2023 11:45 PM    Ketone Negative 01/05/2023 11:45 PM    Bilirubin Negative 01/05/2023 11:45 PM    Urobilinogen 0.2 01/05/2023 11:45 PM    Nitrites Negative 01/05/2023 11:45 PM    Leukocyte Esterase Small (A) 01/05/2023 11:45 PM    Epithelial cells Few 01/05/2023 11:45 PM    Bacteria 3+ (A) 01/05/2023 11:45 PM    WBC 10-20 01/05/2023 11:45 PM    RBC 20-50 01/05/2023 11:45 PM         Medications Reviewed:     Current Facility-Administered Medications   Medication Dose Route Frequency    midodrine (PROAMATINE) tablet 10 mg  10 mg Oral TID WITH MEALS    acetaminophen (TYLENOL) suppository 650 mg  650 mg Rectal Q6H PRN    dexamethasone (PF) (DECADRON) 10 mg/mL injection 6 mg  6 mg IntraVENous Q24H    guaiFENesin-dextromethorphan (ROBITUSSIN DM) 100-10 mg/5 mL syrup 5 mL  5 mL Oral Q4H PRN    levoFLOXacin (LEVAQUIN) 500 mg in D5W IVPB  500 mg IntraVENous Q48H    albuterol (PROVENTIL HFA, VENTOLIN HFA, PROAIR HFA) inhaler 2 Puff  2 Puff Inhalation Q4H PRN    aspirin delayed-release tablet 81 mg  81 mg Oral DAILY    calcitRIOL (ROCALTROL) capsule 0.25 mcg  0.25 mcg Oral DAILY    cholecalciferol (VITAMIN D3) (1000 Units /25 mcg) tablet 1,000 Units  1,000 Units Oral DAILY    sevelamer carbonate (RENVELA) tab 800 mg  800 mg Oral TID WITH MEALS    sodium bicarbonate tablet 650 mg  650 mg Oral BID    tiotropium bromide (SPIRIVA RESPIMAT) 2.5 mcg /actuation- INHALER IN PATIENT ROOM- ISOLATION  2 Puff Inhalation QAM RT    sodium chloride (NS) flush 5-40 mL  5-40 mL IntraVENous Q8H    sodium chloride (NS) flush 5-40 mL  5-40 mL IntraVENous PRN    polyethylene glycol (MIRALAX) packet 17 g  17 g Oral DAILY PRN    promethazine (PHENERGAN) tablet 12.5 mg  12.5 mg Oral Q6H PRN    Or    ondansetron (ZOFRAN) injection 4 mg  4 mg IntraVENous Q6H PRN    heparin (porcine) injection 5,000 Units  5,000 Units SubCUTAneous Q8H    LORazepam (ATIVAN) tablet 1 mg  1 mg Oral BID PRN    nicotine (NICODERM CQ) 14 mg/24 hr patch 1 Patch  1 Patch TransDERmal DAILY    melatonin tablet 9 mg  9 mg Oral QHS PRN    hydrOXYzine pamoate (VISTARIL) capsule 50 mg  50 mg Oral TID PRN     ______________________________________________________________________  EXPECTED LENGTH OF STAY: 5d 7h  ACTUAL LENGTH OF STAY:          1                 Arun Pandya MD

## 2023-02-07 LAB
ANION GAP SERPL CALC-SCNC: 13 MMOL/L (ref 3–18)
BASOPHILS # BLD: 0 K/UL (ref 0–0.1)
BASOPHILS NFR BLD: 0 % (ref 0–2)
BUN SERPL-MCNC: 30 MG/DL (ref 7–18)
BUN/CREAT SERPL: 9 (ref 12–20)
CA-I BLD-MCNC: 9 MG/DL (ref 8.5–10.1)
CHLORIDE SERPL-SCNC: 96 MMOL/L (ref 100–111)
CO2 SERPL-SCNC: 27 MMOL/L (ref 21–32)
CREAT SERPL-MCNC: 3.23 MG/DL (ref 0.6–1.3)
DIFFERENTIAL METHOD BLD: ABNORMAL
EOSINOPHIL # BLD: 0 K/UL (ref 0–0.4)
EOSINOPHIL NFR BLD: 0 % (ref 0–5)
ERYTHROCYTE [DISTWIDTH] IN BLOOD BY AUTOMATED COUNT: 18.8 % (ref 11.6–14.5)
GLUCOSE SERPL-MCNC: 150 MG/DL (ref 74–99)
HCT VFR BLD AUTO: 23.1 % (ref 35–45)
HGB BLD-MCNC: 7.6 G/DL (ref 12–16)
IMM GRANULOCYTES # BLD AUTO: 0 K/UL
IMM GRANULOCYTES NFR BLD AUTO: 0 %
LYMPHOCYTES # BLD: 2 K/UL (ref 0.9–3.6)
LYMPHOCYTES NFR BLD: 15 % (ref 21–52)
MAGNESIUM SERPL-MCNC: 2.2 MG/DL (ref 1.6–2.6)
MCH RBC QN AUTO: 32.3 PG (ref 24–34)
MCHC RBC AUTO-ENTMCNC: 32.9 G/DL (ref 31–37)
MCV RBC AUTO: 98.3 FL (ref 78–100)
METAMYELOCYTES NFR BLD MANUAL: 2 %
MONOCYTES # BLD: 0.7 K/UL (ref 0.05–1.2)
MONOCYTES NFR BLD: 5 % (ref 3–10)
MYELOCYTES NFR BLD MANUAL: 4 %
NEUTS BAND NFR BLD MANUAL: 1 % (ref 0–5)
NEUTS SEG # BLD: 9.9 K/UL (ref 1.8–8)
NEUTS SEG NFR BLD: 72 % (ref 40–73)
NRBC # BLD: 0.02 K/UL (ref 0–0.01)
NRBC BLD-RTO: 0.1 PER 100 WBC
PHOSPHATE SERPL-MCNC: 3.4 MG/DL (ref 2.5–4.9)
PLATELET # BLD AUTO: 181 K/UL (ref 135–420)
PMV BLD AUTO: 9.8 FL (ref 9.2–11.8)
POTASSIUM SERPL-SCNC: 3.4 MMOL/L (ref 3.5–5.5)
PROCALCITONIN SERPL-MCNC: 0.12 NG/ML
PROMYELOCYTES NFR BLD MANUAL: 1 %
RBC # BLD AUTO: 2.35 M/UL (ref 4.2–5.3)
RBC MORPH BLD: ABNORMAL
SODIUM SERPL-SCNC: 136 MMOL/L (ref 136–145)
WBC # BLD AUTO: 13.5 K/UL (ref 4.6–13.2)
WBC MORPH BLD: ABNORMAL

## 2023-02-07 PROCEDURE — 83735 ASSAY OF MAGNESIUM: CPT

## 2023-02-07 PROCEDURE — 84145 PROCALCITONIN (PCT): CPT

## 2023-02-07 PROCEDURE — 74011250636 HC RX REV CODE- 250/636: Performed by: PHYSICIAN ASSISTANT

## 2023-02-07 PROCEDURE — 74011250637 HC RX REV CODE- 250/637: Performed by: NURSE PRACTITIONER

## 2023-02-07 PROCEDURE — 74011000250 HC RX REV CODE- 250: Performed by: PHYSICIAN ASSISTANT

## 2023-02-07 PROCEDURE — 65610000006 HC RM INTENSIVE CARE

## 2023-02-07 PROCEDURE — 85025 COMPLETE CBC W/AUTO DIFF WBC: CPT

## 2023-02-07 PROCEDURE — 77010033678 HC OXYGEN DAILY

## 2023-02-07 PROCEDURE — 94640 AIRWAY INHALATION TREATMENT: CPT

## 2023-02-07 PROCEDURE — 80048 BASIC METABOLIC PNL TOTAL CA: CPT

## 2023-02-07 PROCEDURE — 74011250636 HC RX REV CODE- 250/636: Performed by: SPECIALIST

## 2023-02-07 PROCEDURE — 94761 N-INVAS EAR/PLS OXIMETRY MLT: CPT

## 2023-02-07 PROCEDURE — 74011250637 HC RX REV CODE- 250/637: Performed by: PHYSICIAN ASSISTANT

## 2023-02-07 PROCEDURE — 84100 ASSAY OF PHOSPHORUS: CPT

## 2023-02-07 PROCEDURE — 5A1D70Z PERFORMANCE OF URINARY FILTRATION, INTERMITTENT, LESS THAN 6 HOURS PER DAY: ICD-10-PCS | Performed by: INTERNAL MEDICINE

## 2023-02-07 RX ORDER — ALBUMIN HUMAN 250 G/1000ML
25 SOLUTION INTRAVENOUS
Status: DISCONTINUED | OUTPATIENT
Start: 2023-02-07 | End: 2023-02-09 | Stop reason: HOSPADM

## 2023-02-07 RX ADMIN — ALBUTEROL SULFATE 2 PUFF: 108 AEROSOL, METERED RESPIRATORY (INHALATION) at 08:15

## 2023-02-07 RX ADMIN — ASPIRIN 81 MG: 81 TABLET, COATED ORAL at 08:00

## 2023-02-07 RX ADMIN — LEVOFLOXACIN 500 MG: 5 INJECTION, SOLUTION INTRAVENOUS at 05:28

## 2023-02-07 RX ADMIN — CALCITRIOL CAPSULES 0.25 MCG 0.25 MCG: 0.25 CAPSULE ORAL at 08:00

## 2023-02-07 RX ADMIN — LORAZEPAM 1 MG: 1 TABLET ORAL at 10:26

## 2023-02-07 RX ADMIN — TIOTROPIUM BROMIDE INHALATION SPRAY 2 PUFF: 3.12 SPRAY, METERED RESPIRATORY (INHALATION) at 07:26

## 2023-02-07 RX ADMIN — MIDODRINE HYDROCHLORIDE 10 MG: 5 TABLET ORAL at 08:00

## 2023-02-07 RX ADMIN — HEPARIN SODIUM 5000 UNITS: 5000 INJECTION INTRAVENOUS; SUBCUTANEOUS at 13:49

## 2023-02-07 RX ADMIN — ALBUTEROL SULFATE 2 PUFF: 108 AEROSOL, METERED RESPIRATORY (INHALATION) at 14:24

## 2023-02-07 RX ADMIN — SODIUM CHLORIDE, PRESERVATIVE FREE 10 ML: 5 INJECTION INTRAVENOUS at 21:36

## 2023-02-07 RX ADMIN — SEVELAMER CARBONATE 800 MG: 800 TABLET, FILM COATED ORAL at 08:00

## 2023-02-07 RX ADMIN — HEPARIN SODIUM 5000 UNITS: 5000 INJECTION INTRAVENOUS; SUBCUTANEOUS at 05:28

## 2023-02-07 RX ADMIN — EPOETIN ALFA-EPBX 10000 UNITS: 10000 INJECTION, SOLUTION INTRAVENOUS; SUBCUTANEOUS at 01:35

## 2023-02-07 RX ADMIN — HEPARIN SODIUM 5000 UNITS: 5000 INJECTION INTRAVENOUS; SUBCUTANEOUS at 21:35

## 2023-02-07 RX ADMIN — CHOLECALCIFEROL TAB 25 MCG (1000 UNIT) 1000 UNITS: 25 TAB at 08:00

## 2023-02-07 RX ADMIN — HYDROXYZINE PAMOATE 50 MG: 25 CAPSULE ORAL at 21:35

## 2023-02-07 RX ADMIN — DEXAMETHASONE SODIUM PHOSPHATE 6 MG: 10 INJECTION, SOLUTION INTRAMUSCULAR; INTRAVENOUS at 05:28

## 2023-02-07 RX ADMIN — SODIUM CHLORIDE, PRESERVATIVE FREE 10 ML: 5 INJECTION INTRAVENOUS at 05:28

## 2023-02-07 RX ADMIN — MIDODRINE HYDROCHLORIDE 10 MG: 5 TABLET ORAL at 11:27

## 2023-02-07 RX ADMIN — SEVELAMER CARBONATE 800 MG: 800 TABLET, FILM COATED ORAL at 11:27

## 2023-02-07 NOTE — PROGRESS NOTES
Physician Progress Note      PATIENTAlease Payment  CSN #:                  [id number]  :                       1970  ADMIT DATE:       2023 9:22 PM  [address] [place] DATE:  RESPONDING  PROVIDER #:        Saintclair Cleaver MD Mitchell Morton MD          QUERY TEXT:    Dear Hospitalist  Pt admitted with Covid 19 infection. Pt noted to have sepsis in  H&P. If possible, please document in progress notes and discharge summary the present on admission status of sepsis. The medical record reflects the following:  Risk Factors: ESRD   on  HD ; Covid 19  infection  Clinical Indicators: no temp since admit ; Pt had  WBC   9.5 on admit     and   >  24 hrs later on     WBC now up to 14.0;  HR  110 on admit ; pro morena  0.26;  lactic acid  1.4; Treatment:  IV levaquin 500mg  q48h    Thank you,    Linad Singletary RN   CCDS  Options provided:  -- Yes, sepsis, secondary  to Covid 19 ,  was present at the time of the order to admit to the hospital  -- No, sepsis,  secondary  to Covid 19,  was not present on admission and developed during the inpatient stay  -- Other - I will add my own diagnosis  -- Disagree - Not applicable / Not valid  -- Disagree - Clinically unable to determine / Unknown  -- Refer to Clinical Documentation Reviewer    PROVIDER RESPONSE TEXT:    SIRS only. Due to Fluid overload due to ESRD requiring HD and severe Mitral valve regurgitation. Query created by: Michelle Calix on 2023 12:56 PM      QUERY TEXT:    Dear Hospitalist  Pt admitted with Covid  infection  and has CHF documented. If possible, please document in progress notes and discharge summary further specificity regarding the type and acuity of CHF:    The medical record reflects the following:  Risk Factors: recent  NSTEMI;   ESRD  on  HD;  Clinical Indicators: BNP   89, 213  on admit ; + pleural effusions;  pulm edema  noted on CXR; pt noted to have rales in ER  Limited echo on 23 showed EF 34% w/moderate global HK ;   Treatment: daily  weight;  strict  I&O;  dialysis    Thank you,    Jennifer Asher RN   CCDS  Options provided:  -- Acute on Chronic Systolic CHF/HFrEF  -- Acute Systolic CHF/HFrEF  -- Other - I will add my own diagnosis  -- Disagree - Not applicable / Not valid  -- Disagree - Clinically unable to determine / Unknown  -- Refer to Clinical Documentation Reviewer    PROVIDER RESPONSE TEXT:    This patient is in acute on chronic systolic CHF/HFrEF.     Query created by: Billy Sanchez on 2/6/2023 1:04 PM      Electronically signed by:  Dalia Najera MD 2/7/2023 2:18 PM

## 2023-02-07 NOTE — PROGRESS NOTES
Physician Progress Note      Juliana Duran  CSN #:                  [id number]  :                       1970  ADMIT DATE:       2023 3:12 PM  Eliz Price DATE:        2023 6:27 PM  RESPONDING  PROVIDER #:        Liz Sutton MD          QUERY TEXT:    Pt admitted with Respiratory Failure and Pulmonary Edema. Pt noted to have ESRD, Cardiomyopathy, Aortic stenosis. If possible, please document if you are evaluating and/or treating any of the following: The medical record reflects the following:  Risk Factors: 44 y/o F, ESRD, Pulmonary Edema, COPD, Hypotension, Anemia, Cardiomyopathy, Aortic Stensosis    Clinical Indicators:  Per DC summary:  Pt is doing better sp HD. Her nephrologist feels she is not compliant, but she says she is, and that her urine outpt has greatly changed at home, seemingly lasix has stopped working. Ok for home after hd today  Follow up with cardiology as outpt for eval for MV repair. Treatment: HD, Nephrology and Cardiology consults, midodrine,    Thank you,  Rangel DE LA TORREN, RN, CRCR  Please contact me for any questions or concerns regarding this query at [email or web address]  Options provided:  -- Acute pulmonary edema due to heart disease  -- Acute pulmonary edema due to heart failure  -- Noncardiogenic acute pulmonary edema due to ESRD  -- Other - I will add my own diagnosis  -- Disagree - Not applicable / Not valid  -- Disagree - Clinically unable to determine / Unknown  -- Refer to Clinical Documentation Reviewer    PROVIDER RESPONSE TEXT:    This patient has noncardiogenic acute pulmonary edema due to ESRD.     Query created by: Bob Blackburn on 2023 10:59 AM      Electronically signed by:  Liz Sutton MD 2023 2:19 PM

## 2023-02-07 NOTE — PROGRESS NOTES
0700 received care of pt sitting up in bed watching TV. Pt denies needs at this time    0820 shift assessments completed. Morning meds provided. Pt complaining of non productive cough. Otherwise t has no complaints. Pt denies needs. Call bell is within reach. Pt hypotensive, asymptomatic. Scheduled midodrine provided. 1020 pt complaining of feeling \"nervous and shaky\" . PO ativan provided. 1200 Offered to set pt up for a bath, she refuses, states \"I am too tired right now\". 1330 pt sitting up in bed eating lunch, denies needs    1558 Dialysis nurse in for treatment    1800 pt lying in bed awake.  Dialysis nurse at bedside

## 2023-02-07 NOTE — PROCEDURES
Hemodialysis / [phone]    Vitals Pre Post Assessment Pre Post   /52   115/61 LOC A&Ox4 A&Ox4   HR 92   100 Lungs Diminished bilat lung sounds  Intermittent non-productive cough  Tachypnea at rest Diminished bilat lung sounds  Intermittent nonproductive cough     Resp 32   23 Cardiac NSR NSR   Temp Temp: 98 °F (36.7 °C) (02/07/23 1602) 98.4 Skin Warm and dry Warm and dry   Weight  N/a N/a Edema none none   Tele status bedside bedside Pain Denies pain Denies pain     Orders   Duration: Start: 1630 End: 1830 Total: 2hr   Dialyzer: Dialyzer/Set Up Inspection: Revaclear (02/07/23 1602)   K Bath: Dialysate K (mEq/L): 2 (02/06/23 2240)   Ca Bath: Dialysate CA (mEq/L): 2.5 (02/06/23 2240)   Na: Dialysate NA (mEq/L): 138 (02/06/23 2240)   Bicarb: Dialysate HCO3 (mEq/L): 35 (02/06/23 2240)   Target Fluid Removal: Goal/Amount of Fluid to Remove (mL): 2000 mL (02/07/23 1602)     Access   Type & Location: R CVC   Comments:    PC : Pre- Assessment: Dressing CDI. No s/s of infection. Both lumens aspirate & flush well. Running well at . Post assessment: Dressing CDI. No changes.                                         Labs   HBsAg (Antigen) / date: neg                                         HBsAb (Antibody) / date: denia   Source: Epic 1/31/23   Obtained/Reviewed  Critical Results Called HGB   Date Value Ref Range Status   02/07/2023 7.6 (L) 12.0 - 16.0 g/dL Final     Potassium   Date Value Ref Range Status   02/07/2023 3.4 (L) 3.5 - 5.5 mmol/L Final     Comment:     CHANGE NOTED     Calcium   Date Value Ref Range Status   02/07/2023 9.0 8.5 - 10.1 mg/dL Final     BUN   Date Value Ref Range Status   02/07/2023 30 (H) 7 - 18 mg/dL Final     Comment:     BUN/CREA CHANGES NOTED     Creatinine   Date Value Ref Range Status   02/07/2023 3.23 (H) 0.60 - 1.30 mg/dL Final        Meds Given   Name Dose Route   none                 Adequacy / Fluid    Total Liters Process: 0L   Net Fluid Removed: 2000mL      Comments   Time Out Done:   (Time) 1625   Admitting Diagnosis: COVID   Consent obtained/signed: Informed Consent Verified: Yes (02/07/23 1602)   Machine / Jose Lenoa # Machine Number: [id number] (02/07/23 7937)   Primary Nurse Rpt Pre: Sp Mayers RN   Primary Nurse Rpt Post: Sp Mayers RN   Pt Education: HD treatment    Care Plan: Continue HD as ordered   Pts outpatient clinic: [facility]   Comments:    Orders clarified with Dr. Reyna Cummins- patient is to have a 2 HR UF only. SBAR received from Primary RN. At bedside  A&Ox4. Chronic consent applies. 1630: Each catheter limb disinfected per p&p, caps removed, hubs disinfected per p&p. Each lumen aspirated for blood return and flushed with Normal Saline per policy. VSS. Dialysis Tx initiated. 1830: Tx ended. VSS. Each dialysis catheter limb disinfected per p&p, all possible blood returned per p&p, and each dialysis hub disinfected per p&p. Each lumen flushed, post dialysis catheter and caps applied. Bed locked and in the lowest position, call bell and belongings in reach. SBAR given to Primary, MIO. Patient is stable at time of my departure. All Dialysis related medications have been reviewed.

## 2023-02-07 NOTE — PROGRESS NOTES
CARDIOLOGY PROGRESS NOTE      Patient Name: Cameron Martinez  Age: 45 y.o. Gender:female  :1970  MRN: [id number]        Patient seen and examined. This is a patient who is followed for NSTEMI, new cardiomyopathy. She denies chest pain. Shortness of breath persists but mildly improved after dialysis No other complaints reported. Telemetry reviewed, there were no events noted in the past 24 hours. Pertinent review of systems items noted above, all other systems are negative. Current medications reviewed. Physical Examination    Allergies   Allergen Reactions    Dulaglutide Other (comments)     Nausea with vomiting and dizziness    Sitagliptin Other (comments)     Headache, runny nose, back pain and low blood sugars    Codeine Palpitations    Pcn [Penicillins] Hives       Vital signs are stable. Blood pressure 95/52, Pulse 98  No apparent distress. Heart is regular, rate and rhythm. Normal S1, S2, no murmurs are appreciated. Lungs are clear bilaterally. Abdomen is soft, nontender, normal bowel sounds. Extremities have no edema. Labs reviewed: All lab results for the last 24 hours reviewed.   Recent Results (from the past 12 hour(s))   PROCALCITONIN    Collection Time: 23  3:13 AM   Result Value Ref Range    Procalcitonin 0.12 ng/mL   CBC WITH AUTOMATED DIFF    Collection Time: 23  3:13 AM   Result Value Ref Range    WBC 13.5 (H) 4.6 - 13.2 K/uL    RBC 2.35 (L) 4.20 - 5.30 M/uL    HGB 7.6 (L) 12.0 - 16.0 g/dL    HCT 23.1 (L) 35.0 - 45.0 %    MCV 98.3 78.0 - 100.0 FL    MCH 32.3 24.0 - 34.0 PG    MCHC 32.9 31.0 - 37.0 g/dL    RDW 18.8 (H) 11.6 - 14.5 %    PLATELET 507 754 - 030 K/uL    MPV 9.8 9.2 - 11.8 FL    NRBC 0.1 (H) 0.0  WBC    ABSOLUTE NRBC 0.02 (H) 0.00 - 0.01 K/uL    NEUTROPHILS 72 40 - 73 %    BAND NEUTROPHILS 1 0 - 5 %    LYMPHOCYTES 15 (L) 21 - 52 %    MONOCYTES 5 3 - 10 %    EOSINOPHILS 0 0 - 5 %    BASOPHILS 0 0 - 2 %    METAMYELOCYTES 2 (H) 0 %    MYELOCYTES 4 (H) 0 %    PROMYELOCYTES 1 (H) 0 %    IMMATURE GRANULOCYTES 0 %    ABS. NEUTROPHILS 9.9 (H) 1.8 - 8.0 K/UL    ABS. LYMPHOCYTES 2.0 0.9 - 3.6 K/UL    ABS. MONOCYTES 0.7 0.05 - 1.2 K/UL    ABS. EOSINOPHILS 0.0 0.0 - 0.4 K/UL    ABS. BASOPHILS 0.0 0.0 - 0.1 K/UL    ABS. IMM. GRANS. 0.0 K/UL    DF Manual      RBC COMMENTS Anisocytosis  1+        WBC COMMENTS Toxic Granulation     MAGNESIUM    Collection Time: 02/07/23  3:13 AM   Result Value Ref Range    Magnesium 2.2 1.6 - 2.6 mg/dL   METABOLIC PANEL, BASIC    Collection Time: 02/07/23  3:13 AM   Result Value Ref Range    Sodium 136 136 - 145 mmol/L    Potassium 3.4 (L) 3.5 - 5.5 mmol/L    Chloride 96 (L) 100 - 111 mmol/L    CO2 27 21 - 32 mmol/L    Anion gap 13 3.0 - 18.0 mmol/L    Glucose 150 (H) 74 - 99 mg/dL    BUN 30 (H) 7 - 18 mg/dL    Creatinine 3.23 (H) 0.60 - 1.30 mg/dL    BUN/Creatinine ratio 9 (L) 12 - 20      eGFR 16 (L) >60 ml/min/1.73m2    Calcium 9.0 8.5 - 10.1 mg/dL   PHOSPHORUS    Collection Time: 02/07/23  3:13 AM   Result Value Ref Range    Phosphorus 3.4 2.5 - 4.9 mg/dL          Case discussed with Dr. Caitlin Harrington and our impression and recommendations are as follows:  NSTEMI: occurred last admission w/ troponin peaking at 2600 on 1/30/2023; now trending down. Denies chest pain, but notes recurrent SOB likely anginal equivalent. Continue asa, statin therapy. Cannot tolerate beta blocker due to chronic hypotension. Given symptoms and multiple risk factors, recommend LHC when stable. Will need to wait until COVID resolved. Patient requests transfer to [facility] but no beds are currently available. Acute on chronic CHF: EF 34% (had been 48% just 2 wks prior). HD due today to assist w/ diuresis. GDMT limited by ESRD and hypotension. Daily wt and strict I&O documentation. Hypotension: continue midodrine. Mitral stenosis: moderate to severe on echo. Possible etiology for shortness of breath.  Will need MARCO to further evaluate for possible repair/replacement in the near future. Please do not hesitate to call me or Dr. Tia Lynn if additional questions arise.     ABAD Figueroa  2/7/2023

## 2023-02-07 NOTE — PROGRESS NOTES
1900 Assumed care of pt from off going nurse SHANIKA Gannon RN. Pt is A&Ox4 with no c/o needing dialysis. Dailysis nurse's ETA 2230. Pt made aware of this time. Pt states that she will leave AMA if she does not get dialysis soon. This nurse educated pt of the risk of leaving AMA and reassured her that she is on the waiting list for a bed @ [facility]. 2205 Dialysis nurse @[facility]. 7968 Dialysis complete 2.7 L removed. 0400 In bed resting quietly. Bedside and Verbal shift change report given to LOYD Alicia LPN (oncoming nurse) by Shakira Lloyd RN (offgoing nurse). Report included the following information SBAR, Kardex, Intake/Output, MAR, and Recent Results.

## 2023-02-07 NOTE — PROGRESS NOTES
HOSPITALIST PROGRESS NOTE  Luis Felipe Ramos MD, [address]          Daily Progress Note: 2/7/2023      Subjective:     Patient is alert and oriented x4. Continues to have intermittent shortness of breath however significant improved and currently not hypoxic and tolerating room air. No overnight fever/chills, chest pain, nausea/vomiting noted. Currently pending repeat dialysis per nephrology on 2/7/2023.       Medications reviewed  Current Facility-Administered Medications   Medication Dose Route Frequency    albumin human 25% (BUMINATE) solution 25 g  25 g IntraVENous DIALYSIS PRN    midodrine (PROAMATINE) tablet 10 mg  10 mg Oral TID WITH MEALS    epoetin carmelo-epbx (RETACRIT) injection 10,000 Units  10,000 Units IntraVENous Q MON, WED & FRI    acetaminophen (TYLENOL) suppository 650 mg  650 mg Rectal Q6H PRN    dexamethasone (PF) (DECADRON) 10 mg/mL injection 6 mg  6 mg IntraVENous Q24H    guaiFENesin-dextromethorphan (ROBITUSSIN DM) 100-10 mg/5 mL syrup 5 mL  5 mL Oral Q4H PRN    levoFLOXacin (LEVAQUIN) 500 mg in D5W IVPB  500 mg IntraVENous Q48H    albuterol (PROVENTIL HFA, VENTOLIN HFA, PROAIR HFA) inhaler 2 Puff  2 Puff Inhalation Q4H PRN    aspirin delayed-release tablet 81 mg  81 mg Oral DAILY    calcitRIOL (ROCALTROL) capsule 0.25 mcg  0.25 mcg Oral DAILY    cholecalciferol (VITAMIN D3) (1000 Units /25 mcg) tablet 1,000 Units  1,000 Units Oral DAILY    sevelamer carbonate (RENVELA) tab 800 mg  800 mg Oral TID WITH MEALS    tiotropium bromide (SPIRIVA RESPIMAT) 2.5 mcg /actuation- INHALER IN PATIENT ROOM- ISOLATION  2 Puff Inhalation QAM RT    sodium chloride (NS) flush 5-40 mL  5-40 mL IntraVENous Q8H    sodium chloride (NS) flush 5-40 mL  5-40 mL IntraVENous PRN    polyethylene glycol (MIRALAX) packet 17 g  17 g Oral DAILY PRN    promethazine (PHENERGAN) tablet 12.5 mg  12.5 mg Oral Q6H PRN    Or    ondansetron (ZOFRAN) injection 4 mg  4 mg IntraVENous Q6H PRN    heparin (porcine) injection 5,000 Units  5,000 Units SubCUTAneous Q8H    LORazepam (ATIVAN) tablet 1 mg  1 mg Oral BID PRN    nicotine (NICODERM CQ) 14 mg/24 hr patch 1 Patch  1 Patch TransDERmal DAILY    melatonin tablet 9 mg  9 mg Oral QHS PRN    hydrOXYzine pamoate (VISTARIL) capsule 50 mg  50 mg Oral TID PRN       Review of Systems:   A comprehensive review of systems was negative except for that written in the HPI. Objective:   Physical Exam:     Visit Vitals  BP (!) 97/47   Pulse 99   Temp 98.4 °F (36.9 °C)   Resp 22   Ht 5' 4\" (1.626 m)   Wt 69.9 kg (154 lb 3.2 oz)   SpO2 95%   Breastfeeding No   BMI 26.47 kg/m²    O2 Flow Rate (L/min): 2 l/min O2 Device: None (Room air)  Patient Vitals for the past 8 hrs:   Temp Pulse Resp BP SpO2   23 1422 -- -- -- -- 95 %   23 1230 98.4 °F (36.9 °C) 99 22 (!) 97/47 97 %   23 0817 -- -- -- (!) 95/52 --   23 0816 -- -- -- -- 94 %   23 0800 -- (!) 104 -- -- --   23 0757 98.1 °F (36.7 °C) 94 22 (!) 86/48 --   23 0726 -- -- -- -- 94 %          Temp (24hrs), Av.1 °F (36.7 °C), Min:97.8 °F (36.6 °C), Max:98.4 °F (36.9 °C)    No intake/output data recorded.  1901 -  07  In: 700 [P.O.:700]  Out: 2800 [Urine:50]    General:  Alert, cooperative, no distress, appears stated age. Lungs:   Clear to auscultation bilaterally. Chest wall:  No tenderness or deformity. Heart:  Regular rate and rhythm, S1, S2 normal, no murmur, click, rub or gallop. Abdomen:   Soft, non-tender. Bowel sounds normal. No masses,  No organomegaly. Extremities: Extremities normal, atraumatic, no cyanosis or edema. Pulses: 2+ and symmetric all extremities. Skin: Skin color, texture, turgor normal. No rashes or lesions   Neurologic: CNII-XII intact.  No gross sensory or motor deficits     Data Review:       Recent Days:  Recent Labs     23  0313 23  0403 23  2145   WBC 13.5* 14.0* 9.5   HGB 7. 6* 7.0* 7.6*   HCT 23.1* 22.3* 24.3*    174 196     Recent Labs     02/07/23  0313 02/06/23  0403 02/04/23  2145    131* 137   K 3.4* 4.4 3.5   CL 96* 92* 97*   CO2 27 24 30   * 197* 148*   BUN 30* 62* 39*   CREA 3.23* 5.71* 4.13*   CA 9.0 8.5 8.7   MG 2.2  --  2.2   PHOS 3.4  --   --      Recent Labs     02/05/23  0118   PH 7.45   PCO2 37   PO2 65*   HCO3 26   FIO2 28       24 Hour Results:  Recent Results (from the past 24 hour(s))   PROCALCITONIN    Collection Time: 02/07/23  3:13 AM   Result Value Ref Range    Procalcitonin 0.12 ng/mL   CBC WITH AUTOMATED DIFF    Collection Time: 02/07/23  3:13 AM   Result Value Ref Range    WBC 13.5 (H) 4.6 - 13.2 K/uL    RBC 2.35 (L) 4.20 - 5.30 M/uL    HGB 7.6 (L) 12.0 - 16.0 g/dL    HCT 23.1 (L) 35.0 - 45.0 %    MCV 98.3 78.0 - 100.0 FL    MCH 32.3 24.0 - 34.0 PG    MCHC 32.9 31.0 - 37.0 g/dL    RDW 18.8 (H) 11.6 - 14.5 %    PLATELET 140 682 - 423 K/uL    MPV 9.8 9.2 - 11.8 FL    NRBC 0.1 (H) 0.0  WBC    ABSOLUTE NRBC 0.02 (H) 0.00 - 0.01 K/uL    NEUTROPHILS 72 40 - 73 %    BAND NEUTROPHILS 1 0 - 5 %    LYMPHOCYTES 15 (L) 21 - 52 %    MONOCYTES 5 3 - 10 %    EOSINOPHILS 0 0 - 5 %    BASOPHILS 0 0 - 2 %    METAMYELOCYTES 2 (H) 0 %    MYELOCYTES 4 (H) 0 %    PROMYELOCYTES 1 (H) 0 %    IMMATURE GRANULOCYTES 0 %    ABS. NEUTROPHILS 9.9 (H) 1.8 - 8.0 K/UL    ABS. LYMPHOCYTES 2.0 0.9 - 3.6 K/UL    ABS. MONOCYTES 0.7 0.05 - 1.2 K/UL    ABS. EOSINOPHILS 0.0 0.0 - 0.4 K/UL    ABS. BASOPHILS 0.0 0.0 - 0.1 K/UL    ABS. IMM.  GRANS. 0.0 K/UL    DF Manual      RBC COMMENTS Anisocytosis  1+        WBC COMMENTS Toxic Granulation     MAGNESIUM    Collection Time: 02/07/23  3:13 AM   Result Value Ref Range    Magnesium 2.2 1.6 - 2.6 mg/dL   METABOLIC PANEL, BASIC    Collection Time: 02/07/23  3:13 AM   Result Value Ref Range    Sodium 136 136 - 145 mmol/L    Potassium 3.4 (L) 3.5 - 5.5 mmol/L    Chloride 96 (L) 100 - 111 mmol/L    CO2 27 21 - 32 mmol/L    Anion gap 13 3.0 - 18.0 mmol/L    Glucose 150 (H) 74 - 99 mg/dL    BUN 30 (H) 7 - 18 mg/dL    Creatinine 3.23 (H) 0.60 - 1.30 mg/dL    BUN/Creatinine ratio 9 (L) 12 - 20      eGFR 16 (L) >60 ml/min/1.73m2    Calcium 9.0 8.5 - 10.1 mg/dL   PHOSPHORUS    Collection Time: 02/07/23  3:13 AM   Result Value Ref Range    Phosphorus 3.4 2.5 - 4.9 mg/dL           Assessment/Plan:     Acute hypoxia  Currently improved and able to be weaned off of supplemental O2 of 2 L and down to room air now. Seen ambulating in the room without significant issues. COVID-19  - started on Levaquin with multiple allergies otherwise, will continue  -Initial Solu-Medrol given but will continue with Decadron  -COVID protocol monitoring   -Hold Baricitnib due to ESRD  -Supportive care  -Contact precautions droplet plus     End stage renal failure on dialysis [facility])  Nephrology consultation  Patient to get repeat dialysis today per nephrology, on 2/7/2023.  -Hemodialysis Monday Wednesday and Friday     COPD exacerbation ([address]  Continue Levaquin  Maintain oxygen saturation 93% currently at 4 L by nasal cannula  Respiratory therapy following  Continuing with Decadron for COVID     Pleural effusion  Short term needs aggressive HD, today and tomorrow, in longer term needs mitral valve stenosis addressed, probably a Left heat cath as well, neither will be addressed this hospitalizaton due to covid, needs outpt cardiology follow up  Effusions are chronic, bilateral, and will be adressed by way of HD and volume removal.     Renal Cell Carcinoma  - pt following with outpt oncology     Thrombosed L arm fistula/graft  - upcoming vascular appt to address    Likely given discharged home on 2/8/2023 however will require dialysis later today per nephrology. Care Plan discussed with: Patient/Family, Nurse, and     Total time spent with patient: 30 minutes. With greater than 50% spent in coordination of care and counseling.     Louise Mcdowell MD

## 2023-02-07 NOTE — PROGRESS NOTES
Patient had HD yesterday and she tolerated fine , 3 L was removed and looks like Resp status has improved , will plan on 2h UF session today with 2 L UF and continue MWF after that .

## 2023-02-07 NOTE — PROGRESS NOTES
Orders received for P.T., therapist spoke with nurse and she reports pt is getting up in room without assistance and is tolerating well. Will discontinue order at this time.   Kedar Luong, PT, DPT

## 2023-02-07 NOTE — DIALYSIS
Hemodialysis / [phone]    Vitals Pre Post Assessment Pre Post   BP BP: 125/71 (02/06/23 2315) 106/52 LOC A/O x4, anxious  Resting, wakes to voice   HR Pulse (Heart Rate): 92 (02/06/23 2315) 104 Lungs Diminished bilaterally with crackles present, sitting in orthopneic position Remains diminished and slightly tachypnic but breathes deeper   Resp Resp Rate: 28 (02/06/23 2315) 24 Cardiac NSR with Sinus tach during coughing episodes Tachy, irregular, occasional P waves   Temp Temp: 98.2 °F (36.8 °C) (02/06/23 2039) 97.9 Axillary Skin WDI WDI, dressing NOT changed to site as to not wake patient   Weight    Edema None None   Tele status Bedside monitor Bedside monitor Pain Pain Intensity 1: 0 (02/06/23 2039) 0/10     Orders   Duration: Start: 2250 End: 0150 Total: 3 hours   Dialyzer: Dialyzer/Set Up Inspection: Cole Cast (02/06/23 2240)   K Bath: Dialysate K (mEq/L): 2 (02/06/23 2240)   Ca Bath: Dialysate CA (mEq/L): 2.5 (02/06/23 2240)   Na: Dialysate NA (mEq/L): 138 (02/06/23 2240)   Bicarb: Dialysate HCO3 (mEq/L): 35 (02/06/23 2240)   Target Fluid Removal: Goal/Amount of Fluid to Remove (mL): 3000 mL (02/06/23 2240)     Access   Type & Location: Right subclavian CVC, no S/S of infection. Dressing changed. Limbs +aspiration/flush.     Comments:                                        Labs   HBsAg (Antigen) / date: Negative 1/31/23                                             HBsAb (Antibody) / date: Unknown 1/31/23   Source: [facility]   Obtained/Reviewed  Critical Results Called HGB   Date Value Ref Range Status   02/06/2023 7.0 (L) 12.0 - 16.0 g/dL Final     Potassium   Date Value Ref Range Status   02/06/2023 4.4 3.5 - 5.5 mmol/L Final     Comment:     CHANGE NOTED     Calcium   Date Value Ref Range Status   02/06/2023 8.5 8.5 - 10.1 mg/dL Final     BUN   Date Value Ref Range Status   02/06/2023 62 (H) 7 - 18 mg/dL Final     Comment:     BUN/CREA CHANGES NOTED     Creatinine   Date Value Ref Range Status 02/06/2023 5.71 (H) 0.60 - 1.30 mg/dL Final        Meds Given   Name Dose Route   Epogen/Retacrit 10,000 units/mL Dialysis   Albumin 25% 25g Dialysis           Adequacy / Fluid    Total Liters Process: 67.2   Net Fluid Removed: 3000 mL       Comments   Time Out Done:   (Time) 2210   Admitting Diagnosis: Shortness of Breath   Consent obtained/signed: Informed Consent Verified: Yes (Chronic consent applies) (02/06/23 2240)   Machine / RO # Machine Number: [id number] (02/06/23 2240)   Primary Nurse Rpt Pre: Payal Moss RN   Primary Nurse Rpt Post: Payal Moss RN   Pt Education: Procedure and run time   Care Plan: Continue HD per MD orders   Pts outpatient clinic: [facility]   Comments:                         6703 - Treatment started without difficulty. Patient sitting in orthopneic position, tachypnic with non-productive cough on arrival to bedside. Increasingly tachycardic with each coughing episode. Tachycardia resolves once coughing has calmed. Diminished in bilateral lower lobes with slight crackles present. Only speaking in 1-2 word sentences. 2330 - Albumin initiated due to hypotension, patient asymptomatic. 0130 - Patient tachycardic and irregular but with P waves. Wakes to voice or touch. 5 - Patient remains intermittently tachycardic but with occasional P waves. Treatment completed, all blood rinsed back, limbs remain +aspiration/flush x2, cleaned per policy and sterile caps applied. Bed low and locked, call bell in reach, report given to primary RN.

## 2023-02-08 LAB
ANION GAP SERPL CALC-SCNC: 14 MMOL/L (ref 3–18)
BASOPHILS # BLD: 0 K/UL (ref 0–0.1)
BASOPHILS NFR BLD: 0 % (ref 0–2)
BUN SERPL-MCNC: 57 MG/DL (ref 7–18)
BUN/CREAT SERPL: 12 (ref 12–20)
CA-I BLD-MCNC: 8.5 MG/DL (ref 8.5–10.1)
CHLORIDE SERPL-SCNC: 94 MMOL/L (ref 100–111)
CO2 SERPL-SCNC: 25 MMOL/L (ref 21–32)
CREAT SERPL-MCNC: 4.77 MG/DL (ref 0.6–1.3)
DIFFERENTIAL METHOD BLD: ABNORMAL
EOSINOPHIL # BLD: 0 K/UL (ref 0–0.4)
EOSINOPHIL NFR BLD: 0 % (ref 0–5)
ERYTHROCYTE [DISTWIDTH] IN BLOOD BY AUTOMATED COUNT: 18.9 % (ref 11.6–14.5)
GLUCOSE SERPL-MCNC: 174 MG/DL (ref 74–99)
HCT VFR BLD AUTO: 23.9 % (ref 35–45)
HGB BLD-MCNC: 7.5 G/DL (ref 12–16)
IMM GRANULOCYTES # BLD AUTO: 0 K/UL
IMM GRANULOCYTES NFR BLD AUTO: 0 %
LYMPHOCYTES # BLD: 2.1 K/UL (ref 0.9–3.6)
LYMPHOCYTES NFR BLD: 16 % (ref 21–52)
MCH RBC QN AUTO: 31.5 PG (ref 24–34)
MCHC RBC AUTO-ENTMCNC: 31.4 G/DL (ref 31–37)
MCV RBC AUTO: 100.4 FL (ref 78–100)
MONOCYTES # BLD: 0.8 K/UL (ref 0.05–1.2)
MONOCYTES NFR BLD: 6 % (ref 3–10)
NEUTS BAND NFR BLD MANUAL: 4 % (ref 0–5)
NEUTS SEG # BLD: 10.1 K/UL (ref 1.8–8)
NEUTS SEG NFR BLD: 71 % (ref 40–73)
NRBC # BLD: 0.04 K/UL (ref 0–0.01)
NRBC BLD-RTO: 0.3 PER 100 WBC
OTHER CELLS NFR BLD MANUAL: 3 %
PLATELET # BLD AUTO: 162 K/UL (ref 135–420)
PMV BLD AUTO: 10.6 FL (ref 9.2–11.8)
POTASSIUM SERPL-SCNC: 3.8 MMOL/L (ref 3.5–5.5)
RBC # BLD AUTO: 2.38 M/UL (ref 4.2–5.3)
RBC MORPH BLD: ABNORMAL
RBC MORPH BLD: ABNORMAL
SODIUM SERPL-SCNC: 133 MMOL/L (ref 136–145)
WBC # BLD AUTO: 13.4 K/UL (ref 4.6–13.2)

## 2023-02-08 PROCEDURE — 80048 BASIC METABOLIC PNL TOTAL CA: CPT

## 2023-02-08 PROCEDURE — 85025 COMPLETE CBC W/AUTO DIFF WBC: CPT

## 2023-02-08 PROCEDURE — 74011250637 HC RX REV CODE- 250/637: Performed by: NURSE PRACTITIONER

## 2023-02-08 PROCEDURE — 74011250636 HC RX REV CODE- 250/636: Performed by: PHYSICIAN ASSISTANT

## 2023-02-08 PROCEDURE — 94761 N-INVAS EAR/PLS OXIMETRY MLT: CPT

## 2023-02-08 PROCEDURE — 94618 PULMONARY STRESS TESTING: CPT

## 2023-02-08 PROCEDURE — 94640 AIRWAY INHALATION TREATMENT: CPT

## 2023-02-08 PROCEDURE — 74011000250 HC RX REV CODE- 250: Performed by: PHYSICIAN ASSISTANT

## 2023-02-08 PROCEDURE — 36415 COLL VENOUS BLD VENIPUNCTURE: CPT

## 2023-02-08 PROCEDURE — 74011250637 HC RX REV CODE- 250/637: Performed by: PHYSICIAN ASSISTANT

## 2023-02-08 PROCEDURE — 65610000006 HC RM INTENSIVE CARE

## 2023-02-08 RX ADMIN — CHOLECALCIFEROL TAB 25 MCG (1000 UNIT) 1000 UNITS: 25 TAB at 08:10

## 2023-02-08 RX ADMIN — SODIUM CHLORIDE, PRESERVATIVE FREE 10 ML: 5 INJECTION INTRAVENOUS at 15:22

## 2023-02-08 RX ADMIN — CALCITRIOL CAPSULES 0.25 MCG 0.25 MCG: 0.25 CAPSULE ORAL at 08:10

## 2023-02-08 RX ADMIN — ALBUTEROL SULFATE 2 PUFF: 108 AEROSOL, METERED RESPIRATORY (INHALATION) at 07:44

## 2023-02-08 RX ADMIN — HEPARIN SODIUM 5000 UNITS: 5000 INJECTION INTRAVENOUS; SUBCUTANEOUS at 06:05

## 2023-02-08 RX ADMIN — HEPARIN SODIUM 5000 UNITS: 5000 INJECTION INTRAVENOUS; SUBCUTANEOUS at 15:21

## 2023-02-08 RX ADMIN — ALBUTEROL SULFATE 2 PUFF: 108 AEROSOL, METERED RESPIRATORY (INHALATION) at 13:38

## 2023-02-08 RX ADMIN — MIDODRINE HYDROCHLORIDE 10 MG: 5 TABLET ORAL at 15:17

## 2023-02-08 RX ADMIN — ASPIRIN 81 MG: 81 TABLET, COATED ORAL at 08:10

## 2023-02-08 RX ADMIN — DEXAMETHASONE SODIUM PHOSPHATE 6 MG: 10 INJECTION, SOLUTION INTRAMUSCULAR; INTRAVENOUS at 06:05

## 2023-02-08 RX ADMIN — SODIUM CHLORIDE, PRESERVATIVE FREE 10 ML: 5 INJECTION INTRAVENOUS at 06:06

## 2023-02-08 RX ADMIN — TIOTROPIUM BROMIDE INHALATION SPRAY 2 PUFF: 3.12 SPRAY, METERED RESPIRATORY (INHALATION) at 07:44

## 2023-02-08 RX ADMIN — SEVELAMER CARBONATE 800 MG: 800 TABLET, FILM COATED ORAL at 08:10

## 2023-02-08 RX ADMIN — MIDODRINE HYDROCHLORIDE 10 MG: 5 TABLET ORAL at 08:10

## 2023-02-08 RX ADMIN — SEVELAMER CARBONATE 800 MG: 800 TABLET, FILM COATED ORAL at 15:17

## 2023-02-08 NOTE — PROGRESS NOTES
Hospitalist Progress Note             Date of Service:  2023  NAME:  Yvonne Riley  :  1970  MRN:  [id number]    Assessment/Plan:      Acute hypoxia  Again on 0xygen 2 liters, awaiitng HD  Seen ambulating in the room without significant issues.      COVID-19  - started on Levaquin with multiple allergies otherwise, will continue  -Initial Solu-Medrol given but will continue with Decadron  -COVID protocol monitoring   -Hold Baricitnib due to ESRD  -Supportive care  -Contact precautions droplet plus     End stage renal failure on dialysis [facility])  Nephrology consultation  Patient to get repeat dialysis today per nephrology, on 2023.  -Hemodialysis  and Friday     COPD exacerbation ([address]  Continue Levaquin  Maintain oxygen saturation 93% currently at 4 L by nasal cannula  Respiratory therapy following  Continuing with Decadron for COVID     Pleural effusion  Short term needs aggressive HD, today and tomorrow, in longer term needs mitral valve stenosis addressed, probably a Left heat cath as well, neither will be addressed this hospitalizaton due to covid, needs outpt cardiology follow up  Effusions are chronic, bilateral, and will be adressed by way of HD and volume removal.     Renal Cell Carcinoma  - pt following with outpt oncology     Thrombosed L arm fistula/graft  - upcoming vascular appt to address    Plan on dc tomorrwo after 6 min walk    Hospital Problems  Date Reviewed: 2023            Codes Class Noted POA    Pleural effusion ICD-10-CM: J90  ICD-9-CM: 511.9  2023 Unknown        Sepsis [facility] ICD-10-CM: A41.9  ICD-9-CM: 038.9, 995.91  2023 Unknown        * (Principal) COVID-19 ICD-10-CM: U07.1  ICD-9-CM: 079.89  2023 Unknown        End stage renal failure on dialysis [facility]) ICD-10-CM: N18.6, Z99.2  ICD-9-CM: 585.6, V45.11  2023 Yes        COPD exacerbation ([facility] ICD-10-CM: J44.1  ICD-9-CM: 491.21  1/6/2023 Unknown             Review of Systems:   A comprehensive review of systems was negative except for that written in the HPI. Vital Signs:    Last 24hrs VS reviewed since prior progress note. Most recent are:  Visit Vitals  BP (!) 107/57 (BP 1 Location: Right upper arm, BP Patient Position: At rest)   Pulse 96   Temp 98.2 °F (36.8 °C)   Resp 23   Ht 5' 4\" (1.626 m)   Wt 69.6 kg (153 lb 8 oz)   SpO2 97%   Breastfeeding No   BMI 26.35 kg/m²         Intake/Output Summary (Last 24 hours) at 2/8/2023 1555  Last data filed at 2/7/2023 1830  Gross per 24 hour   Intake --   Output 2000 ml   Net -2000 ml        Physical Examination:             General:          Alert, cooperative, no distress, appears stated age. HEENT:           Atraumatic, anicteric sclerae, pink conjunctivae                          No oral ulcers, mucosa moist, throat clear, dentition fair  Neck:               Supple, symmetrical  Lungs:             crackels at bases  Chest wall:      No tenderness  No Accessory muscle use. Heart:              Regular  rhythm,  No  murmur   No edema  Abdomen:        Soft, non-tender. Not distended. Bowel sounds normal  Extremities:     No cyanosis. No clubbing,                            Skin turgor normal, Capillary refill normal  Skin:                Not pale. Not Jaundiced  No rashes   Psych:             Not anxious or agitated.   Neurologic:      Alert, moves all extremities, answers questions appropriately and responds to commands        Data Review:    Review and/or order of clinical lab test  Review and/or order of tests in the radiology section of CPT  Review and/or order of tests in the medicine section of CPT      Labs:     Recent Labs     02/08/23 0313 02/07/23 0313   WBC 13.4* 13.5*   HGB 7.5* 7.6*   HCT 23.9* 23.1*    181     Recent Labs     02/08/23 0313 02/07/23 0313 02/06/23  0403   * 136 131*   K 3.8 3.4* 4.4   CL 94* 96* 92*   CO2 25 27 24   BUN 57* 30* 62*   CREA 4.77* 3.23* 5.71*   * 150* 197*   CA 8.5 9.0 8.5   MG  --  2.2  --    PHOS  --  3.4  --      No results for input(s): ALT, AP, TBIL, TBILI, TP, ALB, GLOB, GGT, AML, LPSE in the last 72 hours. No lab exists for component: SGOT, GPT, AMYP, HLPSE  No results for input(s): INR, PTP, APTT, INREXT in the last 72 hours. No results for input(s): FE, TIBC, PSAT, FERR in the last 72 hours. No results found for: FOL, RBCF   No results for input(s): PH, PCO2, PO2 in the last 72 hours. No results for input(s): CPK, CKNDX, TROIQ in the last 72 hours.     No lab exists for component: CPKMB  No results found for: CHOL, CHOLX, CHLST, CHOLV, HDL, HDLP, LDL, LDLC, DLDLP, TGLX, TRIGL, TRIGP, CHHD, CHHDX  Lab Results   Component Value Date/Time    Glucose (POC) 380 (H) 01/09/2023 11:55 AM    Glucose (POC) 384 (H) 01/09/2023 08:54 AM    Glucose (POC) 267 (H) 01/08/2023 09:31 PM    Glucose (POC) 219 (H) 01/08/2023 04:19 PM    Glucose (POC) 240 (H) 01/08/2023 11:23 AM     Lab Results   Component Value Date/Time    Color Yellow 01/05/2023 11:45 PM    Appearance Clear 01/05/2023 11:45 PM    Specific gravity 1.018 01/05/2023 11:45 PM    pH (UA) 7.5 01/05/2023 11:45 PM    Protein 300 (A) 01/05/2023 11:45 PM    Glucose 500 (A) 01/05/2023 11:45 PM    Ketone Negative 01/05/2023 11:45 PM    Bilirubin Negative 01/05/2023 11:45 PM    Urobilinogen 0.2 01/05/2023 11:45 PM    Nitrites Negative 01/05/2023 11:45 PM    Leukocyte Esterase Small (A) 01/05/2023 11:45 PM    Epithelial cells Few 01/05/2023 11:45 PM    Bacteria 3+ (A) 01/05/2023 11:45 PM    WBC 10-20 01/05/2023 11:45 PM    RBC 20-50 01/05/2023 11:45 PM         Medications Reviewed:     Current Facility-Administered Medications   Medication Dose Route Frequency    albumin human 25% (BUMINATE) solution 25 g  25 g IntraVENous DIALYSIS PRN    midodrine (PROAMATINE) tablet 10 mg  10 mg Oral TID WITH MEALS    epoetin carmelo-epbx (RETACRIT) injection 10,000 Units  10,000 Units IntraVENous Q MON, WED & FRI    acetaminophen (TYLENOL) suppository 650 mg  650 mg Rectal Q6H PRN    dexamethasone (PF) (DECADRON) 10 mg/mL injection 6 mg  6 mg IntraVENous Q24H    guaiFENesin-dextromethorphan (ROBITUSSIN DM) 100-10 mg/5 mL syrup 5 mL  5 mL Oral Q4H PRN    levoFLOXacin (LEVAQUIN) 500 mg in D5W IVPB  500 mg IntraVENous Q48H    albuterol (PROVENTIL HFA, VENTOLIN HFA, PROAIR HFA) inhaler 2 Puff  2 Puff Inhalation Q4H PRN    aspirin delayed-release tablet 81 mg  81 mg Oral DAILY    calcitRIOL (ROCALTROL) capsule 0.25 mcg  0.25 mcg Oral DAILY    cholecalciferol (VITAMIN D3) (1000 Units /25 mcg) tablet 1,000 Units  1,000 Units Oral DAILY    sevelamer carbonate (RENVELA) tab 800 mg  800 mg Oral TID WITH MEALS    tiotropium bromide (SPIRIVA RESPIMAT) 2.5 mcg /actuation- INHALER IN PATIENT ROOM- ISOLATION  2 Puff Inhalation QAM RT    sodium chloride (NS) flush 5-40 mL  5-40 mL IntraVENous Q8H    sodium chloride (NS) flush 5-40 mL  5-40 mL IntraVENous PRN    polyethylene glycol (MIRALAX) packet 17 g  17 g Oral DAILY PRN    promethazine (PHENERGAN) tablet 12.5 mg  12.5 mg Oral Q6H PRN    Or    ondansetron (ZOFRAN) injection 4 mg  4 mg IntraVENous Q6H PRN    heparin (porcine) injection 5,000 Units  5,000 Units SubCUTAneous Q8H    LORazepam (ATIVAN) tablet 1 mg  1 mg Oral BID PRN    nicotine (NICODERM CQ) 14 mg/24 hr patch 1 Patch  1 Patch TransDERmal DAILY    melatonin tablet 9 mg  9 mg Oral QHS PRN    hydrOXYzine pamoate (VISTARIL) capsule 50 mg  50 mg Oral TID PRN     ______________________________________________________________________  EXPECTED LENGTH OF STAY: 5d 7h  ACTUAL LENGTH OF STAY:          3                 Stephanie Montalvo MD

## 2023-02-08 NOTE — PROGRESS NOTES
1922 Assumed care of pt from off going nurse Carina Monae LPN. Pt is A&Ox4 with no c/o at this time. Dialysis complete w/ 2 L pulled, pt given diner tray. 2030 Pt wrapped in warm blankets and given 1 ham sandwich and 1 sprite with 6 oz cup of ice. 0000 Pt up to bedside commode. Large solid stool. 0400 In bed, resting quietly. Bedside and Verbal shift change report given to CHUCHO Solares LPN (oncoming nurse) by Maxime Jimenez RN (offgoing nurse). Report included the following information SBAR, Kardex, Intake/Output, MAR, and Recent Results.

## 2023-02-08 NOTE — PROGRESS NOTES
0700-Report received from off going nurse Yandy Hewitt RN. Assumed care of patient. Patient resting with eyes closed. NAD. CBWR.     0810-Scheduled meds given. Patient tolerated well. No other needs at this time. CBWR.    1420-Dialysis ETA 1800 per dialysis messenger. 1630-Patient eating supper. NAD. CBWR.     1900-Report given to ZACHERY Arce RN.

## 2023-02-08 NOTE — PROGRESS NOTES
CARDIOLOGY PROGRESS NOTE      Patient Name: Debby Iglesias  Age: 52 y.o. Gender:female  :1970  MRN: [id number]        Patient seen and examined. This is a patient who is followed for NSTEMI, new cardiomyopathy. She denies chest pain. Shortness of breath persists but improved after dialysis No other complaints reported. Telemetry reviewed, there were no events noted in the past 24 hours. Pertinent review of systems items noted above, all other systems are negative. Current medications reviewed. Physical Examination    Allergies   Allergen Reactions    Dulaglutide Other (comments)     Nausea with vomiting and dizziness    Sitagliptin Other (comments)     Headache, runny nose, back pain and low blood sugars    Codeine Palpitations    Pcn [Penicillins] Hives       Vital signs are stable. Blood pressure 111/60, Pulse 90  No apparent distress. Heart is regular, rate and rhythm. Normal S1, S2, no murmurs are appreciated. Lungs are clear bilaterally. Abdomen is soft, nontender, normal bowel sounds. Extremities have no edema. Labs reviewed: All lab results for the last 24 hours reviewed.   Recent Results (from the past 12 hour(s))   METABOLIC PANEL, BASIC    Collection Time: 23  3:13 AM   Result Value Ref Range    Sodium 133 (L) 136 - 145 mmol/L    Potassium 3.8 3.5 - 5.5 mmol/L    Chloride 94 (L) 100 - 111 mmol/L    CO2 25 21 - 32 mmol/L    Anion gap 14 3.0 - 18.0 mmol/L    Glucose 174 (H) 74 - 99 mg/dL    BUN 57 (H) 7 - 18 mg/dL    Creatinine 4.77 (H) 0.60 - 1.30 mg/dL    BUN/Creatinine ratio 12 12 - 20      eGFR 10 (L) >60 ml/min/1.73m2    Calcium 8.5 8.5 - 10.1 mg/dL   CBC WITH AUTOMATED DIFF    Collection Time: 23  3:13 AM   Result Value Ref Range    WBC 13.4 (H) 4.6 - 13.2 K/uL    RBC 2.38 (L) 4.20 - 5.30 M/uL    HGB 7.5 (L) 12.0 - 16.0 g/dL    HCT 23.9 (L) 35.0 - 45.0 %    .4 (H) 78.0 - 100.0 FL    MCH 31.5 24.0 - 34.0 PG    MCHC 31.4 31.0 - 37.0 g/dL    RDW 18.9 (H) 11.6 - 14.5 %    PLATELET 365 849 - 457 K/uL    MPV 10.6 9.2 - 11.8 FL    NRBC 0.3 (H) 0.0  WBC    ABSOLUTE NRBC 0.04 (H) 0.00 - 0.01 K/uL    NEUTROPHILS 71 40 - 73 %    BAND NEUTROPHILS 4 0 - 5 %    LYMPHOCYTES 16 (L) 21 - 52 %    MONOCYTES 6 3 - 10 %    EOSINOPHILS 0 0 - 5 %    BASOPHILS 0 0 - 2 %    OTHER CELL 3 %    IMMATURE GRANULOCYTES 0 %    ABS. NEUTROPHILS 10.1 (H) 1.8 - 8.0 K/UL    ABS. LYMPHOCYTES 2.1 0.9 - 3.6 K/UL    ABS. MONOCYTES 0.8 0.05 - 1.2 K/UL    ABS. EOSINOPHILS 0.0 0.0 - 0.4 K/UL    ABS. BASOPHILS 0.0 0.0 - 0.1 K/UL    ABS. IMM. GRANS. 0.0 K/UL    DF AUTOMATED      RBC COMMENTS Anisocytosis  1+        RBC COMMENTS Microcytosis  1+              Case discussed with Dr. Mehnaz Salcido and our impression and recommendations are as follows:  NSTEMI: occurred last admission w/ troponin peaking at 2600 on 1/30/2023; now trending down. Denies chest pain, but notes recurrent SOB likely anginal equivalent. Continue asa, statin therapy. Cannot tolerate beta blocker due to chronic hypotension. Given symptoms and multiple risk factors, recommend LHC when stable. Will need to wait until COVID (positive 2/5/23) has resolved. Acute on chronic CHF: EF 34% (had been 48% just 2 wks prior). HD due today to assist w/ diuresis. GDMT limited by ESRD and hypotension. Daily wt and strict I&O documentation. Hypotension: continue midodrine. Mitral stenosis: moderate to severe on echo. Possible etiology for shortness of breath. Will need MARCO to further evaluate for possible repair/replacement in the near future. Patient stable for discharge from cardiac standpoint. She need close follow up which I have discussed with her. I have scheduled of her follow up on 2/16 @ 1130am.    Please do not hesitate to call me or Dr. Mehnaz Salcido if additional questions arise.     ABAD Ramos  2/8/2023

## 2023-02-08 NOTE — PROGRESS NOTES
Six Minute Walk       02/08/23 0948   Resting (Room Air)   SpO2 93   HR 95   During Walk (Room Air)   SpO2 87      Rate of Dyspnea 1   Symptoms Leg pain; Fatigue   Comments Walk was stopped due to fatigue, leg pain and WOB. Patient not able to to complete,six minute walk due to fatigue, leg pain, and WOB. Dr. Claudio Meng and Monica Mann RN notified.  Patient to receive dialysis per MD.

## 2023-02-09 VITALS
HEIGHT: 64 IN | DIASTOLIC BLOOD PRESSURE: 72 MMHG | TEMPERATURE: 98.2 F | WEIGHT: 153.5 LBS | RESPIRATION RATE: 15 BRPM | BODY MASS INDEX: 26.21 KG/M2 | OXYGEN SATURATION: 93 % | HEART RATE: 84 BPM | SYSTOLIC BLOOD PRESSURE: 137 MMHG

## 2023-02-09 PROCEDURE — 74011250636 HC RX REV CODE- 250/636: Performed by: SPECIALIST

## 2023-02-09 PROCEDURE — P9047 ALBUMIN (HUMAN), 25%, 50ML: HCPCS | Performed by: SPECIALIST

## 2023-02-09 PROCEDURE — 74011250636 HC RX REV CODE- 250/636: Performed by: PHYSICIAN ASSISTANT

## 2023-02-09 PROCEDURE — 90935 HEMODIALYSIS ONE EVALUATION: CPT

## 2023-02-09 PROCEDURE — 94640 AIRWAY INHALATION TREATMENT: CPT

## 2023-02-09 PROCEDURE — 74011250637 HC RX REV CODE- 250/637: Performed by: PHYSICIAN ASSISTANT

## 2023-02-09 RX ORDER — MIDODRINE HYDROCHLORIDE 10 MG/1
5 TABLET ORAL
Qty: 45 TABLET | Refills: 0 | Status: SHIPPED | OUTPATIENT
Start: 2023-02-09 | End: 2023-03-11

## 2023-02-09 RX ADMIN — TIOTROPIUM BROMIDE INHALATION SPRAY 2 PUFF: 3.12 SPRAY, METERED RESPIRATORY (INHALATION) at 07:08

## 2023-02-09 RX ADMIN — LEVOFLOXACIN 500 MG: 5 INJECTION, SOLUTION INTRAVENOUS at 05:51

## 2023-02-09 RX ADMIN — HEPARIN SODIUM 5000 UNITS: 5000 INJECTION INTRAVENOUS; SUBCUTANEOUS at 05:52

## 2023-02-09 RX ADMIN — ALBUTEROL SULFATE 2 PUFF: 108 AEROSOL, METERED RESPIRATORY (INHALATION) at 07:08

## 2023-02-09 RX ADMIN — DEXAMETHASONE SODIUM PHOSPHATE 6 MG: 10 INJECTION, SOLUTION INTRAMUSCULAR; INTRAVENOUS at 05:51

## 2023-02-09 RX ADMIN — ALBUMIN (HUMAN) 25 G: 0.25 INJECTION, SOLUTION INTRAVENOUS at 02:56

## 2023-02-09 NOTE — PROGRESS NOTES
Problem: Airway Clearance - Ineffective  Goal: Achieve or maintain patent airway  Outcome: Progressing Towards Goal     Problem: Gas Exchange - Impaired  Goal: Absence of hypoxia  Outcome: Progressing Towards Goal  Goal: Promote optimal lung function  Outcome: Progressing Towards Goal     Problem: Breathing Pattern - Ineffective  Goal: Ability to achieve and maintain a regular respiratory rate  Outcome: Progressing Towards Goal     Problem: Body Temperature -  Risk of, Imbalanced  Goal: Ability to maintain a body temperature within defined limits  Outcome: Progressing Towards Goal  Goal: Will regain or maintain usual level of consciousness  Outcome: Progressing Towards Goal  Goal: Complications related to the disease process, condition or treatment will be avoided or minimized  Outcome: Progressing Towards Goal     Problem: Isolation Precautions - Risk of Spread of Infection  Goal: Prevent transmission of infectious organism to others  Outcome: Progressing Towards Goal     Problem: Nutrition Deficits  Goal: Optimize nutrtional status  Outcome: Progressing Towards Goal     Problem: Risk for Fluid Volume Deficit  Goal: Maintain normal heart rhythm  Outcome: Progressing Towards Goal  Goal: Maintain absence of muscle cramping  Outcome: Progressing Towards Goal  Goal: Maintain normal serum potassium, sodium, calcium, phosphorus, and pH  Outcome: Progressing Towards Goal     Problem: Loneliness or Risk for Loneliness  Goal: Demonstrate positive use of time alone when socialization is not possible  Outcome: Progressing Towards Goal     Problem: Fatigue  Goal: Verbalize increase energy and improved vitality  Outcome: Progressing Towards Goal     Problem: Falls - Risk of  Goal: *Absence of Falls  Description: Document Arjun Fall Risk and appropriate interventions in the flowsheet.   Outcome: Progressing Towards Goal  Note: Fall Risk Interventions:                                Problem: Patient Education: Go to Patient Education Activity  Goal: Patient/Family Education  Outcome: Progressing Towards Goal     Problem: Activity Intolerance  Goal: *Oxygen saturation during activity within specified parameters  Outcome: Progressing Towards Goal  Goal: *Able to remain out of bed as prescribed  Outcome: Progressing Towards Goal     Problem: Patient Education: Go to Patient Education Activity  Goal: Patient/Family Education  Outcome: Progressing Towards Goal     Problem: Tissue Perfusion - Cardiopulmonary, Altered  Goal: *Optimize tissue perfusion  Outcome: Progressing Towards Goal  Goal: *Absence of hypoxia  Outcome: Progressing Towards Goal     Problem: Patient Education: Go to Patient Education Activity  Goal: Patient/Family Education  Outcome: Progressing Towards Goal     Problem: Pressure Injury - Risk of  Goal: *Prevention of pressure injury  Description: Document Mio Scale and appropriate interventions in the flowsheet.   Outcome: Progressing Towards Goal     Problem: Patient Education: Go to Patient Education Activity  Goal: Patient/Family Education  Outcome: Progressing Towards Goal

## 2023-02-09 NOTE — PROGRESS NOTES
1900 Assumed care of pt from off going nurse Jona Medina LPN. Pt is A&Ox4 with c/o having to stay late today due to dialysis. This nurse in to take pt a warm blanket and turn up thermostat per pt request. Shift assessment performed. 0100 Dialysis nurse @bedside. 1880 Dialysis complete 3 L pulled. Bedside and Verbal shift change report given to ZUHAIR Webster RN (oncoming nurse) by Alexa Trujillo RN (offgoing nurse). Report included the following information SBAR, Kardex, Intake/Output, MAR, and Recent Results.

## 2023-02-09 NOTE — PROGRESS NOTES
Problem: Pressure Injury - Risk of  Goal: *Prevention of pressure injury  Description: Document Mio Scale and appropriate interventions in the flowsheet.   Outcome: Progressing Towards Goal  Note: Pressure Injury Interventions:  Sensory Interventions: Keep linens dry and wrinkle-free    Moisture Interventions: Absorbent underpads    Activity Interventions: Increase time out of bed    Mobility Interventions: HOB 30 degrees or less    Nutrition Interventions: Document food/fluid/supplement intake    Friction and Shear Interventions: Apply protective barrier, creams and emollients

## 2023-02-09 NOTE — DIALYSIS
Hemodialysis / [phone]    Vitals Pre Post Assessment Pre Post   BP BP: (!) 113/55 (02/09/23 0130)   118/58 LOC AOx4 unchanged   HR Pulse (Heart Rate): 84 (02/09/23 0130) 95 Lungs Coarse/diminished in bases w/crackles bilaterally Coarse/diminished in bases bilaterally   Resp Resp Rate: 20 (02/09/23 0130) 22 Cardiac Sinus rhythm unchanged   Temp Temp: 98 °F (36.7 °C) (02/09/23 0125) 98 Skin Warm/dry unchanged       Edema generalized unchanged   Tele status Bedside monitor Bedside monitor Pain Pain Intensity 1: 0 (02/08/23 1924) 0     Orders   Duration: Start: 0130 End: 0430 Total: 3 hrs   Dialyzer: Dialyzer/Set Up Inspection: Rachel Vale (02/09/23 0125)   K Bath: Dialysate K (mEq/L): 2 (02/09/23 0125)   Ca Bath: Dialysate CA (mEq/L): 2.5 (02/09/23 0125)   Na: Dialysate NA (mEq/L): 138 (02/09/23 0125)   Bicarb: Dialysate HCO3 (mEq/L): 35 (02/09/23 0125)   Target Fluid Removal: Goal/Amount of Fluid to Remove (mL): 3000 mL (02/09/23 0125)     Access   Type & Location: Pt R chest PC. Each catheter limb disinfected for 60 seconds per limb with alcohol swabs. Caps removed, dialysis CVC hub scrubbed with Prevantics for 15 seconds, followed by a 5 second dry time per Hospital P&P. +asp/+flush x 2 ports. Dressing C/D/I.      Labs   HBsAg (Antigen) / date: Negative  // 01/31/23                                          HBsAb (Antibody) / date: Susceptible  // 01/06/23   Source: Epic   Obtained/Reviewed  Critical Results Called HGB   Date Value Ref Range Status   02/08/2023 7.5 (L) 12.0 - 16.0 g/dL Final     Potassium   Date Value Ref Range Status   02/08/2023 3.8 3.5 - 5.5 mmol/L Final     Calcium   Date Value Ref Range Status   02/08/2023 8.5 8.5 - 10.1 mg/dL Final     BUN   Date Value Ref Range Status   02/08/2023 57 (H) 7 - 18 mg/dL Final     Creatinine   Date Value Ref Range Status   02/08/2023 4.77 (H) 0.60 - 1.30 mg/dL Final        Meds Given   Name Dose Route   Albumin 25% 25g HD               Adequacy / Fluid Total Liters Process:    Net Fluid Removed:       Comments   Time Out Done:   (Time) 0125   Admitting Diagnosis: COVID   Consent obtained/signed: Informed Consent Verified:  (chronic consent applies) (02/09/23 0125)   Machine / RO # Machine Number: [id number] (02/09/23 0125)   Primary Nurse Rpt Pre: MIO Carmen   Primary Nurse Rpt Post: MIO Carmen   Pt Education: Access/site care   Care Plan: Continue HD tx per MD orders   Pts outpatient clinic: [facility]     Tx Summary   Comments:   0122 - pt preprocedure time-out completed. SBAR received from primary RN.  0130 - pt HD tx initiated  0200 - pt resting in bed, VSS  0245 - pt hypotensive 92/45, 25g albumin 25% given  0300 - pt resting in bed, VSS  0400 - pt resting in bed, VSS  0430 - pt HD tx completed. Pt tolerated tx well. At end of tx all pt blood returned from circuit. Each dialysis catheter limb disinfected per p&p, blood returned per p&p, each dialysis hub disinfected per p&p, post dialysis catheter dwell instilled per order, and caps applied. Call bell within reach and bed in lowest position. SBAR given to primary RN.

## 2023-02-09 NOTE — PROGRESS NOTES
0700 - Bedside shift report. Pt watching tv. MD rounding. Pt refused oxygen, walk test not needed. Pt refused AM meds. Refused to keep tele on. Pt walking in room no distress. 1015 - Pt stated she is not going to follow up with cardiology on Feb 16. Stated she is going to seek treatment at [facility]. 1020 - PIV removed. Discharge papers reviewed. Pt denied questions. Escorted to front entrance via wheelchair. Pt left with .

## 2023-03-27 NOTE — DISCHARGE SUMMARY
Discharge Summary       PATIENT ID: Carlos Moody  MRN: [id number]   YOB: 1970    DATE OF ADMISSION: 2/4/2023  9:22 PM    DATE OF DISCHARGE: 02/09/23    PRIMARY CARE PROVIDER: ABAD Cabral     ATTENDING PHYSICIAN: Isela Fernandez MD  DISCHARGING PROVIDER: Isela Fernandez MD        CONSULTATIONS: IP CONSULT TO NEPHROLOGY  IP CONSULT TO NEPHROLOGY  IP CONSULT TO CARDIOLOGY    PROCEDURES/SURGERIES: * No surgery found *    [address] COURSE:   Carlos Moody is a 45 y.o. female with a past medical history for end-stage renal disease on hemodialysis MWF, COPD, caardiomegaly, and aortic stenoisis, and tobacco abuse, patient presents to the ED with a chief complaint of ongoing shortness of breath. Patient reports that the shortness of breath and palpitations that started yesterday worsening prior to arrival.  She denies chest pain, palpitations, nausea, vomiting, abdominal pain, fevers and chills. The emergency department evaluation found patient to be COVID-positive. She remains hypotensive with but stable similar to presentation of 3 days ago. She does have tachycardia with a pulse ranging from 105-140. Continues with chronic anemia which is stable with a hemoglobin of 7.6 and 24.3 hematocrit. Patient has been admitted for evaluation and treatment of the problems noted in the plan above. Admit to ICU        DISCHARGE DIAGNOSES / PLAN:      Assessment/Plan:      Acute hypoxia  Again on 0xygen 2 liters, awaiitng HD  Seen ambulating in the room without significant issues.      COVID-19  - started on Levaquin with multiple allergies otherwise, will continue  -Initial Solu-Medrol given but will continue with Decadron  -COVID protocol monitoring   -Hold Baricitnib due to ESRD  -Supportive care  -Contact precautions droplet plus     End stage renal failure on dialysis [facility])  Nephrology consultation  Patient to get repeat dialysis today per nephrology, on 2/7/2023.  -Hemodialysis Monday Wednesday and Friday     COPD exacerbation (HCC)  Continue Levaquin  Maintain oxygen saturation 93% currently at 4 L by nasal cannula  Respiratory therapy following  Continuing with Decadron for COVID     Pleural effusion  Short term needs aggressive HD, today and tomorrow, in longer term needs mitral valve stenosis addressed, probably a Left heat cath as well, neither will be addressed this hospitalizaton due to covid, needs outpt cardiology follow up  Effusions are chronic, bilateral, and will be adressed by way of HD and volume removal.     Renal Cell Carcinoma  - pt following with outpt oncology     Thrombosed L arm fistula/graft  - upcoming vascular appt to address     Pt declines home [address] for dc  Understands need for close follow up appt with cardiology, has upcoming appt scheduled  Also needs to keep follow up qith her oncologist for [address]:    Follow-up Information       Follow up With Specialties Details Why Contact Info    ABAD Licea Nurse Practitioner Go on 2/16/2023 appointment is at 3 PM Maribel Jenkins  [id number]  [address] [facility], [address] Nurse Practitioner   [facility]  [phone]                 DIET: Renal Diet        DISCHARGE MEDICATIONS:  Current Discharge Medication List        START taking these medications    Details   midodrine (PROAMATINE) 10 mg tablet Take 0.5 Tablets by mouth three (3) times daily (with meals) for 30 days. Qty: 45 Tablet, Refills: 0  Start date: 2/9/2023, End date: 3/11/2023           CONTINUE these medications which have NOT CHANGED    Details   albuterol (PROVENTIL HFA, VENTOLIN HFA, PROAIR HFA) 90 mcg/actuation inhaler Take 2 Puffs by inhalation every four (4) hours as needed for Wheezing. tiotropium bromide (SPIRIVA RESPIMAT IN) Take  by inhalation. furosemide (LASIX) 80 mg tablet Take 80 mg by mouth daily.       aspirin delayed-release 81 mg tablet Take 1 Tablet by mouth daily. Qty: 30 Tablet, Refills: 5      sevelamer carbonate (RENVELA) 800 mg tab tab TAKE 1 TABLET BY MOUTH THREE TIMES DAILY WITH MEALS  Qty: 45 Tablet, Refills: 0      calcitRIOL (ROCALTROL) 0.25 mcg capsule Take 1 capsule by mouth once daily  Qty: 30 Capsule, Refills: 0      cholecalciferol (VITAMIN D3) (1000 Units /25 mcg) tablet Take 1 Tablet by mouth daily. Qty: 30 Tablet, Refills: 3      epoetin carmelo (Epogen) 10,000 unit/mL injection 1 mL by SubCUTAneous route Once every 2 weeks. Qty: 1 mL, Refills: 5    Comments: Please dispense as a 30 day supply  Associated Diagnoses: Chronic kidney disease (CKD), stage V (HCC)      sodium bicarbonate 650 mg tablet Take 1 Tablet by mouth two (2) times a day. Qty: 60 Tablet, Refills: 3      cyanocobalamin, vitamin B-12, 5,000 mcg subl 1 Tablet by SubLINGual route daily. NOTIFY YOUR PHYSICIAN FOR ANY OF THE FOLLOWING:   Fever over 101 degrees for 24 hours. Chest pain, shortness of breath, fever, chills, nausea, vomiting, diarrhea, change in mentation, falling, weakness, bleeding. Severe pain or pain not relieved by medications. Or, any other signs or symptoms that you may have questions about. DISPOSITION:home    Home With:   OT  PT  HH  RN       Long term SNF/Inpatient Rehab    Independent/assisted living    Hospice    Other:       PATIENT CONDITION AT DISCHARGE:     Functional status    Poor     Deconditioned    x Independent      Cognition    x Lucid     Forgetful     Dementia      Catheters/lines (plus indication)    Henson     PICC     PEG    x None      Code status    x Full code     DNR      PHYSICAL EXAMINATION AT DISCHARGE:  General:          Alert, cooperative, no distress, appears stated age.      HEENT:           Atraumatic, anicteric sclerae, pink conjunctivae                          No oral ulcers, mucosa moist, throat clear, dentition fair  Neck:               Supple, symmetrical  Lungs:             Clear to auscultation bilaterally. No Wheezing or Rhonchi. No rales. Chest wall:      No tenderness  No Accessory muscle use. Heart:              Regular  rhythm,  No  murmur   No edema  Abdomen:        Soft, non-tender. Not distended. Bowel sounds normal  Extremities:     No cyanosis. No clubbing,                            Skin turgor normal, Capillary refill normal  Skin:                Not pale. Not Jaundiced  No rashes   Psych:             Not anxious or agitated. Neurologic:      Alert, moves all extremities, answers questions appropriately and responds to commands       CHRONIC MEDICAL DIAGNOSES:  Problem List as of 2/9/2023 Date Reviewed: 2/5/2023            Codes Class Noted - Resolved    Pleural effusion ICD-10-CM: J90  ICD-9-CM: 511.9  2/5/2023 - Present        Sepsis ([facility] [place] [facility] ICD-10-CM: A41.9  ICD-9-CM: 038.9, 995.91  2/5/2023 - Present        * (Principal) COVID-19 ICD-10-CM: U07.1  ICD-9-CM: 079.89  2/5/2023 - Present        CKD (chronic kidney disease) requiring chronic dialysis (HCC) ICD-10-CM: N18.6, Z99.2  ICD-9-CM: 585.6, V45.11  1/29/2023 - Present        Type 2 MI (myocardial infarction) ([facility] [place] [facility] ICD-10-CM: I21. A1  ICD-9-CM: 410.90  1/29/2023 - Present        Acute respiratory failure with hypoxia [facility]) ICD-10-CM: J96.01  ICD-9-CM: 518.81  1/6/2023 - Present        Fluid overload ICD-10-CM: E87.70  ICD-9-CM: 276.69  1/6/2023 - Present        End stage renal failure on dialysis [facility]) ICD-10-CM: N18.6, Z99.2  ICD-9-CM: 585.6, V45.11  1/6/2023 - Present        Pulmonary edema ICD-10-CM: J81.1  ICD-9-CM: 819  1/6/2023 - Present        Hypotension ICD-10-CM: I95.9  ICD-9-CM: 458.9  1/6/2023 - Present        COPD exacerbation ([facility] ICD-10-CM: J44.1  ICD-9-CM: 491.21  1/6/2023 - Present        Chronic kidney disease (CKD), stage V ([facility] [place] [[id number]] ICD-10-CM: N18.5  ICD-9-CM: 585.5  5/26/2022 - Present           Greater than 35 minutes were spent with the patient on counseling and coordination of care    Signed: Marylee Hobby, MD  2/9/2023  9:59 AM What Type Of Note Output Would You Prefer (Optional)?: Standard Output Hpi Title: Evaluation of Skin Lesions How Severe Are Your Spot(S)?: mild Have Your Spot(S) Been Treated In The Past?: has not been treated Additional History: Patient has concerning lesions on her left arm and left leg.

## 2023-11-06 ENCOUNTER — APPOINTMENT (OUTPATIENT)
Age: 53
End: 2023-11-06
Payer: MEDICAID

## 2023-11-06 ENCOUNTER — HOSPITAL ENCOUNTER (INPATIENT)
Facility: HOSPITAL | Age: 53
LOS: 8 days | Discharge: HOME | DRG: 721 | End: 2023-11-15
Attending: STUDENT IN AN ORGANIZED HEALTH CARE EDUCATION/TRAINING PROGRAM | Admitting: FAMILY MEDICINE
Payer: MEDICAID

## 2023-11-06 ENCOUNTER — HOSPITAL ENCOUNTER (EMERGENCY)
Age: 53
Discharge: ANOTHER HOSPITAL | End: 2023-11-06
Attending: EMERGENCY MEDICINE
Payer: MEDICAID

## 2023-11-06 VITALS
OXYGEN SATURATION: 100 % | BODY MASS INDEX: 30.05 KG/M2 | TEMPERATURE: 100.3 F | HEIGHT: 64 IN | RESPIRATION RATE: 27 BRPM | SYSTOLIC BLOOD PRESSURE: 121 MMHG | WEIGHT: 176 LBS | HEART RATE: 99 BPM | DIASTOLIC BLOOD PRESSURE: 63 MMHG

## 2023-11-06 DIAGNOSIS — J81.0 ACUTE PULMONARY EDEMA (HCC): ICD-10-CM

## 2023-11-06 DIAGNOSIS — J44.1 COPD EXACERBATION (HCC): ICD-10-CM

## 2023-11-06 DIAGNOSIS — Z95.2 S/P TAVR (TRANSCATHETER AORTIC VALVE REPLACEMENT): ICD-10-CM

## 2023-11-06 DIAGNOSIS — I50.23 ACUTE ON CHRONIC SYSTOLIC CONGESTIVE HEART FAILURE (HCC): Primary | ICD-10-CM

## 2023-11-06 DIAGNOSIS — D64.9 ANEMIA, UNSPECIFIED TYPE: ICD-10-CM

## 2023-11-06 DIAGNOSIS — R78.81 BACTEREMIA DUE TO PSEUDOMONAS: Primary | ICD-10-CM

## 2023-11-06 DIAGNOSIS — A41.9 SEPTICEMIA (HCC): ICD-10-CM

## 2023-11-06 DIAGNOSIS — Z91.158 DIALYSIS PATIENT, NONCOMPLIANT: ICD-10-CM

## 2023-11-06 DIAGNOSIS — I21.4 NSTEMI (NON-ST ELEVATED MYOCARDIAL INFARCTION) (HCC): ICD-10-CM

## 2023-11-06 DIAGNOSIS — B96.5 BACTEREMIA DUE TO PSEUDOMONAS: Primary | ICD-10-CM

## 2023-11-06 DIAGNOSIS — D63.8 ANEMIA OF CHRONIC DISEASE: ICD-10-CM

## 2023-11-06 DIAGNOSIS — N28.89 RENAL MASS: ICD-10-CM

## 2023-11-06 LAB
ALBUMIN SERPL-MCNC: 1.9 G/DL (ref 3.4–5)
ALBUMIN/GLOB SERPL: 0.5 (ref 0.8–1.7)
ALP SERPL-CCNC: 132 U/L (ref 45–117)
ALT SERPL-CCNC: 7 U/L (ref 13–56)
ANION GAP SERPL CALC-SCNC: 10 MMOL/L (ref 3–18)
APTT PPP: 45.1 SEC (ref 23–36.4)
AST SERPL W P-5'-P-CCNC: 14 U/L (ref 10–38)
BASOPHILS # BLD: 0 K/UL (ref 0–0.1)
BASOPHILS NFR BLD: 0 % (ref 0–2)
BILIRUB SERPL-MCNC: 0.6 MG/DL (ref 0.2–1)
BUN SERPL-MCNC: 34 MG/DL (ref 7–18)
BUN/CREAT SERPL: 7 (ref 12–20)
CA-I BLD-MCNC: 7.6 MG/DL (ref 8.5–10.1)
CHLORIDE SERPL-SCNC: 96 MMOL/L (ref 100–111)
CO2 SERPL-SCNC: 27 MMOL/L (ref 21–32)
CREAT SERPL-MCNC: 5.03 MG/DL (ref 0.6–1.3)
DIFFERENTIAL METHOD BLD: ABNORMAL
EOSINOPHIL # BLD: 0 K/UL (ref 0–0.4)
EOSINOPHIL NFR BLD: 0 % (ref 0–5)
ERYTHROCYTE [DISTWIDTH] IN BLOOD BY AUTOMATED COUNT: 17.2 % (ref 11.6–14.5)
FLUAV RNA SPEC QL NAA+PROBE: NOT DETECTED
FLUBV RNA SPEC QL NAA+PROBE: NOT DETECTED
GLOBULIN SER CALC-MCNC: 3.8 G/DL (ref 2–4)
GLUCOSE SERPL-MCNC: 124 MG/DL (ref 74–99)
HCT VFR BLD AUTO: 22.8 % (ref 35–45)
HGB BLD-MCNC: 7.1 G/DL (ref 12–16)
IMM GRANULOCYTES # BLD AUTO: 0.2 K/UL (ref 0–0.04)
IMM GRANULOCYTES NFR BLD AUTO: 2 % (ref 0–0.5)
INR PPP: 1.3 (ref 0.9–1.1)
LACTATE SERPL-SCNC: 1.5 MMOL/L (ref 0.4–2)
LACTATE SERPL-SCNC: 2 MMOL/L (ref 0.4–2)
LYMPHOCYTES # BLD: 0.4 K/UL (ref 0.9–3.6)
LYMPHOCYTES NFR BLD: 4 % (ref 21–52)
MCH RBC QN AUTO: 27.8 PG (ref 24–34)
MCHC RBC AUTO-ENTMCNC: 31.1 G/DL (ref 31–37)
MCV RBC AUTO: 89.4 FL (ref 78–100)
MONOCYTES # BLD: 0.6 K/UL (ref 0.05–1.2)
MONOCYTES NFR BLD: 6 % (ref 3–10)
NEUTS SEG # BLD: 9 K/UL (ref 1.8–8)
NEUTS SEG NFR BLD: 88 % (ref 40–73)
NRBC # BLD: 0 K/UL (ref 0–0.01)
NRBC BLD-RTO: 0 PER 100 WBC
PLATELET # BLD AUTO: 77 K/UL (ref 135–420)
PLATELET COMMENT: ABNORMAL
PMV BLD AUTO: 9.9 FL (ref 9.2–11.8)
POTASSIUM SERPL-SCNC: 4 MMOL/L (ref 3.5–5.5)
PROCALCITONIN SERPL-MCNC: 6.49 NG/ML
PROT SERPL-MCNC: 5.7 G/DL (ref 6.4–8.2)
PROTHROMBIN TIME: 16.4 SEC (ref 11.9–14.7)
RBC # BLD AUTO: 2.55 M/UL (ref 4.2–5.3)
RBC MORPH BLD: ABNORMAL
SARS-COV-2 RNA RESP QL NAA+PROBE: NOT DETECTED
SODIUM SERPL-SCNC: 133 MMOL/L (ref 136–145)
THERAPEUTIC RANGE: ABNORMAL SEC (ref 82–109)
TROPONIN I SERPL HS-MCNC: 3293 NG/L (ref 0–54)
TROPONIN I SERPL HS-MCNC: 535 NG/L (ref 0–54)
WBC # BLD AUTO: 10.2 K/UL (ref 4.6–13.2)

## 2023-11-06 PROCEDURE — 99285 EMERGENCY DEPT VISIT HI MDM: CPT

## 2023-11-06 PROCEDURE — 2500000003 HC RX 250 WO HCPCS: Performed by: STUDENT IN AN ORGANIZED HEALTH CARE EDUCATION/TRAINING PROGRAM

## 2023-11-06 PROCEDURE — 36556 INSERT NON-TUNNEL CV CATH: CPT

## 2023-11-06 PROCEDURE — 87077 CULTURE AEROBIC IDENTIFY: CPT

## 2023-11-06 PROCEDURE — 93005 ELECTROCARDIOGRAM TRACING: CPT | Performed by: EMERGENCY MEDICINE

## 2023-11-06 PROCEDURE — 84145 PROCALCITONIN (PCT): CPT

## 2023-11-06 PROCEDURE — 71045 X-RAY EXAM CHEST 1 VIEW: CPT

## 2023-11-06 PROCEDURE — 80053 COMPREHEN METABOLIC PANEL: CPT

## 2023-11-06 PROCEDURE — 84484 ASSAY OF TROPONIN QUANT: CPT

## 2023-11-06 PROCEDURE — 83605 ASSAY OF LACTIC ACID: CPT

## 2023-11-06 PROCEDURE — 93005 ELECTROCARDIOGRAM TRACING: CPT | Performed by: STUDENT IN AN ORGANIZED HEALTH CARE EDUCATION/TRAINING PROGRAM

## 2023-11-06 PROCEDURE — 2500000003 HC RX 250 WO HCPCS: Performed by: EMERGENCY MEDICINE

## 2023-11-06 PROCEDURE — 6360000002 HC RX W HCPCS: Performed by: EMERGENCY MEDICINE

## 2023-11-06 PROCEDURE — 87040 BLOOD CULTURE FOR BACTERIA: CPT

## 2023-11-06 PROCEDURE — 87636 SARSCOV2 & INF A&B AMP PRB: CPT

## 2023-11-06 PROCEDURE — 2580000003 HC RX 258: Performed by: EMERGENCY MEDICINE

## 2023-11-06 PROCEDURE — 85730 THROMBOPLASTIN TIME PARTIAL: CPT

## 2023-11-06 PROCEDURE — 6360000002 HC RX W HCPCS: Performed by: FAMILY MEDICINE

## 2023-11-06 PROCEDURE — 6360000002 HC RX W HCPCS: Performed by: STUDENT IN AN ORGANIZED HEALTH CARE EDUCATION/TRAINING PROGRAM

## 2023-11-06 PROCEDURE — 85025 COMPLETE CBC W/AUTO DIFF WBC: CPT

## 2023-11-06 PROCEDURE — 96375 TX/PRO/DX INJ NEW DRUG ADDON: CPT

## 2023-11-06 PROCEDURE — 87186 SC STD MICRODIL/AGAR DIL: CPT

## 2023-11-06 PROCEDURE — 2580000003 HC RX 258: Performed by: STUDENT IN AN ORGANIZED HEALTH CARE EDUCATION/TRAINING PROGRAM

## 2023-11-06 PROCEDURE — 96365 THER/PROPH/DIAG IV INF INIT: CPT

## 2023-11-06 PROCEDURE — 87154 CUL TYP ID BLD PTHGN 6+ TRGT: CPT

## 2023-11-06 PROCEDURE — 85610 PROTHROMBIN TIME: CPT

## 2023-11-06 PROCEDURE — 96374 THER/PROPH/DIAG INJ IV PUSH: CPT

## 2023-11-06 RX ORDER — ONDANSETRON HYDROCHLORIDE 8 MG/1
8 TABLET, FILM COATED ORAL EVERY 8 HOURS PRN
Status: ON HOLD | COMMUNITY

## 2023-11-06 RX ORDER — HEPARIN SODIUM 1000 [USP'U]/ML
4000 INJECTION, SOLUTION INTRAVENOUS; SUBCUTANEOUS ONCE
Status: DISCONTINUED | OUTPATIENT
Start: 2023-11-06 | End: 2023-11-06 | Stop reason: HOSPADM

## 2023-11-06 RX ORDER — LORAZEPAM 0.5 MG/1
0.5 TABLET ORAL EVERY 6 HOURS PRN
Status: ON HOLD | COMMUNITY

## 2023-11-06 RX ORDER — HEPARIN SODIUM 1000 [USP'U]/ML
60 INJECTION, SOLUTION INTRAVENOUS; SUBCUTANEOUS ONCE
Status: DISCONTINUED | OUTPATIENT
Start: 2023-11-06 | End: 2023-11-06 | Stop reason: CLARIF

## 2023-11-06 RX ORDER — BUMETANIDE 0.25 MG/ML
2 INJECTION INTRAMUSCULAR; INTRAVENOUS
Status: COMPLETED | OUTPATIENT
Start: 2023-11-06 | End: 2023-11-06

## 2023-11-06 RX ORDER — ISOSORBIDE DINITRATE 20 MG/1
1 TABLET ORAL
Status: ON HOLD | COMMUNITY
Start: 2023-07-13

## 2023-11-06 RX ORDER — CALCITRIOL 0.25 UG/1
CAPSULE, LIQUID FILLED ORAL
Status: ON HOLD | COMMUNITY
Start: 2022-07-07

## 2023-11-06 RX ORDER — MIDAZOLAM HYDROCHLORIDE 1 MG/ML
1 INJECTION INTRAMUSCULAR; INTRAVENOUS
Status: COMPLETED | OUTPATIENT
Start: 2023-11-06 | End: 2023-11-06

## 2023-11-06 RX ORDER — SODIUM CHLORIDE, SODIUM LACTATE, POTASSIUM CHLORIDE, AND CALCIUM CHLORIDE .6; .31; .03; .02 G/100ML; G/100ML; G/100ML; G/100ML
500 INJECTION, SOLUTION INTRAVENOUS ONCE
Status: DISCONTINUED | OUTPATIENT
Start: 2023-11-06 | End: 2023-11-06

## 2023-11-06 RX ORDER — LIDOCAINE HYDROCHLORIDE AND EPINEPHRINE 10; 10 MG/ML; UG/ML
20 INJECTION, SOLUTION INFILTRATION; PERINEURAL
Status: COMPLETED | OUTPATIENT
Start: 2023-11-06 | End: 2023-11-06

## 2023-11-06 RX ORDER — METOPROLOL SUCCINATE 25 MG/1
1 TABLET, EXTENDED RELEASE ORAL
Status: ON HOLD | COMMUNITY
Start: 2023-07-14

## 2023-11-06 RX ORDER — OMEPRAZOLE 20 MG/1
20 TABLET, DELAYED RELEASE ORAL DAILY
Status: ON HOLD | COMMUNITY
Start: 2023-03-17

## 2023-11-06 RX ORDER — SODIUM CHLORIDE, SODIUM LACTATE, POTASSIUM CHLORIDE, AND CALCIUM CHLORIDE .6; .31; .03; .02 G/100ML; G/100ML; G/100ML; G/100ML
500 INJECTION, SOLUTION INTRAVENOUS ONCE
Status: DISCONTINUED | OUTPATIENT
Start: 2023-11-06 | End: 2023-11-06 | Stop reason: HOSPADM

## 2023-11-06 RX ORDER — ALBUTEROL SULFATE 90 UG/1
2 AEROSOL, METERED RESPIRATORY (INHALATION) EVERY 6 HOURS PRN
Status: ON HOLD | COMMUNITY

## 2023-11-06 RX ORDER — AMLODIPINE BESYLATE 10 MG/1
1 TABLET ORAL
Status: ON HOLD | COMMUNITY
Start: 2023-10-04

## 2023-11-06 RX ORDER — IPRATROPIUM BROMIDE AND ALBUTEROL SULFATE 2.5; .5 MG/3ML; MG/3ML
1 SOLUTION RESPIRATORY (INHALATION)
Status: COMPLETED | OUTPATIENT
Start: 2023-11-06 | End: 2023-11-07

## 2023-11-06 RX ORDER — BENZONATATE 100 MG/1
200 CAPSULE ORAL 3 TIMES DAILY PRN
Status: ON HOLD | COMMUNITY

## 2023-11-06 RX ORDER — ASPIRIN 81 MG/1
81 TABLET ORAL DAILY
Status: ON HOLD | COMMUNITY
Start: 2023-01-21

## 2023-11-06 RX ORDER — HEPARIN SODIUM 10000 [USP'U]/100ML
5-30 INJECTION, SOLUTION INTRAVENOUS CONTINUOUS
Status: DISCONTINUED | OUTPATIENT
Start: 2023-11-06 | End: 2023-11-06 | Stop reason: CLARIF

## 2023-11-06 RX ORDER — CETIRIZINE HYDROCHLORIDE 10 MG/1
10 TABLET ORAL DAILY
Status: ON HOLD | COMMUNITY

## 2023-11-06 RX ORDER — LEVOFLOXACIN 5 MG/ML
750 INJECTION, SOLUTION INTRAVENOUS ONCE
Status: COMPLETED | OUTPATIENT
Start: 2023-11-06 | End: 2023-11-07

## 2023-11-06 RX ORDER — HEPARIN SODIUM 10000 [USP'U]/100ML
5-30 INJECTION, SOLUTION INTRAVENOUS CONTINUOUS
Status: DISCONTINUED | OUTPATIENT
Start: 2023-11-06 | End: 2023-11-07

## 2023-11-06 RX ORDER — HEPARIN SODIUM 1000 [USP'U]/ML
60 INJECTION, SOLUTION INTRAVENOUS; SUBCUTANEOUS ONCE
Status: DISCONTINUED | OUTPATIENT
Start: 2023-11-06 | End: 2023-11-06

## 2023-11-06 RX ORDER — HEPARIN SODIUM 1000 [USP'U]/ML
4000 INJECTION, SOLUTION INTRAVENOUS; SUBCUTANEOUS PRN
Status: DISCONTINUED | OUTPATIENT
Start: 2023-11-06 | End: 2023-11-07

## 2023-11-06 RX ORDER — METOLAZONE 2.5 MG/1
1 TABLET ORAL
Status: ON HOLD | COMMUNITY
Start: 2023-10-04

## 2023-11-06 RX ORDER — HEPARIN SODIUM 1000 [USP'U]/ML
4000 INJECTION, SOLUTION INTRAVENOUS; SUBCUTANEOUS PRN
Status: DISCONTINUED | OUTPATIENT
Start: 2023-11-06 | End: 2023-11-06 | Stop reason: HOSPADM

## 2023-11-06 RX ORDER — MIDODRINE HYDROCHLORIDE 10 MG/1
10 TABLET ORAL
Status: ON HOLD | COMMUNITY
Start: 2023-11-03

## 2023-11-06 RX ORDER — HYDROXYZINE PAMOATE 25 MG/1
25 CAPSULE ORAL 3 TIMES DAILY PRN
Status: ON HOLD | COMMUNITY
Start: 2023-07-13

## 2023-11-06 RX ORDER — HEPARIN SODIUM 10000 [USP'U]/100ML
5-30 INJECTION, SOLUTION INTRAVENOUS CONTINUOUS
Status: DISCONTINUED | OUTPATIENT
Start: 2023-11-06 | End: 2023-11-06 | Stop reason: HOSPADM

## 2023-11-06 RX ORDER — FUROSEMIDE 40 MG/1
40 TABLET ORAL DAILY
Status: ON HOLD | COMMUNITY

## 2023-11-06 RX ORDER — VITAMIN B COMPLEX
1 TABLET ORAL DAILY
Status: ON HOLD | COMMUNITY

## 2023-11-06 RX ORDER — SEVELAMER CARBONATE 800 MG/1
1600 TABLET, FILM COATED ORAL
Status: ON HOLD | COMMUNITY
Start: 2022-08-18

## 2023-11-06 RX ORDER — HEPARIN SODIUM 1000 [USP'U]/ML
2000 INJECTION, SOLUTION INTRAVENOUS; SUBCUTANEOUS PRN
Status: DISCONTINUED | OUTPATIENT
Start: 2023-11-06 | End: 2023-11-07

## 2023-11-06 RX ORDER — HEPARIN SODIUM 1000 [USP'U]/ML
2000 INJECTION, SOLUTION INTRAVENOUS; SUBCUTANEOUS PRN
Status: DISCONTINUED | OUTPATIENT
Start: 2023-11-06 | End: 2023-11-06 | Stop reason: HOSPADM

## 2023-11-06 RX ORDER — ATORVASTATIN CALCIUM 40 MG/1
40 TABLET, FILM COATED ORAL
Status: ON HOLD | COMMUNITY
Start: 2023-10-13

## 2023-11-06 RX ADMIN — CEFTRIAXONE SODIUM 1000 MG: 1 INJECTION, POWDER, FOR SOLUTION INTRAMUSCULAR; INTRAVENOUS at 17:46

## 2023-11-06 RX ADMIN — BUMETANIDE 2 MG: 0.25 INJECTION INTRAMUSCULAR; INTRAVENOUS at 23:56

## 2023-11-06 RX ADMIN — METHYLPREDNISOLONE SODIUM SUCCINATE 125 MG: 125 INJECTION INTRAMUSCULAR; INTRAVENOUS at 23:56

## 2023-11-06 RX ADMIN — MIDAZOLAM 1 MG: 1 INJECTION INTRAMUSCULAR; INTRAVENOUS at 17:54

## 2023-11-06 RX ADMIN — HEPARIN SODIUM 4790 UNITS: 1000 INJECTION INTRAVENOUS; SUBCUTANEOUS at 21:17

## 2023-11-06 RX ADMIN — LIDOCAINE HYDROCHLORIDE,EPINEPHRINE BITARTRATE 20 ML: 10; .01 INJECTION, SOLUTION INFILTRATION; PERINEURAL at 17:54

## 2023-11-06 ASSESSMENT — HEART SCORE: ECG: 2

## 2023-11-06 ASSESSMENT — LIFESTYLE VARIABLES
HOW MANY STANDARD DRINKS CONTAINING ALCOHOL DO YOU HAVE ON A TYPICAL DAY: PATIENT DOES NOT DRINK
HOW OFTEN DO YOU HAVE A DRINK CONTAINING ALCOHOL: NEVER

## 2023-11-06 ASSESSMENT — PAIN - FUNCTIONAL ASSESSMENT
PAIN_FUNCTIONAL_ASSESSMENT: NONE - DENIES PAIN
PAIN_FUNCTIONAL_ASSESSMENT: NONE - DENIES PAIN

## 2023-11-06 NOTE — ED PROVIDER NOTES
treatment  Universal protocol:     Patient identity confirmed:  Hospital-assigned identification number and arm band  Pre-procedure details:     Indication(s): central venous access, hemodynamic monitoring and insufficient peripheral access      Hand hygiene: Hand hygiene performed prior to insertion      Sterile barrier technique: All elements of maximal sterile technique followed      Skin preparation:  Chlorhexidine  Sedation:     Sedation type: Anxiolysis  Anesthesia:     Anesthesia method:  Local infiltration    Local anesthetic:  Lidocaine 1% WITH epi  Procedure details:     Location:  L internal jugular    Site selection rationale:  Position and uncooperative state    Patient position:  Reverse Trendelenburg    Procedural supplies:  Triple lumen    Catheter size:  7 Fr    Landmarks identified: yes      Ultrasound guidance: yes      Ultrasound guidance timing: real time      Sterile ultrasound techniques: Sterile gel and sterile probe covers were used      Number of attempts:  1    Successful placement: yes    Post-procedure details:     Post-procedure:  Dressing applied and line sutured    Assessment:  Blood return through all ports, free fluid flow and no pneumothorax on x-ray    Procedure completion:  Tolerated well, no immediate complications          FINAL IMPRESSION    No diagnosis found. DISPOSITION/PLAN   DISPOSITION Decision To Admit 11/06/2023 06:04:47 PM      PATIENT REFERRED TO:  No follow-up provider specified.     DISCHARGE MEDICATIONS:  New Prescriptions    No medications on file     Controlled Substances Monitoring:          No data to display                (Please note that portions of this note were completed with a voice recognition program.  Efforts were made to edit the dictations but occasionally words are mis-transcribed.)    Zane Kumar MD (electronically signed)  Attending Emergency Physician            Javier Tobias DO  11/07/23 8684

## 2023-11-06 NOTE — ED TRIAGE NOTES
EMS called to residence for pt that has not had dialysis since Friday, went to day and was unable to get treatment due to access issues. pt SOB upon arrival, CPAP started.

## 2023-11-06 NOTE — PROGRESS NOTES
Pt arrived on cpap from EMT, mild resp distress with report of low sats 70's with out 02, with cpap 100%, only able to get sats on right side due to dialysis patient. Back to NRB 15 liters per Dr Juan C Rubio tolerating well, No resp distress at at that time while line being placed per Doctor. She said she has not missed dialysis but is not moving good air, no wheezes just very diminished and congested. EKG also taken at this time. Will titrate 02 as able.

## 2023-11-07 ENCOUNTER — APPOINTMENT (OUTPATIENT)
Facility: HOSPITAL | Age: 53
DRG: 721 | End: 2023-11-07
Payer: MEDICAID

## 2023-11-07 ENCOUNTER — APPOINTMENT (OUTPATIENT)
Facility: HOSPITAL | Age: 53
DRG: 721 | End: 2023-11-07
Attending: FAMILY MEDICINE
Payer: MEDICAID

## 2023-11-07 PROBLEM — J96.90 RESPIRATORY FAILURE (HCC): Status: ACTIVE | Noted: 2023-11-07

## 2023-11-07 PROBLEM — I21.4 NSTEMI (NON-ST ELEVATED MYOCARDIAL INFARCTION) (HCC): Status: ACTIVE | Noted: 2023-11-07

## 2023-11-07 LAB
ACCESSION NUMBER, LLC1M: ABNORMAL
ACINETOBACTER CALCOAC BAUMANNII COMPLEX BY PCR: NOT DETECTED
APTT PPP: 93 SEC (ref 21.2–34.1)
BACTEROIDES FRAGILIS BY PCR: NOT DETECTED
BIOFIRE TEST COMMENT: ABNORMAL
CANDIDA ALBICANS BY PCR: NOT DETECTED
CANDIDA AURIS BY PCR: NOT DETECTED
CANDIDA GLABRATA: NOT DETECTED
CANDIDA KRUSEI BY PCR: NOT DETECTED
CANDIDA PARAPSILOSIS BY PCR: NOT DETECTED
CANDIDA TROPICALIS BY PCR: NOT DETECTED
CRYPTOCOCCUS NEOFORMANS/GATTII BY PCR: NOT DETECTED
EKG ATRIAL RATE: 134 BPM
EKG ATRIAL RATE: 99 BPM
EKG DIAGNOSIS: NORMAL
EKG DIAGNOSIS: NORMAL
EKG P AXIS: 63 DEGREES
EKG P AXIS: 72 DEGREES
EKG P-R INTERVAL: 130 MS
EKG P-R INTERVAL: 146 MS
EKG Q-T INTERVAL: 302 MS
EKG Q-T INTERVAL: 360 MS
EKG QRS DURATION: 72 MS
EKG QRS DURATION: 74 MS
EKG QTC CALCULATION (BAZETT): 450 MS
EKG QTC CALCULATION (BAZETT): 462 MS
EKG R AXIS: 13 DEGREES
EKG R AXIS: 25 DEGREES
EKG T AXIS: 107 DEGREES
EKG T AXIS: 189 DEGREES
EKG VENTRICULAR RATE: 134 BPM
EKG VENTRICULAR RATE: 99 BPM
ENTEROBACTER CLOACAE COMPLEX BY PCR: NOT DETECTED
ENTEROBACTERALES BY PCR: NOT DETECTED
ENTEROCOCCUS FAECALIS BY PCR: NOT DETECTED
ENTEROCOCCUS FAECIUM BY PCR: NOT DETECTED
ERYTHROCYTE [DISTWIDTH] IN BLOOD BY AUTOMATED COUNT: 17 % (ref 11.5–14.5)
ESCHERICHIA COLI: NOT DETECTED
GLUCOSE BLD STRIP.AUTO-MCNC: 106 MG/DL (ref 65–100)
GLUCOSE BLD STRIP.AUTO-MCNC: 175 MG/DL (ref 65–100)
HAEMOPHILUS INFLUENZAE BY PCR: NOT DETECTED
HCT VFR BLD AUTO: 18.6 % (ref 35–47)
HCT VFR BLD AUTO: 23.3 % (ref 35–47)
HGB BLD-MCNC: 5.7 G/DL (ref 11.5–16)
HGB BLD-MCNC: 7.1 G/DL (ref 11.5–16)
INR PPP: 1.3 (ref 0.9–1.1)
KLEBSIELLA AEROGENES BY PCR: NOT DETECTED
KLEBSIELLA OXYTOCA BY PCR: NOT DETECTED
KLEBSIELLA PNEUMONIAE GROUP BY PCR: NOT DETECTED
KPC (CARBAPENEM RESISTANCE GENE): NOT DETECTED
LISTERIA MONOCYTOGENES BY PCR: NOT DETECTED
MAGNESIUM SERPL-MCNC: 1.7 MG/DL (ref 1.6–2.4)
MCH RBC QN AUTO: 26.8 PG (ref 26–34)
MCHC RBC AUTO-ENTMCNC: 30.6 G/DL (ref 30–36.5)
MCV RBC AUTO: 87.3 FL (ref 80–99)
NEISSERIA MENINGITIDIS BY PCR: NOT DETECTED
NRBC # BLD: 0 K/UL (ref 0–0.01)
NRBC BLD-RTO: 0 PER 100 WBC
PERFORMED BY:: ABNORMAL
PERFORMED BY:: ABNORMAL
PLATELET # BLD AUTO: 57 K/UL (ref 150–400)
PMV BLD AUTO: 10.1 FL (ref 8.9–12.9)
PROTEUS BY PCR: NOT DETECTED
PROTHROMBIN TIME: 16.8 SEC (ref 11.9–14.6)
PSEUDOMONAS AERUGINOSA, PSAEP: DETECTED
RBC # BLD AUTO: 2.13 M/UL (ref 3.8–5.2)
RESISTANT GENE CTX-M BY PCR: NOT DETECTED
RESISTANT GENE IMP BY PCR: NOT DETECTED
RESISTANT GENE NDM BY PCR: NOT DETECTED
RESISTANT GENE TARGETS: ABNORMAL
RESISTANT GENE VIM BY PCR: NOT DETECTED
SALMONELLA SPECIES BY PCR: NOT DETECTED
SERRATIA MARCESCENS BY PCR: NOT DETECTED
STAPHYLOCOCCUS AUREUS: NOT DETECTED
STAPHYLOCOCCUS EPIDERMIDIS BY PCR: NOT DETECTED
STAPHYLOCOCCUS LUGDUNENSIS BY PCR: NOT DETECTED
STAPHYLOCOCCUS: NOT DETECTED
STENOTROPHOMONAS MALTOPHILIA BY PCR: NOT DETECTED
STREPTOCOCCUS AGALACTIAE (GROUP B): NOT DETECTED
STREPTOCOCCUS PNEUMONIAE , SPNP: NOT DETECTED
STREPTOCOCCUS PYOGENES (GROUP A), SPYOP: NOT DETECTED
STREPTOCOCCUS: NOT DETECTED
THERAPEUTIC RANGE: ABNORMAL SEC (ref 82–109)
TROPONIN I SERPL HS-MCNC: 4247 NG/L (ref 0–51)
TROPONIN I SERPL HS-MCNC: 5069 NG/L (ref 0–51)
TROPONIN I SERPL HS-MCNC: 5095 NG/L (ref 0–51)
UFH PPP CHRO-ACNC: 0.23 IU/ML
WBC # BLD AUTO: 11.7 K/UL (ref 3.6–11)

## 2023-11-07 PROCEDURE — 85018 HEMOGLOBIN: CPT

## 2023-11-07 PROCEDURE — 86900 BLOOD TYPING SEROLOGIC ABO: CPT

## 2023-11-07 PROCEDURE — P9016 RBC LEUKOCYTES REDUCED: HCPCS

## 2023-11-07 PROCEDURE — 94761 N-INVAS EAR/PLS OXIMETRY MLT: CPT

## 2023-11-07 PROCEDURE — 85520 HEPARIN ASSAY: CPT

## 2023-11-07 PROCEDURE — 99254 IP/OBS CNSLTJ NEW/EST MOD 60: CPT | Performed by: UROLOGY

## 2023-11-07 PROCEDURE — 96376 TX/PRO/DX INJ SAME DRUG ADON: CPT

## 2023-11-07 PROCEDURE — 90935 HEMODIALYSIS ONE EVALUATION: CPT

## 2023-11-07 PROCEDURE — 6370000000 HC RX 637 (ALT 250 FOR IP): Performed by: STUDENT IN AN ORGANIZED HEALTH CARE EDUCATION/TRAINING PROGRAM

## 2023-11-07 PROCEDURE — 6370000000 HC RX 637 (ALT 250 FOR IP): Performed by: INTERNAL MEDICINE

## 2023-11-07 PROCEDURE — 2500000003 HC RX 250 WO HCPCS: Performed by: FAMILY MEDICINE

## 2023-11-07 PROCEDURE — 6370000000 HC RX 637 (ALT 250 FOR IP): Performed by: FAMILY MEDICINE

## 2023-11-07 PROCEDURE — 96367 TX/PROPH/DG ADDL SEQ IV INF: CPT

## 2023-11-07 PROCEDURE — 96366 THER/PROPH/DIAG IV INF ADDON: CPT

## 2023-11-07 PROCEDURE — 86901 BLOOD TYPING SEROLOGIC RH(D): CPT

## 2023-11-07 PROCEDURE — 2580000003 HC RX 258: Performed by: FAMILY MEDICINE

## 2023-11-07 PROCEDURE — 85027 COMPLETE CBC AUTOMATED: CPT

## 2023-11-07 PROCEDURE — 85730 THROMBOPLASTIN TIME PARTIAL: CPT

## 2023-11-07 PROCEDURE — 2709999900 HC NON-CHARGEABLE SUPPLY

## 2023-11-07 PROCEDURE — 86850 RBC ANTIBODY SCREEN: CPT

## 2023-11-07 PROCEDURE — 85610 PROTHROMBIN TIME: CPT

## 2023-11-07 PROCEDURE — 30233N1 TRANSFUSION OF NONAUTOLOGOUS RED BLOOD CELLS INTO PERIPHERAL VEIN, PERCUTANEOUS APPROACH: ICD-10-PCS | Performed by: FAMILY MEDICINE

## 2023-11-07 PROCEDURE — 6360000002 HC RX W HCPCS: Performed by: FAMILY MEDICINE

## 2023-11-07 PROCEDURE — 87340 HEPATITIS B SURFACE AG IA: CPT

## 2023-11-07 PROCEDURE — 94640 AIRWAY INHALATION TREATMENT: CPT

## 2023-11-07 PROCEDURE — 74176 CT ABD & PELVIS W/O CONTRAST: CPT

## 2023-11-07 PROCEDURE — 6360000002 HC RX W HCPCS

## 2023-11-07 PROCEDURE — 85014 HEMATOCRIT: CPT

## 2023-11-07 PROCEDURE — 2000000000 HC ICU R&B

## 2023-11-07 PROCEDURE — 6360000002 HC RX W HCPCS: Performed by: STUDENT IN AN ORGANIZED HEALTH CARE EDUCATION/TRAINING PROGRAM

## 2023-11-07 PROCEDURE — 93005 ELECTROCARDIOGRAM TRACING: CPT

## 2023-11-07 PROCEDURE — 86923 COMPATIBILITY TEST ELECTRIC: CPT

## 2023-11-07 PROCEDURE — 96375 TX/PRO/DX INJ NEW DRUG ADDON: CPT

## 2023-11-07 PROCEDURE — 83735 ASSAY OF MAGNESIUM: CPT

## 2023-11-07 PROCEDURE — 82962 GLUCOSE BLOOD TEST: CPT

## 2023-11-07 PROCEDURE — 36415 COLL VENOUS BLD VENIPUNCTURE: CPT

## 2023-11-07 PROCEDURE — 84484 ASSAY OF TROPONIN QUANT: CPT

## 2023-11-07 PROCEDURE — 2700000000 HC OXYGEN THERAPY PER DAY

## 2023-11-07 PROCEDURE — 5A1D70Z PERFORMANCE OF URINARY FILTRATION, INTERMITTENT, LESS THAN 6 HOURS PER DAY: ICD-10-PCS | Performed by: INTERNAL MEDICINE

## 2023-11-07 RX ORDER — METHYLPREDNISOLONE SODIUM SUCCINATE 40 MG/ML
40 INJECTION, POWDER, LYOPHILIZED, FOR SOLUTION INTRAMUSCULAR; INTRAVENOUS EVERY 6 HOURS
Status: DISCONTINUED | OUTPATIENT
Start: 2023-11-07 | End: 2023-11-12

## 2023-11-07 RX ORDER — ONDANSETRON 2 MG/ML
INJECTION INTRAMUSCULAR; INTRAVENOUS
Status: COMPLETED
Start: 2023-11-07 | End: 2023-11-07

## 2023-11-07 RX ORDER — HYDROXYZINE PAMOATE 25 MG/1
25 CAPSULE ORAL 3 TIMES DAILY PRN
Status: DISCONTINUED | OUTPATIENT
Start: 2023-11-07 | End: 2023-11-15 | Stop reason: HOSPADM

## 2023-11-07 RX ORDER — IPRATROPIUM BROMIDE AND ALBUTEROL SULFATE 2.5; .5 MG/3ML; MG/3ML
1 SOLUTION RESPIRATORY (INHALATION)
Status: DISCONTINUED | OUTPATIENT
Start: 2023-11-07 | End: 2023-11-15 | Stop reason: HOSPADM

## 2023-11-07 RX ORDER — MIDODRINE HYDROCHLORIDE 5 MG/1
10 TABLET ORAL
Status: DISCONTINUED | OUTPATIENT
Start: 2023-11-07 | End: 2023-11-07

## 2023-11-07 RX ORDER — METOPROLOL SUCCINATE 25 MG/1
25 TABLET, EXTENDED RELEASE ORAL DAILY
Status: DISCONTINUED | OUTPATIENT
Start: 2023-11-07 | End: 2023-11-15 | Stop reason: HOSPADM

## 2023-11-07 RX ORDER — MIDODRINE HYDROCHLORIDE 5 MG/1
10 TABLET ORAL DAILY PRN
Status: DISCONTINUED | OUTPATIENT
Start: 2023-11-07 | End: 2023-11-15 | Stop reason: HOSPADM

## 2023-11-07 RX ORDER — ALBUTEROL SULFATE 90 UG/1
2 AEROSOL, METERED RESPIRATORY (INHALATION) EVERY 6 HOURS PRN
Status: DISCONTINUED | OUTPATIENT
Start: 2023-11-07 | End: 2023-11-15 | Stop reason: HOSPADM

## 2023-11-07 RX ORDER — SODIUM CHLORIDE 9 MG/ML
INJECTION, SOLUTION INTRAVENOUS PRN
Status: DISCONTINUED | OUTPATIENT
Start: 2023-11-07 | End: 2023-11-15 | Stop reason: HOSPADM

## 2023-11-07 RX ORDER — ACETAMINOPHEN 325 MG/1
650 TABLET ORAL EVERY 6 HOURS PRN
Status: DISCONTINUED | OUTPATIENT
Start: 2023-11-07 | End: 2023-11-15 | Stop reason: HOSPADM

## 2023-11-07 RX ORDER — NICOTINE 21 MG/24HR
1 PATCH, TRANSDERMAL 24 HOURS TRANSDERMAL DAILY
Status: DISCONTINUED | OUTPATIENT
Start: 2023-11-07 | End: 2023-11-15 | Stop reason: HOSPADM

## 2023-11-07 RX ORDER — ATORVASTATIN CALCIUM 40 MG/1
80 TABLET, FILM COATED ORAL ONCE
Status: DISCONTINUED | OUTPATIENT
Start: 2023-11-07 | End: 2023-11-13

## 2023-11-07 RX ORDER — SODIUM CHLORIDE 0.9 % (FLUSH) 0.9 %
5-40 SYRINGE (ML) INJECTION PRN
Status: DISCONTINUED | OUTPATIENT
Start: 2023-11-07 | End: 2023-11-15 | Stop reason: HOSPADM

## 2023-11-07 RX ORDER — SODIUM CHLORIDE 9 MG/ML
INJECTION, SOLUTION INTRAVENOUS PRN
Status: DISCONTINUED | OUTPATIENT
Start: 2023-11-07 | End: 2023-11-07

## 2023-11-07 RX ORDER — AMLODIPINE BESYLATE 5 MG/1
10 TABLET ORAL DAILY
Status: DISCONTINUED | OUTPATIENT
Start: 2023-11-07 | End: 2023-11-07

## 2023-11-07 RX ORDER — ALBUMIN (HUMAN) 12.5 G/50ML
25 SOLUTION INTRAVENOUS
Status: ACTIVE | OUTPATIENT
Start: 2023-11-07 | End: 2023-11-08

## 2023-11-07 RX ORDER — CALCITRIOL 0.25 UG/1
0.25 CAPSULE, LIQUID FILLED ORAL DAILY
Status: DISCONTINUED | OUTPATIENT
Start: 2023-11-07 | End: 2023-11-15 | Stop reason: HOSPADM

## 2023-11-07 RX ORDER — POLYETHYLENE GLYCOL 3350 17 G/17G
17 POWDER, FOR SOLUTION ORAL DAILY PRN
Status: DISCONTINUED | OUTPATIENT
Start: 2023-11-07 | End: 2023-11-15 | Stop reason: HOSPADM

## 2023-11-07 RX ORDER — PANTOPRAZOLE SODIUM 40 MG/1
40 TABLET, DELAYED RELEASE ORAL
Status: DISCONTINUED | OUTPATIENT
Start: 2023-11-07 | End: 2023-11-15 | Stop reason: HOSPADM

## 2023-11-07 RX ORDER — BUDESONIDE AND FORMOTEROL FUMARATE DIHYDRATE 80; 4.5 UG/1; UG/1
2 AEROSOL RESPIRATORY (INHALATION)
Status: DISCONTINUED | OUTPATIENT
Start: 2023-11-07 | End: 2023-11-15 | Stop reason: HOSPADM

## 2023-11-07 RX ORDER — ONDANSETRON 2 MG/ML
4 INJECTION INTRAMUSCULAR; INTRAVENOUS ONCE
Status: COMPLETED | OUTPATIENT
Start: 2023-11-07 | End: 2023-11-07

## 2023-11-07 RX ORDER — CETIRIZINE HYDROCHLORIDE 10 MG/1
10 TABLET ORAL DAILY
Status: DISCONTINUED | OUTPATIENT
Start: 2023-11-07 | End: 2023-11-15 | Stop reason: HOSPADM

## 2023-11-07 RX ORDER — IPRATROPIUM BROMIDE AND ALBUTEROL SULFATE 2.5; .5 MG/3ML; MG/3ML
1 SOLUTION RESPIRATORY (INHALATION)
Status: DISCONTINUED | OUTPATIENT
Start: 2023-11-07 | End: 2023-11-07

## 2023-11-07 RX ORDER — ASPIRIN 81 MG/1
81 TABLET ORAL DAILY
Status: DISCONTINUED | OUTPATIENT
Start: 2023-11-07 | End: 2023-11-15 | Stop reason: HOSPADM

## 2023-11-07 RX ORDER — FUROSEMIDE 40 MG/1
40 TABLET ORAL DAILY
Status: DISCONTINUED | OUTPATIENT
Start: 2023-11-07 | End: 2023-11-15 | Stop reason: HOSPADM

## 2023-11-07 RX ORDER — SEVELAMER CARBONATE 800 MG/1
1600 TABLET, FILM COATED ORAL
Status: DISCONTINUED | OUTPATIENT
Start: 2023-11-07 | End: 2023-11-08

## 2023-11-07 RX ORDER — ISOSORBIDE DINITRATE 20 MG/1
20 TABLET ORAL 3 TIMES DAILY
Status: DISCONTINUED | OUTPATIENT
Start: 2023-11-07 | End: 2023-11-12

## 2023-11-07 RX ORDER — ONDANSETRON 2 MG/ML
4 INJECTION INTRAMUSCULAR; INTRAVENOUS EVERY 6 HOURS PRN
Status: DISCONTINUED | OUTPATIENT
Start: 2023-11-07 | End: 2023-11-15 | Stop reason: HOSPADM

## 2023-11-07 RX ORDER — VITAMIN B COMPLEX
1 TABLET ORAL DAILY
Status: DISCONTINUED | OUTPATIENT
Start: 2023-11-07 | End: 2023-11-15 | Stop reason: HOSPADM

## 2023-11-07 RX ORDER — HYDROMORPHONE HYDROCHLORIDE 1 MG/ML
0.2 INJECTION, SOLUTION INTRAMUSCULAR; INTRAVENOUS; SUBCUTANEOUS EVERY 6 HOURS PRN
Status: DISPENSED | OUTPATIENT
Start: 2023-11-07 | End: 2023-11-10

## 2023-11-07 RX ORDER — METOLAZONE 2.5 MG/1
2.5 TABLET ORAL DAILY
Status: DISCONTINUED | OUTPATIENT
Start: 2023-11-07 | End: 2023-11-07

## 2023-11-07 RX ORDER — ACETAMINOPHEN 650 MG/1
650 SUPPOSITORY RECTAL EVERY 6 HOURS PRN
Status: DISCONTINUED | OUTPATIENT
Start: 2023-11-07 | End: 2023-11-15 | Stop reason: HOSPADM

## 2023-11-07 RX ORDER — SODIUM CHLORIDE 0.9 % (FLUSH) 0.9 %
5-40 SYRINGE (ML) INJECTION EVERY 12 HOURS SCHEDULED
Status: DISCONTINUED | OUTPATIENT
Start: 2023-11-07 | End: 2023-11-15 | Stop reason: HOSPADM

## 2023-11-07 RX ORDER — MORPHINE SULFATE 2 MG/ML
2 INJECTION, SOLUTION INTRAMUSCULAR; INTRAVENOUS EVERY 6 HOURS PRN
Status: DISCONTINUED | OUTPATIENT
Start: 2023-11-07 | End: 2023-11-07

## 2023-11-07 RX ORDER — ATORVASTATIN CALCIUM 40 MG/1
40 TABLET, FILM COATED ORAL DAILY
Status: DISCONTINUED | OUTPATIENT
Start: 2023-11-07 | End: 2023-11-15 | Stop reason: HOSPADM

## 2023-11-07 RX ORDER — ONDANSETRON 4 MG/1
4 TABLET, ORALLY DISINTEGRATING ORAL EVERY 8 HOURS PRN
Status: DISCONTINUED | OUTPATIENT
Start: 2023-11-07 | End: 2023-11-15 | Stop reason: HOSPADM

## 2023-11-07 RX ORDER — HEPARIN SODIUM 1000 [USP'U]/ML
3600 INJECTION, SOLUTION INTRAVENOUS; SUBCUTANEOUS PRN
Status: DISCONTINUED | OUTPATIENT
Start: 2023-11-07 | End: 2023-11-15 | Stop reason: HOSPADM

## 2023-11-07 RX ADMIN — ACETAMINOPHEN 650 MG: 325 TABLET ORAL at 09:34

## 2023-11-07 RX ADMIN — IPRATROPIUM BROMIDE AND ALBUTEROL SULFATE 1 DOSE: .5; 3 SOLUTION RESPIRATORY (INHALATION) at 00:06

## 2023-11-07 RX ADMIN — HYDROMORPHONE HYDROCHLORIDE 0.2 MG: 1 INJECTION, SOLUTION INTRAMUSCULAR; INTRAVENOUS; SUBCUTANEOUS at 12:47

## 2023-11-07 RX ADMIN — SEVELAMER CARBONATE 1600 MG: 800 TABLET, FILM COATED ORAL at 09:34

## 2023-11-07 RX ADMIN — CETIRIZINE HYDROCHLORIDE 10 MG: 10 TABLET, FILM COATED ORAL at 09:34

## 2023-11-07 RX ADMIN — METHYLPREDNISOLONE SODIUM SUCCINATE 40 MG: 40 INJECTION, POWDER, LYOPHILIZED, FOR SOLUTION INTRAMUSCULAR; INTRAVENOUS at 06:21

## 2023-11-07 RX ADMIN — ONDANSETRON 4 MG: 2 INJECTION INTRAMUSCULAR; INTRAVENOUS at 01:13

## 2023-11-07 RX ADMIN — Medication 2 PUFF: at 20:17

## 2023-11-07 RX ADMIN — ISOSORBIDE DINITRATE 20 MG: 10 TABLET ORAL at 19:45

## 2023-11-07 RX ADMIN — IPRATROPIUM BROMIDE AND ALBUTEROL SULFATE 1 DOSE: .5; 3 SOLUTION RESPIRATORY (INHALATION) at 00:08

## 2023-11-07 RX ADMIN — ONDANSETRON 4 MG: 2 INJECTION INTRAMUSCULAR; INTRAVENOUS at 16:59

## 2023-11-07 RX ADMIN — METHYLPREDNISOLONE SODIUM SUCCINATE 40 MG: 40 INJECTION, POWDER, LYOPHILIZED, FOR SOLUTION INTRAMUSCULAR; INTRAVENOUS at 18:47

## 2023-11-07 RX ADMIN — SODIUM CHLORIDE, PRESERVATIVE FREE 10 ML: 5 INJECTION INTRAVENOUS at 19:45

## 2023-11-07 RX ADMIN — Medication 2 PUFF: at 08:29

## 2023-11-07 RX ADMIN — ONDANSETRON 4 MG: 2 INJECTION INTRAMUSCULAR; INTRAVENOUS at 04:21

## 2023-11-07 RX ADMIN — METHYLPREDNISOLONE SODIUM SUCCINATE 40 MG: 40 INJECTION, POWDER, LYOPHILIZED, FOR SOLUTION INTRAMUSCULAR; INTRAVENOUS at 11:28

## 2023-11-07 RX ADMIN — IPRATROPIUM BROMIDE AND ALBUTEROL SULFATE 1 DOSE: 2.5; .5 SOLUTION RESPIRATORY (INHALATION) at 20:11

## 2023-11-07 RX ADMIN — ASPIRIN 81 MG: 81 TABLET, COATED ORAL at 09:34

## 2023-11-07 RX ADMIN — CALCITRIOL CAPSULES 0.25 MCG 0.25 MCG: 0.25 CAPSULE ORAL at 09:34

## 2023-11-07 RX ADMIN — SODIUM CHLORIDE, PRESERVATIVE FREE 10 ML: 5 INJECTION INTRAVENOUS at 09:38

## 2023-11-07 RX ADMIN — LEVOFLOXACIN 750 MG: 5 INJECTION, SOLUTION INTRAVENOUS at 00:58

## 2023-11-07 RX ADMIN — IPRATROPIUM BROMIDE AND ALBUTEROL SULFATE 1 DOSE: .5; 3 SOLUTION RESPIRATORY (INHALATION) at 00:09

## 2023-11-07 RX ADMIN — ATORVASTATIN CALCIUM 40 MG: 40 TABLET, FILM COATED ORAL at 09:34

## 2023-11-07 RX ADMIN — METOPROLOL SUCCINATE 25 MG: 25 TABLET, EXTENDED RELEASE ORAL at 16:58

## 2023-11-07 RX ADMIN — PANTOPRAZOLE SODIUM 40 MG: 40 TABLET, DELAYED RELEASE ORAL at 11:25

## 2023-11-07 RX ADMIN — IPRATROPIUM BROMIDE AND ALBUTEROL SULFATE 1 DOSE: 2.5; .5 SOLUTION RESPIRATORY (INHALATION) at 08:29

## 2023-11-07 RX ADMIN — SEVELAMER CARBONATE 1600 MG: 800 TABLET, FILM COATED ORAL at 16:57

## 2023-11-07 RX ADMIN — HYDROMORPHONE HYDROCHLORIDE 0.2 MG: 1 INJECTION, SOLUTION INTRAMUSCULAR; INTRAVENOUS; SUBCUTANEOUS at 18:47

## 2023-11-07 RX ADMIN — Medication 1000 UNITS: at 09:34

## 2023-11-07 RX ADMIN — MIDODRINE HYDROCHLORIDE 10 MG: 5 TABLET ORAL at 11:25

## 2023-11-07 RX ADMIN — ONDANSETRON 4 MG: 2 INJECTION INTRAMUSCULAR; INTRAVENOUS at 09:34

## 2023-11-07 ASSESSMENT — PAIN DESCRIPTION - DESCRIPTORS
DESCRIPTORS: ACHING

## 2023-11-07 ASSESSMENT — PAIN SCALES - GENERAL
PAINLEVEL_OUTOF10: 9
PAINLEVEL_OUTOF10: 7
PAINLEVEL_OUTOF10: 0
PAINLEVEL_OUTOF10: 9
PAINLEVEL_OUTOF10: 0

## 2023-11-07 ASSESSMENT — PAIN DESCRIPTION - LOCATION
LOCATION: ARM;TOE (COMMENT WHICH ONE)
LOCATION: ARM;KNEE;FOOT
LOCATION: ARM;KNEE;FOOT

## 2023-11-07 ASSESSMENT — PAIN DESCRIPTION - ORIENTATION
ORIENTATION: LEFT;RIGHT
ORIENTATION: RIGHT;LEFT

## 2023-11-07 NOTE — ED NOTES
DR Sage Josue NOW ON W/ [address] [facility] ED.      Miki Medina  11/06/23 2037

## 2023-11-07 NOTE — DIALYSIS
Patient tolerated treatment. 2000 ml of fluid was removed. Patient was alert and oriented during treatment. 59.8 liters of blood was processed. Report was given to primary nurse Maia Urbina.

## 2023-11-07 NOTE — ED NOTES
TRANSFER - OUT REPORT:    Verbal report given to Elisa Rn  on Grace Robertson  being transferred to [facility] for routine progression of patient care       Report consisted of patient's Situation, Background, Assessment and   Recommendations(SBAR). Information from the following report(s) Nurse Handoff Report, ED SBAR, and [facility] was reviewed with the receiving nurse. [place] Fall Assessment:    Presents to emergency department  because of falls (Syncope, seizure, or loss of consciousness): No  Age > 70: No  Altered Mental Status, Intoxication with alcohol or substance confusion (Disorientation, impaired judgment, poor safety awaremess, or inability to follow instructions): No  Impaired Mobility: Ambulates or transfers with assistive devices or assistance; Unable to ambulate or transer.: No  Nursing Judgement: No          Lines:   CVC Triple Lumen 11/06/23 Left Internal jugular (Active)        Opportunity for questions and clarification was provided.       Patient transported with:  Monitor, O2 @ 2lpm, Registered Nurse, and Louie Rubalcava RN  11/07/23 9699

## 2023-11-07 NOTE — ED NOTES
Spoke with Quintin President in ICU at [facility] regarding positive blood cultures.       Melany Mccarty RN  11/07/23 6881

## 2023-11-07 NOTE — ED PROVIDER NOTES
[address]  EMERGENCY DEPARTMENT ENCOUNTER NOTE    Date: 11/6/2023  Patient Name: Jann Forbes    History of Presenting Illness     Chief Complaint   Patient presents with    NSTEMI       History obtained from: Patient    HPI: Jann Forbes, 52 y.o. female with past medical history as listed and reviewed below presents for NSTEMI as a transfer from outside hospital.  The patient is a CKD patient/ESRD, missed dialysis, and presented to an outside hospital with shortness of breath. She had tachycardia upon presentation to the outside hospital, she was in respiratory distress and was placed on CPAP upon her arrival.  The patient had missed dialysis, and went earlier today for dialysis but had access problems so she never got the session. In the outside hospital, they had to place left-sided central line for access after a prolonged time trying to convince her that she needs access for medical care. EKG showed T wave inversions per report. CBC did not show any leukocytosis and showed chronic anemia at baseline. CMP showed a potassium of 4.0 with an elevated creatinine. Troponin increased from 500 initially to 3200. X-ray shows increased bilateral interstitial opacities/pulmonary edema. Lactate was 2, then 1.5. COVID and flu were negative. The patient was given ceftriaxone for possible pneumonia, procalcitonin was elevated at 6.49. The patient was also loaded with heparin and heparin drip was initiated prior to transfer. On arrival, the patient has been weaned down to 2 L of nasal cannula satting at 100% with improved respirations. She reports chest pain and shortness of breath. Stable otherwise.     Medical History   I reviewed the medical, surgical, family, and social history, as well as allergies:    PCP: CASEY Santos NP    Past Medical History:  Past Medical History:   Diagnosis Date    ESRD on hemodialysis ([address])     Kidney failure      Past Surgical

## 2023-11-07 NOTE — CARE COORDINATION
11/07/23 1309   Service Assessment   Patient Orientation Alert and Oriented   Cognition Alert   History Provided By Patient   Primary Caregiver Self   Accompanied By/Relationship Pt alone during interview. [address] is: Legal Next of Kin   PCP Verified by CM Yes  Joshua  - seen 3 weeks ago.)   Last Visit to PCP Within last 3 months   Prior Functional Level Independent in ADLs/IADLs;Mobility  (W/C @ times.)   Current Functional Level Independent in ADLs/IADLs;Mobility  (W/C @ times.)   Can patient return to prior living arrangement Yes   Ability to make needs known: Good   Family able to assist with home care needs: Yes   Would you like for me to discuss the discharge plan with any other family members/significant others, and if so, who? Yes  (Son Anette Henson)   Financial Resources Medicaid   Community Resources None   Social/Functional History   Lives With Spouse   Type of [address] [place] [address] One level   Home Access Stairs to enter without rails   Entrance Stairs - Number of Steps 4 outside steps. Bathroom Shower/Tub Tub/Shower unit   Bathroom Toilet Standard   [facility]  (At times.)   ADL Assistance Independent   Homemaking Assistance Independent   Homemaking Responsibilities Yes   Ambulation Assistance Needs assistance  (W/C @ times.)   Transfer Assistance Independent   Active  No   Occupation On disability   Discharge Planning   Type of Residence House   Living Arrangements Spouse/Significant Other   Current Services Prior To Admission None   Potential Assistance Needed N/A   Potential Assistance Purchasing Medications No   Type of Home Care Services None   Patient expects to be discharged to: House   One/Two Story Residence One story   History of falls?  0   Services At/After Discharge   Transition of Care Consult (CM Consult) Discharge Planning;Transportation Assistance

## 2023-11-07 NOTE — H&P
History and Physical    NAME:   Juan Francisco Lake   :  1970   MRN:  [id number]     Date/Time: 2023 6:04 AM    Patient PCP: CASEY Obando NP  ______________________________________________________________________       Subjective:     CHIEF COMPLAINT:     Shortness of breath        HISTORY OF PRESENT ILLNESS:       Patient is a 52y.o. year old female history of COPD chronic respiratory failure congestive heart failure end-stage renal disease on hemodialysis history of TAVR , missed dialysis today was transferred from the other hospital with acute shortness of breath tachycardic patient went came to the ER patient was not CPAP blood work was done which shows elevated troponin x-ray shows bilateral pulmonary edema lactic acid was 2 then 1.5 COVID and flu was negative because of elevated troponin heparin drip was initiated prior to the transfer  When I saw the patient patient was on 2 L oxygen by nasal cannula saturation was 100%    Past Medical History:   Diagnosis Date    ESRD on hemodialysis ([address])     Kidney failure         Past Surgical History:   Procedure Laterality Date     SECTION         Social History     Tobacco Use    Smoking status: Every Day     Packs/day: .25     Types: Cigarettes    Smokeless tobacco: Never   Substance Use Topics    Alcohol use: Not Currently        Family History   Problem Relation Age of Onset    Diabetes Maternal Grandmother        Allergies   Allergen Reactions    Tramadol Hives     Other reaction(s): gi distress  Throwing up      Codeine Nausea And Vomiting and Palpitations     Other reaction(s): cardiac dysrhythmia    Dulaglutide Other (See Comments)     Other reaction(s): other/intolerance, Unknown  Nausea with vomiting and dizziness  Nausea with vomiting and dizziness  Low blood sugars      Penicillins Hives and Itching     Other reaction(s): rash/itching    Sitagliptin Other (See Comments)     Other reaction(s): other/intolerance, Unknown  Headache, runny
Normocephalic and atraumatic. Eyes: Pupils are equal, round, and reactive to light. EOM are normal.   Cardiovascular: Normal rate, regular rhythm and normal heart sounds. Pulmonary/Chest: Breath sounds normal. No wheezes. No rales. Exhibits no tenderness. Abdominal: Soft. Bowel sounds are normal. There is no abdominal tenderness. There is no rebound and no guarding. Musculoskeletal: Normal range of motion. Neurological: pt is alert and oriented to person, place, and time. Alert. Normal strength. No cranial nerve deficit or sensory deficit. Displays a negative Romberg sign.         ASSESSMENT & PLAN:    NSTEMI  End-stage renal disease on hemodialysis  Acute on chronic hypoxic respiratory failure  Acute extubation of COPD  Acute pulmonary edema  Anemia of chronic disease  Renal mass  History of TAVR  Hypertension  Hyperlipidemia      Amlodipine 10 mg daily  Aspirin 81 mg daily  Lipitor 40 mg daily  Symbicort 2 puffs twice a day  Calcitriol 0.25 mg daily  Lasix 40 mg daily  Nebulizer every 6 hours  Sorbide dinitrate 20 mg 3 times a day  Received 1 dose of Levaquin in the ER  Solu-Medrol 40 mg IV every 6  Zaroxolyn 2.5 mg daily  Metoprolol 25 mg daily  Midodrine 10 mg 3 times a day  Nicotine patch    Monitor H&H  Seen by the urology no further work-up at this time regarding renal mass as patient had this mass for long time  And patient refused any work-up in the past    We will add morphine for the pain and nausea vomiting continue Zofran  Continue dialysis 3 times a week         ________________________________________________________________________    Signed: Aletha Nelson MD

## 2023-11-07 NOTE — ED NOTES
TRANSFER CTR CONTACTED FOR MOVE TO HIGHER LEVEL OF CARE. [facility], WHICH WAS PT REQUEST, HAS  NO AVAILABILITY. TRANSFER CTR CONTACTING [facility] AT THIS TIME.      Yaron Bojorquez  11/06/23 2033

## 2023-11-07 NOTE — ED NOTES
Pt returned from Ct with nausea. Pt vomited once.   Md notified with orders for Ngozi Vázquez RN  11/07/23 0117

## 2023-11-08 ENCOUNTER — APPOINTMENT (OUTPATIENT)
Facility: HOSPITAL | Age: 53
DRG: 721 | End: 2023-11-08
Payer: MEDICAID

## 2023-11-08 ENCOUNTER — APPOINTMENT (OUTPATIENT)
Facility: HOSPITAL | Age: 53
DRG: 721 | End: 2023-11-08
Attending: FAMILY MEDICINE
Payer: MEDICAID

## 2023-11-08 LAB
ALBUMIN SERPL-MCNC: 1.8 G/DL (ref 3.5–5)
ALBUMIN SERPL-MCNC: 1.9 G/DL (ref 3.5–5)
ALBUMIN/GLOB SERPL: 0.6 (ref 1.1–2.2)
ALP SERPL-CCNC: 109 U/L (ref 45–117)
ALT SERPL-CCNC: 6 U/L (ref 12–78)
ANION GAP SERPL CALC-SCNC: 5 MMOL/L (ref 5–15)
ANION GAP SERPL CALC-SCNC: 7 MMOL/L (ref 5–15)
AST SERPL W P-5'-P-CCNC: 11 U/L (ref 15–37)
BASOPHILS # BLD: 0 K/UL (ref 0–0.1)
BASOPHILS # BLD: 0 K/UL (ref 0–0.1)
BASOPHILS NFR BLD: 0 % (ref 0–1)
BASOPHILS NFR BLD: 0 % (ref 0–1)
BILIRUB SERPL-MCNC: 0.6 MG/DL (ref 0.2–1)
BUN SERPL-MCNC: 29 MG/DL (ref 6–20)
BUN SERPL-MCNC: 30 MG/DL (ref 6–20)
BUN/CREAT SERPL: 8 (ref 12–20)
BUN/CREAT SERPL: 9 (ref 12–20)
CA-I BLD-MCNC: 7.6 MG/DL (ref 8.5–10.1)
CA-I BLD-MCNC: 7.8 MG/DL (ref 8.5–10.1)
CHLORIDE SERPL-SCNC: 100 MMOL/L (ref 97–108)
CHLORIDE SERPL-SCNC: 100 MMOL/L (ref 97–108)
CO2 SERPL-SCNC: 28 MMOL/L (ref 21–32)
CO2 SERPL-SCNC: 29 MMOL/L (ref 21–32)
CREAT SERPL-MCNC: 3.46 MG/DL (ref 0.55–1.02)
CREAT SERPL-MCNC: 3.48 MG/DL (ref 0.55–1.02)
DIFFERENTIAL METHOD BLD: ABNORMAL
DIFFERENTIAL METHOD BLD: ABNORMAL
ECHO AV MEAN GRADIENT: 14 MMHG
ECHO AV MEAN VELOCITY: 1.7 M/S
ECHO AV PEAK GRADIENT: 26 MMHG
ECHO AV PEAK VELOCITY: 2.5 M/S
ECHO AV VELOCITY RATIO: 0.24
ECHO AV VTI: 52.4 CM
ECHO BSA: 1.69 M2
ECHO EST RA PRESSURE: 8 MMHG
ECHO LA AREA 2C: 12.3 CM2
ECHO LA AREA 4C: 20.2 CM2
ECHO LA MAJOR AXIS: 5.8 CM
ECHO LA MINOR AXIS: 4.6 CM
ECHO LA VOL MOD A2C: 27 ML (ref 22–52)
ECHO LA VOL MOD A4C: 53 ML (ref 22–52)
ECHO LA VOLUME INDEX MOD A2C: 16 ML/M2 (ref 16–34)
ECHO LA VOLUME INDEX MOD A4C: 32 ML/M2 (ref 16–34)
ECHO LV E' LATERAL VELOCITY: 8 CM/S
ECHO LV E' SEPTAL VELOCITY: 7 CM/S
ECHO LV EDV A2C: 56 ML
ECHO LV EDV A4C: 67 ML
ECHO LV EDV INDEX A4C: 40 ML/M2
ECHO LV EDV NDEX A2C: 33 ML/M2
ECHO LV EJECTION FRACTION A2C: 44 %
ECHO LV EJECTION FRACTION A4C: 32 %
ECHO LV EJECTION FRACTION BIPLANE: 40 % (ref 55–100)
ECHO LV ESV A2C: 31 ML
ECHO LV ESV A4C: 45 ML
ECHO LV ESV INDEX A2C: 18 ML/M2
ECHO LV ESV INDEX A4C: 27 ML/M2
ECHO LV FRACTIONAL SHORTENING: 32 % (ref 28–44)
ECHO LV INTERNAL DIMENSION DIASTOLE INDEX: 3.39 CM/M2
ECHO LV INTERNAL DIMENSION DIASTOLIC: 5.7 CM (ref 3.9–5.3)
ECHO LV INTERNAL DIMENSION SYSTOLIC INDEX: 2.32 CM/M2
ECHO LV INTERNAL DIMENSION SYSTOLIC: 3.9 CM
ECHO LV IVSD: 0.9 CM (ref 0.6–0.9)
ECHO LV MASS 2D: 226.4 G (ref 67–162)
ECHO LV MASS INDEX 2D: 134.7 G/M2 (ref 43–95)
ECHO LV POSTERIOR WALL DIASTOLIC: 1.1 CM (ref 0.6–0.9)
ECHO LV RELATIVE WALL THICKNESS RATIO: 0.39
ECHO LVOT AV VTI INDEX: 0.26
ECHO LVOT MEAN GRADIENT: 1 MMHG
ECHO LVOT PEAK GRADIENT: 2 MMHG
ECHO LVOT PEAK VELOCITY: 0.6 M/S
ECHO LVOT VTI: 13.7 CM
ECHO MV A VELOCITY: 1.08 M/S
ECHO MV E VELOCITY: 1.47 M/S
ECHO MV E/A RATIO: 1.36
ECHO MV E/E' LATERAL: 18.38
ECHO MV EROA PISA: 0.1 CM2
ECHO MV REGURGITANT ALIASING (NYQUIST) VELOCITY: 85 CM/S
ECHO MV REGURGITANT PEAK GRADIENT: 154 MMHG
ECHO MV REGURGITANT PEAK VELOCITY: 6.2 M/S
ECHO MV REGURGITANT RADIUS PISA: 0.3 CM
ECHO MV REGURGITANT VOLUME PISA: 15.34 ML
ECHO MV REGURGITANT VTIA: 198 CM
ECHO PV MAX VELOCITY: 0.8 M/S
ECHO PV PEAK GRADIENT: 3 MMHG
ECHO RA AREA 4C: 13.1 CM2
ECHO RA END SYSTOLIC VOLUME APICAL 4 CHAMBER INDEX BSA: 16 ML/M2
ECHO RA VOLUME: 27 ML
ECHO RIGHT VENTRICULAR SYSTOLIC PRESSURE (RVSP): 47 MMHG
ECHO RV BASAL DIMENSION: 3.3 CM
ECHO RV FREE WALL PEAK S': 10 CM/S
ECHO RV MID DIMENSION: 2.3 CM
ECHO RV TAPSE: 2 CM (ref 1.7–?)
ECHO TV REGURGITANT MAX VELOCITY: 3.14 M/S
ECHO TV REGURGITANT PEAK GRADIENT: 39 MMHG
EOSINOPHIL # BLD: 0 K/UL (ref 0–0.4)
EOSINOPHIL # BLD: 0 K/UL (ref 0–0.4)
EOSINOPHIL NFR BLD: 0 % (ref 0–7)
EOSINOPHIL NFR BLD: 0 % (ref 0–7)
ERYTHROCYTE [DISTWIDTH] IN BLOOD BY AUTOMATED COUNT: 23.3 % (ref 11.5–14.5)
ERYTHROCYTE [DISTWIDTH] IN BLOOD BY AUTOMATED COUNT: 24.2 % (ref 11.5–14.5)
FERRITIN SERPL-MCNC: 3029 NG/ML (ref 8–252)
GLOBULIN SER CALC-MCNC: 3.1 G/DL (ref 2–4)
GLUCOSE BLD STRIP.AUTO-MCNC: 190 MG/DL (ref 65–100)
GLUCOSE BLD STRIP.AUTO-MCNC: 239 MG/DL (ref 65–100)
GLUCOSE BLD STRIP.AUTO-MCNC: 258 MG/DL (ref 65–100)
GLUCOSE SERPL-MCNC: 217 MG/DL (ref 65–100)
GLUCOSE SERPL-MCNC: 221 MG/DL (ref 65–100)
HBV SURFACE AG SER QL: <0.1 INDEX
HBV SURFACE AG SER QL: NEGATIVE
HCT VFR BLD AUTO: 20.5 % (ref 35–47)
HCT VFR BLD AUTO: 23.3 % (ref 35–47)
HGB BLD-MCNC: 6.4 G/DL (ref 11.5–16)
HGB BLD-MCNC: 7.4 G/DL (ref 11.5–16)
IMM GRANULOCYTES # BLD AUTO: 0 K/UL
IMM GRANULOCYTES # BLD AUTO: 0.1 K/UL (ref 0–0.04)
IMM GRANULOCYTES NFR BLD AUTO: 0 %
IMM GRANULOCYTES NFR BLD AUTO: 1 % (ref 0–0.5)
IRON SATN MFR SERPL: 81 % (ref 20–50)
IRON SERPL-MCNC: 103 UG/DL (ref 35–150)
LDH SERPL L TO P-CCNC: 323 U/L (ref 81–246)
LYMPHOCYTES # BLD: 0.4 K/UL (ref 0.8–3.5)
LYMPHOCYTES # BLD: 0.4 K/UL (ref 0.8–3.5)
LYMPHOCYTES NFR BLD: 3 % (ref 12–49)
LYMPHOCYTES NFR BLD: 4 % (ref 12–49)
MCH RBC QN AUTO: 25.7 PG (ref 26–34)
MCH RBC QN AUTO: 26.2 PG (ref 26–34)
MCHC RBC AUTO-ENTMCNC: 31.2 G/DL (ref 30–36.5)
MCHC RBC AUTO-ENTMCNC: 31.8 G/DL (ref 30–36.5)
MCV RBC AUTO: 82.3 FL (ref 80–99)
MCV RBC AUTO: 82.6 FL (ref 80–99)
MONOCYTES # BLD: 0.4 K/UL (ref 0–1)
MONOCYTES # BLD: 0.6 K/UL (ref 0–1)
MONOCYTES NFR BLD: 4 % (ref 5–13)
MONOCYTES NFR BLD: 5 % (ref 5–13)
NEUTS BAND NFR BLD MANUAL: 3 % (ref 0–6)
NEUTS SEG # BLD: 10.7 K/UL (ref 1.8–8)
NEUTS SEG # BLD: 9.5 K/UL (ref 1.8–8)
NEUTS SEG NFR BLD: 89 % (ref 32–75)
NEUTS SEG NFR BLD: 91 % (ref 32–75)
NRBC # BLD: 0 K/UL (ref 0–0.01)
NRBC # BLD: 0.02 K/UL (ref 0–0.01)
NRBC BLD-RTO: 0 PER 100 WBC
NRBC BLD-RTO: 0.2 PER 100 WBC
PERFORMED BY:: ABNORMAL
PHOSPHATE SERPL-MCNC: 2.2 MG/DL (ref 2.6–4.7)
PLATELET # BLD AUTO: 45 K/UL (ref 150–400)
PLATELET # BLD AUTO: 53 K/UL (ref 150–400)
POTASSIUM SERPL-SCNC: 3.7 MMOL/L (ref 3.5–5.1)
POTASSIUM SERPL-SCNC: 3.8 MMOL/L (ref 3.5–5.1)
PROT SERPL-MCNC: 5 G/DL (ref 6.4–8.2)
RBC # BLD AUTO: 2.49 M/UL (ref 3.8–5.2)
RBC # BLD AUTO: 2.82 M/UL (ref 3.8–5.2)
RBC MORPH BLD: ABNORMAL
RETICS # AUTO: 0.04 M/UL (ref 0.02–0.08)
RETICS/RBC NFR AUTO: 1.3 % (ref 0.7–2.1)
SODIUM SERPL-SCNC: 134 MMOL/L (ref 136–145)
SODIUM SERPL-SCNC: 135 MMOL/L (ref 136–145)
TIBC SERPL-MCNC: 127 UG/DL (ref 250–450)
WBC # BLD AUTO: 10.4 K/UL (ref 3.6–11)
WBC # BLD AUTO: 11.7 K/UL (ref 3.6–11)

## 2023-11-08 PROCEDURE — 2709999900 HC NON-CHARGEABLE SUPPLY

## 2023-11-08 PROCEDURE — 6370000000 HC RX 637 (ALT 250 FOR IP): Performed by: FAMILY MEDICINE

## 2023-11-08 PROCEDURE — 82728 ASSAY OF FERRITIN: CPT

## 2023-11-08 PROCEDURE — 2700000000 HC OXYGEN THERAPY PER DAY

## 2023-11-08 PROCEDURE — 83540 ASSAY OF IRON: CPT

## 2023-11-08 PROCEDURE — P9016 RBC LEUKOCYTES REDUCED: HCPCS

## 2023-11-08 PROCEDURE — 85025 COMPLETE CBC W/AUTO DIFF WBC: CPT

## 2023-11-08 PROCEDURE — 36415 COLL VENOUS BLD VENIPUNCTURE: CPT

## 2023-11-08 PROCEDURE — 6360000002 HC RX W HCPCS: Performed by: FAMILY MEDICINE

## 2023-11-08 PROCEDURE — 2580000003 HC RX 258: Performed by: FAMILY MEDICINE

## 2023-11-08 PROCEDURE — 6360000002 HC RX W HCPCS: Performed by: INTERNAL MEDICINE

## 2023-11-08 PROCEDURE — 82962 GLUCOSE BLOOD TEST: CPT

## 2023-11-08 PROCEDURE — 86022 PLATELET ANTIBODIES: CPT

## 2023-11-08 PROCEDURE — 90935 HEMODIALYSIS ONE EVALUATION: CPT

## 2023-11-08 PROCEDURE — 71045 X-RAY EXAM CHEST 1 VIEW: CPT

## 2023-11-08 PROCEDURE — 93306 TTE W/DOPPLER COMPLETE: CPT

## 2023-11-08 PROCEDURE — 82542 COL CHROMOTOGRAPHY QUAL/QUAN: CPT

## 2023-11-08 PROCEDURE — 36430 TRANSFUSION BLD/BLD COMPNT: CPT

## 2023-11-08 PROCEDURE — 80053 COMPREHEN METABOLIC PANEL: CPT

## 2023-11-08 PROCEDURE — 85045 AUTOMATED RETICULOCYTE COUNT: CPT

## 2023-11-08 PROCEDURE — 94640 AIRWAY INHALATION TREATMENT: CPT

## 2023-11-08 PROCEDURE — 83010 ASSAY OF HAPTOGLOBIN QUANT: CPT

## 2023-11-08 PROCEDURE — 80069 RENAL FUNCTION PANEL: CPT

## 2023-11-08 PROCEDURE — 83615 LACTATE (LD) (LDH) ENZYME: CPT

## 2023-11-08 PROCEDURE — 1100000000 HC RM PRIVATE

## 2023-11-08 PROCEDURE — 2500000003 HC RX 250 WO HCPCS: Performed by: FAMILY MEDICINE

## 2023-11-08 PROCEDURE — 6370000000 HC RX 637 (ALT 250 FOR IP): Performed by: INTERNAL MEDICINE

## 2023-11-08 PROCEDURE — 94761 N-INVAS EAR/PLS OXIMETRY MLT: CPT

## 2023-11-08 RX ORDER — SEVELAMER CARBONATE 800 MG/1
800 TABLET, FILM COATED ORAL
Status: DISCONTINUED | OUTPATIENT
Start: 2023-11-08 | End: 2023-11-15 | Stop reason: HOSPADM

## 2023-11-08 RX ORDER — SODIUM CHLORIDE 9 MG/ML
INJECTION, SOLUTION INTRAVENOUS PRN
Status: DISCONTINUED | OUTPATIENT
Start: 2023-11-08 | End: 2023-11-08

## 2023-11-08 RX ADMIN — ISOSORBIDE DINITRATE 20 MG: 10 TABLET ORAL at 08:13

## 2023-11-08 RX ADMIN — FUROSEMIDE 40 MG: 40 TABLET ORAL at 08:13

## 2023-11-08 RX ADMIN — ONDANSETRON 4 MG: 2 INJECTION INTRAMUSCULAR; INTRAVENOUS at 01:13

## 2023-11-08 RX ADMIN — Medication 1000 UNITS: at 08:13

## 2023-11-08 RX ADMIN — ASPIRIN 81 MG: 81 TABLET, COATED ORAL at 08:13

## 2023-11-08 RX ADMIN — IPRATROPIUM BROMIDE AND ALBUTEROL SULFATE 1 DOSE: 2.5; .5 SOLUTION RESPIRATORY (INHALATION) at 09:24

## 2023-11-08 RX ADMIN — SEVELAMER CARBONATE 800 MG: 800 TABLET, FILM COATED ORAL at 18:07

## 2023-11-08 RX ADMIN — HYDROMORPHONE HYDROCHLORIDE 0.2 MG: 1 INJECTION, SOLUTION INTRAMUSCULAR; INTRAVENOUS; SUBCUTANEOUS at 14:35

## 2023-11-08 RX ADMIN — ISOSORBIDE DINITRATE 20 MG: 10 TABLET ORAL at 21:44

## 2023-11-08 RX ADMIN — ATORVASTATIN CALCIUM 40 MG: 40 TABLET, FILM COATED ORAL at 08:13

## 2023-11-08 RX ADMIN — Medication 2 PUFF: at 09:24

## 2023-11-08 RX ADMIN — METHYLPREDNISOLONE SODIUM SUCCINATE 40 MG: 40 INJECTION, POWDER, LYOPHILIZED, FOR SOLUTION INTRAMUSCULAR; INTRAVENOUS at 18:06

## 2023-11-08 RX ADMIN — SEVELAMER CARBONATE 1600 MG: 800 TABLET, FILM COATED ORAL at 12:16

## 2023-11-08 RX ADMIN — HYDROMORPHONE HYDROCHLORIDE 0.2 MG: 1 INJECTION, SOLUTION INTRAMUSCULAR; INTRAVENOUS; SUBCUTANEOUS at 08:11

## 2023-11-08 RX ADMIN — METHYLPREDNISOLONE SODIUM SUCCINATE 40 MG: 40 INJECTION, POWDER, LYOPHILIZED, FOR SOLUTION INTRAMUSCULAR; INTRAVENOUS at 12:16

## 2023-11-08 RX ADMIN — METHYLPREDNISOLONE SODIUM SUCCINATE 40 MG: 40 INJECTION, POWDER, LYOPHILIZED, FOR SOLUTION INTRAMUSCULAR; INTRAVENOUS at 05:15

## 2023-11-08 RX ADMIN — SODIUM CHLORIDE, PRESERVATIVE FREE 10 ML: 5 INJECTION INTRAVENOUS at 21:45

## 2023-11-08 RX ADMIN — HYDROMORPHONE HYDROCHLORIDE 0.2 MG: 1 INJECTION, SOLUTION INTRAMUSCULAR; INTRAVENOUS; SUBCUTANEOUS at 01:13

## 2023-11-08 RX ADMIN — METOPROLOL SUCCINATE 25 MG: 25 TABLET, EXTENDED RELEASE ORAL at 08:13

## 2023-11-08 RX ADMIN — CALCITRIOL CAPSULES 0.25 MCG 0.25 MCG: 0.25 CAPSULE ORAL at 08:13

## 2023-11-08 RX ADMIN — PANTOPRAZOLE SODIUM 40 MG: 40 TABLET, DELAYED RELEASE ORAL at 08:13

## 2023-11-08 RX ADMIN — METHYLPREDNISOLONE SODIUM SUCCINATE 40 MG: 40 INJECTION, POWDER, LYOPHILIZED, FOR SOLUTION INTRAMUSCULAR; INTRAVENOUS at 01:13

## 2023-11-08 RX ADMIN — CETIRIZINE HYDROCHLORIDE 10 MG: 10 TABLET, FILM COATED ORAL at 08:14

## 2023-11-08 RX ADMIN — SODIUM CHLORIDE, PRESERVATIVE FREE 10 ML: 5 INJECTION INTRAVENOUS at 08:14

## 2023-11-08 RX ADMIN — IPRATROPIUM BROMIDE AND ALBUTEROL SULFATE 1 DOSE: 2.5; .5 SOLUTION RESPIRATORY (INHALATION) at 20:10

## 2023-11-08 RX ADMIN — IPRATROPIUM BROMIDE AND ALBUTEROL SULFATE 1 DOSE: 2.5; .5 SOLUTION RESPIRATORY (INHALATION) at 01:31

## 2023-11-08 RX ADMIN — HYDROMORPHONE HYDROCHLORIDE 0.2 MG: 1 INJECTION, SOLUTION INTRAMUSCULAR; INTRAVENOUS; SUBCUTANEOUS at 20:56

## 2023-11-08 RX ADMIN — SEVELAMER CARBONATE 1600 MG: 800 TABLET, FILM COATED ORAL at 08:13

## 2023-11-08 RX ADMIN — EPOETIN ALFA-EPBX 10000 UNITS: 10000 INJECTION, SOLUTION INTRAVENOUS; SUBCUTANEOUS at 16:21

## 2023-11-08 RX ADMIN — HYDROXYZINE PAMOATE 25 MG: 25 CAPSULE ORAL at 20:56

## 2023-11-08 RX ADMIN — Medication 2 PUFF: at 20:10

## 2023-11-08 RX ADMIN — HYDROXYZINE PAMOATE 25 MG: 25 CAPSULE ORAL at 12:16

## 2023-11-08 ASSESSMENT — PAIN SCALES - GENERAL
PAINLEVEL_OUTOF10: 0
PAINLEVEL_OUTOF10: 7
PAINLEVEL_OUTOF10: 0
PAINLEVEL_OUTOF10: 8

## 2023-11-08 ASSESSMENT — PAIN - FUNCTIONAL ASSESSMENT
PAIN_FUNCTIONAL_ASSESSMENT: ACTIVITIES ARE NOT PREVENTED
PAIN_FUNCTIONAL_ASSESSMENT: ACTIVITIES ARE NOT PREVENTED

## 2023-11-08 ASSESSMENT — PAIN DESCRIPTION - ORIENTATION
ORIENTATION: LEFT

## 2023-11-08 ASSESSMENT — PAIN DESCRIPTION - DESCRIPTORS
DESCRIPTORS: ACHING
DESCRIPTORS: ACHING

## 2023-11-08 ASSESSMENT — PAIN DESCRIPTION - LOCATION
LOCATION: ARM;NECK
LOCATION: ARM;NECK
LOCATION: ARM

## 2023-11-08 NOTE — DIALYSIS
Patient completed and well tolerated 3 hours dialysis with 2 L fluids removed. No heparin given as ordered by Oncologist due to low Platelet counts.     CHARY Jauregui received dialysis report

## 2023-11-09 LAB
ALBUMIN SERPL-MCNC: 2 G/DL (ref 3.5–5)
ANION GAP SERPL CALC-SCNC: 7 MMOL/L (ref 5–15)
APTT PPP: 27.1 SEC (ref 21.2–34.1)
BACTERIA SPEC CULT: ABNORMAL
BUN SERPL-MCNC: 25 MG/DL (ref 6–20)
BUN/CREAT SERPL: 9 (ref 12–20)
CA-I BLD-MCNC: 7.8 MG/DL (ref 8.5–10.1)
CHLORIDE SERPL-SCNC: 100 MMOL/L (ref 97–108)
CO2 SERPL-SCNC: 28 MMOL/L (ref 21–32)
CREAT SERPL-MCNC: 2.87 MG/DL (ref 0.55–1.02)
ERYTHROCYTE [DISTWIDTH] IN BLOOD BY AUTOMATED COUNT: 23.3 % (ref 11.5–14.5)
GLUCOSE BLD STRIP.AUTO-MCNC: 209 MG/DL (ref 65–100)
GLUCOSE BLD STRIP.AUTO-MCNC: 316 MG/DL (ref 65–100)
GLUCOSE SERPL-MCNC: 202 MG/DL (ref 65–100)
GRAM STN SPEC: ABNORMAL
HAPTOGLOB SERPL-MCNC: 129 MG/DL (ref 30–200)
HCT VFR BLD AUTO: 21.9 % (ref 35–47)
HGB BLD-MCNC: 7 G/DL (ref 11.5–16)
INR PPP: 1.1 (ref 0.9–1.1)
Lab: ABNORMAL
Lab: ABNORMAL
MCH RBC QN AUTO: 26.5 PG (ref 26–34)
MCHC RBC AUTO-ENTMCNC: 32 G/DL (ref 30–36.5)
MCV RBC AUTO: 83 FL (ref 80–99)
NRBC # BLD: 0.02 K/UL (ref 0–0.01)
NRBC BLD-RTO: 0.2 PER 100 WBC
PERFORMED BY:: ABNORMAL
PERFORMED BY:: ABNORMAL
PHOSPHATE SERPL-MCNC: 1.5 MG/DL (ref 2.6–4.7)
PLATELET # BLD AUTO: 55 K/UL (ref 150–400)
PMV BLD AUTO: 10.3 FL (ref 8.9–12.9)
POTASSIUM SERPL-SCNC: 3.7 MMOL/L (ref 3.5–5.1)
PROTHROMBIN TIME: 14.6 SEC (ref 11.9–14.6)
RBC # BLD AUTO: 2.64 M/UL (ref 3.8–5.2)
SODIUM SERPL-SCNC: 135 MMOL/L (ref 136–145)
THERAPEUTIC RANGE: NORMAL SEC (ref 82–109)
VIT B12 SERPL-MCNC: 1947 PG/ML (ref 193–986)
WBC # BLD AUTO: 10.8 K/UL (ref 3.6–11)

## 2023-11-09 PROCEDURE — 6370000000 HC RX 637 (ALT 250 FOR IP): Performed by: FAMILY MEDICINE

## 2023-11-09 PROCEDURE — 82607 VITAMIN B-12: CPT

## 2023-11-09 PROCEDURE — 2580000003 HC RX 258: Performed by: FAMILY MEDICINE

## 2023-11-09 PROCEDURE — 36415 COLL VENOUS BLD VENIPUNCTURE: CPT

## 2023-11-09 PROCEDURE — 80069 RENAL FUNCTION PANEL: CPT

## 2023-11-09 PROCEDURE — 94640 AIRWAY INHALATION TREATMENT: CPT

## 2023-11-09 PROCEDURE — 82962 GLUCOSE BLOOD TEST: CPT

## 2023-11-09 PROCEDURE — 2700000000 HC OXYGEN THERAPY PER DAY

## 2023-11-09 PROCEDURE — 85027 COMPLETE CBC AUTOMATED: CPT

## 2023-11-09 PROCEDURE — 2500000003 HC RX 250 WO HCPCS: Performed by: FAMILY MEDICINE

## 2023-11-09 PROCEDURE — 85610 PROTHROMBIN TIME: CPT

## 2023-11-09 PROCEDURE — 99223 1ST HOSP IP/OBS HIGH 75: CPT | Performed by: INTERNAL MEDICINE

## 2023-11-09 PROCEDURE — 94761 N-INVAS EAR/PLS OXIMETRY MLT: CPT

## 2023-11-09 PROCEDURE — 6370000000 HC RX 637 (ALT 250 FOR IP): Performed by: INTERNAL MEDICINE

## 2023-11-09 PROCEDURE — 1100000000 HC RM PRIVATE

## 2023-11-09 PROCEDURE — 85730 THROMBOPLASTIN TIME PARTIAL: CPT

## 2023-11-09 PROCEDURE — 6360000002 HC RX W HCPCS: Performed by: FAMILY MEDICINE

## 2023-11-09 RX ADMIN — SEVELAMER CARBONATE 800 MG: 800 TABLET, FILM COATED ORAL at 09:34

## 2023-11-09 RX ADMIN — IPRATROPIUM BROMIDE AND ALBUTEROL SULFATE 1 DOSE: 2.5; .5 SOLUTION RESPIRATORY (INHALATION) at 01:13

## 2023-11-09 RX ADMIN — MEROPENEM 1000 MG: 1 INJECTION, POWDER, FOR SOLUTION INTRAVENOUS at 18:33

## 2023-11-09 RX ADMIN — ISOSORBIDE DINITRATE 20 MG: 10 TABLET ORAL at 09:34

## 2023-11-09 RX ADMIN — Medication 2 PUFF: at 07:43

## 2023-11-09 RX ADMIN — SEVELAMER CARBONATE 800 MG: 800 TABLET, FILM COATED ORAL at 18:32

## 2023-11-09 RX ADMIN — Medication 1000 UNITS: at 09:34

## 2023-11-09 RX ADMIN — Medication 2 PUFF: at 20:40

## 2023-11-09 RX ADMIN — HYDROMORPHONE HYDROCHLORIDE 0.2 MG: 1 INJECTION, SOLUTION INTRAMUSCULAR; INTRAVENOUS; SUBCUTANEOUS at 15:41

## 2023-11-09 RX ADMIN — HYDROMORPHONE HYDROCHLORIDE 0.2 MG: 1 INJECTION, SOLUTION INTRAMUSCULAR; INTRAVENOUS; SUBCUTANEOUS at 02:49

## 2023-11-09 RX ADMIN — CETIRIZINE HYDROCHLORIDE 10 MG: 10 TABLET, FILM COATED ORAL at 09:47

## 2023-11-09 RX ADMIN — HYDROXYZINE PAMOATE 25 MG: 25 CAPSULE ORAL at 18:32

## 2023-11-09 RX ADMIN — HYDROXYZINE PAMOATE 25 MG: 25 CAPSULE ORAL at 12:37

## 2023-11-09 RX ADMIN — METOPROLOL SUCCINATE 25 MG: 25 TABLET, EXTENDED RELEASE ORAL at 09:34

## 2023-11-09 RX ADMIN — ISOSORBIDE DINITRATE 20 MG: 10 TABLET ORAL at 21:35

## 2023-11-09 RX ADMIN — METHYLPREDNISOLONE SODIUM SUCCINATE 40 MG: 40 INJECTION, POWDER, LYOPHILIZED, FOR SOLUTION INTRAMUSCULAR; INTRAVENOUS at 18:32

## 2023-11-09 RX ADMIN — IPRATROPIUM BROMIDE AND ALBUTEROL SULFATE 1 DOSE: 2.5; .5 SOLUTION RESPIRATORY (INHALATION) at 13:17

## 2023-11-09 RX ADMIN — SODIUM CHLORIDE, PRESERVATIVE FREE 10 ML: 5 INJECTION INTRAVENOUS at 21:00

## 2023-11-09 RX ADMIN — ISOSORBIDE DINITRATE 20 MG: 10 TABLET ORAL at 15:41

## 2023-11-09 RX ADMIN — HYDROMORPHONE HYDROCHLORIDE 0.2 MG: 1 INJECTION, SOLUTION INTRAMUSCULAR; INTRAVENOUS; SUBCUTANEOUS at 09:33

## 2023-11-09 RX ADMIN — HYDROMORPHONE HYDROCHLORIDE 0.2 MG: 1 INJECTION, SOLUTION INTRAMUSCULAR; INTRAVENOUS; SUBCUTANEOUS at 21:35

## 2023-11-09 RX ADMIN — SEVELAMER CARBONATE 800 MG: 800 TABLET, FILM COATED ORAL at 12:32

## 2023-11-09 RX ADMIN — METHYLPREDNISOLONE SODIUM SUCCINATE 40 MG: 40 INJECTION, POWDER, LYOPHILIZED, FOR SOLUTION INTRAMUSCULAR; INTRAVENOUS at 12:32

## 2023-11-09 RX ADMIN — PANTOPRAZOLE SODIUM 40 MG: 40 TABLET, DELAYED RELEASE ORAL at 06:35

## 2023-11-09 RX ADMIN — ATORVASTATIN CALCIUM 40 MG: 40 TABLET, FILM COATED ORAL at 09:34

## 2023-11-09 RX ADMIN — ASPIRIN 81 MG: 81 TABLET, COATED ORAL at 09:34

## 2023-11-09 RX ADMIN — FUROSEMIDE 40 MG: 40 TABLET ORAL at 09:34

## 2023-11-09 RX ADMIN — CALCITRIOL CAPSULES 0.25 MCG 0.25 MCG: 0.25 CAPSULE ORAL at 09:47

## 2023-11-09 RX ADMIN — METHYLPREDNISOLONE SODIUM SUCCINATE 40 MG: 40 INJECTION, POWDER, LYOPHILIZED, FOR SOLUTION INTRAMUSCULAR; INTRAVENOUS at 06:35

## 2023-11-09 RX ADMIN — IPRATROPIUM BROMIDE AND ALBUTEROL SULFATE 1 DOSE: 2.5; .5 SOLUTION RESPIRATORY (INHALATION) at 07:43

## 2023-11-09 RX ADMIN — MEROPENEM 1000 MG: 1 INJECTION, POWDER, FOR SOLUTION INTRAVENOUS at 11:00

## 2023-11-09 RX ADMIN — METHYLPREDNISOLONE SODIUM SUCCINATE 40 MG: 40 INJECTION, POWDER, LYOPHILIZED, FOR SOLUTION INTRAMUSCULAR; INTRAVENOUS at 00:21

## 2023-11-09 RX ADMIN — IPRATROPIUM BROMIDE AND ALBUTEROL SULFATE 1 DOSE: 2.5; .5 SOLUTION RESPIRATORY (INHALATION) at 20:39

## 2023-11-09 ASSESSMENT — ENCOUNTER SYMPTOMS
EYES NEGATIVE: 1
GASTROINTESTINAL NEGATIVE: 1
SHORTNESS OF BREATH: 1
COUGH: 0
ALLERGIC/IMMUNOLOGIC NEGATIVE: 1

## 2023-11-09 ASSESSMENT — PAIN DESCRIPTION - LOCATION
LOCATION: ARM;FOOT
LOCATION: ARM;TOE (COMMENT WHICH ONE)
LOCATION: ARM

## 2023-11-09 ASSESSMENT — PAIN SCALES - GENERAL
PAINLEVEL_OUTOF10: 8
PAINLEVEL_OUTOF10: 0
PAINLEVEL_OUTOF10: 8

## 2023-11-09 ASSESSMENT — PAIN SCALES - WONG BAKER: WONGBAKER_NUMERICALRESPONSE: 0

## 2023-11-09 ASSESSMENT — PAIN DESCRIPTION - ORIENTATION
ORIENTATION: LEFT

## 2023-11-09 ASSESSMENT — PAIN DESCRIPTION - DESCRIPTORS
DESCRIPTORS: ACHING

## 2023-11-09 NOTE — CONSULTS
Consult Note            Date:11/9/2023        Patient Name:Shayla Cruz     YOB: 1970     Age:53 y.o. Consults INFECTIOUS DISEASE       Chief Complaint     Chief Complaint   Patient presents with    NSTEMI      Bacteremia    History Obtained From   patient    History of Present Illness   This is a 52year old female with renal celll carcinoma, ESRD on hemodialysis who was brought by EMS to ED because dialysis needs following access problems and SOB. She was afebrile with initially normal WBC that subsequently increased. Exam revealed respiratory distress with rales and leg edema. No urinalysis. Initial CXR revealed changes suggesting pulmonary edema as did repeat CXR yesterday. CT Abdomen showed right solid renal mass consistent with renal cell carcinoma, indwelling right ureteral stent w/o hydronephrosis. Images reviewed by me. Blood cultures were sent and now growing Pseudomonas aeruginosa. Patient is currently on IV Meropenem (penicillin allergy). ID has been consulted for this reason. TTE yesterday negative for aortic prosthetic valve vegetations. Review of Epic  records show that patient was recently hospitalized at [facility] where she being followed by  Dr. Sandy Hansen, for recurrent bacteremia with Pseudomonas, first identified on 10/9/23, treated with IV Cefepime. Source was unclear but included right ureteral stent and sigmoid diverticulitis with microperforation. She was discharged last week on Cefepime 2 gm with HD thrice weekly and end date of 11/17/23. Patient lying in bed. She states that she received treatment at dialysis only  twice. She expressed concern about a left arm AV graft that she reports is tender and she wants to have it removed. She states that she was scheduled to have it removed by Vascular Surgeon, Dr. Marga Verma in [facility]. She does not want to have her dialysis catheter removed.         Past Medical History     Past Medical History:   Diagnosis
IMPRESSION:   Acute on chronic hypoxic respiratory failure  Acute exacerbation of COPD  Fluid overload congestive heart failure  Acute pulmonary edema  Large right renal mass rule out renal cell carcinoma  End-stage renal disease on hemodialysis  Colonic diverticulosis  History of TAVR in the past  Additional workup outlined below  Pt is at high risk of sudden decline and decompensation with life threatening consequenses and continued end organ dysfunction and failure  Pt is critically ill.  Time spent with pt and staff actively rendering care, managing pt and coordinating care as stated below; 55 minutes, exclusive of any procedures      RECOMMENDATIONS/PLAN:   ICU monitoring  78-year-old lady came in because of shortness of breath and dyspnea she is chronically on home oxygen her saturation was in 70s she was put on oxygen via nasal cannula chest x-ray shows pulmonary edema  We will get nephrology and possible need urgent dialysis  Chest x-ray shows acute pulmonary edema BNP 91888  IV Solu-Medrol nebulizer treatment  Urology consult for renal mass possible renal cell cancer  ABG shows no CO2 retention  Need high-fiber diet and stool softener we will start her on Colace  Smoking cessation counseling we will start patient on nicotine patch  CVP monitoring troponin 4247  Hemoglobin is low 7.1 continue to monitor  Transfuse prn to maintain Hgb > 7  Labs to follow electrolytes, renal function and and blood counts  Bronchial hygiene with respiratory therapy techniques, bronchodilators  Pt needs IV fluids with additives and Drug therapy requiring intensive monitoring for toxicity  Prescription drug management with home med reconciliation reviewed  DVT, SUP prophylaxis  Will be available to assist in medical management while in the CCU pending disposition     [x] High complexity decision making was performed  [x] See my orders for details  HPI  78-year-old lady came in because of shortness of breath and dyspnea history
heterogeneous renal mass concerning for malignancy    Pt with enlarging right renal tumor with a ureteral stent. Seen by Urology at [facility] 10/27/23. She was recently admitted and seen on 10/10/2023 for sepsis, UTI, gross hematuria at which time she had a right JJ stent exchange with Dr. Pio Healy. Patient's urine at that time grew Pseudomonas aeruginosa, she was placed on Cipro until 10/21,     \"Evaluated by Oncology on previous admisison - Patient does not wish to pursue any treatment or diagnostic work up for kidney cancer. Palliative care already consulted - primary  Patient denies hematuria or urinary symptoms  Recommend obtaining repeat UA and U cx, as patient had positive Ucx 10/10  Stent to exchange every 6 months. NORBERTO Corado MD, MS  Urologic Oncology       Department of Urology  [facility]     [facility] [place] [place]"      She says she has known about her renal tumor more than a year. She reiterates that she does not want intervention. She denies flank pain. The patient denies urinary frequency, urgency, dysuria or hematuria. She says she is still getting antibiotics with each dialysis for her UTI. Past Medical History:   Allergies   Allergen Reactions    Tramadol Hives     Other reaction(s): gi distress  Throwing up      Codeine Nausea And Vomiting and Palpitations     Other reaction(s): cardiac dysrhythmia    Dulaglutide Other (See Comments)     Other reaction(s): other/intolerance, Unknown  Nausea with vomiting and dizziness  Nausea with vomiting and dizziness  Low blood sugars      Penicillins Hives and Itching     Other reaction(s): rash/itching    Sitagliptin Other (See Comments)     Other reaction(s): other/intolerance, Unknown  Headache, runny nose, back pain and low blood sugars  Headache, runny nose, back pain and low blood sugars      Chlorhexidine      Other reaction(s): Flushing (Red Skin), unknown
primary  ESRD on HD    Thank you for involving us in the care of this patient. Please do not hesitate to call me or Dr. Cleve Duong if additional questions arise.     Goran Sky, APRN - NP  11/7/2023
Globulin 3.8 2.0 - 4.0 g/dL    Albumin/Globulin Ratio 0.5 (L) 0.8 - 1.7     Procalcitonin    Collection Time: 11/06/23  5:50 PM   Result Value Ref Range    Procalcitonin 6.49 ng/mL   Troponin    Collection Time: 11/06/23  5:50 PM   Result Value Ref Range    Troponin, High Sensitivity 535 (HH) 0 - 54 ng/L   Lactate, Sepsis    Collection Time: 11/06/23  5:50 PM   Result Value Ref Range    Lactic Acid, Sepsis 2.0 0.4 - 2.0 mmol/L   Protime-INR    Collection Time: 11/06/23  5:50 PM   Result Value Ref Range    Protime 16.4 (H) 11.9 - 14.7 sec    INR 1.3 (H) 0.9 - 1.1     APTT    Collection Time: 11/06/23  5:50 PM   Result Value Ref Range    PTT 45.1 (H) 23.0 - 36.4 sec    Therapeutic Range   82 - 109 sec   COVID-19 & Influenza Combo    Collection Time: 11/06/23  6:50 PM    Specimen: Nasopharyngeal   Result Value Ref Range    SARS-CoV-2, PCR Not Detected Not Detected      Rapid Influenza A By PCR Not Detected Not Detected      Rapid Influenza B By PCR Not Detected Not Detected     Lactate, Sepsis    Collection Time: 11/06/23  7:45 PM   Result Value Ref Range    Lactic Acid, Sepsis 1.5 0.4 - 2.0 mmol/L   Troponin    Collection Time: 11/06/23  7:45 PM   Result Value Ref Range    Troponin, High Sensitivity 3,293 (HH) 0 - 54 ng/L   EKG 12 Lead    Collection Time: 11/06/23 11:49 PM   Result Value Ref Range    Ventricular Rate 99 BPM    Atrial Rate 99 BPM    P-R Interval 146 ms    QRS Duration 74 ms    Q-T Interval 360 ms    QTc Calculation (Bazett) 462 ms    P Axis 63 degrees    R Axis 25 degrees    T Axis 189 degrees    Diagnosis       Normal sinus rhythm  Possible Left atrial enlargement  Nonspecific ST and T wave abnormality  Prolonged QT  Abnormal ECG  When compared with ECG of 06-NOV-2023 17:14, (Unconfirmed)  No significant change was found  Confirmed by Sami Ventura ([id number]) on 11/7/2023 9:08:32 AM     CBC    Collection Time: 11/07/23 12:02 AM   Result Value Ref Range    WBC 11.7 (H) 3.6 - 11.0 K/uL    RBC 2.13 (L)
FL    MCH 26.2 26.0 - 34.0 PG    MCHC 31.8 30.0 - 36.5 g/dL    RDW 23.3 (H) 11.5 - 14.5 %    Platelets 53 (L) 285 - 400 K/uL    Nucleated RBCs 0.2 (H) 0.0  WBC    nRBC 0.02 (H) 0.00 - 0.01 K/uL    Neutrophils % PENDING %    Lymphocytes % PENDING %    Monocytes % PENDING %    Eosinophils % PENDING %    Basophils % PENDING %    Immature Granulocytes PENDING %    Neutrophils Absolute PENDING K/UL    Lymphocytes Absolute PENDING K/UL    Monocytes Absolute PENDING K/UL    Eosinophils Absolute PENDING K/UL    Basophils Absolute PENDING K/UL    Absolute Immature Granulocyte PENDING K/UL    Differential Type PENDING         XR CHEST 1 VIEW   Final Result   Stable exam with pleural effusions, pulmonary edema, and bibasilar airspace   disease. Please note that the bibasilar airspace disease is favored to reflect   alveolar edema, but pneumonia is not excluded. CT ABDOMEN PELVIS WO CONTRAST Additional Contrast? None   Final Result      1. Pulmonary edema and bilateral pleural effusions. 2. Large right lower pole solid renal mass most consistent with renal cell   carcinoma, slightly increased since prior MRI. Indwelling right ureteral stent   without hydronephrosis. 3. Large amount of colonic stool. Colonic diverticulosis. No bowel obstruction. Assessment & Plan:     47-year-old female with end-stage renal disease on dialysis, COPD CHF admitted with acute onset of shortness of breath. She had pulmonary edema secondary to missing her dialysis. She also had NSTEMI with elevated troponins. She required heparin. However the heparin drip was stopped yesterday due to anemia.  -Patient received her dialysis and her fluid overload is significantly better.  -Has refused cath and stress test.    2.  Severe anemia: Normocytic anemia. Iron studies are normal.  -Appears to be anemia due to CKD versus bleeding.  -Agree with transfusions to keep her hemoglobin above 7 g/dL.   -Monitor closely for any

## 2023-11-09 NOTE — CARE COORDINATION
0820: Chart reviewed. Per notes; patient followed via cardiology, nephrology, oncology, pulmonology and urology. Patient receives dialysis MWF per nephrology.     CM will continue to follow patient and recs of medical team.

## 2023-11-10 ENCOUNTER — APPOINTMENT (OUTPATIENT)
Facility: HOSPITAL | Age: 53
DRG: 721 | End: 2023-11-10
Payer: MEDICAID

## 2023-11-10 LAB
ALBUMIN SERPL-MCNC: 2.2 G/DL (ref 3.5–5)
ALBUMIN/GLOB SERPL: 0.6 (ref 1.1–2.2)
ALP SERPL-CCNC: 94 U/L (ref 45–117)
ALT SERPL-CCNC: 9 U/L (ref 12–78)
ANION GAP SERPL CALC-SCNC: 8 MMOL/L (ref 5–15)
AST SERPL W P-5'-P-CCNC: 5 U/L (ref 15–37)
BILIRUB SERPL-MCNC: 0.6 MG/DL (ref 0.2–1)
BUN SERPL-MCNC: 45 MG/DL (ref 6–20)
BUN/CREAT SERPL: 11 (ref 12–20)
CA-I BLD-MCNC: 7.8 MG/DL (ref 8.5–10.1)
CHLORIDE SERPL-SCNC: 96 MMOL/L (ref 97–108)
CO2 SERPL-SCNC: 28 MMOL/L (ref 21–32)
CREAT SERPL-MCNC: 4.15 MG/DL (ref 0.55–1.02)
CRP SERPL-MCNC: 3.14 MG/DL (ref 0–0.6)
ECHO AV MEAN GRADIENT: 18 MMHG
ECHO AV MEAN VELOCITY: 2 M/S
ECHO AV PEAK GRADIENT: 34 MMHG
ECHO AV PEAK VELOCITY: 2.9 M/S
ECHO AV VELOCITY RATIO: 0.34
ECHO AV VTI: 56 CM
ECHO BSA: 1.69 M2
ECHO EST RA PRESSURE: 3 MMHG
ECHO LA AREA 2C: 8.3 CM2
ECHO LA AREA 4C: 13.1 CM2
ECHO LA MAJOR AXIS: 5 CM
ECHO LA MINOR AXIS: 4.2 CM
ECHO LA VOL BP: 20 ML (ref 22–52)
ECHO LA VOL MOD A2C: 13 ML (ref 22–52)
ECHO LA VOL MOD A4C: 27 ML (ref 22–52)
ECHO LA VOL/BSA BIPLANE: 12 ML/M2 (ref 16–34)
ECHO LA VOLUME INDEX MOD A2C: 8 ML/M2 (ref 16–34)
ECHO LA VOLUME INDEX MOD A4C: 16 ML/M2 (ref 16–34)
ECHO LV EDV A2C: 27 ML
ECHO LV EDV A4C: 50 ML
ECHO LV EDV INDEX A4C: 30 ML/M2
ECHO LV EDV NDEX A2C: 16 ML/M2
ECHO LV EJECTION FRACTION A2C: 32 %
ECHO LV EJECTION FRACTION A4C: 61 %
ECHO LV EJECTION FRACTION BIPLANE: 45 % (ref 55–100)
ECHO LV ESV A2C: 18 ML
ECHO LV ESV A4C: 20 ML
ECHO LV ESV INDEX A2C: 11 ML/M2
ECHO LV ESV INDEX A4C: 12 ML/M2
ECHO LV FRACTIONAL SHORTENING: 38 % (ref 28–44)
ECHO LV INTERNAL DIMENSION DIASTOLE INDEX: 2.71 CM/M2
ECHO LV INTERNAL DIMENSION DIASTOLIC: 4.5 CM (ref 3.9–5.3)
ECHO LV INTERNAL DIMENSION SYSTOLIC INDEX: 1.69 CM/M2
ECHO LV INTERNAL DIMENSION SYSTOLIC: 2.8 CM
ECHO LV IVSD: 1.1 CM (ref 0.6–0.9)
ECHO LV MASS 2D: 175 G (ref 67–162)
ECHO LV MASS INDEX 2D: 105.4 G/M2 (ref 43–95)
ECHO LV POSTERIOR WALL DIASTOLIC: 1.1 CM (ref 0.6–0.9)
ECHO LV RELATIVE WALL THICKNESS RATIO: 0.49
ECHO LVOT AV VTI INDEX: 0.26
ECHO LVOT MEAN GRADIENT: 2 MMHG
ECHO LVOT PEAK GRADIENT: 4 MMHG
ECHO LVOT PEAK VELOCITY: 1 M/S
ECHO LVOT VTI: 14.6 CM
ECHO RA AREA 4C: 10.2 CM2
ECHO RA END SYSTOLIC VOLUME APICAL 4 CHAMBER INDEX BSA: 11 ML/M2
ECHO RA VOLUME: 19 ML
ECHO RIGHT VENTRICULAR SYSTOLIC PRESSURE (RVSP): 29 MMHG
ECHO RV BASAL DIMENSION: 4.2 CM
ECHO RV MID DIMENSION: 2.6 CM
ECHO TV REGURGITANT MAX VELOCITY: 2.53 M/S
ECHO TV REGURGITANT PEAK GRADIENT: 26 MMHG
ERYTHROCYTE [DISTWIDTH] IN BLOOD BY AUTOMATED COUNT: 22.3 % (ref 11.5–14.5)
GLOBULIN SER CALC-MCNC: 3.4 G/DL (ref 2–4)
GLUCOSE BLD STRIP.AUTO-MCNC: 162 MG/DL (ref 65–100)
GLUCOSE BLD STRIP.AUTO-MCNC: 245 MG/DL (ref 65–100)
GLUCOSE BLD STRIP.AUTO-MCNC: 302 MG/DL (ref 65–100)
GLUCOSE SERPL-MCNC: 220 MG/DL (ref 65–100)
HCT VFR BLD AUTO: 22.8 % (ref 35–47)
HGB BLD-MCNC: 7 G/DL (ref 11.5–16)
MCH RBC QN AUTO: 25.7 PG (ref 26–34)
MCHC RBC AUTO-ENTMCNC: 30.7 G/DL (ref 30–36.5)
MCV RBC AUTO: 83.8 FL (ref 80–99)
NRBC # BLD: 0.04 K/UL (ref 0–0.01)
NRBC BLD-RTO: 0.3 PER 100 WBC
PERFORMED BY:: ABNORMAL
PF4 HEPARIN CMPLX AB SER-ACNC: 0.1 OD (ref 0–0.4)
PLATELET # BLD AUTO: 57 K/UL (ref 150–400)
POTASSIUM SERPL-SCNC: 4.6 MMOL/L (ref 3.5–5.1)
PROCALCITONIN SERPL-MCNC: 42.45 NG/ML
PROT SERPL-MCNC: 5.6 G/DL (ref 6.4–8.2)
RBC # BLD AUTO: 2.72 M/UL (ref 3.8–5.2)
SODIUM SERPL-SCNC: 132 MMOL/L (ref 136–145)
WBC # BLD AUTO: 12 K/UL (ref 3.6–11)

## 2023-11-10 PROCEDURE — 84145 PROCALCITONIN (PCT): CPT

## 2023-11-10 PROCEDURE — 85027 COMPLETE CBC AUTOMATED: CPT

## 2023-11-10 PROCEDURE — 2700000000 HC OXYGEN THERAPY PER DAY

## 2023-11-10 PROCEDURE — 1100000000 HC RM PRIVATE

## 2023-11-10 PROCEDURE — 6360000002 HC RX W HCPCS: Performed by: INTERNAL MEDICINE

## 2023-11-10 PROCEDURE — 2500000003 HC RX 250 WO HCPCS: Performed by: FAMILY MEDICINE

## 2023-11-10 PROCEDURE — 94761 N-INVAS EAR/PLS OXIMETRY MLT: CPT

## 2023-11-10 PROCEDURE — 6360000002 HC RX W HCPCS: Performed by: FAMILY MEDICINE

## 2023-11-10 PROCEDURE — 2709999900 HC NON-CHARGEABLE SUPPLY

## 2023-11-10 PROCEDURE — 6370000000 HC RX 637 (ALT 250 FOR IP): Performed by: FAMILY MEDICINE

## 2023-11-10 PROCEDURE — 99232 SBSQ HOSP IP/OBS MODERATE 35: CPT | Performed by: INTERNAL MEDICINE

## 2023-11-10 PROCEDURE — 2580000003 HC RX 258: Performed by: FAMILY MEDICINE

## 2023-11-10 PROCEDURE — 94640 AIRWAY INHALATION TREATMENT: CPT

## 2023-11-10 PROCEDURE — 82962 GLUCOSE BLOOD TEST: CPT

## 2023-11-10 PROCEDURE — 36415 COLL VENOUS BLD VENIPUNCTURE: CPT

## 2023-11-10 PROCEDURE — 6370000000 HC RX 637 (ALT 250 FOR IP): Performed by: INTERNAL MEDICINE

## 2023-11-10 PROCEDURE — 87040 BLOOD CULTURE FOR BACTERIA: CPT

## 2023-11-10 PROCEDURE — 93308 TTE F-UP OR LMTD: CPT

## 2023-11-10 PROCEDURE — 80053 COMPREHEN METABOLIC PANEL: CPT

## 2023-11-10 PROCEDURE — 86140 C-REACTIVE PROTEIN: CPT

## 2023-11-10 PROCEDURE — 90935 HEMODIALYSIS ONE EVALUATION: CPT

## 2023-11-10 PROCEDURE — 2580000003 HC RX 258: Performed by: INTERNAL MEDICINE

## 2023-11-10 RX ORDER — INSULIN LISPRO 100 [IU]/ML
0-8 INJECTION, SOLUTION INTRAVENOUS; SUBCUTANEOUS
Status: DISCONTINUED | OUTPATIENT
Start: 2023-11-10 | End: 2023-11-15 | Stop reason: HOSPADM

## 2023-11-10 RX ORDER — HYDROMORPHONE HYDROCHLORIDE 1 MG/ML
0.2 INJECTION, SOLUTION INTRAMUSCULAR; INTRAVENOUS; SUBCUTANEOUS EVERY 6 HOURS PRN
Status: DISPENSED | OUTPATIENT
Start: 2023-11-10 | End: 2023-11-12

## 2023-11-10 RX ORDER — INSULIN LISPRO 100 [IU]/ML
0-4 INJECTION, SOLUTION INTRAVENOUS; SUBCUTANEOUS NIGHTLY
Status: DISCONTINUED | OUTPATIENT
Start: 2023-11-10 | End: 2023-11-15 | Stop reason: HOSPADM

## 2023-11-10 RX ADMIN — SEVELAMER CARBONATE 800 MG: 800 TABLET, FILM COATED ORAL at 17:15

## 2023-11-10 RX ADMIN — Medication 2 PUFF: at 20:50

## 2023-11-10 RX ADMIN — HYDROMORPHONE HYDROCHLORIDE 0.2 MG: 1 INJECTION, SOLUTION INTRAMUSCULAR; INTRAVENOUS; SUBCUTANEOUS at 20:01

## 2023-11-10 RX ADMIN — IPRATROPIUM BROMIDE AND ALBUTEROL SULFATE 1 DOSE: 2.5; .5 SOLUTION RESPIRATORY (INHALATION) at 01:06

## 2023-11-10 RX ADMIN — HYDROMORPHONE HYDROCHLORIDE 0.2 MG: 1 INJECTION, SOLUTION INTRAMUSCULAR; INTRAVENOUS; SUBCUTANEOUS at 03:34

## 2023-11-10 RX ADMIN — EPOETIN ALFA-EPBX 10000 UNITS: 10000 INJECTION, SOLUTION INTRAVENOUS; SUBCUTANEOUS at 09:34

## 2023-11-10 RX ADMIN — INSULIN LISPRO 6 UNITS: 100 INJECTION, SOLUTION INTRAVENOUS; SUBCUTANEOUS at 17:15

## 2023-11-10 RX ADMIN — SODIUM CHLORIDE, PRESERVATIVE FREE 10 ML: 5 INJECTION INTRAVENOUS at 20:30

## 2023-11-10 RX ADMIN — CEFEPIME 1000 MG: 1 INJECTION, POWDER, FOR SOLUTION INTRAMUSCULAR; INTRAVENOUS at 01:00

## 2023-11-10 RX ADMIN — METHYLPREDNISOLONE SODIUM SUCCINATE 40 MG: 40 INJECTION, POWDER, LYOPHILIZED, FOR SOLUTION INTRAMUSCULAR; INTRAVENOUS at 06:13

## 2023-11-10 RX ADMIN — IPRATROPIUM BROMIDE AND ALBUTEROL SULFATE 1 DOSE: 2.5; .5 SOLUTION RESPIRATORY (INHALATION) at 13:46

## 2023-11-10 RX ADMIN — HEPARIN SODIUM 3600 UNITS: 1000 INJECTION INTRAVENOUS; SUBCUTANEOUS at 10:00

## 2023-11-10 RX ADMIN — HYDROMORPHONE HYDROCHLORIDE 0.2 MG: 1 INJECTION, SOLUTION INTRAMUSCULAR; INTRAVENOUS; SUBCUTANEOUS at 14:16

## 2023-11-10 RX ADMIN — PANTOPRAZOLE SODIUM 40 MG: 40 TABLET, DELAYED RELEASE ORAL at 06:13

## 2023-11-10 RX ADMIN — METHYLPREDNISOLONE SODIUM SUCCINATE 40 MG: 40 INJECTION, POWDER, LYOPHILIZED, FOR SOLUTION INTRAMUSCULAR; INTRAVENOUS at 00:12

## 2023-11-10 RX ADMIN — IPRATROPIUM BROMIDE AND ALBUTEROL SULFATE 1 DOSE: 2.5; .5 SOLUTION RESPIRATORY (INHALATION) at 20:53

## 2023-11-10 RX ADMIN — ISOSORBIDE DINITRATE 20 MG: 10 TABLET ORAL at 20:00

## 2023-11-10 RX ADMIN — ISOSORBIDE DINITRATE 20 MG: 10 TABLET ORAL at 14:16

## 2023-11-10 RX ADMIN — SEVELAMER CARBONATE 800 MG: 800 TABLET, FILM COATED ORAL at 14:18

## 2023-11-10 ASSESSMENT — PAIN DESCRIPTION - DESCRIPTORS
DESCRIPTORS: ACHING

## 2023-11-10 ASSESSMENT — PAIN SCALES - GENERAL
PAINLEVEL_OUTOF10: 10
PAINLEVEL_OUTOF10: 8
PAINLEVEL_OUTOF10: 7
PAINLEVEL_OUTOF10: 9
PAINLEVEL_OUTOF10: 3
PAINLEVEL_OUTOF10: 5

## 2023-11-10 ASSESSMENT — PAIN DESCRIPTION - ORIENTATION
ORIENTATION: LEFT
ORIENTATION: RIGHT;LEFT
ORIENTATION: LEFT

## 2023-11-10 ASSESSMENT — PAIN SCALES - WONG BAKER: WONGBAKER_NUMERICALRESPONSE: 0

## 2023-11-10 ASSESSMENT — PAIN DESCRIPTION - LOCATION
LOCATION: ARM;FOOT
LOCATION: GENERALIZED
LOCATION: ARM
LOCATION: ARM;FOOT

## 2023-11-10 ASSESSMENT — PAIN DESCRIPTION - FREQUENCY: FREQUENCY: CONTINUOUS

## 2023-11-10 NOTE — DIALYSIS
Pt tolerated treatment well, no complaints voiced during or post treatment. Removed 2000ml  and 58.1 liters of blood process. Cath dressing changed clean, dry and intact. Pt discharged alert back to room via hospital transportation. Report called to primary Nurse To.

## 2023-11-10 NOTE — CARE COORDINATION
5683: Chart reviewed. Per notes; patient on IV ABX and IV steroids followed via cardiology, ID, nephrology, oncology, pulmonology and urology. Patient receives dialysis MWF per nephrology.      CM will continue to follow patient and recs of medical team.

## 2023-11-11 LAB
ABO + RH BLD: NORMAL
ALBUMIN SERPL-MCNC: 2.2 G/DL (ref 3.5–5)
ALBUMIN/GLOB SERPL: 0.6 (ref 1.1–2.2)
ALP SERPL-CCNC: 105 U/L (ref 45–117)
ALT SERPL-CCNC: 12 U/L (ref 12–78)
ANION GAP SERPL CALC-SCNC: 7 MMOL/L (ref 5–15)
AST SERPL W P-5'-P-CCNC: 8 U/L (ref 15–37)
BILIRUB SERPL-MCNC: 0.6 MG/DL (ref 0.2–1)
BLD PROD TYP BPU: NORMAL
BLOOD BANK DISPENSE STATUS: NORMAL
BLOOD GROUP ANTIBODIES SERPL: NEGATIVE
BPU ID: NORMAL
BUN SERPL-MCNC: 43 MG/DL (ref 6–20)
BUN/CREAT SERPL: 12 (ref 12–20)
CA-I BLD-MCNC: 8 MG/DL (ref 8.5–10.1)
CHLORIDE SERPL-SCNC: 101 MMOL/L (ref 97–108)
CO2 SERPL-SCNC: 26 MMOL/L (ref 21–32)
CREAT SERPL-MCNC: 3.48 MG/DL (ref 0.55–1.02)
CROSSMATCH RESULT: NORMAL
CRP SERPL-MCNC: 1.67 MG/DL (ref 0–0.6)
ERYTHROCYTE [DISTWIDTH] IN BLOOD BY AUTOMATED COUNT: 21.9 % (ref 11.5–14.5)
GLOBULIN SER CALC-MCNC: 3.4 G/DL (ref 2–4)
GLUCOSE BLD STRIP.AUTO-MCNC: 273 MG/DL (ref 65–100)
GLUCOSE BLD STRIP.AUTO-MCNC: 278 MG/DL (ref 65–100)
GLUCOSE BLD STRIP.AUTO-MCNC: 326 MG/DL (ref 65–100)
GLUCOSE SERPL-MCNC: 215 MG/DL (ref 65–100)
HCT VFR BLD AUTO: 24.3 % (ref 35–47)
HGB BLD-MCNC: 7.3 G/DL (ref 11.5–16)
MCH RBC QN AUTO: 25.2 PG (ref 26–34)
MCHC RBC AUTO-ENTMCNC: 30 G/DL (ref 30–36.5)
MCV RBC AUTO: 83.8 FL (ref 80–99)
NRBC # BLD: 0.07 K/UL (ref 0–0.01)
NRBC BLD-RTO: 0.5 PER 100 WBC
PERFORMED BY:: ABNORMAL
PHOSPHATE SERPL-MCNC: 1.3 MG/DL (ref 2.6–4.7)
PLATELET # BLD AUTO: 65 K/UL (ref 150–400)
PMV BLD AUTO: 10.3 FL (ref 8.9–12.9)
POTASSIUM SERPL-SCNC: 4.3 MMOL/L (ref 3.5–5.1)
PROCALCITONIN SERPL-MCNC: 28.92 NG/ML
PROT SERPL-MCNC: 5.6 G/DL (ref 6.4–8.2)
RBC # BLD AUTO: 2.9 M/UL (ref 3.8–5.2)
SODIUM SERPL-SCNC: 134 MMOL/L (ref 136–145)
SPECIMEN EXP DATE BLD: NORMAL
TRANSFUSION STATUS PATIENT QL: NORMAL
UNIT DIVISION: 0
WBC # BLD AUTO: 13.9 K/UL (ref 3.6–11)

## 2023-11-11 PROCEDURE — 85027 COMPLETE CBC AUTOMATED: CPT

## 2023-11-11 PROCEDURE — 1100000000 HC RM PRIVATE

## 2023-11-11 PROCEDURE — 6360000002 HC RX W HCPCS: Performed by: INTERNAL MEDICINE

## 2023-11-11 PROCEDURE — 82962 GLUCOSE BLOOD TEST: CPT

## 2023-11-11 PROCEDURE — 2580000003 HC RX 258: Performed by: INTERNAL MEDICINE

## 2023-11-11 PROCEDURE — 6370000000 HC RX 637 (ALT 250 FOR IP): Performed by: FAMILY MEDICINE

## 2023-11-11 PROCEDURE — 6360000002 HC RX W HCPCS: Performed by: FAMILY MEDICINE

## 2023-11-11 PROCEDURE — 94640 AIRWAY INHALATION TREATMENT: CPT

## 2023-11-11 PROCEDURE — 84100 ASSAY OF PHOSPHORUS: CPT

## 2023-11-11 PROCEDURE — 80053 COMPREHEN METABOLIC PANEL: CPT

## 2023-11-11 PROCEDURE — 36415 COLL VENOUS BLD VENIPUNCTURE: CPT

## 2023-11-11 PROCEDURE — 86140 C-REACTIVE PROTEIN: CPT

## 2023-11-11 PROCEDURE — 87040 BLOOD CULTURE FOR BACTERIA: CPT

## 2023-11-11 PROCEDURE — 2580000003 HC RX 258: Performed by: FAMILY MEDICINE

## 2023-11-11 PROCEDURE — 2500000003 HC RX 250 WO HCPCS: Performed by: FAMILY MEDICINE

## 2023-11-11 PROCEDURE — 84145 PROCALCITONIN (PCT): CPT

## 2023-11-11 PROCEDURE — 99232 SBSQ HOSP IP/OBS MODERATE 35: CPT | Performed by: INTERNAL MEDICINE

## 2023-11-11 PROCEDURE — 6370000000 HC RX 637 (ALT 250 FOR IP): Performed by: INTERNAL MEDICINE

## 2023-11-11 RX ADMIN — ASPIRIN 81 MG: 81 TABLET, COATED ORAL at 08:56

## 2023-11-11 RX ADMIN — CEFEPIME 1000 MG: 1 INJECTION, POWDER, FOR SOLUTION INTRAMUSCULAR; INTRAVENOUS at 02:10

## 2023-11-11 RX ADMIN — HYDROMORPHONE HYDROCHLORIDE 0.2 MG: 1 INJECTION, SOLUTION INTRAMUSCULAR; INTRAVENOUS; SUBCUTANEOUS at 08:16

## 2023-11-11 RX ADMIN — ISOSORBIDE DINITRATE 20 MG: 10 TABLET ORAL at 21:32

## 2023-11-11 RX ADMIN — INSULIN LISPRO 2 UNITS: 100 INJECTION, SOLUTION INTRAVENOUS; SUBCUTANEOUS at 08:55

## 2023-11-11 RX ADMIN — ISOSORBIDE DINITRATE 20 MG: 10 TABLET ORAL at 16:24

## 2023-11-11 RX ADMIN — IPRATROPIUM BROMIDE AND ALBUTEROL SULFATE 1 DOSE: 2.5; .5 SOLUTION RESPIRATORY (INHALATION) at 19:20

## 2023-11-11 RX ADMIN — INSULIN LISPRO 4 UNITS: 100 INJECTION, SOLUTION INTRAVENOUS; SUBCUTANEOUS at 16:22

## 2023-11-11 RX ADMIN — POLYETHYLENE GLYCOL 3350 17 G: 17 POWDER, FOR SOLUTION ORAL at 19:29

## 2023-11-11 RX ADMIN — SEVELAMER CARBONATE 800 MG: 800 TABLET, FILM COATED ORAL at 12:02

## 2023-11-11 RX ADMIN — CETIRIZINE HYDROCHLORIDE 10 MG: 10 TABLET, FILM COATED ORAL at 08:57

## 2023-11-11 RX ADMIN — Medication 1000 UNITS: at 08:56

## 2023-11-11 RX ADMIN — SODIUM CHLORIDE, PRESERVATIVE FREE 10 ML: 5 INJECTION INTRAVENOUS at 09:09

## 2023-11-11 RX ADMIN — HYDROMORPHONE HYDROCHLORIDE 0.2 MG: 1 INJECTION, SOLUTION INTRAMUSCULAR; INTRAVENOUS; SUBCUTANEOUS at 20:45

## 2023-11-11 RX ADMIN — ISOSORBIDE DINITRATE 20 MG: 10 TABLET ORAL at 08:56

## 2023-11-11 RX ADMIN — FUROSEMIDE 40 MG: 40 TABLET ORAL at 08:56

## 2023-11-11 RX ADMIN — SEVELAMER CARBONATE 800 MG: 800 TABLET, FILM COATED ORAL at 08:56

## 2023-11-11 RX ADMIN — METHYLPREDNISOLONE SODIUM SUCCINATE 40 MG: 40 INJECTION, POWDER, LYOPHILIZED, FOR SOLUTION INTRAMUSCULAR; INTRAVENOUS at 20:51

## 2023-11-11 RX ADMIN — IPRATROPIUM BROMIDE AND ALBUTEROL SULFATE 1 DOSE: 2.5; .5 SOLUTION RESPIRATORY (INHALATION) at 02:30

## 2023-11-11 RX ADMIN — SODIUM CHLORIDE, PRESERVATIVE FREE 10 ML: 5 INJECTION INTRAVENOUS at 20:52

## 2023-11-11 RX ADMIN — Medication 2 PUFF: at 19:20

## 2023-11-11 RX ADMIN — METOPROLOL SUCCINATE 25 MG: 25 TABLET, EXTENDED RELEASE ORAL at 08:57

## 2023-11-11 RX ADMIN — INSULIN LISPRO 4 UNITS: 100 INJECTION, SOLUTION INTRAVENOUS; SUBCUTANEOUS at 12:02

## 2023-11-11 RX ADMIN — METHYLPREDNISOLONE SODIUM SUCCINATE 40 MG: 40 INJECTION, POWDER, LYOPHILIZED, FOR SOLUTION INTRAMUSCULAR; INTRAVENOUS at 14:27

## 2023-11-11 RX ADMIN — INSULIN LISPRO 4 UNITS: 100 INJECTION, SOLUTION INTRAVENOUS; SUBCUTANEOUS at 21:21

## 2023-11-11 RX ADMIN — CALCITRIOL CAPSULES 0.25 MCG 0.25 MCG: 0.25 CAPSULE ORAL at 08:56

## 2023-11-11 RX ADMIN — ATORVASTATIN CALCIUM 40 MG: 40 TABLET, FILM COATED ORAL at 08:56

## 2023-11-11 RX ADMIN — Medication 2 PUFF: at 09:21

## 2023-11-11 RX ADMIN — PANTOPRAZOLE SODIUM 40 MG: 40 TABLET, DELAYED RELEASE ORAL at 06:58

## 2023-11-11 RX ADMIN — IPRATROPIUM BROMIDE AND ALBUTEROL SULFATE 1 DOSE: 2.5; .5 SOLUTION RESPIRATORY (INHALATION) at 09:20

## 2023-11-11 RX ADMIN — HYDROMORPHONE HYDROCHLORIDE 0.2 MG: 1 INJECTION, SOLUTION INTRAMUSCULAR; INTRAVENOUS; SUBCUTANEOUS at 02:15

## 2023-11-11 RX ADMIN — METHYLPREDNISOLONE SODIUM SUCCINATE 40 MG: 40 INJECTION, POWDER, LYOPHILIZED, FOR SOLUTION INTRAMUSCULAR; INTRAVENOUS at 09:03

## 2023-11-11 RX ADMIN — HYDROXYZINE PAMOATE 25 MG: 25 CAPSULE ORAL at 16:22

## 2023-11-11 RX ADMIN — IPRATROPIUM BROMIDE AND ALBUTEROL SULFATE 1 DOSE: 2.5; .5 SOLUTION RESPIRATORY (INHALATION) at 14:06

## 2023-11-11 RX ADMIN — METHYLPREDNISOLONE SODIUM SUCCINATE 40 MG: 40 INJECTION, POWDER, LYOPHILIZED, FOR SOLUTION INTRAMUSCULAR; INTRAVENOUS at 02:00

## 2023-11-11 RX ADMIN — HYDROMORPHONE HYDROCHLORIDE 0.2 MG: 1 INJECTION, SOLUTION INTRAMUSCULAR; INTRAVENOUS; SUBCUTANEOUS at 14:26

## 2023-11-11 ASSESSMENT — PAIN - FUNCTIONAL ASSESSMENT: PAIN_FUNCTIONAL_ASSESSMENT: ACTIVITIES ARE NOT PREVENTED

## 2023-11-11 ASSESSMENT — PAIN SCALES - GENERAL
PAINLEVEL_OUTOF10: 8
PAINLEVEL_OUTOF10: 8
PAINLEVEL_OUTOF10: 7
PAINLEVEL_OUTOF10: 7
PAINLEVEL_OUTOF10: 9
PAINLEVEL_OUTOF10: 6
PAINLEVEL_OUTOF10: 5
PAINLEVEL_OUTOF10: 8

## 2023-11-11 ASSESSMENT — PAIN DESCRIPTION - DESCRIPTORS
DESCRIPTORS: ACHING
DESCRIPTORS: ACHING

## 2023-11-11 ASSESSMENT — PAIN DESCRIPTION - ORIENTATION
ORIENTATION: RIGHT;LEFT
ORIENTATION: LEFT;RIGHT

## 2023-11-11 ASSESSMENT — PAIN DESCRIPTION - LOCATION
LOCATION: LEG
LOCATION: FOOT;ARM
LOCATION: FOOT;ARM

## 2023-11-11 ASSESSMENT — PAIN SCALES - WONG BAKER: WONGBAKER_NUMERICALRESPONSE: 0

## 2023-11-11 NOTE — FLOWSHEET NOTE
Patient c/o pain with L IJ , dressing changed, skin under dressing reddened and irritated. 1740-Spoke with PICC team CHARY Crawley regarding irritation around IJ site. All lines flush with + blood return. Suellen Powell recommends changing transparent dressing every 2 days and cleaning with alcohol swabs. Also recommended wound care consult. Message left with Dr. Mis Sylvester regarding above, wound care consult placed for skin irritation related to CHG.

## 2023-11-12 LAB
ALBUMIN SERPL-MCNC: 2.1 G/DL (ref 3.5–5)
ANION GAP SERPL CALC-SCNC: 6 MMOL/L (ref 5–15)
BUN SERPL-MCNC: 62 MG/DL (ref 6–20)
BUN/CREAT SERPL: 14 (ref 12–20)
CA-I BLD-MCNC: 7.7 MG/DL (ref 8.5–10.1)
CHLORIDE SERPL-SCNC: 98 MMOL/L (ref 97–108)
CO2 SERPL-SCNC: 26 MMOL/L (ref 21–32)
CREAT SERPL-MCNC: 4.31 MG/DL (ref 0.55–1.02)
CRP SERPL-MCNC: 1.3 MG/DL (ref 0–0.6)
ERYTHROCYTE [DISTWIDTH] IN BLOOD BY AUTOMATED COUNT: 21.7 % (ref 11.5–14.5)
EST. AVERAGE GLUCOSE BLD GHB EST-MCNC: ABNORMAL MG/DL
GLUCOSE BLD STRIP.AUTO-MCNC: 271 MG/DL (ref 65–100)
GLUCOSE BLD STRIP.AUTO-MCNC: 293 MG/DL (ref 65–100)
GLUCOSE BLD STRIP.AUTO-MCNC: 295 MG/DL (ref 65–100)
GLUCOSE SERPL-MCNC: 266 MG/DL (ref 65–100)
HBA1C MFR BLD: <3.8 % (ref 4–5.6)
HCT VFR BLD AUTO: 21.8 % (ref 35–47)
HCT VFR BLD AUTO: 24 % (ref 35–47)
HGB BLD-MCNC: 6.7 G/DL (ref 11.5–16)
HGB BLD-MCNC: 7.5 G/DL (ref 11.5–16)
MCH RBC QN AUTO: 25.8 PG (ref 26–34)
MCHC RBC AUTO-ENTMCNC: 30.7 G/DL (ref 30–36.5)
MCV RBC AUTO: 83.8 FL (ref 80–99)
NRBC # BLD: 0.07 K/UL (ref 0–0.01)
NRBC BLD-RTO: 0.6 PER 100 WBC
PERFORMED BY:: ABNORMAL
PHOSPHATE SERPL-MCNC: 2.2 MG/DL (ref 2.6–4.7)
PLATELET # BLD AUTO: 60 K/UL (ref 150–400)
POTASSIUM SERPL-SCNC: 4.9 MMOL/L (ref 3.5–5.1)
PROCALCITONIN SERPL-MCNC: 18.86 NG/ML
RBC # BLD AUTO: 2.6 M/UL (ref 3.8–5.2)
SODIUM SERPL-SCNC: 130 MMOL/L (ref 136–145)
WBC # BLD AUTO: 11.5 K/UL (ref 3.6–11)

## 2023-11-12 PROCEDURE — 94761 N-INVAS EAR/PLS OXIMETRY MLT: CPT

## 2023-11-12 PROCEDURE — 36430 TRANSFUSION BLD/BLD COMPNT: CPT

## 2023-11-12 PROCEDURE — 6370000000 HC RX 637 (ALT 250 FOR IP): Performed by: FAMILY MEDICINE

## 2023-11-12 PROCEDURE — 6360000002 HC RX W HCPCS: Performed by: FAMILY MEDICINE

## 2023-11-12 PROCEDURE — 99232 SBSQ HOSP IP/OBS MODERATE 35: CPT | Performed by: INTERNAL MEDICINE

## 2023-11-12 PROCEDURE — 94640 AIRWAY INHALATION TREATMENT: CPT

## 2023-11-12 PROCEDURE — 6360000002 HC RX W HCPCS: Performed by: INTERNAL MEDICINE

## 2023-11-12 PROCEDURE — P9016 RBC LEUKOCYTES REDUCED: HCPCS

## 2023-11-12 PROCEDURE — 85027 COMPLETE CBC AUTOMATED: CPT

## 2023-11-12 PROCEDURE — 85018 HEMOGLOBIN: CPT

## 2023-11-12 PROCEDURE — 2580000003 HC RX 258: Performed by: FAMILY MEDICINE

## 2023-11-12 PROCEDURE — 85014 HEMATOCRIT: CPT

## 2023-11-12 PROCEDURE — 86901 BLOOD TYPING SEROLOGIC RH(D): CPT

## 2023-11-12 PROCEDURE — 86850 RBC ANTIBODY SCREEN: CPT

## 2023-11-12 PROCEDURE — 2580000003 HC RX 258: Performed by: INTERNAL MEDICINE

## 2023-11-12 PROCEDURE — 6370000000 HC RX 637 (ALT 250 FOR IP): Performed by: INTERNAL MEDICINE

## 2023-11-12 PROCEDURE — 2500000003 HC RX 250 WO HCPCS: Performed by: INTERNAL MEDICINE

## 2023-11-12 PROCEDURE — 1100000000 HC RM PRIVATE

## 2023-11-12 PROCEDURE — 86900 BLOOD TYPING SEROLOGIC ABO: CPT

## 2023-11-12 PROCEDURE — 84145 PROCALCITONIN (PCT): CPT

## 2023-11-12 PROCEDURE — 86140 C-REACTIVE PROTEIN: CPT

## 2023-11-12 PROCEDURE — 6370000000 HC RX 637 (ALT 250 FOR IP)

## 2023-11-12 PROCEDURE — 82962 GLUCOSE BLOOD TEST: CPT

## 2023-11-12 PROCEDURE — 2500000003 HC RX 250 WO HCPCS: Performed by: FAMILY MEDICINE

## 2023-11-12 PROCEDURE — 2700000000 HC OXYGEN THERAPY PER DAY

## 2023-11-12 PROCEDURE — 80069 RENAL FUNCTION PANEL: CPT

## 2023-11-12 PROCEDURE — 36415 COLL VENOUS BLD VENIPUNCTURE: CPT

## 2023-11-12 PROCEDURE — 86923 COMPATIBILITY TEST ELECTRIC: CPT

## 2023-11-12 PROCEDURE — 83036 HEMOGLOBIN GLYCOSYLATED A1C: CPT

## 2023-11-12 RX ORDER — FUROSEMIDE 10 MG/ML
40 INJECTION INTRAMUSCULAR; INTRAVENOUS ONCE
Status: COMPLETED | OUTPATIENT
Start: 2023-11-12 | End: 2023-11-12

## 2023-11-12 RX ORDER — GLUCAGON 1 MG/ML
1 KIT INJECTION PRN
Status: DISCONTINUED | OUTPATIENT
Start: 2023-11-12 | End: 2023-11-15 | Stop reason: HOSPADM

## 2023-11-12 RX ORDER — SUCRALFATE 1 G/1
1 TABLET ORAL EVERY 8 HOURS SCHEDULED
Status: DISCONTINUED | OUTPATIENT
Start: 2023-11-12 | End: 2023-11-15 | Stop reason: HOSPADM

## 2023-11-12 RX ORDER — DEXTROSE MONOHYDRATE 100 MG/ML
INJECTION, SOLUTION INTRAVENOUS CONTINUOUS PRN
Status: DISCONTINUED | OUTPATIENT
Start: 2023-11-12 | End: 2023-11-15 | Stop reason: HOSPADM

## 2023-11-12 RX ORDER — INSULIN GLARGINE 100 [IU]/ML
0.25 INJECTION, SOLUTION SUBCUTANEOUS DAILY
Status: DISCONTINUED | OUTPATIENT
Start: 2023-11-12 | End: 2023-11-15 | Stop reason: HOSPADM

## 2023-11-12 RX ORDER — ISOSORBIDE DINITRATE 20 MG/1
40 TABLET ORAL 3 TIMES DAILY
Status: DISCONTINUED | OUTPATIENT
Start: 2023-11-12 | End: 2023-11-12

## 2023-11-12 RX ORDER — SODIUM CHLORIDE 9 MG/ML
INJECTION, SOLUTION INTRAVENOUS PRN
Status: DISCONTINUED | OUTPATIENT
Start: 2023-11-12 | End: 2023-11-15 | Stop reason: HOSPADM

## 2023-11-12 RX ORDER — METHYLPREDNISOLONE SODIUM SUCCINATE 40 MG/ML
40 INJECTION, POWDER, LYOPHILIZED, FOR SOLUTION INTRAMUSCULAR; INTRAVENOUS EVERY 12 HOURS
Status: DISCONTINUED | OUTPATIENT
Start: 2023-11-12 | End: 2023-11-15 | Stop reason: HOSPADM

## 2023-11-12 RX ORDER — INSULIN GLARGINE 100 [IU]/ML
0.25 INJECTION, SOLUTION SUBCUTANEOUS NIGHTLY
Status: DISCONTINUED | OUTPATIENT
Start: 2023-11-12 | End: 2023-11-12

## 2023-11-12 RX ORDER — ISOSORBIDE DINITRATE 20 MG/1
20 TABLET ORAL 3 TIMES DAILY
Status: DISCONTINUED | OUTPATIENT
Start: 2023-11-12 | End: 2023-11-15 | Stop reason: HOSPADM

## 2023-11-12 RX ORDER — HYDROMORPHONE HYDROCHLORIDE 1 MG/ML
0.2 INJECTION, SOLUTION INTRAMUSCULAR; INTRAVENOUS; SUBCUTANEOUS EVERY 6 HOURS PRN
Status: DISPENSED | OUTPATIENT
Start: 2023-11-12 | End: 2023-11-14

## 2023-11-12 RX ADMIN — INSULIN LISPRO 4 UNITS: 100 INJECTION, SOLUTION INTRAVENOUS; SUBCUTANEOUS at 12:05

## 2023-11-12 RX ADMIN — CETIRIZINE HYDROCHLORIDE 10 MG: 10 TABLET, FILM COATED ORAL at 08:33

## 2023-11-12 RX ADMIN — Medication 1000 UNITS: at 08:27

## 2023-11-12 RX ADMIN — CEFEPIME 1000 MG: 1 INJECTION, POWDER, FOR SOLUTION INTRAMUSCULAR; INTRAVENOUS at 00:56

## 2023-11-12 RX ADMIN — IPRATROPIUM BROMIDE AND ALBUTEROL SULFATE 1 DOSE: 2.5; .5 SOLUTION RESPIRATORY (INHALATION) at 14:01

## 2023-11-12 RX ADMIN — SUCRALFATE 1 G: 1 TABLET ORAL at 14:32

## 2023-11-12 RX ADMIN — PANTOPRAZOLE SODIUM 40 MG: 40 TABLET, DELAYED RELEASE ORAL at 06:14

## 2023-11-12 RX ADMIN — INSULIN LISPRO 4 UNITS: 100 INJECTION, SOLUTION INTRAVENOUS; SUBCUTANEOUS at 08:27

## 2023-11-12 RX ADMIN — FUROSEMIDE 40 MG: 40 TABLET ORAL at 08:26

## 2023-11-12 RX ADMIN — IPRATROPIUM BROMIDE AND ALBUTEROL SULFATE 1 DOSE: 2.5; .5 SOLUTION RESPIRATORY (INHALATION) at 08:54

## 2023-11-12 RX ADMIN — INSULIN GLARGINE 16 UNITS: 100 INJECTION, SOLUTION SUBCUTANEOUS at 10:43

## 2023-11-12 RX ADMIN — FUROSEMIDE 40 MG: 10 INJECTION, SOLUTION INTRAMUSCULAR; INTRAVENOUS at 14:31

## 2023-11-12 RX ADMIN — HYDROMORPHONE HYDROCHLORIDE 0.2 MG: 1 INJECTION, SOLUTION INTRAMUSCULAR; INTRAVENOUS; SUBCUTANEOUS at 15:21

## 2023-11-12 RX ADMIN — SODIUM CHLORIDE, PRESERVATIVE FREE 10 ML: 5 INJECTION INTRAVENOUS at 21:27

## 2023-11-12 RX ADMIN — ASPIRIN 81 MG: 81 TABLET, COATED ORAL at 08:26

## 2023-11-12 RX ADMIN — METOPROLOL SUCCINATE 25 MG: 25 TABLET, EXTENDED RELEASE ORAL at 08:26

## 2023-11-12 RX ADMIN — CALCITRIOL CAPSULES 0.25 MCG 0.25 MCG: 0.25 CAPSULE ORAL at 08:33

## 2023-11-12 RX ADMIN — METHYLPREDNISOLONE SODIUM SUCCINATE 40 MG: 40 INJECTION, POWDER, LYOPHILIZED, FOR SOLUTION INTRAMUSCULAR; INTRAVENOUS at 00:56

## 2023-11-12 RX ADMIN — ISOSORBIDE DINITRATE 20 MG: 10 TABLET ORAL at 08:26

## 2023-11-12 RX ADMIN — ISOSORBIDE DINITRATE 20 MG: 20 TABLET ORAL at 14:32

## 2023-11-12 RX ADMIN — METHYLPREDNISOLONE SODIUM SUCCINATE 40 MG: 40 INJECTION, POWDER, LYOPHILIZED, FOR SOLUTION INTRAMUSCULAR; INTRAVENOUS at 06:14

## 2023-11-12 RX ADMIN — IPRATROPIUM BROMIDE AND ALBUTEROL SULFATE 1 DOSE: 2.5; .5 SOLUTION RESPIRATORY (INHALATION) at 01:09

## 2023-11-12 RX ADMIN — SODIUM CHLORIDE, PRESERVATIVE FREE 10 ML: 5 INJECTION INTRAVENOUS at 08:33

## 2023-11-12 RX ADMIN — Medication 2 PUFF: at 19:33

## 2023-11-12 RX ADMIN — HYDROMORPHONE HYDROCHLORIDE 0.2 MG: 1 INJECTION, SOLUTION INTRAMUSCULAR; INTRAVENOUS; SUBCUTANEOUS at 09:24

## 2023-11-12 RX ADMIN — HYDROXYZINE PAMOATE 25 MG: 25 CAPSULE ORAL at 14:32

## 2023-11-12 RX ADMIN — ATORVASTATIN CALCIUM 40 MG: 40 TABLET, FILM COATED ORAL at 08:26

## 2023-11-12 RX ADMIN — INSULIN LISPRO 4 UNITS: 100 INJECTION, SOLUTION INTRAVENOUS; SUBCUTANEOUS at 16:28

## 2023-11-12 RX ADMIN — HYDROMORPHONE HYDROCHLORIDE 0.2 MG: 1 INJECTION, SOLUTION INTRAMUSCULAR; INTRAVENOUS; SUBCUTANEOUS at 21:27

## 2023-11-12 RX ADMIN — IPRATROPIUM BROMIDE AND ALBUTEROL SULFATE 1 DOSE: 2.5; .5 SOLUTION RESPIRATORY (INHALATION) at 19:33

## 2023-11-12 RX ADMIN — HYDROXYZINE PAMOATE 25 MG: 25 CAPSULE ORAL at 06:25

## 2023-11-12 RX ADMIN — Medication 2 PUFF: at 08:54

## 2023-11-12 RX ADMIN — SUCRALFATE 1 G: 1 TABLET ORAL at 21:27

## 2023-11-12 RX ADMIN — HYDROMORPHONE HYDROCHLORIDE 0.2 MG: 1 INJECTION, SOLUTION INTRAMUSCULAR; INTRAVENOUS; SUBCUTANEOUS at 03:37

## 2023-11-12 RX ADMIN — ISOSORBIDE DINITRATE 20 MG: 20 TABLET ORAL at 21:27

## 2023-11-12 RX ADMIN — METHYLPREDNISOLONE SODIUM SUCCINATE 40 MG: 40 INJECTION INTRAMUSCULAR; INTRAVENOUS at 18:09

## 2023-11-12 ASSESSMENT — PAIN SCALES - GENERAL
PAINLEVEL_OUTOF10: 6
PAINLEVEL_OUTOF10: 7
PAINLEVEL_OUTOF10: 8
PAINLEVEL_OUTOF10: 5
PAINLEVEL_OUTOF10: 8
PAINLEVEL_OUTOF10: 6
PAINLEVEL_OUTOF10: 7
PAINLEVEL_OUTOF10: 8

## 2023-11-12 ASSESSMENT — PAIN DESCRIPTION - FREQUENCY: FREQUENCY: CONTINUOUS

## 2023-11-12 ASSESSMENT — PAIN DESCRIPTION - ORIENTATION
ORIENTATION: RIGHT;LEFT

## 2023-11-12 ASSESSMENT — PAIN DESCRIPTION - DESCRIPTORS
DESCRIPTORS: ACHING
DESCRIPTORS: ACHING
DESCRIPTORS: ACHING;NUMBNESS

## 2023-11-12 ASSESSMENT — PAIN DESCRIPTION - LOCATION
LOCATION: LEG
LOCATION: LEG;FOOT
LOCATION: FOOT;LEG
LOCATION: FOOT;LEG
LOCATION: FOOT

## 2023-11-12 ASSESSMENT — PAIN - FUNCTIONAL ASSESSMENT: PAIN_FUNCTIONAL_ASSESSMENT: ACTIVITIES ARE NOT PREVENTED

## 2023-11-12 NOTE — FLOWSHEET NOTE
Dressing to HD port noted to have CHG on dressing, dressing changed, cleaned with alcohol and then betadine swabs.  Transparent dressing without CHG in place

## 2023-11-12 NOTE — FLOWSHEET NOTE
Dressing changed per PICC team recommendation, cleaned with alcohol swabs and betadine swabs, covered with plain transparent dressing. Patient tolerated poorly.

## 2023-11-13 ENCOUNTER — HOSPITAL ENCOUNTER (INPATIENT)
Facility: HOSPITAL | Age: 53
Discharge: HOME | DRG: 721 | End: 2023-11-15
Attending: FAMILY MEDICINE
Payer: MEDICAID

## 2023-11-13 ENCOUNTER — ANESTHESIA EVENT (OUTPATIENT)
Facility: HOSPITAL | Age: 53
DRG: 721 | End: 2023-11-13
Payer: MEDICAID

## 2023-11-13 ENCOUNTER — ANESTHESIA (OUTPATIENT)
Facility: HOSPITAL | Age: 53
DRG: 721 | End: 2023-11-13
Payer: MEDICAID

## 2023-11-13 VITALS
DIASTOLIC BLOOD PRESSURE: 65 MMHG | OXYGEN SATURATION: 96 % | RESPIRATION RATE: 16 BRPM | HEART RATE: 78 BPM | SYSTOLIC BLOOD PRESSURE: 149 MMHG

## 2023-11-13 LAB
ABO + RH BLD: NORMAL
ALBUMIN SERPL-MCNC: 2.2 G/DL (ref 3.5–5)
ANION GAP SERPL CALC-SCNC: 8 MMOL/L (ref 5–15)
BASOPHILS # BLD: 0 K/UL (ref 0–0.1)
BASOPHILS NFR BLD: 0 % (ref 0–1)
BLD PROD TYP BPU: NORMAL
BLOOD BANK DISPENSE STATUS: NORMAL
BLOOD GROUP ANTIBODIES SERPL: NEGATIVE
BPU ID: NORMAL
BUN SERPL-MCNC: 79 MG/DL (ref 6–20)
BUN/CREAT SERPL: 16 (ref 12–20)
CA-I BLD-MCNC: 7.5 MG/DL (ref 8.5–10.1)
CHLORIDE SERPL-SCNC: 96 MMOL/L (ref 97–108)
CO2 SERPL-SCNC: 24 MMOL/L (ref 21–32)
CREAT SERPL-MCNC: 5.06 MG/DL (ref 0.55–1.02)
CROSSMATCH RESULT: NORMAL
CRP SERPL-MCNC: 0.78 MG/DL (ref 0–0.6)
DIFFERENTIAL METHOD BLD: ABNORMAL
ECHO BSA: 1.69 M2
EOSINOPHIL # BLD: 0 K/UL (ref 0–0.4)
EOSINOPHIL NFR BLD: 0 % (ref 0–7)
ERYTHROCYTE [DISTWIDTH] IN BLOOD BY AUTOMATED COUNT: 20.1 % (ref 11.5–14.5)
GLUCOSE BLD STRIP.AUTO-MCNC: 195 MG/DL (ref 65–100)
GLUCOSE BLD STRIP.AUTO-MCNC: 195 MG/DL (ref 65–100)
GLUCOSE BLD STRIP.AUTO-MCNC: 235 MG/DL (ref 65–100)
GLUCOSE BLD STRIP.AUTO-MCNC: 257 MG/DL (ref 65–100)
GLUCOSE SERPL-MCNC: 194 MG/DL (ref 65–100)
HCT VFR BLD AUTO: 24.3 % (ref 35–47)
HGB BLD-MCNC: 7.7 G/DL (ref 11.5–16)
IMM GRANULOCYTES # BLD AUTO: 0 K/UL
IMM GRANULOCYTES NFR BLD AUTO: 0 %
LYMPHOCYTES # BLD: 0.2 K/UL (ref 0.8–3.5)
LYMPHOCYTES NFR BLD: 1 % (ref 12–49)
MCH RBC QN AUTO: 26.2 PG (ref 26–34)
MCHC RBC AUTO-ENTMCNC: 31.7 G/DL (ref 30–36.5)
MCV RBC AUTO: 82.7 FL (ref 80–99)
MONOCYTES # BLD: 0 K/UL (ref 0–1)
MONOCYTES NFR BLD: 0 % (ref 5–13)
MYELOCYTES NFR BLD MANUAL: 2 %
NEUTS BAND NFR BLD MANUAL: 3 % (ref 0–6)
NEUTS SEG # BLD: 16.3 K/UL (ref 1.8–8)
NEUTS SEG NFR BLD: 94 % (ref 32–75)
NRBC # BLD: 0.04 K/UL (ref 0–0.01)
NRBC BLD-RTO: 0.2 PER 100 WBC
PERFORMED BY:: ABNORMAL
PHOSPHATE SERPL-MCNC: 2.9 MG/DL (ref 2.6–4.7)
PLATELET # BLD AUTO: 70 K/UL (ref 150–400)
PMV BLD AUTO: 10 FL (ref 8.9–12.9)
POTASSIUM SERPL-SCNC: 5.1 MMOL/L (ref 3.5–5.1)
PROCALCITONIN SERPL-MCNC: 11.71 NG/ML
RBC # BLD AUTO: 2.94 M/UL (ref 3.8–5.2)
RBC MORPH BLD: ABNORMAL
SODIUM SERPL-SCNC: 128 MMOL/L (ref 136–145)
SPECIMEN EXP DATE BLD: NORMAL
TRANSFUSION STATUS PATIENT QL: NORMAL
UNIT DIVISION: 0
WBC # BLD AUTO: 16.8 K/UL (ref 3.6–11)

## 2023-11-13 PROCEDURE — 6360000002 HC RX W HCPCS: Performed by: INTERNAL MEDICINE

## 2023-11-13 PROCEDURE — 2500000003 HC RX 250 WO HCPCS: Performed by: INTERNAL MEDICINE

## 2023-11-13 PROCEDURE — 93320 DOPPLER ECHO COMPLETE: CPT

## 2023-11-13 PROCEDURE — 84145 PROCALCITONIN (PCT): CPT

## 2023-11-13 PROCEDURE — 94640 AIRWAY INHALATION TREATMENT: CPT

## 2023-11-13 PROCEDURE — 6360000002 HC RX W HCPCS: Performed by: FAMILY MEDICINE

## 2023-11-13 PROCEDURE — 6370000000 HC RX 637 (ALT 250 FOR IP)

## 2023-11-13 PROCEDURE — 2700000000 HC OXYGEN THERAPY PER DAY

## 2023-11-13 PROCEDURE — 82962 GLUCOSE BLOOD TEST: CPT

## 2023-11-13 PROCEDURE — 6360000002 HC RX W HCPCS: Performed by: NURSE PRACTITIONER

## 2023-11-13 PROCEDURE — 1100000000 HC RM PRIVATE

## 2023-11-13 PROCEDURE — 2500000003 HC RX 250 WO HCPCS: Performed by: NURSE PRACTITIONER

## 2023-11-13 PROCEDURE — 85025 COMPLETE CBC W/AUTO DIFF WBC: CPT

## 2023-11-13 PROCEDURE — 94761 N-INVAS EAR/PLS OXIMETRY MLT: CPT

## 2023-11-13 PROCEDURE — 6370000000 HC RX 637 (ALT 250 FOR IP): Performed by: FAMILY MEDICINE

## 2023-11-13 PROCEDURE — 6370000000 HC RX 637 (ALT 250 FOR IP): Performed by: INTERNAL MEDICINE

## 2023-11-13 PROCEDURE — 90935 HEMODIALYSIS ONE EVALUATION: CPT

## 2023-11-13 PROCEDURE — 99232 SBSQ HOSP IP/OBS MODERATE 35: CPT | Performed by: INTERNAL MEDICINE

## 2023-11-13 PROCEDURE — 2580000003 HC RX 258: Performed by: INTERNAL MEDICINE

## 2023-11-13 PROCEDURE — 2580000003 HC RX 258: Performed by: FAMILY MEDICINE

## 2023-11-13 PROCEDURE — 86140 C-REACTIVE PROTEIN: CPT

## 2023-11-13 PROCEDURE — 36415 COLL VENOUS BLD VENIPUNCTURE: CPT

## 2023-11-13 PROCEDURE — 80069 RENAL FUNCTION PANEL: CPT

## 2023-11-13 RX ORDER — PROPOFOL 10 MG/ML
INJECTION, EMULSION INTRAVENOUS
Status: COMPLETED
Start: 2023-11-13 | End: 2023-11-13

## 2023-11-13 RX ORDER — LIDOCAINE HYDROCHLORIDE 20 MG/ML
INJECTION, SOLUTION EPIDURAL; INFILTRATION; INTRACAUDAL; PERINEURAL PRN
Status: DISCONTINUED | OUTPATIENT
Start: 2023-11-13 | End: 2023-11-13 | Stop reason: SDUPTHER

## 2023-11-13 RX ORDER — PROPOFOL 10 MG/ML
INJECTION, EMULSION INTRAVENOUS PRN
Status: DISCONTINUED | OUTPATIENT
Start: 2023-11-13 | End: 2023-11-13 | Stop reason: SDUPTHER

## 2023-11-13 RX ADMIN — SUCRALFATE 1 G: 1 TABLET ORAL at 20:22

## 2023-11-13 RX ADMIN — ISOSORBIDE DINITRATE 20 MG: 20 TABLET ORAL at 20:21

## 2023-11-13 RX ADMIN — HYDROXYZINE PAMOATE 25 MG: 25 CAPSULE ORAL at 01:03

## 2023-11-13 RX ADMIN — HEPARIN SODIUM 3600 UNITS: 1000 INJECTION INTRAVENOUS; SUBCUTANEOUS at 18:01

## 2023-11-13 RX ADMIN — INSULIN GLARGINE 16 UNITS: 100 INJECTION, SOLUTION SUBCUTANEOUS at 10:28

## 2023-11-13 RX ADMIN — SUCRALFATE 1 G: 1 TABLET ORAL at 06:05

## 2023-11-13 RX ADMIN — IPRATROPIUM BROMIDE AND ALBUTEROL SULFATE 1 DOSE: 2.5; .5 SOLUTION RESPIRATORY (INHALATION) at 02:02

## 2023-11-13 RX ADMIN — CETIRIZINE HYDROCHLORIDE 10 MG: 10 TABLET, FILM COATED ORAL at 13:10

## 2023-11-13 RX ADMIN — METOPROLOL SUCCINATE 25 MG: 25 TABLET, EXTENDED RELEASE ORAL at 13:11

## 2023-11-13 RX ADMIN — METHYLPREDNISOLONE SODIUM SUCCINATE 40 MG: 40 INJECTION INTRAMUSCULAR; INTRAVENOUS at 18:45

## 2023-11-13 RX ADMIN — Medication 2 PUFF: at 19:26

## 2023-11-13 RX ADMIN — PROPOFOL 50 MG: 10 INJECTION, EMULSION INTRAVENOUS at 09:01

## 2023-11-13 RX ADMIN — HYDROXYZINE PAMOATE 25 MG: 25 CAPSULE ORAL at 20:20

## 2023-11-13 RX ADMIN — EPOETIN ALFA-EPBX 10000 UNITS: 10000 INJECTION, SOLUTION INTRAVENOUS; SUBCUTANEOUS at 17:37

## 2023-11-13 RX ADMIN — FUROSEMIDE 40 MG: 40 TABLET ORAL at 13:11

## 2023-11-13 RX ADMIN — HYDROMORPHONE HYDROCHLORIDE 0.2 MG: 1 INJECTION, SOLUTION INTRAMUSCULAR; INTRAVENOUS; SUBCUTANEOUS at 10:24

## 2023-11-13 RX ADMIN — HYDROMORPHONE HYDROCHLORIDE 0.2 MG: 1 INJECTION, SOLUTION INTRAMUSCULAR; INTRAVENOUS; SUBCUTANEOUS at 22:29

## 2023-11-13 RX ADMIN — ASPIRIN 81 MG: 81 TABLET, COATED ORAL at 13:10

## 2023-11-13 RX ADMIN — ATORVASTATIN CALCIUM 40 MG: 40 TABLET, FILM COATED ORAL at 13:11

## 2023-11-13 RX ADMIN — Medication 1000 UNITS: at 13:10

## 2023-11-13 RX ADMIN — HYDROMORPHONE HYDROCHLORIDE 0.2 MG: 1 INJECTION, SOLUTION INTRAMUSCULAR; INTRAVENOUS; SUBCUTANEOUS at 04:01

## 2023-11-13 RX ADMIN — Medication 2 PUFF: at 07:16

## 2023-11-13 RX ADMIN — INSULIN LISPRO 2 UNITS: 100 INJECTION, SOLUTION INTRAVENOUS; SUBCUTANEOUS at 18:50

## 2023-11-13 RX ADMIN — IPRATROPIUM BROMIDE AND ALBUTEROL SULFATE 1 DOSE: 2.5; .5 SOLUTION RESPIRATORY (INHALATION) at 07:16

## 2023-11-13 RX ADMIN — HYDROMORPHONE HYDROCHLORIDE 0.2 MG: 1 INJECTION, SOLUTION INTRAMUSCULAR; INTRAVENOUS; SUBCUTANEOUS at 16:30

## 2023-11-13 RX ADMIN — ONDANSETRON 4 MG: 2 INJECTION INTRAMUSCULAR; INTRAVENOUS at 06:25

## 2023-11-13 RX ADMIN — CEFEPIME 1000 MG: 1 INJECTION, POWDER, FOR SOLUTION INTRAMUSCULAR; INTRAVENOUS at 00:52

## 2023-11-13 RX ADMIN — IPRATROPIUM BROMIDE AND ALBUTEROL SULFATE 1 DOSE: 2.5; .5 SOLUTION RESPIRATORY (INHALATION) at 19:26

## 2023-11-13 RX ADMIN — CALCITRIOL CAPSULES 0.25 MCG 0.25 MCG: 0.25 CAPSULE ORAL at 13:11

## 2023-11-13 RX ADMIN — SODIUM CHLORIDE, PRESERVATIVE FREE 10 ML: 5 INJECTION INTRAVENOUS at 20:22

## 2023-11-13 RX ADMIN — METHYLPREDNISOLONE SODIUM SUCCINATE 40 MG: 40 INJECTION INTRAMUSCULAR; INTRAVENOUS at 06:05

## 2023-11-13 RX ADMIN — PANTOPRAZOLE SODIUM 40 MG: 40 TABLET, DELAYED RELEASE ORAL at 06:05

## 2023-11-13 RX ADMIN — PROPOFOL 40 MG: 10 INJECTION, EMULSION INTRAVENOUS at 09:05

## 2023-11-13 RX ADMIN — HYDROXYZINE PAMOATE 25 MG: 25 CAPSULE ORAL at 13:35

## 2023-11-13 RX ADMIN — ISOSORBIDE DINITRATE 20 MG: 20 TABLET ORAL at 13:10

## 2023-11-13 RX ADMIN — LIDOCAINE HYDROCHLORIDE 80 MG: 20 INJECTION, SOLUTION EPIDURAL; INFILTRATION; INTRACAUDAL; PERINEURAL at 08:58

## 2023-11-13 ASSESSMENT — PAIN DESCRIPTION - LOCATION
LOCATION: FOOT
LOCATION: ARM;FOOT
LOCATION: GENERALIZED

## 2023-11-13 ASSESSMENT — COPD QUESTIONNAIRES: CAT_SEVERITY: MODERATE

## 2023-11-13 ASSESSMENT — PAIN SCALES - GENERAL
PAINLEVEL_OUTOF10: 6
PAINLEVEL_OUTOF10: 0
PAINLEVEL_OUTOF10: 8
PAINLEVEL_OUTOF10: 0
PAINLEVEL_OUTOF10: 8
PAINLEVEL_OUTOF10: 7
PAINLEVEL_OUTOF10: 4

## 2023-11-13 ASSESSMENT — PAIN DESCRIPTION - DESCRIPTORS
DESCRIPTORS: ACHING
DESCRIPTORS: ACHING
DESCRIPTORS: ACHING;DISCOMFORT

## 2023-11-13 ASSESSMENT — PAIN SCALES - WONG BAKER
WONGBAKER_NUMERICALRESPONSE: 0

## 2023-11-13 ASSESSMENT — PAIN - FUNCTIONAL ASSESSMENT
PAIN_FUNCTIONAL_ASSESSMENT: PREVENTS OR INTERFERES SOME ACTIVE ACTIVITIES AND ADLS
PAIN_FUNCTIONAL_ASSESSMENT: PREVENTS OR INTERFERES SOME ACTIVE ACTIVITIES AND ADLS
PAIN_FUNCTIONAL_ASSESSMENT: ACTIVITIES ARE NOT PREVENTED

## 2023-11-13 ASSESSMENT — PAIN DESCRIPTION - PAIN TYPE: TYPE: CHRONIC PAIN

## 2023-11-13 ASSESSMENT — PAIN DESCRIPTION - ORIENTATION
ORIENTATION: RIGHT;LEFT
ORIENTATION: RIGHT;LEFT

## 2023-11-13 NOTE — DIALYSIS
Pt norma 3 hour hemodialysis well.  2 liters net fluid removed.   Nola Krause in telemetry notified pt cinthia to her room, box #100

## 2023-11-13 NOTE — DIALYSIS
Report called to [facility] Last /72.  HR 62. 3 hour Treatment tolerated well. 2 liters removed

## 2023-11-13 NOTE — CARE COORDINATION
0715: Chart reviewed. Per notes; patient on IV ABX and IV Steroids followed via cardiology, ID, nephrology, oncology and pulmonology. EVERETTE pending. Patient receives dialysis MWF per nephrology.     CM will continue to follow patient and recs of medical team.

## 2023-11-13 NOTE — ANESTHESIA POSTPROCEDURE EVALUATION
Department of Anesthesiology  Postprocedure Note    Patient: Ronaldo Quintero  MRN: [id number]  YOB: 1970  Date of evaluation: 11/13/2023      Procedure Summary     Date: 11/13/23 Room / Location: [address] [place] [address] NON-INVASIVE CARDIOLOGY; [facility] EKG    Anesthesia Start: 1859 Anesthesia Stop: 0909    Procedure: EVERETTE (PRN CONTRAST/BUBBLE/3D) Diagnosis:       Bacteremia due to Pseudomonas      S/P TAVR (transcatheter aortic valve replacement)    Scheduled Providers: Mohit Romero MD Responsible Provider: Kevin Padgett MD    Anesthesia Type: MAC, TIVA ASA Status: 3          Anesthesia Type: No value filed. Vicki Phase I:      Vicki Phase II:        Anesthesia Post Evaluation    Patient location during evaluation: bedside (Endoscopy Unit)  Patient participation: complete - patient participated  Level of consciousness: sleepy but conscious  Pain score: 0  Airway patency: patent  Nausea & Vomiting: no nausea and no vomiting  Complications: no  Cardiovascular status: hemodynamically stable  Respiratory status: acceptable  Hydration status: stable  Comments: This patient remained on floor bed. The patient was handed off to the cardiology nursing team.  All questions regarding pre-, intra-, and postoperative care were answered.   Multimodal analgesia pain management approach

## 2023-11-13 NOTE — ANESTHESIA PRE PROCEDURE
Department of Anesthesiology  Preprocedure Note       Name:  Lex Nageotte   Age:  52 y.o.  :  1970                                          MRN:  [id number]         Date:  2023      Surgeon: * No surgeons listed *    Procedure: * No procedures listed *    Medications prior to admission:   Prior to Admission medications    Medication Sig Start Date End Date Taking? Authorizing Provider   benzonatate (TESSALON) 100 MG capsule Take 2 capsules by mouth 3 times daily as needed for Cough    Valerio Byers MD   LORazepam (ATIVAN) 0.5 MG tablet Take 1 tablet by mouth every 6 hours as needed for Anxiety.  Max Daily Amount: 2 mg    Valerio Byers MD   albuterol sulfate HFA (PROVENTIL;VENTOLIN;PROAIR) 108 (90 Base) MCG/ACT inhaler Inhale 2 puffs into the lungs every 6 hours as needed for Wheezing    Valerio Byers MD   midodrine (PROAMATINE) 10 MG tablet Take 1 tablet by mouth 11/3/23   Valerio Byers MD   tiotropium (SPIRIVA RESPIMAT) 2.5 MCG/ACT AERS inhaler Inhale 2 puffs into the lungs daily 12/15/22   Valerio Byers MD   sevelamer (RENVELA) 800 MG tablet Take 2 tablets by mouth 3 times daily (with meals) 22   Valerio Byers MD   ondansetron (ZOFRAN) 8 MG tablet Take 1 tablet by mouth every 8 hours as needed    Valerio Byers MD   omeprazole (PRILOSEC OTC) 20 MG tablet Take 1 tablet by mouth daily 3/17/23   Valerio Byers MD   metOLazone (ZAROXOLYN) 2.5 MG tablet Take 1 tablet by mouth 10/4/23   Valerio Byers MD   metoprolol succinate (TOPROL XL) 25 MG extended release tablet Take 1 tablet by mouth 23   Valerio Byers MD   hydrOXYzine pamoate (VISTARIL) 25 MG capsule Take 1 capsule by mouth 3 times daily as needed 23   Valerio Byers MD   furosemide (LASIX) 40 MG tablet Take 1 tablet by mouth daily    Valerio Byers MD   sublingual tablet cyanocobalamin 2500 MCG SUBL Place 1 tablet under the tongue daily

## 2023-11-13 NOTE — PERIOP NOTE
Report called to [facility] on innovation unit. [phone], pt transported in stable condition via bed on room air alert,ox4 to room 202 by pt transported, tele box 100 on.

## 2023-11-14 LAB
ALBUMIN SERPL-MCNC: 2.1 G/DL (ref 3.5–5)
ALBUMIN/GLOB SERPL: 0.7 (ref 1.1–2.2)
ALP SERPL-CCNC: 95 U/L (ref 45–117)
ALT SERPL-CCNC: 11 U/L (ref 12–78)
ANION GAP SERPL CALC-SCNC: 6 MMOL/L (ref 5–15)
AST SERPL W P-5'-P-CCNC: 5 U/L (ref 15–37)
BILIRUB SERPL-MCNC: 0.6 MG/DL (ref 0.2–1)
BUN SERPL-MCNC: 55 MG/DL (ref 6–20)
BUN/CREAT SERPL: 13 (ref 12–20)
CA-I BLD-MCNC: 7.6 MG/DL (ref 8.5–10.1)
CHLORIDE SERPL-SCNC: 103 MMOL/L (ref 97–108)
CO2 SERPL-SCNC: 26 MMOL/L (ref 21–32)
COMMENT: NORMAL
COMMENT: NORMAL
CREAT SERPL-MCNC: 4.13 MG/DL (ref 0.55–1.02)
CRP SERPL-MCNC: 1.04 MG/DL (ref 0–0.6)
ENOXAPARIN HD (LMWH): NORMAL %
ENOXAPARIN LD (LMWH): NORMAL %
ENOXAPARIN RESULT: NORMAL
ERYTHROCYTE [DISTWIDTH] IN BLOOD BY AUTOMATED COUNT: 20.9 % (ref 11.5–14.5)
GLOBULIN SER CALC-MCNC: 2.9 G/DL (ref 2–4)
GLUCOSE BLD STRIP.AUTO-MCNC: 177 MG/DL (ref 65–100)
GLUCOSE BLD STRIP.AUTO-MCNC: 196 MG/DL (ref 65–100)
GLUCOSE BLD STRIP.AUTO-MCNC: 215 MG/DL (ref 65–100)
GLUCOSE BLD STRIP.AUTO-MCNC: 231 MG/DL (ref 65–100)
GLUCOSE SERPL-MCNC: 213 MG/DL (ref 65–100)
HCT VFR BLD AUTO: 28.1 % (ref 35–47)
HGB BLD-MCNC: 8.3 G/DL (ref 11.5–16)
MCH RBC QN AUTO: 26.1 PG (ref 26–34)
MCHC RBC AUTO-ENTMCNC: 29.5 G/DL (ref 30–36.5)
MCV RBC AUTO: 88.4 FL (ref 80–99)
NRBC # BLD: 0.06 K/UL (ref 0–0.01)
NRBC BLD-RTO: 0.4 PER 100 WBC
PERFORMED BY:: ABNORMAL
PLATELET # BLD AUTO: 72 K/UL (ref 150–400)
PMV BLD AUTO: 10.4 FL (ref 8.9–12.9)
POTASSIUM SERPL-SCNC: 4.5 MMOL/L (ref 3.5–5.1)
PROCALCITONIN SERPL-MCNC: 9.08 NG/ML
PROT SERPL-MCNC: 5 G/DL (ref 6.4–8.2)
RBC # BLD AUTO: 3.18 M/UL (ref 3.8–5.2)
SODIUM SERPL-SCNC: 135 MMOL/L (ref 136–145)
SRA .2 IU/ML UFH SER-ACNC: <1 % (ref 0–20)
SRA 100IU/ML UFH SER-ACNC: <1 % (ref 0–20)
SRA UFH SER-IMP: NORMAL
WBC # BLD AUTO: 14.5 K/UL (ref 3.6–11)

## 2023-11-14 PROCEDURE — 86140 C-REACTIVE PROTEIN: CPT

## 2023-11-14 PROCEDURE — 1100000000 HC RM PRIVATE

## 2023-11-14 PROCEDURE — 6360000002 HC RX W HCPCS: Performed by: INTERNAL MEDICINE

## 2023-11-14 PROCEDURE — 6370000000 HC RX 637 (ALT 250 FOR IP): Performed by: FAMILY MEDICINE

## 2023-11-14 PROCEDURE — 85027 COMPLETE CBC AUTOMATED: CPT

## 2023-11-14 PROCEDURE — 2500000003 HC RX 250 WO HCPCS: Performed by: INTERNAL MEDICINE

## 2023-11-14 PROCEDURE — 2580000003 HC RX 258: Performed by: FAMILY MEDICINE

## 2023-11-14 PROCEDURE — 94640 AIRWAY INHALATION TREATMENT: CPT

## 2023-11-14 PROCEDURE — 6370000000 HC RX 637 (ALT 250 FOR IP)

## 2023-11-14 PROCEDURE — 2580000003 HC RX 258: Performed by: INTERNAL MEDICINE

## 2023-11-14 PROCEDURE — 6370000000 HC RX 637 (ALT 250 FOR IP): Performed by: INTERNAL MEDICINE

## 2023-11-14 PROCEDURE — 84145 PROCALCITONIN (PCT): CPT

## 2023-11-14 PROCEDURE — 80053 COMPREHEN METABOLIC PANEL: CPT

## 2023-11-14 PROCEDURE — 82962 GLUCOSE BLOOD TEST: CPT

## 2023-11-14 PROCEDURE — 36415 COLL VENOUS BLD VENIPUNCTURE: CPT

## 2023-11-14 PROCEDURE — 94761 N-INVAS EAR/PLS OXIMETRY MLT: CPT

## 2023-11-14 PROCEDURE — 2700000000 HC OXYGEN THERAPY PER DAY

## 2023-11-14 PROCEDURE — 99232 SBSQ HOSP IP/OBS MODERATE 35: CPT | Performed by: INTERNAL MEDICINE

## 2023-11-14 RX ORDER — IPRATROPIUM BROMIDE AND ALBUTEROL SULFATE 2.5; .5 MG/3ML; MG/3ML
3 SOLUTION RESPIRATORY (INHALATION)
Qty: 360 ML | Refills: 2 | Status: SHIPPED | OUTPATIENT
Start: 2023-11-14

## 2023-11-14 RX ORDER — INSULIN GLARGINE 100 [IU]/ML
0.25 INJECTION, SOLUTION SUBCUTANEOUS DAILY
Qty: 10 ML | Refills: 3 | Status: SHIPPED | OUTPATIENT
Start: 2023-11-14

## 2023-11-14 RX ORDER — SUCRALFATE 1 G/1
1 TABLET ORAL EVERY 8 HOURS SCHEDULED
Qty: 120 TABLET | Refills: 3 | Status: SHIPPED | OUTPATIENT
Start: 2023-11-14

## 2023-11-14 RX ORDER — BUDESONIDE AND FORMOTEROL FUMARATE DIHYDRATE 80; 4.5 UG/1; UG/1
2 AEROSOL RESPIRATORY (INHALATION)
Qty: 10.2 G | Refills: 3 | Status: SHIPPED | OUTPATIENT
Start: 2023-11-14

## 2023-11-14 RX ORDER — NICOTINE 21 MG/24HR
1 PATCH, TRANSDERMAL 24 HOURS TRANSDERMAL DAILY
Qty: 30 PATCH | Refills: 3 | Status: SHIPPED | OUTPATIENT
Start: 2023-11-14

## 2023-11-14 RX ADMIN — METHYLPREDNISOLONE SODIUM SUCCINATE 40 MG: 40 INJECTION INTRAMUSCULAR; INTRAVENOUS at 06:11

## 2023-11-14 RX ADMIN — ISOSORBIDE DINITRATE 20 MG: 20 TABLET ORAL at 22:01

## 2023-11-14 RX ADMIN — SODIUM CHLORIDE, PRESERVATIVE FREE 10 ML: 5 INJECTION INTRAVENOUS at 08:45

## 2023-11-14 RX ADMIN — IPRATROPIUM BROMIDE AND ALBUTEROL SULFATE 1 DOSE: 2.5; .5 SOLUTION RESPIRATORY (INHALATION) at 10:06

## 2023-11-14 RX ADMIN — INSULIN GLARGINE 16 UNITS: 100 INJECTION, SOLUTION SUBCUTANEOUS at 08:44

## 2023-11-14 RX ADMIN — CETIRIZINE HYDROCHLORIDE 10 MG: 10 TABLET, FILM COATED ORAL at 08:44

## 2023-11-14 RX ADMIN — IPRATROPIUM BROMIDE AND ALBUTEROL SULFATE 1 DOSE: 2.5; .5 SOLUTION RESPIRATORY (INHALATION) at 20:55

## 2023-11-14 RX ADMIN — Medication 1000 UNITS: at 08:44

## 2023-11-14 RX ADMIN — HYDROMORPHONE HYDROCHLORIDE 0.2 MG: 1 INJECTION, SOLUTION INTRAMUSCULAR; INTRAVENOUS; SUBCUTANEOUS at 11:30

## 2023-11-14 RX ADMIN — IPRATROPIUM BROMIDE AND ALBUTEROL SULFATE 1 DOSE: 2.5; .5 SOLUTION RESPIRATORY (INHALATION) at 14:26

## 2023-11-14 RX ADMIN — CALCITRIOL CAPSULES 0.25 MCG 0.25 MCG: 0.25 CAPSULE ORAL at 08:44

## 2023-11-14 RX ADMIN — SUCRALFATE 1 G: 1 TABLET ORAL at 06:11

## 2023-11-14 RX ADMIN — INSULIN LISPRO 2 UNITS: 100 INJECTION, SOLUTION INTRAVENOUS; SUBCUTANEOUS at 08:44

## 2023-11-14 RX ADMIN — HYDROXYZINE PAMOATE 25 MG: 25 CAPSULE ORAL at 16:53

## 2023-11-14 RX ADMIN — SUCRALFATE 1 G: 1 TABLET ORAL at 22:01

## 2023-11-14 RX ADMIN — PANTOPRAZOLE SODIUM 40 MG: 40 TABLET, DELAYED RELEASE ORAL at 06:13

## 2023-11-14 RX ADMIN — METOPROLOL SUCCINATE 25 MG: 25 TABLET, EXTENDED RELEASE ORAL at 08:44

## 2023-11-14 RX ADMIN — HYDROMORPHONE HYDROCHLORIDE 0.2 MG: 1 INJECTION, SOLUTION INTRAMUSCULAR; INTRAVENOUS; SUBCUTANEOUS at 04:32

## 2023-11-14 RX ADMIN — ASPIRIN 81 MG: 81 TABLET, COATED ORAL at 08:44

## 2023-11-14 RX ADMIN — FUROSEMIDE 40 MG: 40 TABLET ORAL at 08:45

## 2023-11-14 RX ADMIN — CEFEPIME 1000 MG: 1 INJECTION, POWDER, FOR SOLUTION INTRAMUSCULAR; INTRAVENOUS at 01:30

## 2023-11-14 RX ADMIN — ISOSORBIDE DINITRATE 20 MG: 20 TABLET ORAL at 15:51

## 2023-11-14 RX ADMIN — Medication 2 PUFF: at 21:17

## 2023-11-14 RX ADMIN — Medication 2 PUFF: at 10:14

## 2023-11-14 RX ADMIN — SUCRALFATE 1 G: 1 TABLET ORAL at 15:51

## 2023-11-14 RX ADMIN — IPRATROPIUM BROMIDE AND ALBUTEROL SULFATE 1 DOSE: 2.5; .5 SOLUTION RESPIRATORY (INHALATION) at 02:44

## 2023-11-14 RX ADMIN — ISOSORBIDE DINITRATE 20 MG: 20 TABLET ORAL at 08:45

## 2023-11-14 RX ADMIN — ATORVASTATIN CALCIUM 40 MG: 40 TABLET, FILM COATED ORAL at 08:44

## 2023-11-14 ASSESSMENT — PAIN - FUNCTIONAL ASSESSMENT
PAIN_FUNCTIONAL_ASSESSMENT: ACTIVITIES ARE NOT PREVENTED
PAIN_FUNCTIONAL_ASSESSMENT: PREVENTS OR INTERFERES SOME ACTIVE ACTIVITIES AND ADLS
PAIN_FUNCTIONAL_ASSESSMENT: PREVENTS OR INTERFERES SOME ACTIVE ACTIVITIES AND ADLS

## 2023-11-14 ASSESSMENT — PAIN DESCRIPTION - LOCATION
LOCATION: GENERALIZED
LOCATION: ARM;FOOT

## 2023-11-14 ASSESSMENT — PAIN SCALES - GENERAL
PAINLEVEL_OUTOF10: 0
PAINLEVEL_OUTOF10: 5
PAINLEVEL_OUTOF10: 8
PAINLEVEL_OUTOF10: 3
PAINLEVEL_OUTOF10: 4
PAINLEVEL_OUTOF10: 10

## 2023-11-14 ASSESSMENT — PAIN SCALES - WONG BAKER: WONGBAKER_NUMERICALRESPONSE: 0

## 2023-11-14 ASSESSMENT — PAIN DESCRIPTION - PAIN TYPE
TYPE: CHRONIC PAIN
TYPE: CHRONIC PAIN

## 2023-11-14 ASSESSMENT — PAIN DESCRIPTION - DESCRIPTORS
DESCRIPTORS: ACHING
DESCRIPTORS: ACHING
DESCRIPTORS: ACHING;DISCOMFORT

## 2023-11-14 ASSESSMENT — PAIN DESCRIPTION - ORIENTATION: ORIENTATION: RIGHT;LEFT

## 2023-11-14 NOTE — CARE COORDINATION
0715: Chart reviewed. Per notes; patient on IV ABX and IV Steroids followed via cardiology, ID, nephrology, oncology and pulmonology. Per ID, patient can receive long term IV ABX in dialysis. Patient receives dialysis MWF per nephrology. CM will continue to follow patient and recs of medical team.    9706: Discharge summary/order home with home health noted as:    Skilled care nursing/home PT OT     IV antibiotics cefepime 1 g daily for 25 days during dialysis     Patient need to see a vascular surgeon to change dialysis cath as an outpatient     Follow-up with the nephrologist and ID     Discharge home if okay with infectious disease and nephrology    1150: CM met with patient at bedside to offer home health services. Patient believes she is open with [address] services: company unknown. Choice letter received, placed on chart and referral sent for clarification. 1325: [address] referrals sent via [facility] have not been accepted. CM called [facility] who has received a referral in the past for this patient, but referral was declined. CM telephone [facility] and was directed to [facility] who services this patient's area. Referral sent. 1525: Nephrology and ID have cleared patient for discharge, per nursing. CM spoke with both regarding discharge understanding patient was on IV ABX in dialysis prior to admission. CM met with patient who could not name dialysis clinic, but states her sister provides transport to the clinic. CM located patient's clinic as [address] [place] [address] and verified with the hotline she is a patient at this clinic. Clinic is closed on Tuesday and Thursday. Patient is aware home health has not been secured due to location/payor at this time. Attending notified. CM telephoned patient's son, Tulio Ivory to verify he was aware of discharge today without home health.     Tulio Ivory stated his mother has not had home health, but is seeking personal care

## 2023-11-14 NOTE — DISCHARGE INSTRUCTIONS
Discharge Instructions       PATIENT ID: Phong Tena  MRN: [id number]   YOB: 1970    DATE OF ADMISSION: 11/6/2023  DATE OF DISCHARGE: 11/14/2023    PRIMARY CARE PROVIDER: [unfilled]     ATTENDING PHYSICIAN: Bandar Galdamez MD   DISCHARGING PROVIDER: Bandar Galdamez MD    To contact this individual call [phone] and ask the  to page. If unavailable, ask to be transferred the Adult Hospitalist Department. DISCHARGE DIAGNOSES respiratory failure bacteremia renal mass    CONSULTATIONS: [unfilled]      PROCEDURES/SURGERIES: * No surgery found *    PENDING TEST RESULTS:   At the time of discharge the following test results are still pending: None    FOLLOW UP APPOINTMENTS:   [place]  [id number] [facility] [address]  [phone]    Schedule an appointment as soon as possible for a visit in 1 week(s)         ADDITIONAL CARE RECOMMENDATIONS: Discharge home with IV antibiotic oxygen    DIET: Renal diet      ACTIVITY: As tolerated    Wound care: {Discharge Wound Care Instructions:65638}    EQUIPMENT needed: ***      DISCHARGE MEDICATIONS:   See Medication Reconciliation Form    It is important that you take the medication exactly as they are prescribed. Keep your medication in the bottles provided by the pharmacist and keep a list of the medication names, dosages, and times to be taken in your wallet. Do not take other medications without consulting your doctor. NOTIFY YOUR PHYSICIAN FOR ANY OF THE FOLLOWING:   Fever over 101 degrees for 24 hours. Chest pain, shortness of breath, fever, chills, nausea, vomiting, diarrhea, change in mentation, falling, weakness, bleeding. Severe pain or pain not relieved by medications. Or, any other signs or symptoms that you may have questions about.       DISPOSITION:    Home With:   OT  PT  HH  RN       SNF/Inpatient Rehab/LTAC    Independent/assisted living    Hospice    Other:         PROBLEM LIST Updated:  Yes ***

## 2023-11-14 NOTE — WOUND CARE
Wound Care Note:    Wound Care into see patient because of skin irritation secondary to CHG allergy    Skin Care & Pressure Relief Recommendations:  Minimize layers of linen  Pads under patient to optimize support surface and microclimate  Turn/reposition approximately every 2 hours. Pillow Wedges  Manage incontinence  Promote continence; Skin Protective lotion to buttocks and sacrum daily and as needed with incontinence care    Offload heels with pillows or offloading boots. Skin to right subclavian central line site appears clear and without irritation at this time. Per Rn, betadine and a tegaderm are being applied every other day. If drainage occurs, recommend applying calcium alginate Ag around site and then cover with tegaderm every other day. Apply Optifoam Border Sacral foam dressing to sacrum every three days to redistribute pressure from bony prominence and prevent pressure and friction injury to skin. Maintain the the external  incontinence management system to manage  incontinence, if needed. Ensure patient turns / repositions q2h to offload sacrum and buttocks to prevent pressure related skin injury. Float heels with 1-2 pillows lengthwise while in bed for offloading of the heels. Maintain HOB at 30 degrees or less, if not contraindicated, to reduce pressure to buttocks and sacrum. Raise foot of bed to help prevent friction and shear injury from sliding down in the bed. Re-consult [facility] for other skin care concerns.

## 2023-11-15 VITALS
HEART RATE: 76 BPM | BODY MASS INDEX: 22.73 KG/M2 | RESPIRATION RATE: 16 BRPM | TEMPERATURE: 98.4 F | DIASTOLIC BLOOD PRESSURE: 53 MMHG | HEIGHT: 64 IN | OXYGEN SATURATION: 99 % | WEIGHT: 133.16 LBS | SYSTOLIC BLOOD PRESSURE: 143 MMHG

## 2023-11-15 LAB
CRP SERPL-MCNC: 0.83 MG/DL (ref 0–0.6)
GLUCOSE BLD STRIP.AUTO-MCNC: 121 MG/DL (ref 65–100)
GLUCOSE BLD STRIP.AUTO-MCNC: 74 MG/DL (ref 65–100)
GLUCOSE BLD STRIP.AUTO-MCNC: 84 MG/DL (ref 65–100)
PERFORMED BY:: ABNORMAL
PERFORMED BY:: NORMAL
PERFORMED BY:: NORMAL
PROCALCITONIN SERPL-MCNC: 7.51 NG/ML

## 2023-11-15 PROCEDURE — 84145 PROCALCITONIN (PCT): CPT

## 2023-11-15 PROCEDURE — 94640 AIRWAY INHALATION TREATMENT: CPT

## 2023-11-15 PROCEDURE — 6370000000 HC RX 637 (ALT 250 FOR IP): Performed by: INTERNAL MEDICINE

## 2023-11-15 PROCEDURE — 2709999900 HC NON-CHARGEABLE SUPPLY

## 2023-11-15 PROCEDURE — 94761 N-INVAS EAR/PLS OXIMETRY MLT: CPT

## 2023-11-15 PROCEDURE — 99232 SBSQ HOSP IP/OBS MODERATE 35: CPT | Performed by: INTERNAL MEDICINE

## 2023-11-15 PROCEDURE — 6370000000 HC RX 637 (ALT 250 FOR IP)

## 2023-11-15 PROCEDURE — 82962 GLUCOSE BLOOD TEST: CPT

## 2023-11-15 PROCEDURE — 2700000000 HC OXYGEN THERAPY PER DAY

## 2023-11-15 PROCEDURE — 6370000000 HC RX 637 (ALT 250 FOR IP): Performed by: FAMILY MEDICINE

## 2023-11-15 PROCEDURE — 90935 HEMODIALYSIS ONE EVALUATION: CPT

## 2023-11-15 PROCEDURE — 86140 C-REACTIVE PROTEIN: CPT

## 2023-11-15 PROCEDURE — 6360000002 HC RX W HCPCS: Performed by: INTERNAL MEDICINE

## 2023-11-15 RX ORDER — CEFEPIME HYDROCHLORIDE 1 G/1
2000 INJECTION, POWDER, FOR SOLUTION INTRAMUSCULAR; INTRAVENOUS
Status: SHIPPED | OUTPATIENT
Start: 2023-11-15 | End: 2023-12-11

## 2023-11-15 RX ADMIN — HYDROXYZINE PAMOATE 25 MG: 25 CAPSULE ORAL at 07:57

## 2023-11-15 RX ADMIN — ISOSORBIDE DINITRATE 20 MG: 20 TABLET ORAL at 07:51

## 2023-11-15 RX ADMIN — IPRATROPIUM BROMIDE AND ALBUTEROL SULFATE 1 DOSE: 2.5; .5 SOLUTION RESPIRATORY (INHALATION) at 08:55

## 2023-11-15 RX ADMIN — SUCRALFATE 1 G: 1 TABLET ORAL at 14:01

## 2023-11-15 RX ADMIN — CALCITRIOL CAPSULES 0.25 MCG 0.25 MCG: 0.25 CAPSULE ORAL at 07:51

## 2023-11-15 RX ADMIN — INSULIN GLARGINE 16 UNITS: 100 INJECTION, SOLUTION SUBCUTANEOUS at 07:57

## 2023-11-15 RX ADMIN — Medication 2 PUFF: at 08:55

## 2023-11-15 RX ADMIN — PANTOPRAZOLE SODIUM 40 MG: 40 TABLET, DELAYED RELEASE ORAL at 07:56

## 2023-11-15 RX ADMIN — METOPROLOL SUCCINATE 25 MG: 25 TABLET, EXTENDED RELEASE ORAL at 07:50

## 2023-11-15 RX ADMIN — FUROSEMIDE 40 MG: 40 TABLET ORAL at 07:52

## 2023-11-15 RX ADMIN — CETIRIZINE HYDROCHLORIDE 10 MG: 10 TABLET, FILM COATED ORAL at 07:51

## 2023-11-15 RX ADMIN — HEPARIN SODIUM 3600 UNITS: 1000 INJECTION INTRAVENOUS; SUBCUTANEOUS at 13:18

## 2023-11-15 RX ADMIN — EPOETIN ALFA-EPBX 10000 UNITS: 10000 INJECTION, SOLUTION INTRAVENOUS; SUBCUTANEOUS at 13:19

## 2023-11-15 RX ADMIN — IPRATROPIUM BROMIDE AND ALBUTEROL SULFATE 1 DOSE: 2.5; .5 SOLUTION RESPIRATORY (INHALATION) at 01:52

## 2023-11-15 RX ADMIN — Medication 1000 UNITS: at 07:52

## 2023-11-15 RX ADMIN — ATORVASTATIN CALCIUM 40 MG: 40 TABLET, FILM COATED ORAL at 07:52

## 2023-11-15 RX ADMIN — ISOSORBIDE DINITRATE 20 MG: 20 TABLET ORAL at 14:01

## 2023-11-15 RX ADMIN — ASPIRIN 81 MG: 81 TABLET, COATED ORAL at 07:51

## 2023-11-15 RX ADMIN — HYDROXYZINE PAMOATE 25 MG: 25 CAPSULE ORAL at 01:37

## 2023-11-15 ASSESSMENT — PAIN DESCRIPTION - LOCATION: LOCATION: FOOT;HAND

## 2023-11-15 ASSESSMENT — PAIN SCALES - GENERAL
PAINLEVEL_OUTOF10: 0
PAINLEVEL_OUTOF10: 8
PAINLEVEL_OUTOF10: 0

## 2023-11-15 NOTE — CARE COORDINATION
Discharge delayed due to medical transport going to the wrong hospital last evening. 1045: CM telephoned patient's son, Caitlyn Garcia learning home oxygen is with [facility] [phone]. Caitlyn Garcia stated his mother had two tanks with one's knob broken. CM explained a call would be made requesting service to ensure tanks are in good working order. CM also shared patient will discharge home after dialysis with transport set-up for 3PM.    Understanding verbalized. CM telephoned [facility] speaking with Marcelo Law who will have a technician contact patient's son for a service call today. Priti from patient's insurance telephoned for update on DCP. CM provided detailed update and patient's son's contact info. Kaleigh Arie stated she would follow-up with patient's son regarding concerns and assist in securing home health services. After dialysis and when medical transportation with oxygen available, patient to discharge home. Discharge Checklist Completed. Transition of Care Plan:     RUR: 23%  Prior Level of Functioning: Assistance, as needed  Disposition: Home  If SNF or IPR: Date FOC offered: 11/14/2023  Date FOC received: 11/14/2023  Accepting facility: N/A  Date authorization started with reference number: N/A  Date authorization received and expires: N/A  Follow up appointments: Discharge Summary  DME needed: Home oxygen in place  Transportation at discharge: Medicaid with oxygen  IM/IMM Medicare/ letter given: N/A  Is patient a  and connected with VA? No  If yes, was [employer] transfer form completed and VA notified? N/A  Caregiver Contact: Patient and son, Caitlyn Garcia  Discharge Caregiver contacted prior to discharge? Patient and son, [facility]?  No  Barriers to discharge: None

## 2023-11-15 NOTE — DIALYSIS
Pt norma 3.5 hemodialysis well.  3 liters net fluid removed. Report given to [place], [address] [address] units given IV.

## 2023-11-15 NOTE — DISCHARGE SUMMARY
Discharge Summary       PATIENT ID: Aric Chan  MRN: [id number]   YOB: 1970    DATE OF ADMISSION: 11/6/2023   DATE OF DISCHARGE:   PRIMARY CARE PROVIDER: @EFRAÍN@      ATTENDING PHYSICIAN: Wilbert Ty  DISCHARGING PROVIDER: Wilbert Ty      CONSULTATIONS: IP CONSULT TO CARDIOLOGY  IP CONSULT TO PULMONOLOGY  IP CONSULT TO NEPHROLOGY  IP CONSULT TO UROLOGY  IP CONSULT TO HEMATOLOGY  IP CONSULT TO INFECTIOUS DISEASES  IP CONSULT TO PHARMACY  IP CONSULT TO PICC TEAM  IP WOUND CARE NURSE CONSULT TO EVAL    PROCEDURES/SURGERIES: * No surgery found *    ADMITTING DIAGNOSES:    Patient Active Problem List    Diagnosis Date Noted    NSTEMI (non-ST elevated myocardial infarction) ([address]) 11/07/2023    Respiratory failure ([address]) 11/07/2023    Pleural effusion 02/05/2023    Sepsis ([address]) 02/05/2023    COVID-19 02/05/2023    CKD (chronic kidney disease) requiring chronic dialysis ([address]) 01/29/2023    Type 2 MI (myocardial infarction) ([address]) 01/29/2023    Acute respiratory failure with hypoxia (HCC) 01/06/2023    Fluid overload 01/06/2023    End stage renal failure on dialysis ([address]) 01/06/2023    Pulmonary edema 01/06/2023    Hypotension 01/06/2023    COPD exacerbation ([address]) 01/06/2023    Chronic kidney disease (CKD), stage V ([address]) 05/26/2022       DISCHARGE DIAGNOSES / PLAN:      NSTEMI  End-stage renal disease on hemodialysis  Acute on chronic hypoxic respiratory failure  Cardiomyopathy with ejection fraction about 30 to 35%  Acute extubation of COPD  Acute pulmonary edema  Renal mass  History of TAVR  Hypertension  Hyperlipidemia  Thrombocytopenia  Bacteremia with Pseudomonas  Anemia of chronic disease        DISCHARGE MEDICATIONS:     Medication List        START taking these medications      budesonide-formoterol 80-4.5 MCG/ACT Aero  Commonly known as: SYMBICORT  Inhale 2 puffs into the lungs in the morning and 2 puffs in the evening.      cefepime  infusion  Commonly known as: MAXIPIME  Infuse 1,000 mg
shows bilateral pulmonary edema lactic acid was 2 then 1.5 COVID and flu was negative because of elevated troponin heparin drip was initiated prior to the transfer  When I saw the patient patient was on 2 L oxygen by nasal cannula saturation was 100%     11/7     Patient feeling much better  Complaining of pain  Patient having vomiting this morning  Breathing much improved  Troponin positive now 4247     Patient seen by the urology regarding renal mass  Globin this morning 7.1     11/8     Patient resting the bed not distressed no new complaints  Seen by the cardiology regarding elevated troponin/cardiology recommend stress test/cath patient refused     11/9     Patient resting in the bed alert awake denies any chest pain shortness of breath nausea no vomiting patient platelet drops to 55  Blood culture came back positive for Pseudomonas     11/10     For dialysis today  Seen by the infectious disease discontinue meropenem start on cefepime  ID ordered EVERETTE for rule out endocarditis     11/13     Patient scheduled for EVERETTE today  Seen by infectious disease recommend to continue cefepime     As per nephrology recommend to remove permacath and try to sterilize with antibiotic  Can be removed after hemodialysis    11/14    Patient have echo done shows ejection fraction of 55 to 60%  No vegetation on    Patient resting the bed alert awake not in distress on 2 L oxygen by nasal cannula  As patient refused to change dialysis cath here   Repeat blood cultures so far negative I did recommend to discharge patient on cefepime for 25 more days  Patient have seen the urology regarding renal mass/refused any work-up at this time patient need to follow-up with the urology as an outpatient  Patient need to follow-up with the vascular surgeon for change of dialysis cath    Medication reconciliation done time discharge patient 35 minutes 50% time spent counseling and coordination of care    Time discharge patient 35 minutes 50% time

## 2023-11-16 LAB
BACTERIA SPEC CULT: NORMAL
Lab: NORMAL

## 2023-11-17 ENCOUNTER — TELEPHONE (OUTPATIENT)
Age: 53
End: 2023-11-17

## 2023-11-17 LAB
BACTERIA SPEC CULT: NORMAL
Lab: NORMAL

## 2023-11-17 NOTE — TELEPHONE ENCOUNTER
Fanny Treviño called in for Dr. Eleno Banks. She stated that the doctor needs to speak with you regarding the patient's care.  She is currently in the hospital. [phone]

## 2023-11-20 LAB
EKG ATRIAL RATE: 90 BPM
EKG DIAGNOSIS: NORMAL
EKG P AXIS: 52 DEGREES
EKG P-R INTERVAL: 144 MS
EKG Q-T INTERVAL: 372 MS
EKG QRS DURATION: 74 MS
EKG QTC CALCULATION (BAZETT): 455 MS
EKG R AXIS: 16 DEGREES
EKG T AXIS: 180 DEGREES
EKG VENTRICULAR RATE: 90 BPM

## 2023-11-29 ENCOUNTER — HOSPITAL ENCOUNTER (EMERGENCY)
Facility: HOSPITAL | Age: 53
Discharge: HOME | End: 2023-11-30
Payer: MEDICAID

## 2023-11-29 VITALS
WEIGHT: 121 LBS | RESPIRATION RATE: 18 BRPM | HEART RATE: 99 BPM | HEIGHT: 64 IN | DIASTOLIC BLOOD PRESSURE: 80 MMHG | TEMPERATURE: 97.1 F | OXYGEN SATURATION: 94 % | BODY MASS INDEX: 20.66 KG/M2 | SYSTOLIC BLOOD PRESSURE: 113 MMHG

## 2023-11-29 DIAGNOSIS — Z99.2 ENCOUNTER FOR DIALYSIS (HCC): Primary | ICD-10-CM

## 2023-11-29 LAB
ALBUMIN SERPL-MCNC: 2.3 G/DL (ref 3.4–5)
ALBUMIN/GLOB SERPL: 0.8 (ref 0.8–1.7)
ALP SERPL-CCNC: 113 U/L (ref 45–117)
ALT SERPL-CCNC: 9 U/L (ref 13–56)
ANION GAP SERPL CALC-SCNC: 8 MMOL/L (ref 3–18)
AST SERPL-CCNC: 5 U/L (ref 10–38)
BASOPHILS # BLD: 0 K/UL (ref 0–0.1)
BASOPHILS NFR BLD: 0 % (ref 0–2)
BILIRUB SERPL-MCNC: 0.4 MG/DL (ref 0.2–1)
BUN SERPL-MCNC: 66 MG/DL (ref 7–18)
BUN/CREAT SERPL: 8 (ref 12–20)
CALCIUM SERPL-MCNC: 8.3 MG/DL (ref 8.5–10.1)
CHLORIDE SERPL-SCNC: 95 MMOL/L (ref 100–111)
CO2 SERPL-SCNC: 29 MMOL/L (ref 21–32)
CREAT SERPL-MCNC: 7.87 MG/DL (ref 0.6–1.3)
DIFFERENTIAL METHOD BLD: ABNORMAL
EKG ATRIAL RATE: 120 BPM
EKG DIAGNOSIS: NORMAL
EKG P AXIS: 56 DEGREES
EKG P-R INTERVAL: 162 MS
EKG Q-T INTERVAL: 296 MS
EKG QRS DURATION: 72 MS
EKG QTC CALCULATION (BAZETT): 418 MS
EKG R AXIS: 18 DEGREES
EKG T AXIS: 161 DEGREES
EKG VENTRICULAR RATE: 120 BPM
EOSINOPHIL # BLD: 0.1 K/UL (ref 0–0.4)
EOSINOPHIL NFR BLD: 1 % (ref 0–5)
ERYTHROCYTE [DISTWIDTH] IN BLOOD BY AUTOMATED COUNT: 20.1 % (ref 11.6–14.5)
GLOBULIN SER CALC-MCNC: 2.9 G/DL (ref 2–4)
GLUCOSE SERPL-MCNC: 103 MG/DL (ref 74–99)
HCT VFR BLD AUTO: 25 % (ref 35–45)
HGB BLD-MCNC: 7.9 G/DL (ref 12–16)
IMM GRANULOCYTES # BLD AUTO: 0.1 K/UL (ref 0–0.04)
IMM GRANULOCYTES NFR BLD AUTO: 1 % (ref 0–0.5)
LYMPHOCYTES # BLD: 0.6 K/UL (ref 0.9–3.6)
LYMPHOCYTES NFR BLD: 6 % (ref 21–52)
MCH RBC QN AUTO: 27.3 PG (ref 24–34)
MCHC RBC AUTO-ENTMCNC: 31.6 G/DL (ref 31–37)
MCV RBC AUTO: 86.5 FL (ref 78–100)
MONOCYTES # BLD: 0.6 K/UL (ref 0.05–1.2)
MONOCYTES NFR BLD: 7 % (ref 3–10)
NEUTS SEG # BLD: 7.7 K/UL (ref 1.8–8)
NEUTS SEG NFR BLD: 85 % (ref 40–73)
NRBC # BLD: 0 K/UL (ref 0–0.01)
NRBC BLD-RTO: 0 PER 100 WBC
PLATELET # BLD AUTO: 50 K/UL (ref 135–420)
PMV BLD AUTO: 9.6 FL (ref 9.2–11.8)
POTASSIUM SERPL-SCNC: 5.8 MMOL/L (ref 3.5–5.5)
PROT SERPL-MCNC: 5.2 G/DL (ref 6.4–8.2)
RBC # BLD AUTO: 2.89 M/UL (ref 4.2–5.3)
SODIUM SERPL-SCNC: 132 MMOL/L (ref 136–145)
TROPONIN I SERPL HS-MCNC: 111 NG/L (ref 0–54)
WBC # BLD AUTO: 9.1 K/UL (ref 4.6–13.2)

## 2023-11-29 PROCEDURE — 93010 ELECTROCARDIOGRAM REPORT: CPT | Performed by: INTERNAL MEDICINE

## 2023-11-29 PROCEDURE — 4500000002 HC ER NO CHARGE

## 2023-11-29 PROCEDURE — 80053 COMPREHEN METABOLIC PANEL: CPT

## 2023-11-29 PROCEDURE — 85025 COMPLETE CBC W/AUTO DIFF WBC: CPT

## 2023-11-29 PROCEDURE — 90935 HEMODIALYSIS ONE EVALUATION: CPT

## 2023-11-29 PROCEDURE — 84484 ASSAY OF TROPONIN QUANT: CPT

## 2023-11-29 PROCEDURE — 93005 ELECTROCARDIOGRAM TRACING: CPT

## 2023-11-29 ASSESSMENT — ENCOUNTER SYMPTOMS
RHINORRHEA: 0
VOMITING: 0
ABDOMINAL DISTENTION: 0
NAUSEA: 0
DIARRHEA: 0
ABDOMINAL PAIN: 0
SHORTNESS OF BREATH: 0
CHEST TIGHTNESS: 0

## 2023-11-29 ASSESSMENT — PAIN - FUNCTIONAL ASSESSMENT
PAIN_FUNCTIONAL_ASSESSMENT: NONE - DENIES PAIN
PAIN_FUNCTIONAL_ASSESSMENT: NONE - DENIES PAIN

## 2023-11-29 NOTE — ED TRIAGE NOTES
Pt arrives [facility] EMS [facility] for [facility]. [facility] on 11/24. Pt changed her mind about hospice and now wants HD. Last HD Friday. Per NP - K 6.1 and creat 6.

## 2023-11-29 NOTE — ED TRIAGE NOTES
Pt came to ED from EMS; [facility] in need [facility]   Dialysis cath right chest   Last dialysis was on Friday    Was on hospice, however changed mind.  Per EMS vitals are stable     EMS provided pt Labs from 11/22/2023

## 2023-11-29 NOTE — ED NOTES
EKG completed, ED tech is working on obtaining blood work.      Pt NAD noted or voiced      Daniela Mensah RN  11/29/23 3851

## 2023-11-29 NOTE — PROGRESS NOTES
HD orders placed  Discussed with dialysis unit    Please call with questions    Eliu Francisco MD [facility]  Cell [phone]  Pager: [phone]  Fax   [phone]

## 2023-11-29 NOTE — ED NOTES
GREG Pham still working on pt's PIV and blood work. Pt is a hard stick.      Lilliana Barbosa RN  11/29/23 1129

## 2023-11-29 NOTE — DIALYSIS
Treatment summary  Received report from [address] Rn  Dialyzed patient in the unit  for 3 hours. VSS  R chest TDC accessed without difficulty. Treatment initiated by JIGAR Wolff    Total Uf 2500  ml  Net UF 2000 ml     Treatment note:           Patient c/o feeling sick (Nausea) towards the end of   Treatment and  Treatment stopped, blood rinsed back. Patient remained stable prior to discharge cyril to her room. Offered assistance with repositioning every 2 hours. Vascular access visible at all times and line connected at all   times during treatment. Access R chest [facility]   Functioning well without complications. Report given to Nina Del Rosario RN with all questions answered.

## 2023-11-29 NOTE — ED NOTES
Transferred to room 16, placed on monitor. Relative at bedside.     Given with lunch tray per provider's approval.     Melany Keen RN  11/29/23 9844

## 2023-11-29 NOTE — ED PROVIDER NOTES
hemodynamically stable at time of discharge. Spoke with Dr. Mariposa Brooks regarding patient's care, he agreed with plan and disposition. Disposition Considerations : discharge       Critical Care Time:       Brittaney Hogan     Diagnosis and Disposition     DIAGNOSIS:   1. Encounter for dialysis [facility])        DISPOSITION Decision To Discharge 11/29/2023 05:24:21 PM      PATIENT REFERRED TO:  CASEY Awan NP  [id number]  [address]  [phone]    Go in 1 day      [facility] EMERGENCY DEPT  [address] [address]  [phone]  Go to   As needed, If symptoms worsen    Sagrario Lau MD  [facility] [address]  [phone]    Go to   As needed, If symptoms worsen      DISCHARGE MEDICATIONS:  New Prescriptions    No medications on file       DISCONTINUED MEDICATIONS:  Discontinued Medications    No medications on file              (Please note that portions of this note were completed with a voice recognition program.  Efforts were made to edit the dictations but occasionally words are mis-transcribed.)    FARIBA Vallejo (electronically signed)    Julieth Bailey PA-C am the primary clinician of record. Lis Disclaimer     Please note that this dictation was completed with [employer], the computer voice recognition software. Quite often unanticipated grammatical, syntax, homophones, and other interpretive errors are inadvertently transcribed by the computer software. Please disregard these errors. Please excuse any errors that have escaped final proofreading.        Anitha Bloom PA-C  11/29/23 4754

## 2023-11-30 NOTE — ED NOTES
Pt incontinent of stool. Pt cleaned, pericare performed using warm wipes, white petroleum applied, new diaper applied.      Idalia Knowles RN  11/30/23 0018

## 2023-12-22 ENCOUNTER — HOSPITAL ENCOUNTER (INPATIENT)
Facility: HOSPITAL | Age: 53
LOS: 4 days | Discharge: NURSING, REHAB OR LONG-TERM CARE | DRG: 663 | End: 2023-12-26
Attending: STUDENT IN AN ORGANIZED HEALTH CARE EDUCATION/TRAINING PROGRAM | Admitting: INTERNAL MEDICINE
Payer: MEDICAID

## 2023-12-22 DIAGNOSIS — D63.1 ANEMIA DUE TO CHRONIC KIDNEY DISEASE, ON CHRONIC DIALYSIS (HCC): Primary | ICD-10-CM

## 2023-12-22 DIAGNOSIS — N18.6 ANEMIA DUE TO CHRONIC KIDNEY DISEASE, ON CHRONIC DIALYSIS (HCC): Primary | ICD-10-CM

## 2023-12-22 DIAGNOSIS — G62.9 NEUROPATHY: ICD-10-CM

## 2023-12-22 DIAGNOSIS — Z99.2 ANEMIA DUE TO CHRONIC KIDNEY DISEASE, ON CHRONIC DIALYSIS (HCC): Primary | ICD-10-CM

## 2023-12-22 DIAGNOSIS — F41.9 ANXIETY: ICD-10-CM

## 2023-12-22 PROBLEM — D64.9 ANEMIA: Status: ACTIVE | Noted: 2023-12-22

## 2023-12-22 LAB
ALBUMIN SERPL-MCNC: 1.9 G/DL (ref 3.4–5)
ALBUMIN/GLOB SERPL: 0.6 (ref 0.8–1.7)
ALP SERPL-CCNC: 108 U/L (ref 45–117)
ALT SERPL-CCNC: 32 U/L (ref 13–56)
ANION GAP SERPL CALC-SCNC: 7 MMOL/L (ref 3–18)
AST SERPL-CCNC: 17 U/L (ref 10–38)
BASOPHILS # BLD: 0 K/UL (ref 0–0.1)
BASOPHILS NFR BLD: 0 % (ref 0–2)
BILIRUB SERPL-MCNC: 0.4 MG/DL (ref 0.2–1)
BUN SERPL-MCNC: 39 MG/DL (ref 7–18)
BUN/CREAT SERPL: 8 (ref 12–20)
CALCIUM SERPL-MCNC: 7.7 MG/DL (ref 8.5–10.1)
CHLORIDE SERPL-SCNC: 100 MMOL/L (ref 100–111)
CO2 SERPL-SCNC: 24 MMOL/L (ref 21–32)
CREAT SERPL-MCNC: 4.74 MG/DL (ref 0.6–1.3)
DIFFERENTIAL METHOD BLD: ABNORMAL
EOSINOPHIL # BLD: 0.1 K/UL (ref 0–0.4)
EOSINOPHIL NFR BLD: 1 % (ref 0–5)
ERYTHROCYTE [DISTWIDTH] IN BLOOD BY AUTOMATED COUNT: 20.4 % (ref 11.6–14.5)
FERRITIN SERPL-MCNC: 2889 NG/ML (ref 8–388)
FOLATE SERPL-MCNC: 4.1 NG/ML (ref 3.1–17.5)
GLOBULIN SER CALC-MCNC: 3 G/DL (ref 2–4)
GLUCOSE SERPL-MCNC: 96 MG/DL (ref 74–99)
HBV SURFACE AB SER QL IA: POSITIVE
HBV SURFACE AB SERPL IA-ACNC: 25.72 MIU/ML
HBV SURFACE AG SER QL: <0.1 INDEX
HBV SURFACE AG SER QL: NEGATIVE
HCT VFR BLD AUTO: 13.6 % (ref 35–45)
HEMOCCULT STL QL: POSITIVE
HEP BS AB COMMENT: NORMAL
HGB BLD-MCNC: 4.1 G/DL (ref 12–16)
HISTORY CHECK: NORMAL
IMM GRANULOCYTES # BLD AUTO: 0.1 K/UL (ref 0–0.04)
IMM GRANULOCYTES NFR BLD AUTO: 1 % (ref 0–0.5)
INR PPP: 1.4 (ref 0.9–1.1)
IRON SATN MFR SERPL: 28 % (ref 20–50)
IRON SERPL-MCNC: 46 UG/DL (ref 50–175)
LYMPHOCYTES # BLD: 1.1 K/UL (ref 0.9–3.6)
LYMPHOCYTES NFR BLD: 9 % (ref 21–52)
MCH RBC QN AUTO: 26.6 PG (ref 24–34)
MCHC RBC AUTO-ENTMCNC: 30.1 G/DL (ref 31–37)
MCV RBC AUTO: 88.3 FL (ref 78–100)
MONOCYTES # BLD: 0.9 K/UL (ref 0.05–1.2)
MONOCYTES NFR BLD: 7 % (ref 3–10)
NEUTS SEG # BLD: 10.6 K/UL (ref 1.8–8)
NEUTS SEG NFR BLD: 83 % (ref 40–73)
NRBC # BLD: 0 K/UL (ref 0–0.01)
NRBC BLD-RTO: 0 PER 100 WBC
PLATELET # BLD AUTO: 54 K/UL (ref 135–420)
PMV BLD AUTO: 9.6 FL (ref 9.2–11.8)
POTASSIUM SERPL-SCNC: 4 MMOL/L (ref 3.5–5.5)
PROT SERPL-MCNC: 4.9 G/DL (ref 6.4–8.2)
PROTHROMBIN TIME: 17.4 SEC (ref 11.9–14.7)
RBC # BLD AUTO: 1.54 M/UL (ref 4.2–5.3)
RETICS/RBC NFR AUTO: 4 % (ref 0.5–2.5)
SODIUM SERPL-SCNC: 131 MMOL/L (ref 136–145)
TIBC SERPL-MCNC: 167 UG/DL (ref 250–450)
TSH SERPL DL<=0.05 MIU/L-ACNC: 2.52 UIU/ML (ref 0.36–3.74)
VIT B12 SERPL-MCNC: >2000 PG/ML (ref 211–911)
WBC # BLD AUTO: 12.8 K/UL (ref 4.6–13.2)

## 2023-12-22 PROCEDURE — 99223 1ST HOSP IP/OBS HIGH 75: CPT | Performed by: INTERNAL MEDICINE

## 2023-12-22 PROCEDURE — 86901 BLOOD TYPING SEROLOGIC RH(D): CPT

## 2023-12-22 PROCEDURE — 86706 HEP B SURFACE ANTIBODY: CPT

## 2023-12-22 PROCEDURE — 6370000000 HC RX 637 (ALT 250 FOR IP): Performed by: STUDENT IN AN ORGANIZED HEALTH CARE EDUCATION/TRAINING PROGRAM

## 2023-12-22 PROCEDURE — 86900 BLOOD TYPING SEROLOGIC ABO: CPT

## 2023-12-22 PROCEDURE — 84443 ASSAY THYROID STIM HORMONE: CPT

## 2023-12-22 PROCEDURE — 82746 ASSAY OF FOLIC ACID SERUM: CPT

## 2023-12-22 PROCEDURE — 87340 HEPATITIS B SURFACE AG IA: CPT

## 2023-12-22 PROCEDURE — 85045 AUTOMATED RETICULOCYTE COUNT: CPT

## 2023-12-22 PROCEDURE — 2580000003 HC RX 258: Performed by: INTERNAL MEDICINE

## 2023-12-22 PROCEDURE — 1100000000 HC RM PRIVATE

## 2023-12-22 PROCEDURE — 30233N1 TRANSFUSION OF NONAUTOLOGOUS RED BLOOD CELLS INTO PERIPHERAL VEIN, PERCUTANEOUS APPROACH: ICD-10-PCS | Performed by: INTERNAL MEDICINE

## 2023-12-22 PROCEDURE — 80053 COMPREHEN METABOLIC PANEL: CPT

## 2023-12-22 PROCEDURE — 82272 OCCULT BLD FECES 1-3 TESTS: CPT

## 2023-12-22 PROCEDURE — 85025 COMPLETE CBC W/AUTO DIFF WBC: CPT

## 2023-12-22 PROCEDURE — 6370000000 HC RX 637 (ALT 250 FOR IP): Performed by: INTERNAL MEDICINE

## 2023-12-22 PROCEDURE — 85610 PROTHROMBIN TIME: CPT

## 2023-12-22 PROCEDURE — 83550 IRON BINDING TEST: CPT

## 2023-12-22 PROCEDURE — 36430 TRANSFUSION BLD/BLD COMPNT: CPT

## 2023-12-22 PROCEDURE — 86850 RBC ANTIBODY SCREEN: CPT

## 2023-12-22 PROCEDURE — P9016 RBC LEUKOCYTES REDUCED: HCPCS

## 2023-12-22 PROCEDURE — 83540 ASSAY OF IRON: CPT

## 2023-12-22 PROCEDURE — 82607 VITAMIN B-12: CPT

## 2023-12-22 PROCEDURE — 5A1D70Z PERFORMANCE OF URINARY FILTRATION, INTERMITTENT, LESS THAN 6 HOURS PER DAY: ICD-10-PCS | Performed by: INTERNAL MEDICINE

## 2023-12-22 PROCEDURE — 86923 COMPATIBILITY TEST ELECTRIC: CPT

## 2023-12-22 PROCEDURE — 82728 ASSAY OF FERRITIN: CPT

## 2023-12-22 PROCEDURE — 99285 EMERGENCY DEPT VISIT HI MDM: CPT

## 2023-12-22 PROCEDURE — 90935 HEMODIALYSIS ONE EVALUATION: CPT

## 2023-12-22 RX ORDER — ONDANSETRON 2 MG/ML
4 INJECTION INTRAMUSCULAR; INTRAVENOUS EVERY 6 HOURS PRN
Status: DISCONTINUED | OUTPATIENT
Start: 2023-12-22 | End: 2023-12-26 | Stop reason: HOSPADM

## 2023-12-22 RX ORDER — ISOSORBIDE DINITRATE 20 MG/1
20 TABLET ORAL
COMMUNITY
Start: 2023-12-21

## 2023-12-22 RX ORDER — SEVELAMER CARBONATE 800 MG/1
1 TABLET, FILM COATED ORAL 3 TIMES DAILY
COMMUNITY
Start: 2023-12-21

## 2023-12-22 RX ORDER — SODIUM CHLORIDE 9 MG/ML
INJECTION, SOLUTION INTRAVENOUS PRN
Status: DISCONTINUED | OUTPATIENT
Start: 2023-12-22 | End: 2023-12-26 | Stop reason: HOSPADM

## 2023-12-22 RX ORDER — GABAPENTIN 300 MG/1
300 CAPSULE ORAL 2 TIMES DAILY
Status: DISCONTINUED | OUTPATIENT
Start: 2023-12-22 | End: 2023-12-22 | Stop reason: SDUPTHER

## 2023-12-22 RX ORDER — ONDANSETRON 4 MG/1
4 TABLET, ORALLY DISINTEGRATING ORAL EVERY 8 HOURS PRN
Status: DISCONTINUED | OUTPATIENT
Start: 2023-12-22 | End: 2023-12-26 | Stop reason: HOSPADM

## 2023-12-22 RX ORDER — ISOSORBIDE DINITRATE 10 MG/1
20 TABLET ORAL 2 TIMES DAILY
Status: DISCONTINUED | OUTPATIENT
Start: 2023-12-22 | End: 2023-12-26 | Stop reason: HOSPADM

## 2023-12-22 RX ORDER — POLYETHYLENE GLYCOL 3350 17 G/17G
17 POWDER, FOR SOLUTION ORAL DAILY PRN
Status: DISCONTINUED | OUTPATIENT
Start: 2023-12-22 | End: 2023-12-26 | Stop reason: HOSPADM

## 2023-12-22 RX ORDER — SODIUM CHLORIDE 9 MG/ML
INJECTION, SOLUTION INTRAVENOUS PRN
Status: DISCONTINUED | OUTPATIENT
Start: 2023-12-22 | End: 2023-12-26

## 2023-12-22 RX ORDER — TRAZODONE HYDROCHLORIDE 50 MG/1
50 TABLET ORAL NIGHTLY
Status: DISCONTINUED | OUTPATIENT
Start: 2023-12-22 | End: 2023-12-26 | Stop reason: HOSPADM

## 2023-12-22 RX ORDER — ATORVASTATIN CALCIUM 40 MG/1
40 TABLET, FILM COATED ORAL DAILY
COMMUNITY
Start: 2023-12-21

## 2023-12-22 RX ORDER — GABAPENTIN 300 MG/1
300 CAPSULE ORAL 2 TIMES DAILY
Status: DISCONTINUED | OUTPATIENT
Start: 2023-12-22 | End: 2023-12-22

## 2023-12-22 RX ORDER — LORAZEPAM 1 MG/1
1 TABLET ORAL DAILY
Status: DISCONTINUED | OUTPATIENT
Start: 2023-12-22 | End: 2023-12-26 | Stop reason: HOSPADM

## 2023-12-22 RX ORDER — PANTOPRAZOLE SODIUM 40 MG/1
40 TABLET, DELAYED RELEASE ORAL
Status: DISCONTINUED | OUTPATIENT
Start: 2023-12-23 | End: 2023-12-26 | Stop reason: HOSPADM

## 2023-12-22 RX ORDER — OMEPRAZOLE 20 MG/1
20 CAPSULE, DELAYED RELEASE ORAL DAILY
COMMUNITY
Start: 2023-12-15

## 2023-12-22 RX ORDER — SODIUM CHLORIDE 0.9 % (FLUSH) 0.9 %
5-40 SYRINGE (ML) INJECTION PRN
Status: DISCONTINUED | OUTPATIENT
Start: 2023-12-22 | End: 2023-12-26 | Stop reason: HOSPADM

## 2023-12-22 RX ORDER — ATORVASTATIN CALCIUM 40 MG/1
40 TABLET, FILM COATED ORAL DAILY
Status: DISCONTINUED | OUTPATIENT
Start: 2023-12-22 | End: 2023-12-26 | Stop reason: HOSPADM

## 2023-12-22 RX ORDER — TRAZODONE HYDROCHLORIDE 50 MG/1
50 TABLET ORAL NIGHTLY
COMMUNITY
Start: 2023-12-14

## 2023-12-22 RX ORDER — LORAZEPAM 1 MG/1
1 TABLET ORAL DAILY
Status: ON HOLD | COMMUNITY
Start: 2023-12-11 | End: 2023-12-25

## 2023-12-22 RX ORDER — SEVELAMER CARBONATE 800 MG/1
800 TABLET, FILM COATED ORAL 3 TIMES DAILY
Status: DISCONTINUED | OUTPATIENT
Start: 2023-12-22 | End: 2023-12-26 | Stop reason: HOSPADM

## 2023-12-22 RX ORDER — ACETAMINOPHEN 500 MG
1000 TABLET ORAL ONCE
Status: DISCONTINUED | OUTPATIENT
Start: 2023-12-22 | End: 2023-12-26 | Stop reason: HOSPADM

## 2023-12-22 RX ORDER — CALCITRIOL 0.25 UG/1
0.25 CAPSULE, LIQUID FILLED ORAL DAILY
COMMUNITY
Start: 2023-12-21

## 2023-12-22 RX ORDER — ACETAMINOPHEN 650 MG/1
650 SUPPOSITORY RECTAL EVERY 6 HOURS PRN
Status: DISCONTINUED | OUTPATIENT
Start: 2023-12-22 | End: 2023-12-26 | Stop reason: HOSPADM

## 2023-12-22 RX ORDER — GABAPENTIN 300 MG/1
300 CAPSULE ORAL 2 TIMES DAILY
Status: ON HOLD | COMMUNITY
Start: 2023-12-18 | End: 2023-12-24

## 2023-12-22 RX ORDER — FUROSEMIDE 40 MG/1
40 TABLET ORAL DAILY
COMMUNITY
Start: 2023-12-21

## 2023-12-22 RX ORDER — ACETAMINOPHEN 325 MG/1
650 TABLET ORAL EVERY 6 HOURS PRN
Status: DISCONTINUED | OUTPATIENT
Start: 2023-12-22 | End: 2023-12-26 | Stop reason: HOSPADM

## 2023-12-22 RX ORDER — SODIUM CHLORIDE 0.9 % (FLUSH) 0.9 %
5-40 SYRINGE (ML) INJECTION EVERY 12 HOURS SCHEDULED
Status: DISCONTINUED | OUTPATIENT
Start: 2023-12-22 | End: 2023-12-26 | Stop reason: HOSPADM

## 2023-12-22 RX ORDER — GABAPENTIN 300 MG/1
300 CAPSULE ORAL
Status: DISCONTINUED | OUTPATIENT
Start: 2023-12-22 | End: 2023-12-26 | Stop reason: HOSPADM

## 2023-12-22 RX ORDER — CALCITRIOL 0.25 UG/1
0.25 CAPSULE, LIQUID FILLED ORAL DAILY
Status: DISCONTINUED | OUTPATIENT
Start: 2023-12-22 | End: 2023-12-26 | Stop reason: HOSPADM

## 2023-12-22 RX ORDER — TIOTROPIUM BROMIDE AND OLODATEROL 3.124; 2.736 UG/1; UG/1
2 SPRAY, METERED RESPIRATORY (INHALATION) DAILY
COMMUNITY
Start: 2023-12-08

## 2023-12-22 RX ADMIN — GABAPENTIN 300 MG: 300 CAPSULE ORAL at 13:38

## 2023-12-22 RX ADMIN — TRAZODONE HYDROCHLORIDE 50 MG: 50 TABLET ORAL at 21:37

## 2023-12-22 RX ADMIN — SEVELAMER CARBONATE 800 MG: 800 TABLET, FILM COATED ORAL at 21:37

## 2023-12-22 RX ADMIN — LORAZEPAM 1 MG: 1 TABLET ORAL at 21:37

## 2023-12-22 RX ADMIN — GABAPENTIN 300 MG: 300 CAPSULE ORAL at 21:37

## 2023-12-22 RX ADMIN — SODIUM CHLORIDE, PRESERVATIVE FREE 10 ML: 5 INJECTION INTRAVENOUS at 21:38

## 2023-12-22 RX ADMIN — ATORVASTATIN CALCIUM 40 MG: 40 TABLET, FILM COATED ORAL at 21:38

## 2023-12-22 RX ADMIN — ISOSORBIDE DINITRATE 20 MG: 10 TABLET ORAL at 21:37

## 2023-12-22 RX ADMIN — CALCITRIOL CAPSULES 0.25 MCG 0.25 MCG: 0.25 CAPSULE ORAL at 21:37

## 2023-12-22 ASSESSMENT — PAIN DESCRIPTION - LOCATION: LOCATION: LEG;FOOT

## 2023-12-22 ASSESSMENT — PAIN - FUNCTIONAL ASSESSMENT: PAIN_FUNCTIONAL_ASSESSMENT: 0-10

## 2023-12-22 ASSESSMENT — PAIN SCALES - GENERAL: PAINLEVEL_OUTOF10: 8

## 2023-12-22 NOTE — ED NOTES
Received call from dialysis, stated once first unit of blood is started, they will accept pt for HD.

## 2023-12-22 NOTE — H&P
[id number]= possible alternate chart in Psychiatric per ED     History and Physical    Patient: Barrera Gutierrez MRN: [id number]  SSN: [id number]    YOB: 1970  Age: 52 y.o. Sex: female      No primary care provider on file. C/C : fatigue     Subjective:      Barrera Gutierrez is a 52 y.o. female with PMH of ESRD on HD , HLD , Non Specific neuropathy on gabapentin , now being admitted for severe symptomatic anemia . patient is from SNF . Patient has 2 charts , epic charts reviewed , d/w ED MD     Patient had out patient labs done and she was found to have severe anemia and hence sent to ed , here patient does have fatigue . She is HD patient . According to Renal patient needs PRBC transfusion and then they will do HD . No h/o any external bleed . We will transfuse , in past has extensive work up for anemia , I could not find GI notes from past . In any case will transfuse and monitor H/h for stability and then if stable dc to SNF and have out patient follow up with her established sub speciality          Overview : From her other chart       CAD, PCI LAD in 2/2023    Heart failure with reduced EF, in the 30-35% range per last [facility] )      s/p TAVR  Hypertension, continue HD, hypotensive at times  Anemia,chronic   ESRD on HD- Dr Shyla Best     Past Medical History:   Diagnosis Date    CHF (congestive heart failure) ([facility])     Chronic kidney disease     COPD (chronic obstructive pulmonary disease) ([facility])     Diabetes mellitus ([address] [facility])     Hypertension      History reviewed. No pertinent surgical history. History reviewed. No pertinent family history. Social History     Tobacco Use    Smoking status: Every Day     Current packs/day: 0.50     Types: Cigarettes    Smokeless tobacco: Never   Substance Use Topics    Alcohol use: Not Currently      Prior to Admission medications    Medication Sig Start Date End Date Taking?  Authorizing Provider   atorvastatin (LIPITOR) 40 MG tablet Take 1 tablet by mouth daily 12/21/23

## 2023-12-22 NOTE — PROGRESS NOTES
Pharmacy Note - Renal dose adjustment made per P/T protocol    Original order:  Gabapentin 300mg po BID    Estimated Creatinine Clearance: 12 mL/min (A) (based on SCr of 4.74 mg/dL (H)). Recent Labs     12/22/23  0749   BUN 39*   CREATININE 4.74*       Renally adjusted order:  Gabapentin 300mg three times a week after HD    Please call pharmacy with any questions.     Thank you,  Lorenzo Perez [facility]  12/22/2023 6:17 PM

## 2023-12-22 NOTE — ED PROVIDER NOTES
[facility] EMERGENCY DEPT  EMERGENCY DEPARTMENT ENCOUNTER      Pt Name: Manisha Storm  MRN: [id number]  [address] [facility] 1970  Date of evaluation: 12/22/2023  Provider: Damaris Olivares MD    CHIEF COMPLAINT       Chief Complaint   Patient presents with    Low Hgb         HISTORY OF PRESENT ILLNESS   (Location/Symptom, Timing/Onset, Context/Setting, Quality, Duration, Modifying Factors, Severity)  Note limiting factors. Manisha Storm is a 52 y.o. female who presents to the emergency department after she was sent in for an outpatient low hemoglobin. Apparently her outpatient hemoglobin was 4.8 per nursing home. Patient does state that she feels very tired and has noticed that she has felt a little dizzy with getting up recently but denies any chest pain or difficulty breathing. Denies any bleeding from anywhere. States yesterday she was having some abdominal pain due to constipation but had a bowel movement and now she is not having any discomfort in her belly. She states that her legs are swollen and are causing some discomfort but nothing new or different from her normal.  Patient does state that she takes Plavix and aspirin but denies any other blood thinners. Nursing Notes were reviewed. REVIEW OF SYSTEMS    (2-9 systems for level 4, 10 or more for level 5)     Constitutional: No fever  HENT: No ear pain  Eyes: No change in vision  Respiratory: No SOB  Cardio: No chest pain  GI: No blood in stool  : No hematuria  MSK: No back pain  Skin: No rashes  Neuro: No headache    Except as noted above the remainder of the review of systems was reviewed and negative. PAST MEDICAL HISTORY     Past Medical History:   Diagnosis Date    CHF (congestive heart failure) (HCC)     Chronic kidney disease     COPD (chronic obstructive pulmonary disease) (HCC)     Diabetes mellitus ([address])     Hypertension          SURGICAL HISTORY     History reviewed. No pertinent surgical history.       CURRENT MEDICATIONS       Previous

## 2023-12-22 NOTE — PROGRESS NOTES
Will arrange dialysis today (after transfusion is started), will see tomorrow if admitted. Patient is known to us from op dialysis and frequent admissions in a setting of the chf/copd/metastatic renal cell carcinoma/chronic anemia.

## 2023-12-22 NOTE — DIALYSIS
HD Care plan  Time: 3 hrs  Dialysate:  3 K  2.5   Ca  Bath  Net UF: 2 L  Access: Aseptically care for Rt chest TDC  Hemodynamic stability: Maintain BP WNL, Transfuse blood as hemoglobin low at 4.8    Pre Dialysis:  Pt received from ER Nurse Raegan Mace , pt on a stretcher ,A+O X 4, No s/s of distress noted  on RA . Patient receiving blood transfusion via a peripheral line   RT Chest [facility]  assessed no abnormalities noted, dressing changed, line patent with good flow. TDC accessed  per protocol without any difficulty    Intra Dialysis:  Time out / safety process performed per policy, Tx initiated at [phone]. TDC flowing with ease. For hemodynamic stability UF goal  set at 2000 ml as tolerated, blood transfusion switched from peripheral to via HD aseptically. Pt offered assistance with repositioning every 2 hours/prn    Vascular access visible  and line connections remained intact throughout entire duration of treatment. 1500;1st unit of blood completed without any issue, pt to get 2 more units, Primary Nurse Anita Eastman) contacted to bring blood so we can double check before transfusing, nurse very busy not able to bring the blood at this time, Nurse  contacted for assistance, she said she will get a Nurse from ER to bring the blood. 1640: ER charge [phone])  sent a Tech to bring the blood to dialysis, ER nurse called to come sign off blood with this RN before transfusing, Nurse not available, Blood sent back to blood bank. Pt will receive the next units of blood after dialysis. Vital signs checked every 15 mins,BP fluctuating UF goal reduced to 1.5 L, Dr Radha Vazquez updated. Post Dialysis: Tx completed at 1730,   Tolerated well ,1.5 L removed. De-accessed per protocol. Dialysis catheter  locked accordingly with Heparin 1.6ml in arterial port, and 1.7 ml in venous port ,catheter dressing clean, dry and intact.   Post Dialysis report given to unit Nurse Esequiel Alfaro

## 2023-12-22 NOTE — ED NOTES
Pt had liquid BM. Linens changed. Barrier cream applied to buttocks. Pt has open wounds to sacrum and buttocks.

## 2023-12-23 LAB
HCT VFR BLD AUTO: 28.1 % (ref 35–45)
HGB BLD-MCNC: 9 G/DL (ref 12–16)

## 2023-12-23 PROCEDURE — P9016 RBC LEUKOCYTES REDUCED: HCPCS

## 2023-12-23 PROCEDURE — 36415 COLL VENOUS BLD VENIPUNCTURE: CPT

## 2023-12-23 PROCEDURE — 2580000003 HC RX 258: Performed by: INTERNAL MEDICINE

## 2023-12-23 PROCEDURE — 36430 TRANSFUSION BLD/BLD COMPNT: CPT

## 2023-12-23 PROCEDURE — 85014 HEMATOCRIT: CPT

## 2023-12-23 PROCEDURE — 1100000000 HC RM PRIVATE

## 2023-12-23 PROCEDURE — 85018 HEMOGLOBIN: CPT

## 2023-12-23 PROCEDURE — 99232 SBSQ HOSP IP/OBS MODERATE 35: CPT | Performed by: INTERNAL MEDICINE

## 2023-12-23 PROCEDURE — 6370000000 HC RX 637 (ALT 250 FOR IP): Performed by: INTERNAL MEDICINE

## 2023-12-23 RX ADMIN — PANTOPRAZOLE SODIUM 40 MG: 40 TABLET, DELAYED RELEASE ORAL at 06:11

## 2023-12-23 RX ADMIN — SEVELAMER CARBONATE 800 MG: 800 TABLET, FILM COATED ORAL at 14:00

## 2023-12-23 RX ADMIN — ATORVASTATIN CALCIUM 40 MG: 40 TABLET, FILM COATED ORAL at 09:30

## 2023-12-23 RX ADMIN — SEVELAMER CARBONATE 800 MG: 800 TABLET, FILM COATED ORAL at 09:30

## 2023-12-23 RX ADMIN — CALCITRIOL CAPSULES 0.25 MCG 0.25 MCG: 0.25 CAPSULE ORAL at 09:30

## 2023-12-23 RX ADMIN — SEVELAMER CARBONATE 800 MG: 800 TABLET, FILM COATED ORAL at 20:33

## 2023-12-23 RX ADMIN — SODIUM CHLORIDE, PRESERVATIVE FREE 10 ML: 5 INJECTION INTRAVENOUS at 20:34

## 2023-12-23 RX ADMIN — ISOSORBIDE DINITRATE 20 MG: 10 TABLET ORAL at 09:30

## 2023-12-23 RX ADMIN — ISOSORBIDE DINITRATE 20 MG: 10 TABLET ORAL at 20:33

## 2023-12-23 RX ADMIN — LORAZEPAM 1 MG: 1 TABLET ORAL at 09:30

## 2023-12-23 RX ADMIN — TRAZODONE HYDROCHLORIDE 50 MG: 50 TABLET ORAL at 20:33

## 2023-12-23 RX ADMIN — PANTOPRAZOLE SODIUM 40 MG: 40 TABLET, DELAYED RELEASE ORAL at 16:00

## 2023-12-23 NOTE — PROGRESS NOTES
Blood transfusion started    Skin check completed with second nurse. Excoriation to anus area, red open areas about fist size around anus and lower.

## 2023-12-23 NOTE — PROGRESS NOTES
[address] Hospitalist Group  Progress Note    Patient: Manisha Storm Age: 52 y.o. : 1970 MR#: [id number] SSN: [id number]  Date/Time: 2023     C/C: weakness       Subjective:   HPI : Patient with end-stage renal disease admitted with severe anemia symptomatic s/p multiple PRBC transfusion also received hemodialysis. Today she is more alert awake oriented but still sleepy. Review of Systems:  positive responses in bold type   Constitutional: Negative for fever, chills, diaphoresis and unexpected weight change. HENT: Negative for ear pain, congestion, sore throat, rhinorrhea, drooling, trouble swallowing, neck pain and tinnitus. Eyes: Negative for photophobia, pain, redness and visual disturbance. Respiratory: negative for shortness of breath, cough, choking, chest tightness, wheezing or stridor. Cardiovascular: Negative for chest pain, palpitations and leg swelling. Gastrointestinal: Negative for nausea, vomiting, abdominal pain, diarrhea, constipation, blood in stool, abdominal distention and anal bleeding. Genitourinary: Negative for dysuria, urgency, frequency, hematuria, flank pain and difficulty urinating. Musculoskeletal: Negative for back pain and arthralgias. Skin: Negative for color change, rash and wound. Neurological: Negative for dizziness, seizures, syncope, speech difficulty, light-headedness or headaches. Hematological: Does not bruise/bleed easily.    Psychiatric/Behavioral: Negative for suicidal ideas, hallucinations, behavioral problems, self-injury or agitation     Assessment/Plan:     CAD, PCI LAD in 2023     Heart failure with reduced EF, in the 30-35% range per last [facility] )       s/p TAVR  Hypertension, continue HD, hypotensive at times  Anemia,chronic   ESRD on HD- Dr Em Sargent  - S/p PRBC Transfusion   - Monitor H/H   - If stable dc home in AM   - further work up out patient - Patient refuses to stay beyond

## 2023-12-24 LAB
ABO + RH BLD: NORMAL
ANION GAP SERPL CALC-SCNC: 7 MMOL/L (ref 3–18)
BASOPHILS # BLD: 0.1 K/UL (ref 0–0.1)
BASOPHILS NFR BLD: 1 % (ref 0–2)
BLD PROD TYP BPU: NORMAL
BLOOD BANK DISPENSE STATUS: NORMAL
BLOOD GROUP ANTIBODIES SERPL: NORMAL
BPU ID: NORMAL
BUN SERPL-MCNC: 32 MG/DL (ref 7–18)
BUN/CREAT SERPL: 8 (ref 12–20)
CALCIUM SERPL-MCNC: 8 MG/DL (ref 8.5–10.1)
CALLED TO: NORMAL
CHLORIDE SERPL-SCNC: 95 MMOL/L (ref 100–111)
CO2 SERPL-SCNC: 26 MMOL/L (ref 21–32)
CREAT SERPL-MCNC: 4.02 MG/DL (ref 0.6–1.3)
CROSSMATCH RESULT: NORMAL
DIFFERENTIAL METHOD BLD: ABNORMAL
EOSINOPHIL # BLD: 0.1 K/UL (ref 0–0.4)
EOSINOPHIL NFR BLD: 1 % (ref 0–5)
ERYTHROCYTE [DISTWIDTH] IN BLOOD BY AUTOMATED COUNT: 16.7 % (ref 11.6–14.5)
GLUCOSE SERPL-MCNC: 99 MG/DL (ref 74–99)
HCT VFR BLD AUTO: 25.6 % (ref 35–45)
HGB BLD-MCNC: 8.1 G/DL (ref 12–16)
IMM GRANULOCYTES # BLD AUTO: 0.1 K/UL (ref 0–0.04)
IMM GRANULOCYTES NFR BLD AUTO: 1 % (ref 0–0.5)
LYMPHOCYTES # BLD: 0.9 K/UL (ref 0.9–3.6)
LYMPHOCYTES NFR BLD: 10 % (ref 21–52)
MCH RBC QN AUTO: 27.9 PG (ref 24–34)
MCHC RBC AUTO-ENTMCNC: 31.6 G/DL (ref 31–37)
MCV RBC AUTO: 88.3 FL (ref 78–100)
MONOCYTES # BLD: 0.8 K/UL (ref 0.05–1.2)
MONOCYTES NFR BLD: 9 % (ref 3–10)
NEUTS SEG # BLD: 7.5 K/UL (ref 1.8–8)
NEUTS SEG NFR BLD: 79 % (ref 40–73)
NRBC # BLD: 0 K/UL (ref 0–0.01)
NRBC BLD-RTO: 0 PER 100 WBC
PLATELET # BLD AUTO: 59 K/UL (ref 135–420)
PMV BLD AUTO: 9.8 FL (ref 9.2–11.8)
POTASSIUM SERPL-SCNC: 3.9 MMOL/L (ref 3.5–5.5)
RBC # BLD AUTO: 2.9 M/UL (ref 4.2–5.3)
SODIUM SERPL-SCNC: 128 MMOL/L (ref 136–145)
SPECIMEN EXP DATE BLD: NORMAL
UNIT DIVISION: 0
WBC # BLD AUTO: 9.5 K/UL (ref 4.6–13.2)

## 2023-12-24 PROCEDURE — 97161 PT EVAL LOW COMPLEX 20 MIN: CPT

## 2023-12-24 PROCEDURE — 36415 COLL VENOUS BLD VENIPUNCTURE: CPT

## 2023-12-24 PROCEDURE — 94761 N-INVAS EAR/PLS OXIMETRY MLT: CPT

## 2023-12-24 PROCEDURE — 85025 COMPLETE CBC W/AUTO DIFF WBC: CPT

## 2023-12-24 PROCEDURE — 6370000000 HC RX 637 (ALT 250 FOR IP): Performed by: HOSPITALIST

## 2023-12-24 PROCEDURE — 80048 BASIC METABOLIC PNL TOTAL CA: CPT

## 2023-12-24 PROCEDURE — 97530 THERAPEUTIC ACTIVITIES: CPT

## 2023-12-24 PROCEDURE — 6370000000 HC RX 637 (ALT 250 FOR IP): Performed by: INTERNAL MEDICINE

## 2023-12-24 PROCEDURE — 1100000000 HC RM PRIVATE

## 2023-12-24 PROCEDURE — 2580000003 HC RX 258: Performed by: INTERNAL MEDICINE

## 2023-12-24 PROCEDURE — 99239 HOSP IP/OBS DSCHRG MGMT >30: CPT | Performed by: INTERNAL MEDICINE

## 2023-12-24 RX ORDER — GABAPENTIN 300 MG/1
300 CAPSULE ORAL ONCE
Status: COMPLETED | OUTPATIENT
Start: 2023-12-24 | End: 2023-12-24

## 2023-12-24 RX ORDER — GABAPENTIN 300 MG/1
300 CAPSULE ORAL 2 TIMES DAILY
Qty: 90 CAPSULE | Refills: 0 | Status: SHIPPED | OUTPATIENT
Start: 2023-12-24 | End: 2023-12-25

## 2023-12-24 RX ADMIN — PANTOPRAZOLE SODIUM 40 MG: 40 TABLET, DELAYED RELEASE ORAL at 16:00

## 2023-12-24 RX ADMIN — SEVELAMER CARBONATE 800 MG: 800 TABLET, FILM COATED ORAL at 14:00

## 2023-12-24 RX ADMIN — CALCITRIOL CAPSULES 0.25 MCG 0.25 MCG: 0.25 CAPSULE ORAL at 10:07

## 2023-12-24 RX ADMIN — SEVELAMER CARBONATE 800 MG: 800 TABLET, FILM COATED ORAL at 10:06

## 2023-12-24 RX ADMIN — LORAZEPAM 1 MG: 1 TABLET ORAL at 10:07

## 2023-12-24 RX ADMIN — SEVELAMER CARBONATE 800 MG: 800 TABLET, FILM COATED ORAL at 22:00

## 2023-12-24 RX ADMIN — ATORVASTATIN CALCIUM 40 MG: 40 TABLET, FILM COATED ORAL at 10:07

## 2023-12-24 RX ADMIN — GABAPENTIN 300 MG: 300 CAPSULE ORAL at 00:40

## 2023-12-24 RX ADMIN — PANTOPRAZOLE SODIUM 40 MG: 40 TABLET, DELAYED RELEASE ORAL at 06:13

## 2023-12-24 RX ADMIN — SODIUM CHLORIDE, PRESERVATIVE FREE 10 ML: 5 INJECTION INTRAVENOUS at 10:08

## 2023-12-24 RX ADMIN — TRAZODONE HYDROCHLORIDE 50 MG: 50 TABLET ORAL at 22:00

## 2023-12-24 RX ADMIN — ISOSORBIDE DINITRATE 20 MG: 10 TABLET ORAL at 10:07

## 2023-12-24 RX ADMIN — ISOSORBIDE DINITRATE 20 MG: 10 TABLET ORAL at 22:00

## 2023-12-24 NOTE — CARE COORDINATION
CM spoke with patient, patient said she wants to return to [address], said she was there for SNF, but patient only has Medicaid. Patient said she does not know the name of Dialysis Center but said that [facility] off of [facility] near [place], goes M/W/F and Chair time is 6 am, and that Medicaid transport patient to and from Dialysis. CM updated patient, that CM is waiting for [address] to call CM back to see if patient can return to facility today or not.              Janice Molina, RN  Case Management [phone]

## 2023-12-24 NOTE — PLAN OF CARE
Problem: Physical Therapy - Adult  Goal: By Discharge: Performs mobility at highest level of function for planned discharge setting.  See evaluation for individualized goals.  Description: Physical Therapy Goals:  Initiated 12/24/2023 to be met within 7-10 days.    1.  Patient will move from supine to sit and sit to supine  in bed with modified independence.    2.  Patient will transfer from bed to chair and chair to bed with modified independence using the least restrictive device.  3.  Patient will perform sit to stand with modified independence.  4.  Patient will ambulate with modified independence for 100 feet with the least restrictive device.     PLOF: Pt reports she has recently been at rehab for 2 weeks, plans to return home with her  once she is done therapy. Pt has assist at rehab currently, reports she was mod (I) with RW prior to admission.  can assist minimally at home.      Outcome: Progressing     PHYSICAL THERAPY EVALUATION    Patient: Latha Oseguera (53 y.o. female)  Date: 12/24/2023  Primary Diagnosis: Anemia [D64.9]  Anemia due to chronic kidney disease, on chronic dialysis (HCC) [N18.6, D63.1, Z99.2]       Precautions: General Precautions, Fall Risk,  ,  ,  ,  ,  ,  ,      ASSESSMENT :  Pt presents with impaired functional mobility, decreased activity tolerance, and generalized weakness 2/2 admission for anemia. Pt required min A and minimally increased time to complete mobility this session, able to tolerate standing EOB x 1 minute prior to scooting at EOB to return supine. Pt encouraged to continue getting OOB multiple times a day with nursing to improve function and decrease risk of deconditioning, verbalized understanding.    DEFICITS/IMPAIRMENTS:    , Body Structures, Functions, Activity Limitations Requiring Skilled Therapeutic Intervention: Decreased functional mobility ;Decreased endurance;Decreased strength    Patient will benefit from skilled intervention to address the  Repositioning  Response to intervention: Nurse notified     Activity Tolerance:   Activity Tolerance: Patient tolerated treatment well  Please refer to the flowsheet for vital signs taken during this treatment.     After treatment:   []         Patient left in no apparent distress sitting up in chair  [x]         Patient left in no apparent distress in bed  [x]         Call bell left within reach  [x]         Nursing notified  []         Caregiver present  []         Bed alarm activated  []         SCDs applied    COMMUNICATION/EDUCATION:   Patient Education  Education Given To: Patient  Education Provided: Role of Therapy;Plan of Care;Energy Conservation  Education Method: Demonstration;Verbal  Barriers to Learning: None  Education Outcome: Verbalized understanding;Demonstrated understanding    Thank you for this referral.  Chiquita Luong, PT  Minutes: 23      Eval Complexity: Decision Making: Low Complexity

## 2023-12-24 NOTE — CARE COORDINATION
Memo Chaudhary with [facility]	[place], [address] called CM back, confirmed patient is LTC, and that patient can come back for LTC tomorrow after Dialysis, to stay on M/W/F schedule. Memo Chaudhary said she thinks patient goes to [facility]. Lata Subramanian and updated that LTC facility can take patient back tomorrow after Dialysis here at hospital.   asked Dr. Brannon Subramanian to take out the discharge order for today.            Jose Nix, RN  Case Management [phone]

## 2023-12-24 NOTE — PROGRESS NOTES
Comprehensive Nutrition Assessment    Type and Reason for Visit:  Initial, Positive Nutrition Screen    Nutrition Recommendations/Plan:   Continue current diet. Plan to add oral nutrition supplement to optimize nutrition intake opportunity: Nepro with Carb Steady (each provides 425 kcal, 19g protein) once daily  Monitor PO intake/tolerance of meals/supplements, weight, labs, and POC while admitted. Malnutrition Assessment:  Malnutrition Status:  Insufficient data (12/24/23 1419)    Context:  Acute Illness       Nutrition History and Allergies: PMHx: ESRD on HD, HLD, CHF, COPD, DM, HTN. Wt hx: no recent wt hx available in chart. NKFA. Nutrition Assessment:    Pt admitted for management of anemia, s/p transfusion. MST noted- (34 or more lbs) wt loss and NO decreased appetite. Noted that wt loss was over 1 year. Attempted to visit pt x3- MD in room x2, physical therapy in room x1. Dialyzed 12/22- 1.5 L removed. Per chart, pt to d/c today. No recent wt hx available in chart to assess wt loss. No PO data in chart. Plan to add Nepro once daily to promote PO intake while pt is admitted. Will attempt to assess nutrition hx and wt hx at follow-up. Nutrition Related Findings:    Last BM: not documented. Edema: none. Labs: Na 128 L, K 3.9 WNL, Cl 95 L, BUN/Cr 32/4.02 H (trend down), GFR 13 L, Ca 8 L. No hemoglobin A1C available. Pertinent meds: Protonix, renvela. Wound Type: None       Current Nutrition Intake & Therapies:    Average Meal Intake: Unable to assess  Average Supplements Intake: None Ordered  ADULT DIET; Regular; Low Sodium (2 gm)    Anthropometric Measures:  Height: 162.6 cm (5' 4\")  Ideal Body Weight (IBW): 120 lbs (55 kg)    Admission Body Weight: 59 kg (130 lb) (stated)  Current Body Weight: 77.6 kg (171 lb), 142.5 % IBW.  Weight Source: Not Specified  Current BMI (kg/m2): 29.3  Usual Body Weight:  (no wt hx available in chart)     Weight Adjustment For: No Adjustment  BMI Categories:

## 2023-12-24 NOTE — DISCHARGE SUMMARY
[facility] Hospitalist Group    Discharge Summary    Patient: Latha Oseguera MRN: [id number]  CSN: [id number]    YOB: 1970  Age: 53 y.o.  Sex: female    DOA: 12/22/2023 LOS:  LOS: 4 days   Discharge Date:          Discharge Diagnoses:   CAD, PCI LAD in 2/2023     Heart failure with reduced EF, in the 30-35% range per last echo( [facility] )       s/p TAVR  Hypertension, continue HD, hypotensive at times  Anemia,chronic   ESRD on HD- Dr Carrera   7  Renal cell ca - Further management per PCP - out patient - d/w them     Discharge Condition: Good    Discharge To: Home    Consults: Nephrology            HPI: per Admitting MD   Latha Oseguera is a 53 y.o. female with PMH of ESRD on HD , HLD , Non Specific neuropathy on gabapentin , now being admitted for severe symptomatic anemia .patient is from SNF .      Patient has 2 charts , epic charts reviewed , d/w ED MD      Patient had out patient labs done and she was found to have severe anemia and hence sent to ed , here patient does have fatigue . She is HD patient . According to Renal patient needs PRBC transfusion and then they will do HD . No h/o any external bleed .   We will transfuse , in past has extensive work up for anemia , I could not find GI notes from past . In any case will transfuse and monitor H/h for stability and then if stable dc to SNF and have out patient follow up with her established sub speciality            Overview : From her other chart         CAD, PCI LAD in 2/2023     Heart failure with reduced EF, in the 30-35% range per last echo( [facility] )       s/p TAVR  Hypertension, continue HD, hypotensive at times  Anemia,chronic   ESRD on HD- Dr Carrera      Hospital Course:     Admitted with symptomatic anemia - patient had extensive work up prior for anemia , not clear etiology .     Patient will continue out patient follow up with PCP and subspeciality     Check on h/H - and transfusion as needed .     Continue HD per renal  Patient insists on going home to day . H/H so far stable , s/p Multiple blood transfusions , since then H/H stable no major drop will dc patient with out patient close follow up O POSITIVE needs out patient work up for anemia . She insists on dc . D/W PCP Goldie Ferrell PCP     D./w patient and she understood well and she agreed     Physical Exam:  General :  HEENT :NAD   RS:Clear   CVS:Reg   Abd:NAD   Neuro :Intact   EXT: No edema     Significant Diagnostic Studies:     Recent Results (from the past 24 hour(s))   CBC with Auto Differential    Collection Time: 12/26/23  1:10 AM   Result Value Ref Range    WBC 7.2 4.6 - 13.2 K/uL    RBC 2.87 (L) 4.20 - 5.30 M/uL    Hemoglobin 8.1 (L) 12.0 - 16.0 g/dL    Hematocrit 25.7 (L) 35.0 - 45.0 %    MCV 89.5 78.0 - 100.0 FL    MCH 28.2 24.0 - 34.0 PG    MCHC 31.5 31.0 - 37.0 g/dL    RDW 16.5 (H) 11.6 - 14.5 %    Platelets 61 (L) 946 - 420 K/uL    MPV 10.3 9.2 - 11.8 FL    Nucleated RBCs 0.0 0  WBC    nRBC 0.00 0.00 - 0.01 K/uL    Neutrophils % 77 (H) 40 - 73 %    Lymphocytes % 9 (L) 21 - 52 %    Monocytes % 11 (H) 3 - 10 %    Eosinophils % 1 0 - 5 %    Basophils % 0 0 - 2 %    Immature Granulocytes 1 (H) 0.0 - 0.5 %    Neutrophils Absolute 5.5 1.8 - 8.0 K/UL    Lymphocytes Absolute 0.7 (L) 0.9 - 3.6 K/UL    Monocytes Absolute 0.8 0.05 - 1.2 K/UL    Eosinophils Absolute 0.1 0.0 - 0.4 K/UL    Basophils Absolute 0.0 0.0 - 0.1 K/UL    Absolute Immature Granulocyte 0.1 (H) 0.00 - 0.04 K/UL    Differential Type AUTOMATED         No results found. Procedures: HD     Discharge Medications:        Medication List        CONTINUE taking these medications      atorvastatin 40 MG tablet  Commonly known as: LIPITOR     calcitRIOL 0.25 MCG capsule  Commonly known as: ROCALTROL     furosemide 40 MG tablet  Commonly known as: LASIX     gabapentin 300 MG capsule  Commonly known as: NEURONTIN  Take 1 capsule by mouth in the morning and at bedtime for 180 days.  Max Daily Amount:

## 2023-12-24 NOTE — CARE COORDINATION
Nephrology per note today:    Continue dialysis mwf. Patient's op unit is on the holiday schedule and is closed tomorrow. The easiest way is to dialyze patient here tomorrow and then can be d/marj back to the snf. CM will ask Shweta Daniel with [facility], [address] if she is able to call CM back today to see if they can take patient today or tomorrow for SNF.              Yao Cobos, RN  Case Management [phone]

## 2023-12-24 NOTE — CARE COORDINATION
Discharge orders noted. Chart reviewed. Pt from [facility] [employer]. Called Irineo Keys for [address] and left a message for her to call us back.             Juan West, BLAKEN RN  Care Management

## 2023-12-25 LAB
ANION GAP SERPL CALC-SCNC: 7 MMOL/L (ref 3–18)
BUN SERPL-MCNC: 43 MG/DL (ref 7–18)
BUN/CREAT SERPL: 9 (ref 12–20)
CALCIUM SERPL-MCNC: 8 MG/DL (ref 8.5–10.1)
CHLORIDE SERPL-SCNC: 93 MMOL/L (ref 100–111)
CO2 SERPL-SCNC: 27 MMOL/L (ref 21–32)
CREAT SERPL-MCNC: 4.61 MG/DL (ref 0.6–1.3)
GLUCOSE SERPL-MCNC: 98 MG/DL (ref 74–99)
POTASSIUM SERPL-SCNC: 3.8 MMOL/L (ref 3.5–5.5)
SODIUM SERPL-SCNC: 127 MMOL/L (ref 136–145)

## 2023-12-25 PROCEDURE — 1100000000 HC RM PRIVATE

## 2023-12-25 PROCEDURE — 6360000002 HC RX W HCPCS: Performed by: INTERNAL MEDICINE

## 2023-12-25 PROCEDURE — 6370000000 HC RX 637 (ALT 250 FOR IP): Performed by: STUDENT IN AN ORGANIZED HEALTH CARE EDUCATION/TRAINING PROGRAM

## 2023-12-25 PROCEDURE — 80048 BASIC METABOLIC PNL TOTAL CA: CPT

## 2023-12-25 PROCEDURE — 6370000000 HC RX 637 (ALT 250 FOR IP): Performed by: INTERNAL MEDICINE

## 2023-12-25 PROCEDURE — 2580000003 HC RX 258: Performed by: INTERNAL MEDICINE

## 2023-12-25 PROCEDURE — 90935 HEMODIALYSIS ONE EVALUATION: CPT

## 2023-12-25 PROCEDURE — 36415 COLL VENOUS BLD VENIPUNCTURE: CPT

## 2023-12-25 RX ORDER — LORAZEPAM 1 MG/1
1 TABLET ORAL DAILY
Qty: 3 TABLET | Refills: 0 | Status: SHIPPED | OUTPATIENT
Start: 2023-12-25 | End: 2023-12-28

## 2023-12-25 RX ORDER — GABAPENTIN 300 MG/1
300 CAPSULE ORAL 2 TIMES DAILY
Qty: 90 CAPSULE | Refills: 0 | Status: SHIPPED | OUTPATIENT
Start: 2023-12-25 | End: 2023-12-26 | Stop reason: HOSPADM

## 2023-12-25 RX ADMIN — PANTOPRAZOLE SODIUM 40 MG: 40 TABLET, DELAYED RELEASE ORAL at 06:45

## 2023-12-25 RX ADMIN — ISOSORBIDE DINITRATE 20 MG: 10 TABLET ORAL at 21:26

## 2023-12-25 RX ADMIN — CALCITRIOL CAPSULES 0.25 MCG 0.25 MCG: 0.25 CAPSULE ORAL at 18:31

## 2023-12-25 RX ADMIN — TRAZODONE HYDROCHLORIDE 50 MG: 50 TABLET ORAL at 21:27

## 2023-12-25 RX ADMIN — GABAPENTIN 300 MG: 300 CAPSULE ORAL at 18:31

## 2023-12-25 RX ADMIN — SODIUM CHLORIDE, PRESERVATIVE FREE 10 ML: 5 INJECTION INTRAVENOUS at 21:27

## 2023-12-25 RX ADMIN — ISOSORBIDE DINITRATE 20 MG: 10 TABLET ORAL at 18:30

## 2023-12-25 RX ADMIN — SODIUM CHLORIDE, PRESERVATIVE FREE 10 ML: 5 INJECTION INTRAVENOUS at 01:28

## 2023-12-25 RX ADMIN — SEVELAMER CARBONATE 800 MG: 800 TABLET, FILM COATED ORAL at 18:31

## 2023-12-25 RX ADMIN — PANTOPRAZOLE SODIUM 40 MG: 40 TABLET, DELAYED RELEASE ORAL at 18:31

## 2023-12-25 RX ADMIN — ATORVASTATIN CALCIUM 40 MG: 40 TABLET, FILM COATED ORAL at 18:31

## 2023-12-25 RX ADMIN — SEVELAMER CARBONATE 800 MG: 800 TABLET, FILM COATED ORAL at 21:27

## 2023-12-25 RX ADMIN — LORAZEPAM 1 MG: 1 TABLET ORAL at 18:31

## 2023-12-25 RX ADMIN — ONDANSETRON 4 MG: 2 INJECTION INTRAMUSCULAR; INTRAVENOUS at 01:27

## 2023-12-25 ASSESSMENT — PAIN SCALES - GENERAL: PAINLEVEL_OUTOF10: 0

## 2023-12-25 NOTE — PROGRESS NOTES
[facility] Hospitalist Group  Progress Note    Patient: Carlee Franco Age: 52 y.o. : 1970 MR#: [id number] SSN: [id number]  Date/Time: 2023     C/C: weakness       Subjective:   HPI : Patient with end-stage renal disease admitted with severe anemia symptomatic s/p multiple PRBC transfusion also received hemodialysis. Today she is more alert awake oriented but still sleepy. Review of Systems:  positive responses in bold type   Constitutional: Negative for fever, chills, diaphoresis and unexpected weight change. HENT: Negative for ear pain, congestion, sore throat, rhinorrhea, drooling, trouble swallowing, neck pain and tinnitus. Eyes: Negative for photophobia, pain, redness and visual disturbance. Respiratory: negative for shortness of breath, cough, choking, chest tightness, wheezing or stridor. Cardiovascular: Negative for chest pain, palpitations and leg swelling. Gastrointestinal: Negative for nausea, vomiting, abdominal pain, diarrhea, constipation, blood in stool, abdominal distention and anal bleeding. Genitourinary: Negative for dysuria, urgency, frequency, hematuria, flank pain and difficulty urinating. Musculoskeletal: Negative for back pain and arthralgias. Skin: Negative for color change, rash and wound. Neurological: Negative for dizziness, seizures, syncope, speech difficulty, light-headedness or headaches. Hematological: Does not bruise/bleed easily.    Psychiatric/Behavioral: Negative for suicidal ideas, hallucinations, behavioral problems, self-injury or agitation     Assessment/Plan:   ======================================================  APPARENTLY PATIENT HAS OTHER CHART WITH DIFFERENT MR# [id number]  =======================================================    CAD, PCI LAD in 2023     Heart failure with reduced EF, in the 30-35% range per last [facility] )       s/p TAVR  Hypertension, continue HD, hypotensive at times  Anemia,chronic   ESRD on HD- Dr Carrera     From clinical chart, medical record #[id number]    -History of renal cancer recent CAT scan at outpatient/[facility] was showing enlargement of right renal cell carcinoma.     PLAN  - S/p PRBC Transfusion -H&H is very gradually dropping initial plan was to discharge to the primary caregivers include subspecialty however due to dropping H&H will continue to monitor patient.  We need to get more information.  Reviewed patient's other chart as mentioned above.  Stool occult ordered if positive will order GI consultation.  - Monitor H/H   - HD and electrolyte management per Renal     ADD:     Patient's younger son in room with patient , he confirmed patient has renal cell ca and as far as he knows so far patient has not gone to any speciality , patient today is very combative as she wants to go , I do not have patient's PCP number and I can not do any further work up for anemia , this was d/w family in room with patient ans with other family members via telephone , it was decided to dc patient to [facility] care and have her PCP take over patient's care .     Working on dc to [facility] if possible during holiday . D/w  .     Per  patient will need auth for transport , and it will not happen till tomorrow .     Objective:       General:  Alert, cooperative, no acute distress   HEENT: No facial asymmetry, DM Roel, External ears - WNL    Cardiovascular: S1S2 - regular , No Murmur   Pulmonary: Equal expansion , No Use of accessory muscles , No Rales No Rhonchi    GI:  +BS in all four quadrants, soft, non-tender  Extremities:  No edema; 2+ dorsalis pedis pulses bilaterally  Neuro: Alert and oriented X 2.       DVT Prophylaxis:  []Lovenox  []Hep SQ  []SCDs  []Coumadin   []On Heparin gtt    [] Eliquis [] Xarelto     Vitals:         VS: BP (!) 125/56   Pulse 91   Temp 97.4 °F (36.3 °C)   Resp 22   Ht 1.626 m (5' 4\")   Wt 77.6 kg (171 lb)   SpO2 91%   BMI 29.35

## 2023-12-25 NOTE — CARE COORDINATION
JAZ attempted to obtain authorization for transportation through [facility] so that patient can discharge back to [facility]. [employer] transportation rep advised JAZ that due to the holiday, the transportation back to the nursing home would have had to be pre-arranged prior to [place], unless an emergency transport. JAZ notified nursing staff and MD that transport will not be in place until after the holiday, per insurance. JAZ also notified [facility] liaison that discharge is delayed until  after the holiday.     Mary Beth Hall, DANNY   Case Management

## 2023-12-25 NOTE — DIALYSIS
HD Care plan  Time: 3 Hours  Dialysate:  2 K   2.5  Ca  [place]  Net UF: 2.5L  Access: Aseptically care for Right Chest TDC. Hemodynamic stability: Maintain BP WNL    Pre Dialysis:  Pt received from Unit Nurse Anthony Jacobson pt on a bed ,A+O X 4, No s/s of distress noted  on RA . RT Chest [facility]  assessed no abnormalities noted, line patent with good flow. TDC accessed  per protocol without any difficulty    Intra Dialysis:  Time out / safety process performed per policy, Tx initiated at 0900. Right Chest TDC flowing with ease. For hemodynamic stability UF goal  set  at 2500 ml flowing with ease and as tolerate. Pt offered assistance with repositioning every 2 hours/prn    Vascular access visible  and line connections remained intact throughout entire duration of treatment. Vital signs checked every 15 mins, WNL    Post Dialysis: Tx completed at 1200,   Tolerated well ,2.5L removed. De-accessed per protocol. Dialysis catheter  locked accordingly with Saline locked in arterial and venous port ,catheter dressing clean, dry and intact. Post Dialysis report given to unit  Nurse Sánchez Escobar.

## 2023-12-26 VITALS
WEIGHT: 171 LBS | DIASTOLIC BLOOD PRESSURE: 72 MMHG | BODY MASS INDEX: 29.19 KG/M2 | OXYGEN SATURATION: 95 % | TEMPERATURE: 98.3 F | SYSTOLIC BLOOD PRESSURE: 131 MMHG | RESPIRATION RATE: 19 BRPM | HEART RATE: 92 BPM | HEIGHT: 64 IN

## 2023-12-26 LAB
BASOPHILS # BLD: 0 K/UL (ref 0–0.1)
BASOPHILS NFR BLD: 0 % (ref 0–2)
DIFFERENTIAL METHOD BLD: ABNORMAL
EOSINOPHIL # BLD: 0.1 K/UL (ref 0–0.4)
EOSINOPHIL NFR BLD: 1 % (ref 0–5)
ERYTHROCYTE [DISTWIDTH] IN BLOOD BY AUTOMATED COUNT: 16.5 % (ref 11.6–14.5)
HCT VFR BLD AUTO: 25.7 % (ref 35–45)
HGB BLD-MCNC: 8.1 G/DL (ref 12–16)
IMM GRANULOCYTES # BLD AUTO: 0.1 K/UL (ref 0–0.04)
IMM GRANULOCYTES NFR BLD AUTO: 1 % (ref 0–0.5)
LYMPHOCYTES # BLD: 0.7 K/UL (ref 0.9–3.6)
LYMPHOCYTES NFR BLD: 9 % (ref 21–52)
MCH RBC QN AUTO: 28.2 PG (ref 24–34)
MCHC RBC AUTO-ENTMCNC: 31.5 G/DL (ref 31–37)
MCV RBC AUTO: 89.5 FL (ref 78–100)
MONOCYTES # BLD: 0.8 K/UL (ref 0.05–1.2)
MONOCYTES NFR BLD: 11 % (ref 3–10)
NEUTS SEG # BLD: 5.5 K/UL (ref 1.8–8)
NEUTS SEG NFR BLD: 77 % (ref 40–73)
NRBC # BLD: 0 K/UL (ref 0–0.01)
NRBC BLD-RTO: 0 PER 100 WBC
PLATELET # BLD AUTO: 61 K/UL (ref 135–420)
PMV BLD AUTO: 10.3 FL (ref 9.2–11.8)
RBC # BLD AUTO: 2.87 M/UL (ref 4.2–5.3)
WBC # BLD AUTO: 7.2 K/UL (ref 4.6–13.2)

## 2023-12-26 PROCEDURE — 99232 SBSQ HOSP IP/OBS MODERATE 35: CPT | Performed by: INTERNAL MEDICINE

## 2023-12-26 PROCEDURE — 36415 COLL VENOUS BLD VENIPUNCTURE: CPT

## 2023-12-26 PROCEDURE — 2580000003 HC RX 258: Performed by: INTERNAL MEDICINE

## 2023-12-26 PROCEDURE — 94761 N-INVAS EAR/PLS OXIMETRY MLT: CPT

## 2023-12-26 PROCEDURE — 85025 COMPLETE CBC W/AUTO DIFF WBC: CPT

## 2023-12-26 PROCEDURE — 6370000000 HC RX 637 (ALT 250 FOR IP): Performed by: INTERNAL MEDICINE

## 2023-12-26 RX ORDER — GABAPENTIN 300 MG/1
300 CAPSULE ORAL
Qty: 6 CAPSULE | Refills: 0 | Status: SHIPPED | OUTPATIENT
Start: 2023-12-27 | End: 2024-01-11

## 2023-12-26 RX ADMIN — PANTOPRAZOLE SODIUM 40 MG: 40 TABLET, DELAYED RELEASE ORAL at 09:38

## 2023-12-26 RX ADMIN — ATORVASTATIN CALCIUM 40 MG: 40 TABLET, FILM COATED ORAL at 09:39

## 2023-12-26 RX ADMIN — CALCITRIOL CAPSULES 0.25 MCG 0.25 MCG: 0.25 CAPSULE ORAL at 09:38

## 2023-12-26 RX ADMIN — SODIUM CHLORIDE, PRESERVATIVE FREE 10 ML: 5 INJECTION INTRAVENOUS at 10:00

## 2023-12-26 RX ADMIN — LORAZEPAM 1 MG: 1 TABLET ORAL at 09:39

## 2023-12-26 RX ADMIN — ISOSORBIDE DINITRATE 20 MG: 10 TABLET ORAL at 09:38

## 2023-12-26 RX ADMIN — SEVELAMER CARBONATE 800 MG: 800 TABLET, FILM COATED ORAL at 09:38

## 2023-12-26 ASSESSMENT — PAIN SCALES - GENERAL: PAINLEVEL_OUTOF10: 0

## 2023-12-26 NOTE — CARE COORDINATION
Call made to [facility] transportation [phone], spoke with Cheryl Huerta, dispatch will call the nurses station with ARLEEN. Trip # [id number].

## 2023-12-26 NOTE — PROGRESS NOTES
RN called [facility] to call report for this patient, Ms. Nadeem Lowe RN received the report. She was given a chance to ask some questions with regards to this patient and were answered. ETA given and concurred.

## 2023-12-26 NOTE — PROGRESS NOTES
[facility] Hospitalist Group  Progress Note    Patient: José Jones Age: 52 y.o. : 1970 MR#: [id number] SSN: [id number]  Date/Time: 2023     C/C: weakness       Subjective:   HPI : Patient with end-stage renal disease admitted with severe anemia symptomatic s/p multiple PRBC transfusion also received hemodialysis. Today she is more alert awake oriented but still sleepy. Review of Systems:  positive responses in bold type   Constitutional: Negative for fever, chills, diaphoresis and unexpected weight change. HENT: Negative for ear pain, congestion, sore throat, rhinorrhea, drooling, trouble swallowing, neck pain and tinnitus. Eyes: Negative for photophobia, pain, redness and visual disturbance. Respiratory: negative for shortness of breath, cough, choking, chest tightness, wheezing or stridor. Cardiovascular: Negative for chest pain, palpitations and leg swelling. Gastrointestinal: Negative for nausea, vomiting, abdominal pain, diarrhea, constipation, blood in stool, abdominal distention and anal bleeding. Genitourinary: Negative for dysuria, urgency, frequency, hematuria, flank pain and difficulty urinating. Musculoskeletal: Negative for back pain and arthralgias. Skin: Negative for color change, rash and wound. Neurological: Negative for dizziness, seizures, syncope, speech difficulty, light-headedness or headaches. Hematological: Does not bruise/bleed easily.    Psychiatric/Behavioral: Negative for suicidal ideas, hallucinations, behavioral problems, self-injury or agitation     Assessment/Plan:   ======================================================  APPARENTLY PATIENT HAS OTHER CHART WITH DIFFERENT MR# [id number]  =======================================================    CAD, PCI LAD in 2023     Heart failure with reduced EF, in the 30-35% range per last [facility] )       s/p TAVR  Hypertension, continue HD, hypotensive at

## 2023-12-26 NOTE — PROGRESS NOTES
Discharge patient in stable condition, discharge instructions given to the medical transporter. IV line removed aseptically.

## 2023-12-26 NOTE — CARE COORDINATION
Crew came to  patient and left, Call made to [facility] transportation [phone], spoke with Eduardo Bautista, crew is on their way back to  patient. Trip # [id number].

## 2023-12-26 NOTE — PLAN OF CARE
Problem: Safety - Adult  Goal: Free from fall injury  Outcome: Progressing     Problem: Discharge Planning  Goal: Discharge to home or other facility with appropriate resources  Outcome: Progressing     Problem: Pain  Goal: Verbalizes/displays adequate comfort level or baseline comfort level  Outcome: Progressing     Problem: ABCDS Injury Assessment  Goal: Absence of physical injury  Outcome: Progressing     Problem: Nutrition Deficit:  Goal: Optimize nutritional status  Outcome: Progressing     Problem: Chronic Conditions and Co-morbidities  Goal: Patient's chronic conditions and co-morbidity symptoms are monitored and maintained or improved  Outcome: Progressing

## 2023-12-26 NOTE — PROGRESS NOTES
Call placed to patient's PCP - Radha Frye NP      D/w covering MD     - Patient is well established with them   - She will be seen as post hospitalization follow up   - they are aware of patient 's anemia and renal cell ca   - patient's Hb now stable and can have out patient work up for her Anemia , ( patient insisting on going   out of hospital today )   - DC  plan as above d/w with On call PCP - and they agreed .

## 2023-12-26 NOTE — PLAN OF CARE
Problem: Safety - Adult  Goal: Free from fall injury  12/26/2023 1627 by Kelvin Merchant RN  Outcome: Completed  12/26/2023 1345 by Kelvin Merchant RN  Outcome: Progressing     Problem: Discharge Planning  Goal: Discharge to home or other facility with appropriate resources  12/26/2023 1627 by Kelvin Merchant RN  Outcome: Completed  12/26/2023 1345 by Kelvin Merchant RN  Outcome: Progressing     Problem: Pain  Goal: Verbalizes/displays adequate comfort level or baseline comfort level  12/26/2023 1627 by Kelvin Merchant RN  Outcome: Completed  12/26/2023 1345 by Kelvin Merchant RN  Outcome: Progressing     Problem: ABCDS Injury Assessment  Goal: Absence of physical injury  12/26/2023 1627 by Kelvin Merchant RN  Outcome: Completed  12/26/2023 1345 by Kelvin Merchant RN  Outcome: Progressing     Problem: Nutrition Deficit:  Goal: Optimize nutritional status  12/26/2023 1627 by Kelvin Merchant RN  Outcome: Completed  12/26/2023 1345 by Kelvin Merchant RN  Outcome: Progressing     Problem: Chronic Conditions and Co-morbidities  Goal: Patient's chronic conditions and co-morbidity symptoms are monitored and maintained or improved  12/26/2023 1627 by Kelvin Merchant RN  Outcome: Completed  12/26/2023 1345 by Kelvin Merchant RN  Outcome: Progressing

## 2024-01-03 ENCOUNTER — APPOINTMENT (OUTPATIENT)
Facility: HOSPITAL | Age: 54
DRG: 720 | End: 2024-01-03
Payer: MEDICAID

## 2024-01-03 ENCOUNTER — HOSPITAL ENCOUNTER (INPATIENT)
Facility: HOSPITAL | Age: 54
LOS: 7 days | Discharge: NURSING, REHAB OR LONG-TERM CARE | DRG: 720 | End: 2024-01-10
Attending: EMERGENCY MEDICINE | Admitting: HOSPITALIST
Payer: MEDICAID

## 2024-01-03 DIAGNOSIS — N39.0 SEPSIS DUE TO URINARY TRACT INFECTION (HCC): ICD-10-CM

## 2024-01-03 DIAGNOSIS — G93.40 ENCEPHALOPATHY ACUTE: ICD-10-CM

## 2024-01-03 DIAGNOSIS — I50.20 HFREF (HEART FAILURE WITH REDUCED EJECTION FRACTION) (HCC): Primary | ICD-10-CM

## 2024-01-03 DIAGNOSIS — N18.6 ESRD (END STAGE RENAL DISEASE) (HCC): ICD-10-CM

## 2024-01-03 DIAGNOSIS — A41.9 SEPSIS DUE TO URINARY TRACT INFECTION (HCC): ICD-10-CM

## 2024-01-03 PROBLEM — N28.89 RENAL MASS: Status: ACTIVE | Noted: 2024-01-03

## 2024-01-03 PROBLEM — G93.41 ACUTE METABOLIC ENCEPHALOPATHY: Status: ACTIVE | Noted: 2024-01-03

## 2024-01-03 PROBLEM — J18.9 HEALTHCARE-ASSOCIATED PNEUMONIA: Status: ACTIVE | Noted: 2024-01-03

## 2024-01-03 PROBLEM — R65.20 SEVERE SEPSIS (HCC): Status: ACTIVE | Noted: 2024-01-03

## 2024-01-03 PROBLEM — R91.8 PULMONARY NODULES: Status: ACTIVE | Noted: 2024-01-03

## 2024-01-03 LAB
ALBUMIN SERPL-MCNC: 1.8 G/DL (ref 3.4–5)
ALBUMIN/GLOB SERPL: 0.5 (ref 0.8–1.7)
ALP SERPL-CCNC: 182 U/L (ref 45–117)
ALT SERPL-CCNC: 13 U/L (ref 13–56)
ANION GAP SERPL CALC-SCNC: 8 MMOL/L (ref 3–18)
APPEARANCE UR: ABNORMAL
AST SERPL-CCNC: 26 U/L (ref 10–38)
B PERT DNA SPEC QL NAA+PROBE: NOT DETECTED
BACTERIA URNS QL MICRO: ABNORMAL /HPF
BASOPHILS # BLD: 0 K/UL (ref 0–0.1)
BASOPHILS NFR BLD: 0 % (ref 0–2)
BILIRUB SERPL-MCNC: 0.8 MG/DL (ref 0.2–1)
BILIRUB UR QL: NEGATIVE
BORDETELLA PARAPERTUSSIS BY PCR: NOT DETECTED
BUN SERPL-MCNC: 46 MG/DL (ref 7–18)
BUN/CREAT SERPL: 9 (ref 12–20)
C PNEUM DNA SPEC QL NAA+PROBE: NOT DETECTED
CALCIUM SERPL-MCNC: 8.1 MG/DL (ref 8.5–10.1)
CHLORIDE SERPL-SCNC: 92 MMOL/L (ref 100–111)
CO2 SERPL-SCNC: 27 MMOL/L (ref 21–32)
COLOR UR: YELLOW
CREAT SERPL-MCNC: 5.08 MG/DL (ref 0.6–1.3)
DIFFERENTIAL METHOD BLD: ABNORMAL
EKG ATRIAL RATE: 111 BPM
EKG DIAGNOSIS: NORMAL
EKG P AXIS: 60 DEGREES
EKG P-R INTERVAL: 148 MS
EKG Q-T INTERVAL: 294 MS
EKG QRS DURATION: 70 MS
EKG QTC CALCULATION (BAZETT): 399 MS
EKG R AXIS: 49 DEGREES
EKG T AXIS: -77 DEGREES
EKG VENTRICULAR RATE: 111 BPM
EOSINOPHIL # BLD: 0.1 K/UL (ref 0–0.4)
EOSINOPHIL NFR BLD: 1 % (ref 0–5)
EPITH CASTS URNS QL MICRO: NEGATIVE /LPF (ref 0–5)
ERYTHROCYTE [DISTWIDTH] IN BLOOD BY AUTOMATED COUNT: 16.5 % (ref 11.6–14.5)
FLUAV SUBTYP SPEC NAA+PROBE: NOT DETECTED
FLUBV RNA SPEC QL NAA+PROBE: NOT DETECTED
GLOBULIN SER CALC-MCNC: 3.4 G/DL (ref 2–4)
GLUCOSE SERPL-MCNC: 121 MG/DL (ref 74–99)
GLUCOSE UR STRIP.AUTO-MCNC: 100 MG/DL
HADV DNA SPEC QL NAA+PROBE: NOT DETECTED
HCG SERPL QL: NEGATIVE
HCOV 229E RNA SPEC QL NAA+PROBE: NOT DETECTED
HCOV HKU1 RNA SPEC QL NAA+PROBE: NOT DETECTED
HCOV NL63 RNA SPEC QL NAA+PROBE: NOT DETECTED
HCOV OC43 RNA SPEC QL NAA+PROBE: NOT DETECTED
HCT VFR BLD AUTO: 24.5 % (ref 35–45)
HGB BLD-MCNC: 7.8 G/DL (ref 12–16)
HGB UR QL STRIP: ABNORMAL
HMPV RNA SPEC QL NAA+PROBE: NOT DETECTED
HPIV1 RNA SPEC QL NAA+PROBE: NOT DETECTED
HPIV2 RNA SPEC QL NAA+PROBE: NOT DETECTED
HPIV3 RNA SPEC QL NAA+PROBE: NOT DETECTED
HPIV4 RNA SPEC QL NAA+PROBE: NOT DETECTED
IMM GRANULOCYTES # BLD AUTO: 0.2 K/UL (ref 0–0.04)
IMM GRANULOCYTES NFR BLD AUTO: 1 % (ref 0–0.5)
KETONES UR QL STRIP.AUTO: NEGATIVE MG/DL
LACTATE SERPL-SCNC: 1 MMOL/L (ref 0.4–2)
LEUKOCYTE ESTERASE UR QL STRIP.AUTO: ABNORMAL
LYMPHOCYTES # BLD: 0.4 K/UL (ref 0.9–3.6)
LYMPHOCYTES NFR BLD: 3 % (ref 21–52)
M PNEUMO DNA SPEC QL NAA+PROBE: NOT DETECTED
MCH RBC QN AUTO: 28 PG (ref 24–34)
MCHC RBC AUTO-ENTMCNC: 31.8 G/DL (ref 31–37)
MCV RBC AUTO: 87.8 FL (ref 78–100)
MONOCYTES # BLD: 0.7 K/UL (ref 0.05–1.2)
MONOCYTES NFR BLD: 6 % (ref 3–10)
NEUTS SEG # BLD: 10.4 K/UL (ref 1.8–8)
NEUTS SEG NFR BLD: 89 % (ref 40–73)
NITRITE UR QL STRIP.AUTO: NEGATIVE
NRBC # BLD: 0 K/UL (ref 0–0.01)
NRBC BLD-RTO: 0 PER 100 WBC
PH UR STRIP: 7.5 (ref 5–8)
PLATELET # BLD AUTO: 87 K/UL (ref 135–420)
PMV BLD AUTO: 9.9 FL (ref 9.2–11.8)
POTASSIUM SERPL-SCNC: 3.5 MMOL/L (ref 3.5–5.5)
PROT SERPL-MCNC: 5.2 G/DL (ref 6.4–8.2)
PROT UR STRIP-MCNC: 300 MG/DL
RBC # BLD AUTO: 2.79 M/UL (ref 4.2–5.3)
RBC #/AREA URNS HPF: ABNORMAL /HPF (ref 0–5)
RSV RNA SPEC QL NAA+PROBE: NOT DETECTED
RV+EV RNA SPEC QL NAA+PROBE: NOT DETECTED
SARS-COV-2 RNA RESP QL NAA+PROBE: NOT DETECTED
SODIUM SERPL-SCNC: 127 MMOL/L (ref 136–145)
SP GR UR REFRACTOMETRY: 1.01 (ref 1–1.03)
UROBILINOGEN UR QL STRIP.AUTO: 0.2 EU/DL (ref 0.2–1)
WBC # BLD AUTO: 11.7 K/UL (ref 4.6–13.2)
WBC URNS QL MICRO: ABNORMAL /HPF (ref 0–4)

## 2024-01-03 PROCEDURE — 74176 CT ABD & PELVIS W/O CONTRAST: CPT

## 2024-01-03 PROCEDURE — 6360000004 HC RX CONTRAST MEDICATION

## 2024-01-03 PROCEDURE — 2580000003 HC RX 258: Performed by: HOSPITALIST

## 2024-01-03 PROCEDURE — 99285 EMERGENCY DEPT VISIT HI MDM: CPT

## 2024-01-03 PROCEDURE — 87040 BLOOD CULTURE FOR BACTERIA: CPT

## 2024-01-03 PROCEDURE — 99223 1ST HOSP IP/OBS HIGH 75: CPT | Performed by: HOSPITALIST

## 2024-01-03 PROCEDURE — 87077 CULTURE AEROBIC IDENTIFY: CPT

## 2024-01-03 PROCEDURE — 0202U NFCT DS 22 TRGT SARS-COV-2: CPT

## 2024-01-03 PROCEDURE — 96374 THER/PROPH/DIAG INJ IV PUSH: CPT

## 2024-01-03 PROCEDURE — 81001 URINALYSIS AUTO W/SCOPE: CPT

## 2024-01-03 PROCEDURE — 5A1D70Z PERFORMANCE OF URINARY FILTRATION, INTERMITTENT, LESS THAN 6 HOURS PER DAY: ICD-10-PCS | Performed by: STUDENT IN AN ORGANIZED HEALTH CARE EDUCATION/TRAINING PROGRAM

## 2024-01-03 PROCEDURE — 96365 THER/PROPH/DIAG IV INF INIT: CPT

## 2024-01-03 PROCEDURE — 6360000002 HC RX W HCPCS: Performed by: HOSPITALIST

## 2024-01-03 PROCEDURE — 87154 CUL TYP ID BLD PTHGN 6+ TRGT: CPT

## 2024-01-03 PROCEDURE — 71260 CT THORAX DX C+: CPT

## 2024-01-03 PROCEDURE — 85025 COMPLETE CBC W/AUTO DIFF WBC: CPT

## 2024-01-03 PROCEDURE — 6370000000 HC RX 637 (ALT 250 FOR IP): Performed by: HOSPITALIST

## 2024-01-03 PROCEDURE — 84703 CHORIONIC GONADOTROPIN ASSAY: CPT

## 2024-01-03 PROCEDURE — 93010 ELECTROCARDIOGRAM REPORT: CPT | Performed by: INTERNAL MEDICINE

## 2024-01-03 PROCEDURE — 71045 X-RAY EXAM CHEST 1 VIEW: CPT

## 2024-01-03 PROCEDURE — 99223 1ST HOSP IP/OBS HIGH 75: CPT | Performed by: INTERNAL MEDICINE

## 2024-01-03 PROCEDURE — 2580000003 HC RX 258: Performed by: EMERGENCY MEDICINE

## 2024-01-03 PROCEDURE — 93005 ELECTROCARDIOGRAM TRACING: CPT | Performed by: EMERGENCY MEDICINE

## 2024-01-03 PROCEDURE — 6360000002 HC RX W HCPCS: Performed by: EMERGENCY MEDICINE

## 2024-01-03 PROCEDURE — 83605 ASSAY OF LACTIC ACID: CPT

## 2024-01-03 PROCEDURE — 6370000000 HC RX 637 (ALT 250 FOR IP): Performed by: INTERNAL MEDICINE

## 2024-01-03 PROCEDURE — 90935 HEMODIALYSIS ONE EVALUATION: CPT

## 2024-01-03 PROCEDURE — 80053 COMPREHEN METABOLIC PANEL: CPT

## 2024-01-03 PROCEDURE — 70450 CT HEAD/BRAIN W/O DYE: CPT

## 2024-01-03 PROCEDURE — 87186 SC STD MICRODIL/AGAR DIL: CPT

## 2024-01-03 PROCEDURE — 1100000003 HC PRIVATE W/ TELEMETRY

## 2024-01-03 PROCEDURE — 6370000000 HC RX 637 (ALT 250 FOR IP): Performed by: EMERGENCY MEDICINE

## 2024-01-03 RX ORDER — HEPARIN SODIUM 1000 [USP'U]/ML
3800 INJECTION, SOLUTION INTRAVENOUS; SUBCUTANEOUS AS NEEDED
Status: DISCONTINUED | OUTPATIENT
Start: 2024-01-03 | End: 2024-01-10 | Stop reason: HOSPADM

## 2024-01-03 RX ORDER — ACETAMINOPHEN 650 MG/1
650 SUPPOSITORY RECTAL EVERY 6 HOURS PRN
Status: DISCONTINUED | OUTPATIENT
Start: 2024-01-03 | End: 2024-01-10 | Stop reason: HOSPADM

## 2024-01-03 RX ORDER — ONDANSETRON 4 MG/1
4 TABLET, ORALLY DISINTEGRATING ORAL EVERY 8 HOURS PRN
Status: DISCONTINUED | OUTPATIENT
Start: 2024-01-03 | End: 2024-01-10 | Stop reason: HOSPADM

## 2024-01-03 RX ORDER — POLYETHYLENE GLYCOL 3350 17 G/17G
17 POWDER, FOR SOLUTION ORAL DAILY PRN
Status: DISCONTINUED | OUTPATIENT
Start: 2024-01-03 | End: 2024-01-10 | Stop reason: HOSPADM

## 2024-01-03 RX ORDER — ARFORMOTEROL TARTRATE 15 UG/2ML
15 SOLUTION RESPIRATORY (INHALATION)
Status: DISCONTINUED | OUTPATIENT
Start: 2024-01-03 | End: 2024-01-05

## 2024-01-03 RX ORDER — SEVELAMER CARBONATE 800 MG/1
800 TABLET, FILM COATED ORAL 3 TIMES DAILY
Status: DISCONTINUED | OUTPATIENT
Start: 2024-01-03 | End: 2024-01-10 | Stop reason: HOSPADM

## 2024-01-03 RX ORDER — ACETAMINOPHEN 650 MG/1
650 SUPPOSITORY RECTAL
Status: COMPLETED | OUTPATIENT
Start: 2024-01-03 | End: 2024-01-03

## 2024-01-03 RX ORDER — ONDANSETRON 2 MG/ML
4 INJECTION INTRAMUSCULAR; INTRAVENOUS EVERY 6 HOURS PRN
Status: DISCONTINUED | OUTPATIENT
Start: 2024-01-03 | End: 2024-01-10 | Stop reason: HOSPADM

## 2024-01-03 RX ORDER — ACETAMINOPHEN 325 MG/1
650 TABLET ORAL EVERY 6 HOURS PRN
Status: DISCONTINUED | OUTPATIENT
Start: 2024-01-03 | End: 2024-01-10 | Stop reason: HOSPADM

## 2024-01-03 RX ORDER — HEPARIN SODIUM 5000 [USP'U]/ML
5000 INJECTION, SOLUTION INTRAVENOUS; SUBCUTANEOUS EVERY 8 HOURS SCHEDULED
Status: DISCONTINUED | OUTPATIENT
Start: 2024-01-03 | End: 2024-01-10 | Stop reason: HOSPADM

## 2024-01-03 RX ORDER — FOLIC ACID 1 MG/1
1 TABLET ORAL DAILY
Status: DISCONTINUED | OUTPATIENT
Start: 2024-01-04 | End: 2024-01-10 | Stop reason: HOSPADM

## 2024-01-03 RX ORDER — SODIUM CHLORIDE 9 MG/ML
INJECTION, SOLUTION INTRAVENOUS PRN
Status: DISCONTINUED | OUTPATIENT
Start: 2024-01-03 | End: 2024-01-10 | Stop reason: HOSPADM

## 2024-01-03 RX ORDER — SODIUM CHLORIDE 0.9 % (FLUSH) 0.9 %
5-40 SYRINGE (ML) INJECTION EVERY 12 HOURS SCHEDULED
Status: DISCONTINUED | OUTPATIENT
Start: 2024-01-03 | End: 2024-01-10 | Stop reason: HOSPADM

## 2024-01-03 RX ORDER — GABAPENTIN 100 MG/1
100 CAPSULE ORAL
Status: DISCONTINUED | OUTPATIENT
Start: 2024-01-03 | End: 2024-01-10 | Stop reason: HOSPADM

## 2024-01-03 RX ORDER — SODIUM CHLORIDE 0.9 % (FLUSH) 0.9 %
5-40 SYRINGE (ML) INJECTION PRN
Status: DISCONTINUED | OUTPATIENT
Start: 2024-01-03 | End: 2024-01-10 | Stop reason: HOSPADM

## 2024-01-03 RX ORDER — TRAZODONE HYDROCHLORIDE 50 MG/1
50 TABLET ORAL NIGHTLY
Status: DISCONTINUED | OUTPATIENT
Start: 2024-01-03 | End: 2024-01-03

## 2024-01-03 RX ORDER — CALCITRIOL 0.25 UG/1
0.25 CAPSULE, LIQUID FILLED ORAL DAILY
Status: DISCONTINUED | OUTPATIENT
Start: 2024-01-04 | End: 2024-01-10 | Stop reason: HOSPADM

## 2024-01-03 RX ORDER — 0.9 % SODIUM CHLORIDE 0.9 %
500 INTRAVENOUS SOLUTION INTRAVENOUS ONCE
Status: COMPLETED | OUTPATIENT
Start: 2024-01-03 | End: 2024-01-03

## 2024-01-03 RX ORDER — NALOXONE HYDROCHLORIDE 0.4 MG/ML
0.4 INJECTION, SOLUTION INTRAMUSCULAR; INTRAVENOUS; SUBCUTANEOUS
Status: COMPLETED | OUTPATIENT
Start: 2024-01-03 | End: 2024-01-03

## 2024-01-03 RX ORDER — ATORVASTATIN CALCIUM 40 MG/1
40 TABLET, FILM COATED ORAL NIGHTLY
Status: DISCONTINUED | OUTPATIENT
Start: 2024-01-03 | End: 2024-01-10 | Stop reason: HOSPADM

## 2024-01-03 RX ADMIN — CEFEPIME 2000 MG: 2 INJECTION, POWDER, FOR SOLUTION INTRAVENOUS at 09:04

## 2024-01-03 RX ADMIN — ATORVASTATIN CALCIUM 40 MG: 40 TABLET, FILM COATED ORAL at 21:57

## 2024-01-03 RX ADMIN — GABAPENTIN 100 MG: 100 CAPSULE ORAL at 22:30

## 2024-01-03 RX ADMIN — IPRATROPIUM BROMIDE 0.5 MG: 0.5 SOLUTION RESPIRATORY (INHALATION) at 16:36

## 2024-01-03 RX ADMIN — HEPARIN SODIUM 5000 UNITS: 5000 INJECTION INTRAVENOUS; SUBCUTANEOUS at 22:00

## 2024-01-03 RX ADMIN — SEVELAMER CARBONATE 800 MG: 800 TABLET, FILM COATED ORAL at 21:57

## 2024-01-03 RX ADMIN — IOPAMIDOL 80 ML: 612 INJECTION, SOLUTION INTRAVENOUS at 20:46

## 2024-01-03 RX ADMIN — SODIUM CHLORIDE 500 ML: 9 INJECTION, SOLUTION INTRAVENOUS at 08:51

## 2024-01-03 RX ADMIN — NALOXONE HYDROCHLORIDE 0.4 MG: 0.4 INJECTION, SOLUTION INTRAMUSCULAR; INTRAVENOUS; SUBCUTANEOUS at 13:47

## 2024-01-03 RX ADMIN — VANCOMYCIN HYDROCHLORIDE 1750 MG: 1 INJECTION, POWDER, LYOPHILIZED, FOR SOLUTION INTRAVENOUS at 11:39

## 2024-01-03 RX ADMIN — ACETAMINOPHEN 650 MG: 650 SUPPOSITORY RECTAL at 09:07

## 2024-01-03 RX ADMIN — SODIUM CHLORIDE, PRESERVATIVE FREE 10 ML: 5 INJECTION INTRAVENOUS at 22:33

## 2024-01-03 ASSESSMENT — LIFESTYLE VARIABLES
HOW OFTEN DO YOU HAVE A DRINK CONTAINING ALCOHOL: PATIENT UNABLE TO ANSWER
HOW MANY STANDARD DRINKS CONTAINING ALCOHOL DO YOU HAVE ON A TYPICAL DAY: PATIENT UNABLE TO ANSWER

## 2024-01-03 ASSESSMENT — PAIN SCALES - GENERAL
PAINLEVEL_OUTOF10: 0
PAINLEVEL_OUTOF10: 0

## 2024-01-03 NOTE — ED NOTES
Report given to RN. Pt in dialysis. Will be transported from dialysis to 402. Pt belongings already placed in her room.

## 2024-01-03 NOTE — ED PROVIDER NOTES
[facility]  eMERGENCY dEPARTMENT eNCOUnter        Pt Name: Latha Oseguera  MRN: [id number]  Birthdate 1970  Date of evaluation: 1/3/2024  Provider: Karthik Ovalles MD  PCP: No primary care provider on file.  Note Started: 8:16 AM EST 1/4/2024          CHIEF COMPLAINT       Chief Complaint   Patient presents with    Fever       HISTORY OF PRESENT ILLNESS        Patient comes in by EMS, went to dialysis this morning and was found to be febrile with confusion and weakness.  On arrival the patient is altered but responds to voice.  History limited at this time.    Nursing Notes were all reviewed and agreed with or any disagreements were addressed  in the HPI.    REVIEW OF SYSTEMS         The following 10 systems are reviewed and negative except as noted in the HPI: Constitutional, Eyes, ENT, cardiovascular, pulmonary, GI, , neuro, skin, and musculoskeletal       PASTMEDICAL HISTORY     Past Medical History:   Diagnosis Date    CHF (congestive heart failure) (HCC)     Chronic kidney disease     COPD (chronic obstructive pulmonary disease) (HCC)     Diabetes mellitus (HCC)     Hypertension          SURGICAL HISTORY     No past surgical history on file.      CURRENT MEDICATIONS       Current Discharge Medication List        CONTINUE these medications which have NOT CHANGED    Details   LORazepam (ATIVAN) 1 MG tablet Take 1 tablet by mouth 2 times daily. Max Daily Amount: 2 mg      HYDROmorphone (DILAUDID) 2 MG tablet Take 1 tablet by mouth every 12 hours as needed for Pain. Max Daily Amount: 4 mg      gabapentin (NEURONTIN) 300 MG capsule Take 1 capsule by mouth Every Monday, Wednesday, and Friday for 15 days. Max Daily Amount: 300 mg  Qty: 6 capsule, Refills: 0    Associated Diagnoses: Neuropathy      atorvastatin (LIPITOR) 40 MG tablet Take 1 tablet by mouth daily      calcitRIOL (ROCALTROL) 0.25 MCG capsule Take 1 capsule by mouth daily      furosemide (LASIX) 40 MG tablet Take 1 tablet by mouth daily

## 2024-01-03 NOTE — H&P
History & Physical    Patient: Latha Oseguera MRN: [id number]  [facility]: [id number]    YOB: 1970  Age: 53 y.o.  Sex: female      DOA: 1/3/2024    Chief Complaint   Patient presents with    Fever          HPI:     Latha Oseugera is a 53 y.o. female with past medical hx of end-stage renal disease on dialysis, status post TAVR, CAD status post PCI LAD 2/23, hypertension, renal cell carcinoma, dyslipidemia, admitted to [facility] hospitalist service 12/22 to 12/26/2023 for severe symptomatic anemia. There was no evidence of bleeding, transfusion done without complication, outpatient follow-up with GI recommended.  Patient apparently is at [facility], and she returned there.  Patient went to dialysis this morning as per her usual and was found to be febrile, with confusion and weakness.  Patient was transported from dialysis to [facility] ED, where noted with altered mental status but responding to voice.  Patient noted febrile, tachycardic, with neutrophil predominance.  In the ED, blood cultures were sent.  Respiratory virus panel negative.  X-ray revealed likely pulmonary edema, small bilateral pleural effusions, superimposed infectious process not excluded.  CT head nil acute.  CT abdomen revealed large mass lower pole right kidney consistent with renal cell carcinoma until proven otherwise, right ureteral stent in place, multiple small pulmonary nodules nonspecific but concerning for metastatic disease.  Also concern for loculated pleural effusion.  Also mild rectosigmoid colon wall thickening nonspecific, recommended to correlate for colitis.  Patient seen and examined at bedside, she is very drowsy and unable to provide any history.  She awakens briefly to state her name, where she is, and the year, then falls immediately back asleep.  No family at bedside.     In the ED, patient received Tylenol suppository, Atrovent, cefepime, normal saline 500 mL bolus.     No records were found in care everywhere, [facility] or

## 2024-01-03 NOTE — ED TRIAGE NOTES
Pt arrived via EMS. Pt unable to obtain dialysis over the holidays. Pt gets MWF dialysis and missed over a week of treatments. Pt has oral temp of 102.5, feels hot to touch, is responsive to verbal stimuli, and seems to be oriented when I am able to arouse her. Pt has two 20 gauge Ivs in her right forearm. Pt has received 500 NaCl bolus and first round of antibiotics. Pt received tylenol via suppository. Pt has a stage 2/3 sacral decubitis wound present on admission. Pt is incontinent. Cleaned and changed into new diaper and gown.

## 2024-01-04 ENCOUNTER — APPOINTMENT (OUTPATIENT)
Facility: HOSPITAL | Age: 54
DRG: 720 | End: 2024-01-04
Attending: HOSPITALIST
Payer: MEDICAID

## 2024-01-04 PROBLEM — A41.9 SEPSIS (HCC): Status: ACTIVE | Noted: 2024-01-04

## 2024-01-04 PROBLEM — R53.81 DEBILITY: Status: ACTIVE | Noted: 2024-01-04

## 2024-01-04 PROBLEM — Z99.2 ESRD (END STAGE RENAL DISEASE) ON DIALYSIS (HCC): Status: ACTIVE | Noted: 2024-01-04

## 2024-01-04 PROBLEM — I95.9 HYPOTENSION: Status: ACTIVE | Noted: 2024-01-04

## 2024-01-04 PROBLEM — J90 PLEURAL EFFUSION: Status: ACTIVE | Noted: 2024-01-04

## 2024-01-04 PROBLEM — N18.6 ESRD (END STAGE RENAL DISEASE) ON DIALYSIS (HCC): Status: ACTIVE | Noted: 2024-01-04

## 2024-01-04 PROBLEM — Z71.89 GOALS OF CARE, COUNSELING/DISCUSSION: Status: ACTIVE | Noted: 2024-01-04

## 2024-01-04 LAB
ACCESSION NUMBER, LLC1M: ABNORMAL
ACINETOBACTER CALCOAC BAUMANNII COMPLEX BY PCR: NOT DETECTED
AMMONIA PLAS-SCNC: <10 UMOL/L (ref 11–32)
ANION GAP SERPL CALC-SCNC: 5 MMOL/L (ref 3–18)
B FRAGILIS DNA BLD POS QL NAA+NON-PROBE: NOT DETECTED
BASOPHILS # BLD: 0 K/UL (ref 0–0.1)
BASOPHILS NFR BLD: 0 % (ref 0–2)
BIOFIRE TEST COMMENT: ABNORMAL
BLACTX-M ISLT/SPM QL: NOT DETECTED
BLAIMP ISLT/SPM QL: NOT DETECTED
BLAKPC BLD POS QL NAA+NON-PROBE: NOT DETECTED
BLAVIM ISLT/SPM QL: NOT DETECTED
BUN SERPL-MCNC: 25 MG/DL (ref 7–18)
BUN/CREAT SERPL: 8 (ref 12–20)
C ALBICANS DNA BLD POS QL NAA+NON-PROBE: NOT DETECTED
C AURIS DNA BLD POS QL NAA+NON-PROBE: NOT DETECTED
C GATTII+NEOFOR DNA BLD POS QL NAA+N-PRB: NOT DETECTED
C GLABRATA DNA BLD POS QL NAA+NON-PROBE: NOT DETECTED
C KRUSEI DNA BLD POS QL NAA+NON-PROBE: NOT DETECTED
C PARAP DNA BLD POS QL NAA+NON-PROBE: NOT DETECTED
C TROPICLS DNA BLD POS QL NAA+NON-PROBE: NOT DETECTED
CALCIUM SERPL-MCNC: 8.3 MG/DL (ref 8.5–10.1)
CHLORIDE SERPL-SCNC: 98 MMOL/L (ref 100–111)
CO2 SERPL-SCNC: 27 MMOL/L (ref 21–32)
CREAT SERPL-MCNC: 3.16 MG/DL (ref 0.6–1.3)
DIFFERENTIAL METHOD BLD: ABNORMAL
E CLOAC COMP DNA BLD POS NAA+NON-PROBE: NOT DETECTED
E COLI DNA BLD POS QL NAA+NON-PROBE: NOT DETECTED
E FAECALIS DNA BLD POS QL NAA+NON-PROBE: NOT DETECTED
E FAECIUM DNA BLD POS QL NAA+NON-PROBE: NOT DETECTED
ENTEROBACTERALES DNA BLD POS NAA+N-PRB: NOT DETECTED
EOSINOPHIL # BLD: 0 K/UL (ref 0–0.4)
EOSINOPHIL NFR BLD: 0 % (ref 0–5)
ERYTHROCYTE [DISTWIDTH] IN BLOOD BY AUTOMATED COUNT: 16.5 % (ref 11.6–14.5)
GLUCOSE BLD STRIP.AUTO-MCNC: 136 MG/DL (ref 70–110)
GLUCOSE SERPL-MCNC: 99 MG/DL (ref 74–99)
GP B STREP DNA BLD POS QL NAA+NON-PROBE: NOT DETECTED
HAEM INFLU DNA BLD POS QL NAA+NON-PROBE: NOT DETECTED
HCT VFR BLD AUTO: 25.1 % (ref 35–45)
HGB BLD-MCNC: 7.8 G/DL (ref 12–16)
IMM GRANULOCYTES # BLD AUTO: 0.1 K/UL (ref 0–0.04)
IMM GRANULOCYTES NFR BLD AUTO: 1 % (ref 0–0.5)
K OXYTOCA DNA BLD POS QL NAA+NON-PROBE: NOT DETECTED
KLEBSIELLA SP DNA BLD POS QL NAA+NON-PRB: NOT DETECTED
KLEBSIELLA SP DNA BLD POS QL NAA+NON-PRB: NOT DETECTED
L MONOCYTOG DNA BLD POS QL NAA+NON-PROBE: NOT DETECTED
LYMPHOCYTES # BLD: 0.1 K/UL (ref 0.9–3.6)
LYMPHOCYTES NFR BLD: 1 % (ref 21–52)
MAGNESIUM SERPL-MCNC: 1.5 MG/DL (ref 1.6–2.6)
MCH RBC QN AUTO: 27.8 PG (ref 24–34)
MCHC RBC AUTO-ENTMCNC: 31.1 G/DL (ref 31–37)
MCV RBC AUTO: 89.3 FL (ref 78–100)
MONOCYTES # BLD: 0.4 K/UL (ref 0.05–1.2)
MONOCYTES NFR BLD: 5 % (ref 3–10)
N MEN DNA BLD POS QL NAA+NON-PROBE: NOT DETECTED
NEUTS SEG # BLD: 8.4 K/UL (ref 1.8–8)
NEUTS SEG NFR BLD: 92 % (ref 40–73)
NRBC # BLD: 0 K/UL (ref 0–0.01)
NRBC BLD-RTO: 0 PER 100 WBC
P AERUGINOSA DNA BLD POS NAA+NON-PROBE: DETECTED
PHOSPHATE SERPL-MCNC: 2.3 MG/DL (ref 2.5–4.9)
PLATELET # BLD AUTO: 79 K/UL (ref 135–420)
PMV BLD AUTO: 10.8 FL (ref 9.2–11.8)
POTASSIUM SERPL-SCNC: 3.3 MMOL/L (ref 3.5–5.5)
PROTEUS SP DNA BLD POS QL NAA+NON-PROBE: NOT DETECTED
RBC # BLD AUTO: 2.81 M/UL (ref 4.2–5.3)
RESISTANT GENE NDM BY PCR: NOT DETECTED
RESISTANT GENE TARGETS: ABNORMAL
S AUREUS DNA BLD POS QL NAA+NON-PROBE: NOT DETECTED
S AUREUS+CONS DNA BLD POS NAA+NON-PROBE: NOT DETECTED
S EPIDERMIDIS DNA BLD POS QL NAA+NON-PRB: NOT DETECTED
S LUGDUNENSIS DNA BLD POS QL NAA+NON-PRB: NOT DETECTED
S MALTOPHILIA DNA BLD POS QL NAA+NON-PRB: NOT DETECTED
S MARCESCENS DNA BLD POS NAA+NON-PROBE: NOT DETECTED
S PNEUM DNA BLD POS QL NAA+NON-PROBE: NOT DETECTED
S PYO DNA BLD POS QL NAA+NON-PROBE: NOT DETECTED
SALMONELLA DNA BLD POS QL NAA+NON-PROBE: NOT DETECTED
SODIUM SERPL-SCNC: 130 MMOL/L (ref 136–145)
STREPTOCOCCUS DNA BLD POS NAA+NON-PROBE: NOT DETECTED
WBC # BLD AUTO: 9.1 K/UL (ref 4.6–13.2)

## 2024-01-04 PROCEDURE — 84100 ASSAY OF PHOSPHORUS: CPT

## 2024-01-04 PROCEDURE — 97166 OT EVAL MOD COMPLEX 45 MIN: CPT

## 2024-01-04 PROCEDURE — 6370000000 HC RX 637 (ALT 250 FOR IP): Performed by: HOSPITALIST

## 2024-01-04 PROCEDURE — 90935 HEMODIALYSIS ONE EVALUATION: CPT

## 2024-01-04 PROCEDURE — 2700000000 HC OXYGEN THERAPY PER DAY

## 2024-01-04 PROCEDURE — 94640 AIRWAY INHALATION TREATMENT: CPT

## 2024-01-04 PROCEDURE — 94761 N-INVAS EAR/PLS OXIMETRY MLT: CPT

## 2024-01-04 PROCEDURE — 83735 ASSAY OF MAGNESIUM: CPT

## 2024-01-04 PROCEDURE — 6360000002 HC RX W HCPCS: Performed by: INTERNAL MEDICINE

## 2024-01-04 PROCEDURE — 6370000000 HC RX 637 (ALT 250 FOR IP): Performed by: INTERNAL MEDICINE

## 2024-01-04 PROCEDURE — 6360000002 HC RX W HCPCS: Performed by: HOSPITALIST

## 2024-01-04 PROCEDURE — 2580000003 HC RX 258: Performed by: INTERNAL MEDICINE

## 2024-01-04 PROCEDURE — 82962 GLUCOSE BLOOD TEST: CPT

## 2024-01-04 PROCEDURE — 97535 SELF CARE MNGMENT TRAINING: CPT

## 2024-01-04 PROCEDURE — 99232 SBSQ HOSP IP/OBS MODERATE 35: CPT | Performed by: FAMILY MEDICINE

## 2024-01-04 PROCEDURE — 2580000003 HC RX 258: Performed by: HOSPITALIST

## 2024-01-04 PROCEDURE — 85025 COMPLETE CBC W/AUTO DIFF WBC: CPT

## 2024-01-04 PROCEDURE — 99231 SBSQ HOSP IP/OBS SF/LOW 25: CPT | Performed by: INTERNAL MEDICINE

## 2024-01-04 PROCEDURE — 36415 COLL VENOUS BLD VENIPUNCTURE: CPT

## 2024-01-04 PROCEDURE — 93306 TTE W/DOPPLER COMPLETE: CPT

## 2024-01-04 PROCEDURE — 99223 1ST HOSP IP/OBS HIGH 75: CPT | Performed by: NURSE PRACTITIONER

## 2024-01-04 PROCEDURE — 97530 THERAPEUTIC ACTIVITIES: CPT

## 2024-01-04 PROCEDURE — 1100000003 HC PRIVATE W/ TELEMETRY

## 2024-01-04 PROCEDURE — 97162 PT EVAL MOD COMPLEX 30 MIN: CPT

## 2024-01-04 PROCEDURE — 80048 BASIC METABOLIC PNL TOTAL CA: CPT

## 2024-01-04 PROCEDURE — 82140 ASSAY OF AMMONIA: CPT

## 2024-01-04 RX ORDER — HYDROMORPHONE HYDROCHLORIDE 2 MG/1
2 TABLET ORAL EVERY 12 HOURS PRN
Status: DISCONTINUED | OUTPATIENT
Start: 2024-01-04 | End: 2024-01-10 | Stop reason: HOSPADM

## 2024-01-04 RX ORDER — MAGNESIUM SULFATE 1 G/100ML
1000 INJECTION INTRAVENOUS ONCE
Status: COMPLETED | OUTPATIENT
Start: 2024-01-04 | End: 2024-01-04

## 2024-01-04 RX ORDER — HYDROMORPHONE HYDROCHLORIDE 2 MG/1
2 TABLET ORAL EVERY 12 HOURS PRN
Status: ON HOLD | COMMUNITY

## 2024-01-04 RX ORDER — MIDODRINE HYDROCHLORIDE 10 MG/1
10 TABLET ORAL
Status: DISCONTINUED | OUTPATIENT
Start: 2024-01-04 | End: 2024-01-10 | Stop reason: HOSPADM

## 2024-01-04 RX ORDER — LORAZEPAM 1 MG/1
1 TABLET ORAL EVERY 12 HOURS PRN
Status: DISCONTINUED | OUTPATIENT
Start: 2024-01-04 | End: 2024-01-10 | Stop reason: HOSPADM

## 2024-01-04 RX ORDER — LORAZEPAM 1 MG/1
1 TABLET ORAL 2 TIMES DAILY
Status: ON HOLD | COMMUNITY

## 2024-01-04 RX ADMIN — IPRATROPIUM BROMIDE 0.5 MG: 0.5 SOLUTION RESPIRATORY (INHALATION) at 08:34

## 2024-01-04 RX ADMIN — SEVELAMER CARBONATE 800 MG: 800 TABLET, FILM COATED ORAL at 16:20

## 2024-01-04 RX ADMIN — LORAZEPAM 1 MG: 1 TABLET ORAL at 09:15

## 2024-01-04 RX ADMIN — HEPARIN SODIUM 5000 UNITS: 5000 INJECTION INTRAVENOUS; SUBCUTANEOUS at 18:11

## 2024-01-04 RX ADMIN — SEVELAMER CARBONATE 800 MG: 800 TABLET, FILM COATED ORAL at 09:08

## 2024-01-04 RX ADMIN — ATORVASTATIN CALCIUM 40 MG: 40 TABLET, FILM COATED ORAL at 22:38

## 2024-01-04 RX ADMIN — DIBASIC SODIUM PHOSPHATE, MONOBASIC POTASSIUM PHOSPHATE AND MONOBASIC SODIUM PHOSPHATE 1 TABLET: 852; 155; 130 TABLET ORAL at 18:11

## 2024-01-04 RX ADMIN — ARFORMOTEROL TARTRATE 15 MCG: 15 SOLUTION RESPIRATORY (INHALATION) at 19:15

## 2024-01-04 RX ADMIN — FOLIC ACID 1 MG: 1 TABLET ORAL at 09:08

## 2024-01-04 RX ADMIN — SEVELAMER CARBONATE 800 MG: 800 TABLET, FILM COATED ORAL at 22:38

## 2024-01-04 RX ADMIN — SODIUM CHLORIDE, PRESERVATIVE FREE 10 ML: 5 INJECTION INTRAVENOUS at 09:16

## 2024-01-04 RX ADMIN — CALCITRIOL CAPSULES 0.25 MCG 0.25 MCG: 0.25 CAPSULE ORAL at 09:08

## 2024-01-04 RX ADMIN — SODIUM CHLORIDE, PRESERVATIVE FREE 10 ML: 5 INJECTION INTRAVENOUS at 22:39

## 2024-01-04 RX ADMIN — HYDROMORPHONE HYDROCHLORIDE 2 MG: 2 TABLET ORAL at 02:22

## 2024-01-04 RX ADMIN — CEFEPIME 2000 MG: 2 INJECTION, POWDER, FOR SOLUTION INTRAVENOUS at 18:03

## 2024-01-04 RX ADMIN — IPRATROPIUM BROMIDE 0.5 MG: 0.5 SOLUTION RESPIRATORY (INHALATION) at 19:15

## 2024-01-04 RX ADMIN — ACETAMINOPHEN 325MG 650 MG: 325 TABLET ORAL at 12:34

## 2024-01-04 RX ADMIN — MIDODRINE HYDROCHLORIDE 10 MG: 10 TABLET ORAL at 16:20

## 2024-01-04 RX ADMIN — MAGNESIUM SULFATE HEPTAHYDRATE 1000 MG: 1 INJECTION, SOLUTION INTRAVENOUS at 16:20

## 2024-01-04 RX ADMIN — MINERAL OIL, PETROLATUM, PHENYLEPHRINE HCL: 2.5; 140; 749 OINTMENT TOPICAL at 02:21

## 2024-01-04 RX ADMIN — HYDROMORPHONE HYDROCHLORIDE 2 MG: 2 TABLET ORAL at 16:20

## 2024-01-04 RX ADMIN — DIBASIC SODIUM PHOSPHATE, MONOBASIC POTASSIUM PHOSPHATE AND MONOBASIC SODIUM PHOSPHATE 1 TABLET: 852; 155; 130 TABLET ORAL at 22:38

## 2024-01-04 RX ADMIN — HEPARIN SODIUM 5000 UNITS: 5000 INJECTION INTRAVENOUS; SUBCUTANEOUS at 22:38

## 2024-01-04 ASSESSMENT — PAIN SCALES - GENERAL
PAINLEVEL_OUTOF10: 10
PAINLEVEL_OUTOF10: 3
PAINLEVEL_OUTOF10: 5

## 2024-01-04 ASSESSMENT — PAIN DESCRIPTION - LOCATION
LOCATION: ARM;LEG
LOCATION: HAND;LEG
LOCATION: ARM;NECK

## 2024-01-04 ASSESSMENT — PAIN DESCRIPTION - ORIENTATION
ORIENTATION: LEFT
ORIENTATION: RIGHT;LEFT
ORIENTATION: LEFT;RIGHT

## 2024-01-04 ASSESSMENT — PAIN DESCRIPTION - DESCRIPTORS: DESCRIPTORS: ACHING

## 2024-01-04 NOTE — SIGNIFICANT EVENT
INTERIM UPDATE - 0139 EST on 1/04/2024    Nursing Staff reports that Patient is being very anxious and demanding.  Nursing Staff reports that Patient is requesting Ativan by name.  Nursing Staff also reports that Patient is A&O x4/4 and is confused at reports that Patient may have been confused earlier.    Plan:  Given Patient's waxing and waning orientation, will hold off on administering benzodiazepines until the clinical picture is clear.

## 2024-01-04 NOTE — ACP (ADVANCE CARE PLANNING)
Oumar.  Will continue to monitor and provide support.    CODE STATUS:DNR/DNI    Molly Ordoñez RN  Palliative Medicine Inpatient RN  [facility]  Palliative COPE Line: [phone])

## 2024-01-05 PROBLEM — G93.40 ENCEPHALOPATHY ACUTE: Status: ACTIVE | Noted: 2024-01-05

## 2024-01-05 LAB
ECHO AV AREA PEAK VELOCITY: 1.3 CM2
ECHO AV AREA VTI: 1.2 CM2
ECHO AV AREA/BSA PEAK VELOCITY: 0.7 CM2/M2
ECHO AV AREA/BSA VTI: 0.7 CM2/M2
ECHO AV MEAN GRADIENT: 12 MMHG
ECHO AV MEAN VELOCITY: 1.6 M/S
ECHO AV PEAK GRADIENT: 21 MMHG
ECHO AV PEAK VELOCITY: 2.3 M/S
ECHO AV VELOCITY RATIO: 0.48
ECHO AV VTI: 42.1 CM
ECHO BSA: 1.81 M2
ECHO EST RA PRESSURE: 15 MMHG
ECHO LA VOL A-L A2C: 49 ML (ref 22–52)
ECHO LA VOL A-L A4C: 51 ML (ref 22–52)
ECHO LA VOL MOD A2C: 45 ML (ref 22–52)
ECHO LA VOL MOD A4C: 48 ML (ref 22–52)
ECHO LA VOLUME AREA LENGTH: 55 ML
ECHO LA VOLUME INDEX A-L A2C: 28 ML/M2 (ref 16–34)
ECHO LA VOLUME INDEX A-L A4C: 29 ML/M2 (ref 16–34)
ECHO LA VOLUME INDEX AREA LENGTH: 31 ML/M2 (ref 16–34)
ECHO LA VOLUME INDEX MOD A2C: 25 ML/M2 (ref 16–34)
ECHO LA VOLUME INDEX MOD A4C: 27 ML/M2 (ref 16–34)
ECHO LV E' LATERAL VELOCITY: 4 CM/S
ECHO LV E' SEPTAL VELOCITY: 5 CM/S
ECHO LV FRACTIONAL SHORTENING: 19 % (ref 28–44)
ECHO LV INTERNAL DIMENSION DIASTOLE INDEX: 2.42 CM/M2
ECHO LV INTERNAL DIMENSION DIASTOLIC: 4.3 CM (ref 3.9–5.3)
ECHO LV INTERNAL DIMENSION SYSTOLIC INDEX: 1.97 CM/M2
ECHO LV INTERNAL DIMENSION SYSTOLIC: 3.5 CM
ECHO LV IVSD: 1.2 CM (ref 0.6–0.9)
ECHO LV MASS 2D: 184.7 G (ref 67–162)
ECHO LV MASS INDEX 2D: 103.8 G/M2 (ref 43–95)
ECHO LV POSTERIOR WALL DIASTOLIC: 1.2 CM (ref 0.6–0.9)
ECHO LV RELATIVE WALL THICKNESS RATIO: 0.56
ECHO LVOT AREA: 2.8 CM2
ECHO LVOT AV VTI INDEX: 0.45
ECHO LVOT DIAM: 1.9 CM
ECHO LVOT MEAN GRADIENT: 2 MMHG
ECHO LVOT PEAK GRADIENT: 5 MMHG
ECHO LVOT PEAK VELOCITY: 1.1 M/S
ECHO LVOT STROKE VOLUME INDEX: 30.2 ML/M2
ECHO LVOT SV: 53.8 ML
ECHO LVOT VTI: 19 CM
ECHO MV A VELOCITY: 0.86 M/S
ECHO MV AREA VTI: 2 CM2
ECHO MV E DECELERATION TIME (DT): 126.8 MS
ECHO MV E VELOCITY: 1.28 M/S
ECHO MV E/A RATIO: 1.49
ECHO MV E/E' LATERAL: 32
ECHO MV E/E' RATIO (AVERAGED): 28.8
ECHO MV LVOT VTI INDEX: 1.4
ECHO MV MAX VELOCITY: 1.7 M/S
ECHO MV MEAN GRADIENT: 5 MMHG
ECHO MV MEAN VELOCITY: 1 M/S
ECHO MV PEAK GRADIENT: 12 MMHG
ECHO MV VTI: 26.6 CM
ECHO PV MAX VELOCITY: 1.9 M/S
ECHO PV PEAK GRADIENT: 14 MMHG
ECHO RIGHT VENTRICULAR SYSTOLIC PRESSURE (RVSP): 118 MMHG
ECHO RV BASAL DIMENSION: 3.5 CM
ECHO RV TAPSE: 1.6 CM (ref 1.7–?)
ECHO RVOT PEAK GRADIENT: 3 MMHG
ECHO RVOT PEAK VELOCITY: 0.8 M/S
ECHO TV REGURGITANT MAX VELOCITY: 5.07 M/S
ECHO TV REGURGITANT PEAK GRADIENT: 103 MMHG

## 2024-01-05 PROCEDURE — 99231 SBSQ HOSP IP/OBS SF/LOW 25: CPT | Performed by: INTERNAL MEDICINE

## 2024-01-05 PROCEDURE — 6370000000 HC RX 637 (ALT 250 FOR IP): Performed by: INTERNAL MEDICINE

## 2024-01-05 PROCEDURE — 6360000002 HC RX W HCPCS: Performed by: INTERNAL MEDICINE

## 2024-01-05 PROCEDURE — 99232 SBSQ HOSP IP/OBS MODERATE 35: CPT | Performed by: FAMILY MEDICINE

## 2024-01-05 PROCEDURE — 94761 N-INVAS EAR/PLS OXIMETRY MLT: CPT

## 2024-01-05 PROCEDURE — 2580000003 HC RX 258: Performed by: INTERNAL MEDICINE

## 2024-01-05 PROCEDURE — 94640 AIRWAY INHALATION TREATMENT: CPT

## 2024-01-05 PROCEDURE — 2580000003 HC RX 258: Performed by: HOSPITALIST

## 2024-01-05 PROCEDURE — 6360000002 HC RX W HCPCS: Performed by: HOSPITALIST

## 2024-01-05 PROCEDURE — 6370000000 HC RX 637 (ALT 250 FOR IP): Performed by: HOSPITALIST

## 2024-01-05 PROCEDURE — 1100000003 HC PRIVATE W/ TELEMETRY

## 2024-01-05 PROCEDURE — 93306 TTE W/DOPPLER COMPLETE: CPT | Performed by: INTERNAL MEDICINE

## 2024-01-05 PROCEDURE — 2700000000 HC OXYGEN THERAPY PER DAY

## 2024-01-05 RX ORDER — ARFORMOTEROL TARTRATE 15 UG/2ML
15 SOLUTION RESPIRATORY (INHALATION)
Status: DISCONTINUED | OUTPATIENT
Start: 2024-01-05 | End: 2024-01-10 | Stop reason: HOSPADM

## 2024-01-05 RX ADMIN — HYDROMORPHONE HYDROCHLORIDE 2 MG: 2 TABLET ORAL at 21:51

## 2024-01-05 RX ADMIN — SODIUM CHLORIDE, PRESERVATIVE FREE 10 ML: 5 INJECTION INTRAVENOUS at 22:00

## 2024-01-05 RX ADMIN — ATORVASTATIN CALCIUM 40 MG: 40 TABLET, FILM COATED ORAL at 23:05

## 2024-01-05 RX ADMIN — DIBASIC SODIUM PHOSPHATE, MONOBASIC POTASSIUM PHOSPHATE AND MONOBASIC SODIUM PHOSPHATE 1 TABLET: 852; 155; 130 TABLET ORAL at 10:13

## 2024-01-05 RX ADMIN — ARFORMOTEROL TARTRATE 15 MCG: 15 SOLUTION RESPIRATORY (INHALATION) at 09:27

## 2024-01-05 RX ADMIN — CEFEPIME 2000 MG: 2 INJECTION, POWDER, FOR SOLUTION INTRAVENOUS at 17:09

## 2024-01-05 RX ADMIN — SODIUM CHLORIDE, PRESERVATIVE FREE 10 ML: 5 INJECTION INTRAVENOUS at 10:17

## 2024-01-05 RX ADMIN — CALCITRIOL CAPSULES 0.25 MCG 0.25 MCG: 0.25 CAPSULE ORAL at 10:13

## 2024-01-05 RX ADMIN — SEVELAMER CARBONATE 800 MG: 800 TABLET, FILM COATED ORAL at 10:13

## 2024-01-05 RX ADMIN — FOLIC ACID 1 MG: 1 TABLET ORAL at 10:13

## 2024-01-05 RX ADMIN — HYDROMORPHONE HYDROCHLORIDE 2 MG: 2 TABLET ORAL at 10:24

## 2024-01-05 RX ADMIN — MIDODRINE HYDROCHLORIDE 10 MG: 10 TABLET ORAL at 17:09

## 2024-01-05 RX ADMIN — LORAZEPAM 1 MG: 1 TABLET ORAL at 23:05

## 2024-01-05 RX ADMIN — IPRATROPIUM BROMIDE 0.5 MG: 0.5 SOLUTION RESPIRATORY (INHALATION) at 20:36

## 2024-01-05 RX ADMIN — MIDODRINE HYDROCHLORIDE 10 MG: 10 TABLET ORAL at 10:13

## 2024-01-05 RX ADMIN — ARFORMOTEROL TARTRATE 15 MCG: 15 SOLUTION RESPIRATORY (INHALATION) at 20:36

## 2024-01-05 RX ADMIN — IPRATROPIUM BROMIDE 0.5 MG: 0.5 SOLUTION RESPIRATORY (INHALATION) at 15:03

## 2024-01-05 RX ADMIN — IPRATROPIUM BROMIDE 0.5 MG: 0.5 SOLUTION RESPIRATORY (INHALATION) at 09:27

## 2024-01-05 ASSESSMENT — PAIN DESCRIPTION - ORIENTATION
ORIENTATION: LEFT
ORIENTATION: LEFT

## 2024-01-05 ASSESSMENT — PAIN DESCRIPTION - DESCRIPTORS: DESCRIPTORS: ACHING;THROBBING

## 2024-01-05 ASSESSMENT — PAIN SCALES - WONG BAKER: WONGBAKER_NUMERICALRESPONSE: 0

## 2024-01-05 ASSESSMENT — PAIN SCALES - GENERAL
PAINLEVEL_OUTOF10: 10
PAINLEVEL_OUTOF10: 10

## 2024-01-05 ASSESSMENT — PAIN DESCRIPTION - LOCATION
LOCATION: ARM
LOCATION: ARM

## 2024-01-06 LAB
ANION GAP SERPL CALC-SCNC: 6 MMOL/L (ref 3–18)
BASOPHILS # BLD: 0.1 K/UL (ref 0–0.1)
BASOPHILS NFR BLD: 0 % (ref 0–2)
BUN SERPL-MCNC: 27 MG/DL (ref 7–18)
BUN/CREAT SERPL: 9 (ref 12–20)
CALCIUM SERPL-MCNC: 8.4 MG/DL (ref 8.5–10.1)
CHLORIDE SERPL-SCNC: 97 MMOL/L (ref 100–111)
CO2 SERPL-SCNC: 28 MMOL/L (ref 21–32)
CREAT SERPL-MCNC: 3.12 MG/DL (ref 0.6–1.3)
DIFFERENTIAL METHOD BLD: ABNORMAL
EOSINOPHIL # BLD: 0.1 K/UL (ref 0–0.4)
EOSINOPHIL NFR BLD: 1 % (ref 0–5)
ERYTHROCYTE [DISTWIDTH] IN BLOOD BY AUTOMATED COUNT: 16.8 % (ref 11.6–14.5)
GLUCOSE SERPL-MCNC: 117 MG/DL (ref 74–99)
HCT VFR BLD AUTO: 27.1 % (ref 35–45)
HGB BLD-MCNC: 8.2 G/DL (ref 12–16)
IMM GRANULOCYTES # BLD AUTO: 0.1 K/UL (ref 0–0.04)
IMM GRANULOCYTES NFR BLD AUTO: 1 % (ref 0–0.5)
INR PPP: 1.4 (ref 0.9–1.1)
LYMPHOCYTES # BLD: 1 K/UL (ref 0.9–3.6)
LYMPHOCYTES NFR BLD: 9 % (ref 21–52)
MCH RBC QN AUTO: 27.4 PG (ref 24–34)
MCHC RBC AUTO-ENTMCNC: 30.3 G/DL (ref 31–37)
MCV RBC AUTO: 90.6 FL (ref 78–100)
MONOCYTES # BLD: 0.9 K/UL (ref 0.05–1.2)
MONOCYTES NFR BLD: 8 % (ref 3–10)
NEUTS SEG # BLD: 9.9 K/UL (ref 1.8–8)
NEUTS SEG NFR BLD: 82 % (ref 40–73)
NRBC # BLD: 0 K/UL (ref 0–0.01)
NRBC BLD-RTO: 0 PER 100 WBC
PLATELET # BLD AUTO: 81 K/UL (ref 135–420)
PMV BLD AUTO: 11.5 FL (ref 9.2–11.8)
POTASSIUM SERPL-SCNC: 3.4 MMOL/L (ref 3.5–5.5)
PROTHROMBIN TIME: 17.1 SEC (ref 11.9–14.7)
RBC # BLD AUTO: 2.99 M/UL (ref 4.2–5.3)
SODIUM SERPL-SCNC: 131 MMOL/L (ref 136–145)
WBC # BLD AUTO: 12 K/UL (ref 4.6–13.2)

## 2024-01-06 PROCEDURE — 2580000003 HC RX 258: Performed by: INTERNAL MEDICINE

## 2024-01-06 PROCEDURE — 1100000003 HC PRIVATE W/ TELEMETRY

## 2024-01-06 PROCEDURE — 93005 ELECTROCARDIOGRAM TRACING: CPT | Performed by: FAMILY MEDICINE

## 2024-01-06 PROCEDURE — 6360000002 HC RX W HCPCS: Performed by: INTERNAL MEDICINE

## 2024-01-06 PROCEDURE — 90935 HEMODIALYSIS ONE EVALUATION: CPT

## 2024-01-06 PROCEDURE — 6370000000 HC RX 637 (ALT 250 FOR IP): Performed by: HOSPITALIST

## 2024-01-06 PROCEDURE — 80048 BASIC METABOLIC PNL TOTAL CA: CPT

## 2024-01-06 PROCEDURE — 99231 SBSQ HOSP IP/OBS SF/LOW 25: CPT | Performed by: INTERNAL MEDICINE

## 2024-01-06 PROCEDURE — 94640 AIRWAY INHALATION TREATMENT: CPT

## 2024-01-06 PROCEDURE — 36415 COLL VENOUS BLD VENIPUNCTURE: CPT

## 2024-01-06 PROCEDURE — 2700000000 HC OXYGEN THERAPY PER DAY

## 2024-01-06 PROCEDURE — 2580000003 HC RX 258: Performed by: HOSPITALIST

## 2024-01-06 PROCEDURE — 87040 BLOOD CULTURE FOR BACTERIA: CPT

## 2024-01-06 PROCEDURE — 6370000000 HC RX 637 (ALT 250 FOR IP): Performed by: INTERNAL MEDICINE

## 2024-01-06 PROCEDURE — 94761 N-INVAS EAR/PLS OXIMETRY MLT: CPT

## 2024-01-06 PROCEDURE — 85610 PROTHROMBIN TIME: CPT

## 2024-01-06 PROCEDURE — 6360000002 HC RX W HCPCS: Performed by: HOSPITALIST

## 2024-01-06 PROCEDURE — 85025 COMPLETE CBC W/AUTO DIFF WBC: CPT

## 2024-01-06 RX ORDER — BUDESONIDE 0.5 MG/2ML
0.5 INHALANT ORAL
Status: DISCONTINUED | OUTPATIENT
Start: 2024-01-06 | End: 2024-01-10 | Stop reason: HOSPADM

## 2024-01-06 RX ADMIN — ATORVASTATIN CALCIUM 40 MG: 40 TABLET, FILM COATED ORAL at 21:30

## 2024-01-06 RX ADMIN — LORAZEPAM 1 MG: 1 TABLET ORAL at 11:29

## 2024-01-06 RX ADMIN — HYDROMORPHONE HYDROCHLORIDE 2 MG: 2 TABLET ORAL at 09:34

## 2024-01-06 RX ADMIN — BUDESONIDE INHALATION 500 MCG: 0.5 SUSPENSION RESPIRATORY (INHALATION) at 19:57

## 2024-01-06 RX ADMIN — ARFORMOTEROL TARTRATE 15 MCG: 15 SOLUTION RESPIRATORY (INHALATION) at 19:57

## 2024-01-06 RX ADMIN — CEFEPIME 2000 MG: 2 INJECTION, POWDER, FOR SOLUTION INTRAVENOUS at 17:29

## 2024-01-06 RX ADMIN — SEVELAMER CARBONATE 800 MG: 800 TABLET, FILM COATED ORAL at 09:05

## 2024-01-06 RX ADMIN — SEVELAMER CARBONATE 800 MG: 800 TABLET, FILM COATED ORAL at 21:30

## 2024-01-06 RX ADMIN — IPRATROPIUM BROMIDE 0.5 MG: 0.5 SOLUTION RESPIRATORY (INHALATION) at 19:57

## 2024-01-06 RX ADMIN — MINERAL OIL, PETROLATUM, PHENYLEPHRINE HCL: 2.5; 140; 749 OINTMENT TOPICAL at 09:09

## 2024-01-06 RX ADMIN — IPRATROPIUM BROMIDE 0.5 MG: 0.5 SOLUTION RESPIRATORY (INHALATION) at 07:21

## 2024-01-06 RX ADMIN — SEVELAMER CARBONATE 800 MG: 800 TABLET, FILM COATED ORAL at 15:12

## 2024-01-06 RX ADMIN — HYDROMORPHONE HYDROCHLORIDE 2 MG: 2 TABLET ORAL at 21:30

## 2024-01-06 RX ADMIN — HEPARIN SODIUM 5000 UNITS: 5000 INJECTION INTRAVENOUS; SUBCUTANEOUS at 15:13

## 2024-01-06 RX ADMIN — SODIUM CHLORIDE, PRESERVATIVE FREE 10 ML: 5 INJECTION INTRAVENOUS at 09:11

## 2024-01-06 RX ADMIN — FOLIC ACID 1 MG: 1 TABLET ORAL at 09:05

## 2024-01-06 RX ADMIN — CALCITRIOL CAPSULES 0.25 MCG 0.25 MCG: 0.25 CAPSULE ORAL at 09:05

## 2024-01-06 RX ADMIN — SODIUM CHLORIDE, PRESERVATIVE FREE 10 ML: 5 INJECTION INTRAVENOUS at 21:30

## 2024-01-06 RX ADMIN — IPRATROPIUM BROMIDE 0.5 MG: 0.5 SOLUTION RESPIRATORY (INHALATION) at 15:12

## 2024-01-06 RX ADMIN — ARFORMOTEROL TARTRATE 15 MCG: 15 SOLUTION RESPIRATORY (INHALATION) at 07:21

## 2024-01-06 ASSESSMENT — PAIN DESCRIPTION - ORIENTATION: ORIENTATION: LEFT

## 2024-01-06 ASSESSMENT — PAIN SCALES - GENERAL
PAINLEVEL_OUTOF10: 10
PAINLEVEL_OUTOF10: 0
PAINLEVEL_OUTOF10: 7

## 2024-01-06 ASSESSMENT — PAIN DESCRIPTION - LOCATION
LOCATION: ARM;BUTTOCKS
LOCATION: ARM;FOOT

## 2024-01-06 ASSESSMENT — PAIN DESCRIPTION - DESCRIPTORS
DESCRIPTORS: ACHING
DESCRIPTORS: ACHING;BURNING

## 2024-01-07 LAB
ALBUMIN SERPL-MCNC: 1.6 G/DL (ref 3.4–5)
ALBUMIN/GLOB SERPL: 0.5 (ref 0.8–1.7)
ALP SERPL-CCNC: 130 U/L (ref 45–117)
ALT SERPL-CCNC: 10 U/L (ref 13–56)
ANION GAP SERPL CALC-SCNC: 3 MMOL/L (ref 3–18)
AST SERPL-CCNC: 13 U/L (ref 10–38)
BACTERIA SPEC CULT: ABNORMAL
BACTERIA SPEC CULT: ABNORMAL
BASOPHILS # BLD: 0.1 K/UL (ref 0–0.1)
BASOPHILS NFR BLD: 1 % (ref 0–2)
BILIRUB SERPL-MCNC: 0.9 MG/DL (ref 0.2–1)
BUN SERPL-MCNC: 18 MG/DL (ref 7–18)
BUN/CREAT SERPL: 7 (ref 12–20)
CALCIUM SERPL-MCNC: 8.3 MG/DL (ref 8.5–10.1)
CHLORIDE SERPL-SCNC: 103 MMOL/L (ref 100–111)
CO2 SERPL-SCNC: 26 MMOL/L (ref 21–32)
CREAT SERPL-MCNC: 2.56 MG/DL (ref 0.6–1.3)
DIFFERENTIAL METHOD BLD: ABNORMAL
EKG ATRIAL RATE: 99 BPM
EKG DIAGNOSIS: NORMAL
EKG P AXIS: 48 DEGREES
EKG P-R INTERVAL: 150 MS
EKG Q-T INTERVAL: 336 MS
EKG QRS DURATION: 74 MS
EKG QTC CALCULATION (BAZETT): 431 MS
EKG R AXIS: 21 DEGREES
EKG T AXIS: 16 DEGREES
EKG VENTRICULAR RATE: 99 BPM
EOSINOPHIL # BLD: 0.1 K/UL (ref 0–0.4)
EOSINOPHIL NFR BLD: 1 % (ref 0–5)
ERYTHROCYTE [DISTWIDTH] IN BLOOD BY AUTOMATED COUNT: 16.8 % (ref 11.6–14.5)
GLOBULIN SER CALC-MCNC: 3.3 G/DL (ref 2–4)
GLUCOSE SERPL-MCNC: 89 MG/DL (ref 74–99)
GRAM STN SPEC: ABNORMAL
HCT VFR BLD AUTO: 22.4 % (ref 35–45)
HGB BLD-MCNC: 7 G/DL (ref 12–16)
IMM GRANULOCYTES # BLD AUTO: 0.3 K/UL (ref 0–0.04)
IMM GRANULOCYTES NFR BLD AUTO: 3 % (ref 0–0.5)
LYMPHOCYTES # BLD: 1.2 K/UL (ref 0.9–3.6)
LYMPHOCYTES NFR BLD: 11 % (ref 21–52)
MAGNESIUM SERPL-MCNC: 1.7 MG/DL (ref 1.6–2.6)
MCH RBC QN AUTO: 27.9 PG (ref 24–34)
MCHC RBC AUTO-ENTMCNC: 31.3 G/DL (ref 31–37)
MCV RBC AUTO: 89.2 FL (ref 78–100)
MONOCYTES # BLD: 0.9 K/UL (ref 0.05–1.2)
MONOCYTES NFR BLD: 8 % (ref 3–10)
NEUTS SEG # BLD: 8.3 K/UL (ref 1.8–8)
NEUTS SEG NFR BLD: 76 % (ref 40–73)
NRBC # BLD: 0 K/UL (ref 0–0.01)
NRBC BLD-RTO: 0 PER 100 WBC
PLATELET # BLD AUTO: 58 K/UL (ref 135–420)
PLATELET COMMENT: ABNORMAL
PMV BLD AUTO: 10.3 FL (ref 9.2–11.8)
POTASSIUM SERPL-SCNC: 3.5 MMOL/L (ref 3.5–5.5)
PROT SERPL-MCNC: 4.9 G/DL (ref 6.4–8.2)
RBC # BLD AUTO: 2.51 M/UL (ref 4.2–5.3)
RBC MORPH BLD: ABNORMAL
SERVICE CMNT-IMP: ABNORMAL
SERVICE CMNT-IMP: ABNORMAL
SODIUM SERPL-SCNC: 132 MMOL/L (ref 136–145)
WBC # BLD AUTO: 10.9 K/UL (ref 4.6–13.2)

## 2024-01-07 PROCEDURE — 83735 ASSAY OF MAGNESIUM: CPT

## 2024-01-07 PROCEDURE — 93010 ELECTROCARDIOGRAM REPORT: CPT | Performed by: INTERNAL MEDICINE

## 2024-01-07 PROCEDURE — 36415 COLL VENOUS BLD VENIPUNCTURE: CPT

## 2024-01-07 PROCEDURE — 94761 N-INVAS EAR/PLS OXIMETRY MLT: CPT

## 2024-01-07 PROCEDURE — 6360000002 HC RX W HCPCS: Performed by: HOSPITALIST

## 2024-01-07 PROCEDURE — 6370000000 HC RX 637 (ALT 250 FOR IP): Performed by: INTERNAL MEDICINE

## 2024-01-07 PROCEDURE — 6360000002 HC RX W HCPCS: Performed by: INTERNAL MEDICINE

## 2024-01-07 PROCEDURE — 2580000003 HC RX 258: Performed by: HOSPITALIST

## 2024-01-07 PROCEDURE — 94640 AIRWAY INHALATION TREATMENT: CPT

## 2024-01-07 PROCEDURE — 6370000000 HC RX 637 (ALT 250 FOR IP): Performed by: HOSPITALIST

## 2024-01-07 PROCEDURE — 99232 SBSQ HOSP IP/OBS MODERATE 35: CPT | Performed by: STUDENT IN AN ORGANIZED HEALTH CARE EDUCATION/TRAINING PROGRAM

## 2024-01-07 PROCEDURE — 1100000003 HC PRIVATE W/ TELEMETRY

## 2024-01-07 PROCEDURE — 80053 COMPREHEN METABOLIC PANEL: CPT

## 2024-01-07 PROCEDURE — 85025 COMPLETE CBC W/AUTO DIFF WBC: CPT

## 2024-01-07 PROCEDURE — 2700000000 HC OXYGEN THERAPY PER DAY

## 2024-01-07 PROCEDURE — 2580000003 HC RX 258: Performed by: INTERNAL MEDICINE

## 2024-01-07 RX ADMIN — SODIUM CHLORIDE, PRESERVATIVE FREE 10 ML: 5 INJECTION INTRAVENOUS at 20:43

## 2024-01-07 RX ADMIN — ATORVASTATIN CALCIUM 40 MG: 40 TABLET, FILM COATED ORAL at 20:43

## 2024-01-07 RX ADMIN — FOLIC ACID 1 MG: 1 TABLET ORAL at 08:18

## 2024-01-07 RX ADMIN — CALCITRIOL CAPSULES 0.25 MCG 0.25 MCG: 0.25 CAPSULE ORAL at 08:17

## 2024-01-07 RX ADMIN — SEVELAMER CARBONATE 800 MG: 800 TABLET, FILM COATED ORAL at 20:43

## 2024-01-07 RX ADMIN — SEVELAMER CARBONATE 800 MG: 800 TABLET, FILM COATED ORAL at 08:18

## 2024-01-07 RX ADMIN — BUDESONIDE INHALATION 500 MCG: 0.5 SUSPENSION RESPIRATORY (INHALATION) at 20:01

## 2024-01-07 RX ADMIN — BUDESONIDE INHALATION 500 MCG: 0.5 SUSPENSION RESPIRATORY (INHALATION) at 08:25

## 2024-01-07 RX ADMIN — ARFORMOTEROL TARTRATE 15 MCG: 15 SOLUTION RESPIRATORY (INHALATION) at 08:25

## 2024-01-07 RX ADMIN — IPRATROPIUM BROMIDE 0.5 MG: 0.5 SOLUTION RESPIRATORY (INHALATION) at 08:25

## 2024-01-07 RX ADMIN — IPRATROPIUM BROMIDE 0.5 MG: 0.5 SOLUTION RESPIRATORY (INHALATION) at 20:01

## 2024-01-07 RX ADMIN — ARFORMOTEROL TARTRATE 15 MCG: 15 SOLUTION RESPIRATORY (INHALATION) at 20:01

## 2024-01-07 RX ADMIN — LORAZEPAM 1 MG: 1 TABLET ORAL at 18:12

## 2024-01-07 RX ADMIN — LORAZEPAM 1 MG: 1 TABLET ORAL at 01:02

## 2024-01-07 RX ADMIN — CEFEPIME 2000 MG: 2 INJECTION, POWDER, FOR SOLUTION INTRAVENOUS at 16:43

## 2024-01-07 ASSESSMENT — PAIN SCALES - GENERAL: PAINLEVEL_OUTOF10: 0

## 2024-01-08 PROBLEM — Z99.2 ESRD (END STAGE RENAL DISEASE) ON DIALYSIS (HCC): Status: ACTIVE | Noted: 2024-01-08

## 2024-01-08 PROBLEM — N18.6 ESRD (END STAGE RENAL DISEASE) ON DIALYSIS (HCC): Status: ACTIVE | Noted: 2024-01-08

## 2024-01-08 LAB
ALBUMIN SERPL-MCNC: 1.6 G/DL (ref 3.4–5)
ALBUMIN/GLOB SERPL: 0.5 (ref 0.8–1.7)
ALP SERPL-CCNC: 133 U/L (ref 45–117)
ALT SERPL-CCNC: 9 U/L (ref 13–56)
ANION GAP SERPL CALC-SCNC: 6 MMOL/L (ref 3–18)
AST SERPL-CCNC: 12 U/L (ref 10–38)
BASOPHILS # BLD: 0 K/UL (ref 0–0.1)
BASOPHILS NFR BLD: 0 % (ref 0–2)
BILIRUB SERPL-MCNC: 0.9 MG/DL (ref 0.2–1)
BUN SERPL-MCNC: 23 MG/DL (ref 7–18)
BUN/CREAT SERPL: 7 (ref 12–20)
CALCIUM SERPL-MCNC: 8.4 MG/DL (ref 8.5–10.1)
CHLORIDE SERPL-SCNC: 102 MMOL/L (ref 100–111)
CO2 SERPL-SCNC: 28 MMOL/L (ref 21–32)
CREAT SERPL-MCNC: 3.1 MG/DL (ref 0.6–1.3)
DIFFERENTIAL METHOD BLD: ABNORMAL
EOSINOPHIL # BLD: 0.1 K/UL (ref 0–0.4)
EOSINOPHIL NFR BLD: 1 % (ref 0–5)
ERYTHROCYTE [DISTWIDTH] IN BLOOD BY AUTOMATED COUNT: 16.9 % (ref 11.6–14.5)
GLOBULIN SER CALC-MCNC: 3.4 G/DL (ref 2–4)
GLUCOSE SERPL-MCNC: 94 MG/DL (ref 74–99)
HCT VFR BLD AUTO: 22.9 % (ref 35–45)
HGB BLD-MCNC: 6.8 G/DL (ref 12–16)
HISTORY CHECK: NORMAL
IMM GRANULOCYTES # BLD AUTO: 0.2 K/UL (ref 0–0.04)
IMM GRANULOCYTES NFR BLD AUTO: 2 % (ref 0–0.5)
LYMPHOCYTES # BLD: 0.8 K/UL (ref 0.9–3.6)
LYMPHOCYTES NFR BLD: 8 % (ref 21–52)
MAGNESIUM SERPL-MCNC: 1.8 MG/DL (ref 1.6–2.6)
MCH RBC QN AUTO: 26.9 PG (ref 24–34)
MCHC RBC AUTO-ENTMCNC: 29.7 G/DL (ref 31–37)
MCV RBC AUTO: 90.5 FL (ref 78–100)
MONOCYTES # BLD: 0.9 K/UL (ref 0.05–1.2)
MONOCYTES NFR BLD: 9 % (ref 3–10)
NEUTS SEG # BLD: 8.2 K/UL (ref 1.8–8)
NEUTS SEG NFR BLD: 80 % (ref 40–73)
NRBC # BLD: 0 K/UL (ref 0–0.01)
NRBC BLD-RTO: 0 PER 100 WBC
PLATELET # BLD AUTO: 69 K/UL (ref 135–420)
PMV BLD AUTO: 10.7 FL (ref 9.2–11.8)
POTASSIUM SERPL-SCNC: 3.4 MMOL/L (ref 3.5–5.5)
PROT SERPL-MCNC: 5 G/DL (ref 6.4–8.2)
RBC # BLD AUTO: 2.53 M/UL (ref 4.2–5.3)
SODIUM SERPL-SCNC: 136 MMOL/L (ref 136–145)
WBC # BLD AUTO: 10.2 K/UL (ref 4.6–13.2)

## 2024-01-08 PROCEDURE — 6360000002 HC RX W HCPCS: Performed by: STUDENT IN AN ORGANIZED HEALTH CARE EDUCATION/TRAINING PROGRAM

## 2024-01-08 PROCEDURE — 85025 COMPLETE CBC W/AUTO DIFF WBC: CPT

## 2024-01-08 PROCEDURE — P9016 RBC LEUKOCYTES REDUCED: HCPCS

## 2024-01-08 PROCEDURE — 87205 SMEAR GRAM STAIN: CPT

## 2024-01-08 PROCEDURE — 6370000000 HC RX 637 (ALT 250 FOR IP): Performed by: INTERNAL MEDICINE

## 2024-01-08 PROCEDURE — 99232 SBSQ HOSP IP/OBS MODERATE 35: CPT | Performed by: STUDENT IN AN ORGANIZED HEALTH CARE EDUCATION/TRAINING PROGRAM

## 2024-01-08 PROCEDURE — 36415 COLL VENOUS BLD VENIPUNCTURE: CPT

## 2024-01-08 PROCEDURE — 99232 SBSQ HOSP IP/OBS MODERATE 35: CPT | Performed by: NURSE PRACTITIONER

## 2024-01-08 PROCEDURE — 94761 N-INVAS EAR/PLS OXIMETRY MLT: CPT

## 2024-01-08 PROCEDURE — 2580000003 HC RX 258: Performed by: HOSPITALIST

## 2024-01-08 PROCEDURE — 6360000002 HC RX W HCPCS: Performed by: INTERNAL MEDICINE

## 2024-01-08 PROCEDURE — 83735 ASSAY OF MAGNESIUM: CPT

## 2024-01-08 PROCEDURE — 94664 DEMO&/EVAL PT USE INHALER: CPT

## 2024-01-08 PROCEDURE — 1100000003 HC PRIVATE W/ TELEMETRY

## 2024-01-08 PROCEDURE — 80053 COMPREHEN METABOLIC PANEL: CPT

## 2024-01-08 PROCEDURE — 6360000002 HC RX W HCPCS: Performed by: HOSPITALIST

## 2024-01-08 PROCEDURE — 87070 CULTURE OTHR SPECIMN AEROBIC: CPT

## 2024-01-08 PROCEDURE — 86901 BLOOD TYPING SEROLOGIC RH(D): CPT

## 2024-01-08 PROCEDURE — 87040 BLOOD CULTURE FOR BACTERIA: CPT

## 2024-01-08 PROCEDURE — 94640 AIRWAY INHALATION TREATMENT: CPT

## 2024-01-08 PROCEDURE — 90935 HEMODIALYSIS ONE EVALUATION: CPT

## 2024-01-08 PROCEDURE — 86850 RBC ANTIBODY SCREEN: CPT

## 2024-01-08 PROCEDURE — 2700000000 HC OXYGEN THERAPY PER DAY

## 2024-01-08 PROCEDURE — 86900 BLOOD TYPING SEROLOGIC ABO: CPT

## 2024-01-08 PROCEDURE — 86923 COMPATIBILITY TEST ELECTRIC: CPT

## 2024-01-08 PROCEDURE — 6370000000 HC RX 637 (ALT 250 FOR IP): Performed by: HOSPITALIST

## 2024-01-08 PROCEDURE — 87154 CUL TYP ID BLD PTHGN 6+ TRGT: CPT

## 2024-01-08 PROCEDURE — 99233 SBSQ HOSP IP/OBS HIGH 50: CPT | Performed by: INTERNAL MEDICINE

## 2024-01-08 PROCEDURE — 36430 TRANSFUSION BLD/BLD COMPNT: CPT

## 2024-01-08 PROCEDURE — 2580000003 HC RX 258: Performed by: STUDENT IN AN ORGANIZED HEALTH CARE EDUCATION/TRAINING PROGRAM

## 2024-01-08 RX ORDER — SODIUM CHLORIDE 9 MG/ML
INJECTION, SOLUTION INTRAVENOUS PRN
Status: DISCONTINUED | OUTPATIENT
Start: 2024-01-08 | End: 2024-01-10 | Stop reason: HOSPADM

## 2024-01-08 RX ORDER — LORAZEPAM 2 MG/ML
2 INJECTION INTRAMUSCULAR ONCE
Status: COMPLETED | OUTPATIENT
Start: 2024-01-08 | End: 2024-01-08

## 2024-01-08 RX ADMIN — BUDESONIDE INHALATION 500 MCG: 0.5 SUSPENSION RESPIRATORY (INHALATION) at 08:25

## 2024-01-08 RX ADMIN — ARFORMOTEROL TARTRATE 15 MCG: 15 SOLUTION RESPIRATORY (INHALATION) at 08:25

## 2024-01-08 RX ADMIN — LORAZEPAM 1 MG: 1 TABLET ORAL at 21:53

## 2024-01-08 RX ADMIN — ATORVASTATIN CALCIUM 40 MG: 40 TABLET, FILM COATED ORAL at 21:53

## 2024-01-08 RX ADMIN — SEVELAMER CARBONATE 800 MG: 800 TABLET, FILM COATED ORAL at 08:18

## 2024-01-08 RX ADMIN — BUDESONIDE INHALATION 500 MCG: 0.5 SUSPENSION RESPIRATORY (INHALATION) at 19:45

## 2024-01-08 RX ADMIN — LORAZEPAM 1 MG: 1 TABLET ORAL at 08:19

## 2024-01-08 RX ADMIN — SODIUM CHLORIDE, PRESERVATIVE FREE 10 ML: 5 INJECTION INTRAVENOUS at 21:51

## 2024-01-08 RX ADMIN — LORAZEPAM 2 MG: 2 INJECTION, SOLUTION INTRAMUSCULAR; INTRAVENOUS at 16:57

## 2024-01-08 RX ADMIN — CALCITRIOL CAPSULES 0.25 MCG 0.25 MCG: 0.25 CAPSULE ORAL at 08:19

## 2024-01-08 RX ADMIN — IPRATROPIUM BROMIDE 0.5 MG: 0.5 SOLUTION RESPIRATORY (INHALATION) at 19:45

## 2024-01-08 RX ADMIN — GABAPENTIN 100 MG: 100 CAPSULE ORAL at 21:53

## 2024-01-08 RX ADMIN — IPRATROPIUM BROMIDE 0.5 MG: 0.5 SOLUTION RESPIRATORY (INHALATION) at 13:57

## 2024-01-08 RX ADMIN — CEFEPIME 2000 MG: 2 INJECTION, POWDER, FOR SOLUTION INTRAVENOUS at 22:01

## 2024-01-08 RX ADMIN — HYDROMORPHONE HYDROCHLORIDE 2 MG: 2 TABLET ORAL at 12:42

## 2024-01-08 RX ADMIN — SEVELAMER CARBONATE 800 MG: 800 TABLET, FILM COATED ORAL at 21:53

## 2024-01-08 RX ADMIN — FOLIC ACID 1 MG: 1 TABLET ORAL at 08:19

## 2024-01-08 RX ADMIN — ARFORMOTEROL TARTRATE 15 MCG: 15 SOLUTION RESPIRATORY (INHALATION) at 19:45

## 2024-01-08 RX ADMIN — IPRATROPIUM BROMIDE 0.5 MG: 0.5 SOLUTION RESPIRATORY (INHALATION) at 08:25

## 2024-01-08 ASSESSMENT — PAIN DESCRIPTION - DESCRIPTORS: DESCRIPTORS: ACHING

## 2024-01-08 ASSESSMENT — PAIN SCALES - GENERAL
PAINLEVEL_OUTOF10: 0
PAINLEVEL_OUTOF10: 0
PAINLEVEL_OUTOF10: 5

## 2024-01-08 ASSESSMENT — PAIN DESCRIPTION - LOCATION: LOCATION: BACK

## 2024-01-08 NOTE — CONSULTS
ASSESSMENT:   ccRCC of right kidney s/p biopsy in 12/22 with likely mets to liver and lung               JJ stent placed 12/22 for hydronephrosis exchanged 6/23/2023              S/p right renal artery embolization 6/14/23             Oncology following.      S/p left nephrectomy     Sepsis / UTI / Gross Hematuria         UA Ucx 10/9 pending          Hgb 6.3<7.2         WBC 4      Anemia   ESRD on HD  CAD  HTN  Chronic combined CHF  COPD  Pulmonary HTN      PLAN:    Discussed with patient and family today. There is no intervention needed acutely during this admission.     We did discuss involving medical oncology for discussion of targeted therapies, but the patient was clear that she would not accept any form of \"chemo\" therefore involving them is likely not needed at this time    We did discuss the need to exchange her stent which has been in for about 3 months. It will need to be exchanged in 3 months from now so will make an appt for her in 2 months with clinic.     There was discussion of possible nephrectomy after being on needed dual antiplatelet therapy for 6 months, during last admission, but not clear that she would accept intervention. We will make follow up for her and discuss all of these issues at that time.     Follow up arranged? YES - Yaima Saldaña MD    [phone]    January 7, 2024 7:53 PM        Chief Complaint   Patient presents with    Fever       HISTORY OF PRESENT ILLNESS:  Latha Oseguera is a 53 y.o. female who is seen in consultation for renal mass with metastatic disease.     HPI:  Location right kidney  Duration months   Associated signs/symptoms none  Modifying factors none  Severity severe     Past Medical History:   Diagnosis Date    CHF (congestive heart failure) (HCC)     Chronic kidney disease     COPD (chronic obstructive pulmonary disease) (HCC)     Diabetes mellitus (HCC)     Hypertension        No past surgical history on file.    Social 
    Interventional Radiology Consult Note  Patient: Latha Oseguera               Sex: female          DOA: 1/3/2024       YOB: 1970      Age:  53 y.o.        LOS:  LOS: 2 days              Assessment   Pulmonary nodules concerning for metastatic disease  Right renal mass  ESRD  sepsis    Image guided pulmonary nodule biopsy is indicated to aid in diagnostics and guide further management.    Case and images reviewed by Dr. Rowan .    Plan     Image guided lung nodule biopsy with moderate sedation next week as schedule allows    Additional specimen orders per referring team    - NPO after midnight prior to procedure  - Hold SQ heparin prior to procedure  - Labs reviewed -- okay to proceed     Thank you,  ANDREA Ornelas  [id number]    HPI:     Consult for evaluation of bilateral pulmonary nodules received from Dr Smith and reviewed with Dr. Rowan.    Latha Oseguera is a 53 y.o. female with a PMH of ESRD on dialysis, CAD, s/p TAVR, HTN, HLD who presented to [facility] on 1/3/24 for AMS.  ED evaluation patient was febrile 102.5F and tachycardic with neutrophil predominance on cbc. Chest xray showed likely pulmonary edema and small bilateral pulmonary effusions. Respiratory panel negative. CT of head showed no acute process. CT abdomen revealed large right renal mass consistent with RCC. CT of chest shows bilateral pulmonary nodules concerning for metastatic disease.   Patient was admitted for further evaluation and management.  Not on blood thinning medication. She is intermittently confused and unable to provide detailed history. Today the patient has no complaints. Denies chest pain, shortness of breath, abdominal pain, weight loss, or fever.    The anticipated procedure was discussed in detail including risk of injury, infection, and bleeding. All questions were answered and concerns addressed. Informed consents will be obtained prior to procedure     Past Medical History:   Diagnosis Date    CHF (congestive heart 
Infectious Disease Consultation Note        Reason: Sepsis, gram-negative bacteremia    Current abx Prior abx    Cefepime, vancomycin x1 on 1/3     Lines:       Assessment :  53 y.o. female with past medical hx of end-stage renal disease on dialysis, status post TAVR, CAD status post PCI LAD 2/23, hypertension, renal cell carcinoma, dyslipidemia presented to ED on 1/3/2024 with complaints of fever, weakness    Clinical presentation consistent with sepsis-present on admission due to gram-negative bloodstream infection (positive blood culture 1/3/2024), cystitis, complicated UTI in the setting of right ureteral stent/probable  malignancy    No urine output for 2 days-rule out acute urinary retention    Right renal mass-seen on CT scan concerning for renal cell cancer    Loculated left-sided pleural effusion-likely malignant.  Pulmonary follow-up appreciated.  Recommendations for thoracentesis noted    Altered mental status-likely metabolic encephalopathy    Recommendations:    Start cefepime  Obtain bladder scan  Follow-up nephrology recommendations regarding dialysis  Follow-up pulmonary recommendations regarding pleural effusion  Follow-up ID/susceptibility of gram-negative logan in blood culture.  Modify antibiotics accordingly  Repeat blood culture tomorrow  Monitor for hemodialysis catheter/prosthetic aortic valve infection  Follow-up palliative care recommendation regarding goals of care      Prognosis: Poor    Thank you for consultation request. Above plan was discussed in details with patient,  and dr Coates. Please call me if any further questions or concerns. Will continue to participate in the care of this patient.  HPI:    53 y.o. female with past medical hx of end-stage renal disease on dialysis, status post TAVR, CAD status post PCI LAD 2/23, hypertension, renal cell carcinoma, dyslipidemia presented to ED on 1/3/2024 with complaints of fever, weakness      She was admitted to [facility] hospitalist service 
[facility]  Pulmonary, Critical Care, and Sleep Medicine    Initial Patient Consult    Name: Latha Oseguera MRN: [id number]   : 1970 Hospital: [facility]   Date: 1/3/2024        IMPRESSION:   L sided Loculated pleural effusion vs pleural based mass/metastasis. Favor the latter upon review of images. Absence of prior studies and lack of IV contrast makes it difficult to differentiate  Free flowing R sided pleural effusion  Encephalopathy, likely acute and toxic-metabolic  Sepsis due to UTI  Emphysema on CT, suspect undiagnosed COPD and possible CO2 retention given smoking history   ESRD on IHD  HFrEF compensated  Chronic anemia with history of GI bleed  R renal mass presumed RCC      RECOMMENDATIONS:   Schedule CT with IV contrast ASAP. Consider biopsy if pleural based mass demonstrated.   US guided diagnostic/therapeutic thoracentesis of R pleural effusion with cytology to R/o malignant effusion. This will likely result in some relief of dyspnea.  No current indication for supplemental O2  Stat ABG, ammonia to evaluate for causes of encephalopathy  Agree with empiric antibiotics for UTI and possible pneumonia. Streamline to cultures.   Neuro monitoring and airway watch. Pt appears able to protect her airway at this time.  Will follow with you. Thank you for consulting     Subjective:     This patient has been seen and evaluated at the request of Dr. He for pleural effusion. Patient is a 53 y.o. female with history of renal cell cancer, ESRD on dialysis, HFrEF 30%, CAD S/p PCI LAD in 2023. Presented to the ED with fever, weakness and confusion and was found to be septic with UTI. Noncontrast CT chest also showed a R sided pleural effusion and multiple lung nodules, below. Pt unable to give a more detailed history due to AMS.       Past Medical History:   Diagnosis Date    CHF (congestive heart failure) (HCC)     Chronic kidney disease     COPD (chronic obstructive 
ears/nose/mouth/throat, respiratory, gastrointestinal, genitourinary, musculoskeletal, integumentary, neurologic, psychiatric, endocrine. Positive findings noted below.  Modified ESAS Completed by: provider                                            PHYSICAL EXAM:     Wt Readings from Last 3 Encounters:   01/04/24 72.6 kg (160 lb)   12/24/23 77.6 kg (171 lb)     Blood pressure 119/63, pulse 96, temperature 97.2 °F (36.2 °C), resp. rate 20, height 1.626 m (5' 4\"), weight 72.6 kg (160 lb), SpO2 97 %.  Pain:                         Constitutional: Chronically ill-appearing female lying in bed in no acute distress  Eyes: pupils equal  Cardiovascular: regular rhythm  Respiratory: breathing not labored  Gastrointestinal: soft   Skin: warm, dry  Neurologic: following commands, alert answers most of our questions oriented x 2-3     HISTORY:     Principal Problem:    Healthcare-associated pneumonia  Active Problems:    HFrEF (heart failure with reduced ejection fraction) ([facility])    Severe sepsis ([facility])    Renal mass    Pulmonary nodules    Acute metabolic encephalopathy  Resolved Problems:    * No resolved hospital problems. *    Past Medical History:   Diagnosis Date    CHF (congestive heart failure) ([facility])     Chronic kidney disease     COPD (chronic obstructive pulmonary disease) (HCC)     Diabetes mellitus (HCC)     Hypertension       No past surgical history on file.   No family history on file.  History reviewed, no pertinent family history.  Social History     Tobacco Use    Smoking status: Every Day     Current packs/day: 0.50     Types: Cigarettes    Smokeless tobacco: Never   Substance Use Topics    Alcohol use: Not Currently     Allergies   Allergen Reactions    Chlorhexidine     Codeine     Dulaglutide     Januvia [Sitagliptin]     Penicillins     Tramadol     Trazodone And Nefazodone Itching      Current Facility-Administered Medications   Medication Dose Route Frequency    phenylephrine-mineral oil-petrolatum 
01/03/24  0825   WBC 11.7   RBC 2.79*   HGB 7.8*   HCT 24.5*   PLT 87*         Iron/Ferritin No results for input(s): \"IRON\" in the last 72 hours.    Invalid input(s): \"TIBCCALC\"   PTH/VIT D No results for input(s): \"PTH\" in the last 72 hours.    Invalid input(s): \"VITD\"           Juan Carrera MD  1/3/2024  4:25 PM      January 3, 2024

## 2024-01-08 NOTE — CONSENT
Informed Consent for Blood Component Transfusion Note    I have discussed with the patient the rationale for blood component transfusion; its benefits in treating or preventing fatigue, organ damage, or death; and its risk which includes mild transfusion reactions, rare risk of blood borne infection, or more serious but rare reactions. I have discussed the alternatives to transfusion, including the risk and consequences of not receiving transfusion. The patient had an opportunity to ask questions and had agreed to proceed with transfusion of blood components.    Electronically signed by Radha Walker DO on 1/8/24 at 11:14 AM EST

## 2024-01-09 ENCOUNTER — APPOINTMENT (OUTPATIENT)
Facility: HOSPITAL | Age: 54
DRG: 720 | End: 2024-01-09
Payer: MEDICAID

## 2024-01-09 LAB
ABO + RH BLD: NORMAL
ALBUMIN SERPL-MCNC: 2 G/DL (ref 3.4–5)
ALBUMIN/GLOB SERPL: 0.6 (ref 0.8–1.7)
ALP SERPL-CCNC: 129 U/L (ref 45–117)
ALT SERPL-CCNC: 10 U/L (ref 13–56)
ANION GAP SERPL CALC-SCNC: 8 MMOL/L (ref 3–18)
AST SERPL-CCNC: 16 U/L (ref 10–38)
BASOPHILS # BLD: 0.1 K/UL (ref 0–0.1)
BASOPHILS NFR BLD: 1 % (ref 0–2)
BILIRUB SERPL-MCNC: 1.1 MG/DL (ref 0.2–1)
BLD PROD TYP BPU: NORMAL
BLOOD BANK DISPENSE STATUS: NORMAL
BLOOD GROUP ANTIBODIES SERPL: NORMAL
BPU ID: NORMAL
BUN SERPL-MCNC: 17 MG/DL (ref 7–18)
BUN/CREAT SERPL: 7 (ref 12–20)
CALCIUM SERPL-MCNC: 9 MG/DL (ref 8.5–10.1)
CALLED TO: NORMAL
CHLORIDE SERPL-SCNC: 104 MMOL/L (ref 100–111)
CO2 SERPL-SCNC: 27 MMOL/L (ref 21–32)
CREAT SERPL-MCNC: 2.45 MG/DL (ref 0.6–1.3)
CROSSMATCH RESULT: NORMAL
DIFFERENTIAL METHOD BLD: ABNORMAL
EOSINOPHIL # BLD: 0.1 K/UL (ref 0–0.4)
EOSINOPHIL NFR BLD: 1 % (ref 0–5)
ERYTHROCYTE [DISTWIDTH] IN BLOOD BY AUTOMATED COUNT: 17.1 % (ref 11.6–14.5)
GLOBULIN SER CALC-MCNC: 3.6 G/DL (ref 2–4)
GLUCOSE SERPL-MCNC: 63 MG/DL (ref 74–99)
HCT VFR BLD AUTO: 26.4 % (ref 35–45)
HGB BLD-MCNC: 8.2 G/DL (ref 12–16)
IMM GRANULOCYTES # BLD AUTO: 0.2 K/UL (ref 0–0.04)
IMM GRANULOCYTES NFR BLD AUTO: 1 % (ref 0–0.5)
LYMPHOCYTES # BLD: 1 K/UL (ref 0.9–3.6)
LYMPHOCYTES NFR BLD: 7 % (ref 21–52)
MAGNESIUM SERPL-MCNC: 1.9 MG/DL (ref 1.6–2.6)
MCH RBC QN AUTO: 27.9 PG (ref 24–34)
MCHC RBC AUTO-ENTMCNC: 31.1 G/DL (ref 31–37)
MCV RBC AUTO: 89.8 FL (ref 78–100)
MONOCYTES # BLD: 1 K/UL (ref 0.05–1.2)
MONOCYTES NFR BLD: 7 % (ref 3–10)
NEUTS SEG # BLD: 10.9 K/UL (ref 1.8–8)
NEUTS SEG NFR BLD: 83 % (ref 40–73)
NRBC # BLD: 0 K/UL (ref 0–0.01)
NRBC BLD-RTO: 0 PER 100 WBC
PLATELET # BLD AUTO: 59 K/UL (ref 135–420)
PMV BLD AUTO: 10.5 FL (ref 9.2–11.8)
POTASSIUM SERPL-SCNC: 3.2 MMOL/L (ref 3.5–5.5)
PROT SERPL-MCNC: 5.6 G/DL (ref 6.4–8.2)
RBC # BLD AUTO: 2.94 M/UL (ref 4.2–5.3)
SODIUM SERPL-SCNC: 139 MMOL/L (ref 136–145)
SPECIMEN EXP DATE BLD: NORMAL
UNIT DIVISION: 0
WBC # BLD AUTO: 13.1 K/UL (ref 4.6–13.2)

## 2024-01-09 PROCEDURE — 6360000002 HC RX W HCPCS: Performed by: INTERNAL MEDICINE

## 2024-01-09 PROCEDURE — 83735 ASSAY OF MAGNESIUM: CPT

## 2024-01-09 PROCEDURE — 36415 COLL VENOUS BLD VENIPUNCTURE: CPT

## 2024-01-09 PROCEDURE — 71045 X-RAY EXAM CHEST 1 VIEW: CPT

## 2024-01-09 PROCEDURE — 6360000002 HC RX W HCPCS: Performed by: HOSPITALIST

## 2024-01-09 PROCEDURE — 80053 COMPREHEN METABOLIC PANEL: CPT

## 2024-01-09 PROCEDURE — 99232 SBSQ HOSP IP/OBS MODERATE 35: CPT | Performed by: STUDENT IN AN ORGANIZED HEALTH CARE EDUCATION/TRAINING PROGRAM

## 2024-01-09 PROCEDURE — 6360000002 HC RX W HCPCS: Performed by: STUDENT IN AN ORGANIZED HEALTH CARE EDUCATION/TRAINING PROGRAM

## 2024-01-09 PROCEDURE — 85025 COMPLETE CBC W/AUTO DIFF WBC: CPT

## 2024-01-09 PROCEDURE — APPSS15 APP SPLIT SHARED TIME 0-15 MINUTES

## 2024-01-09 PROCEDURE — 87040 BLOOD CULTURE FOR BACTERIA: CPT

## 2024-01-09 PROCEDURE — 97535 SELF CARE MNGMENT TRAINING: CPT

## 2024-01-09 PROCEDURE — 2580000003 HC RX 258: Performed by: HOSPITALIST

## 2024-01-09 PROCEDURE — 99232 SBSQ HOSP IP/OBS MODERATE 35: CPT | Performed by: NURSE PRACTITIONER

## 2024-01-09 PROCEDURE — 1100000003 HC PRIVATE W/ TELEMETRY

## 2024-01-09 PROCEDURE — 6370000000 HC RX 637 (ALT 250 FOR IP): Performed by: INTERNAL MEDICINE

## 2024-01-09 PROCEDURE — 2580000003 HC RX 258: Performed by: STUDENT IN AN ORGANIZED HEALTH CARE EDUCATION/TRAINING PROGRAM

## 2024-01-09 PROCEDURE — 36556 INSERT NON-TUNNEL CV CATH: CPT

## 2024-01-09 PROCEDURE — 6370000000 HC RX 637 (ALT 250 FOR IP): Performed by: HOSPITALIST

## 2024-01-09 RX ADMIN — HYDROMORPHONE HYDROCHLORIDE 2 MG: 2 TABLET ORAL at 04:08

## 2024-01-09 RX ADMIN — ARFORMOTEROL TARTRATE 15 MCG: 15 SOLUTION RESPIRATORY (INHALATION) at 10:40

## 2024-01-09 RX ADMIN — SODIUM CHLORIDE, PRESERVATIVE FREE 10 ML: 5 INJECTION INTRAVENOUS at 21:36

## 2024-01-09 RX ADMIN — BUDESONIDE INHALATION 500 MCG: 0.5 SUSPENSION RESPIRATORY (INHALATION) at 10:42

## 2024-01-09 RX ADMIN — MIDODRINE HYDROCHLORIDE 10 MG: 10 TABLET ORAL at 16:00

## 2024-01-09 RX ADMIN — CEFEPIME 2000 MG: 2 INJECTION, POWDER, FOR SOLUTION INTRAVENOUS at 21:36

## 2024-01-09 RX ADMIN — SODIUM CHLORIDE, PRESERVATIVE FREE 10 ML: 5 INJECTION INTRAVENOUS at 10:31

## 2024-01-09 RX ADMIN — LORAZEPAM 1 MG: 1 TABLET ORAL at 21:40

## 2024-01-09 RX ADMIN — HEPARIN SODIUM 5000 UNITS: 5000 INJECTION INTRAVENOUS; SUBCUTANEOUS at 21:39

## 2024-01-09 RX ADMIN — IPRATROPIUM BROMIDE 0.5 MG: 0.5 SOLUTION RESPIRATORY (INHALATION) at 10:41

## 2024-01-09 RX ADMIN — SEVELAMER CARBONATE 800 MG: 800 TABLET, FILM COATED ORAL at 14:01

## 2024-01-09 RX ADMIN — SEVELAMER CARBONATE 800 MG: 800 TABLET, FILM COATED ORAL at 21:36

## 2024-01-09 RX ADMIN — IPRATROPIUM BROMIDE 0.5 MG: 0.5 SOLUTION RESPIRATORY (INHALATION) at 14:01

## 2024-01-09 RX ADMIN — MIDODRINE HYDROCHLORIDE 10 MG: 10 TABLET ORAL at 12:00

## 2024-01-09 RX ADMIN — ATORVASTATIN CALCIUM 40 MG: 40 TABLET, FILM COATED ORAL at 21:36

## 2024-01-09 ASSESSMENT — PAIN SCALES - WONG BAKER
WONGBAKER_NUMERICALRESPONSE: 0

## 2024-01-09 ASSESSMENT — PAIN DESCRIPTION - LOCATION: LOCATION: HAND

## 2024-01-09 ASSESSMENT — PAIN SCALES - GENERAL
PAINLEVEL_OUTOF10: 0
PAINLEVEL_OUTOF10: 0
PAINLEVEL_OUTOF10: 10
PAINLEVEL_OUTOF10: 2
PAINLEVEL_OUTOF10: 0

## 2024-01-09 NOTE — PALLIATIVE CARE
Palliative Medicine     Palliative Medicine team members, Dayna Swartz, MARYJO and Ashley Toledo RN for follow up bedside visit.  Ms Oseguera is resting comfortably this am with eyes closed. Patient made attempt to alert to her name. Per notes patient has reported she is not interested in any chemotherapy options for her RCC that has likely metastasis.      Goals of care defined, PM team will sign off.   No further palliative intervention required.  Thank you for the Palliative Medicine consult and allowing us to participate in the care of Ms. Oseguera.        Goals of care DNR/DNI.     Ashley Toledo BSN, RN, [facility]  Palliative Medicine Inpatient RN  Palliative COPE Line: [phone]  
(e.g., several times per day, once every few days, constant)     FUNCTIONAL ASSESSMENT:     Palliative Performance Scale (PPS):40         PSYCHOSOCIAL/SPIRITUAL SCREENING:      Any spiritual / Rastafari concerns:  [] Yes /  [x] No    Caregiver Burnout:  [] Yes /  [] No /  [x] No Caregiver Present      Anticipatory grief assessment:   [x] Normal  / [] Maladaptive        REVIEW OF SYSTEMS:     Positive and pertinent negative findings in ROS are noted above in HPI.  The following systems were [x] reviewed / [] unable to be reviewed as noted in HPI  Other findings are noted below.  Systems: constitutional, ears/nose/mouth/throat, respiratory, gastrointestinal, genitourinary, musculoskeletal, integumentary, neurologic, psychiatric, endocrine. Positive findings noted below.  Modified ESAS Completed by: provider                                            PHYSICAL EXAM:     Wt Readings from Last 3 Encounters:   01/04/24 72.6 kg (160 lb)   12/24/23 77.6 kg (171 lb)     Blood pressure (!) 153/74, pulse 88, temperature 97.1 °F (36.2 °C), temperature source Oral, resp. rate 18, height 1.626 m (5' 4.02\"), weight 72.6 kg (160 lb), SpO2 99 %.  Pain:                         Constitutional: ill appearing female who is reclining in bed in NAD   Eyes: pupils equal  Cardiovascular: RRR  Respiratory: breathing not labored  Gastrointestinal: soft   Skin: warm, dry  Neurologic: eyes closed comfortable appearing ( did not disturb)      HISTORY:     Principal Problem:    Healthcare-associated pneumonia  Active Problems:    HFrEF (heart failure with reduced ejection fraction) (HCC)    Severe sepsis (HCC)    Renal mass    Lung nodules    Acute metabolic encephalopathy    Goals of care, counseling/discussion    ESRD (end stage renal disease) (HCC)    Hypotension    Debility    Sepsis (HCC)    Pleural effusion    Encephalopathy acute  Resolved Problems:    * No resolved hospital problems. *    Past Medical History:   Diagnosis Date    CHF

## 2024-01-09 NOTE — SIGNIFICANT EVENT
INTERIM UPDATE - 0119 EST on 1/09/2024    Nursing Staff reports that Patient has removed her TDC dressing and duane blood when scratched her hand.  Nursing Staff reports that Patient is reportedly confused (A&O x1/3-4) and is currently not redirectable.    Plan:  Ordered Bilateral Secured Mitts.  Will discontinue Restraints when Restraints impose greater risk of harm than they prevent or when Patient no longer presents a threat to themself or others (to include reliably following commands or being able to be reoriented away from harmful behaviors).        INTERIM UPDATE - 0322 EST on 1/09/2024    Nursing Staff calls to report that Patient has managed to remove her Secured Bilateral Mitts and partially displace her Tunneled Dialysis Catheter.    Plan:  Instructed Nurse to advance TDC, redress, and restrain Patient is Bilateral Soft Wrist Restraints.  STAT CXR to evaluate repositioning of TDC.  TDC not to be used at this time until re-evaluated.

## 2024-01-10 VITALS
WEIGHT: 196.21 LBS | HEART RATE: 74 BPM | SYSTOLIC BLOOD PRESSURE: 111 MMHG | OXYGEN SATURATION: 100 % | TEMPERATURE: 97.5 F | RESPIRATION RATE: 18 BRPM | DIASTOLIC BLOOD PRESSURE: 96 MMHG | HEIGHT: 64 IN | BODY MASS INDEX: 33.5 KG/M2

## 2024-01-10 LAB
ACCESSION NUMBER, LLC1M: ABNORMAL
ACINETOBACTER CALCOAC BAUMANNII COMPLEX BY PCR: NOT DETECTED
ALBUMIN SERPL-MCNC: 1.9 G/DL (ref 3.4–5)
ALBUMIN/GLOB SERPL: 0.6 (ref 0.8–1.7)
ALP SERPL-CCNC: 131 U/L (ref 45–117)
ALT SERPL-CCNC: 8 U/L (ref 13–56)
ANION GAP SERPL CALC-SCNC: 7 MMOL/L (ref 3–18)
AST SERPL-CCNC: 15 U/L (ref 10–38)
B FRAGILIS DNA BLD POS QL NAA+NON-PROBE: NOT DETECTED
BACTERIA SPEC CULT: ABNORMAL
BASOPHILS # BLD: 0.1 K/UL (ref 0–0.1)
BASOPHILS NFR BLD: 1 % (ref 0–2)
BILIRUB SERPL-MCNC: 0.9 MG/DL (ref 0.2–1)
BIOFIRE TEST COMMENT: ABNORMAL
BUN SERPL-MCNC: 18 MG/DL (ref 7–18)
BUN/CREAT SERPL: 7 (ref 12–20)
C ALBICANS DNA BLD POS QL NAA+NON-PROBE: NOT DETECTED
C AURIS DNA BLD POS QL NAA+NON-PROBE: NOT DETECTED
C GATTII+NEOFOR DNA BLD POS QL NAA+N-PRB: NOT DETECTED
C GLABRATA DNA BLD POS QL NAA+NON-PROBE: NOT DETECTED
C KRUSEI DNA BLD POS QL NAA+NON-PROBE: NOT DETECTED
C PARAP DNA BLD POS QL NAA+NON-PROBE: NOT DETECTED
C TROPICLS DNA BLD POS QL NAA+NON-PROBE: NOT DETECTED
CALCIUM SERPL-MCNC: 8.7 MG/DL (ref 8.5–10.1)
CHLORIDE SERPL-SCNC: 105 MMOL/L (ref 100–111)
CO2 SERPL-SCNC: 29 MMOL/L (ref 21–32)
CREAT SERPL-MCNC: 2.49 MG/DL (ref 0.6–1.3)
DIFFERENTIAL METHOD BLD: ABNORMAL
E CLOAC COMP DNA BLD POS NAA+NON-PROBE: NOT DETECTED
E COLI DNA BLD POS QL NAA+NON-PROBE: NOT DETECTED
E FAECALIS DNA BLD POS QL NAA+NON-PROBE: NOT DETECTED
E FAECIUM DNA BLD POS QL NAA+NON-PROBE: NOT DETECTED
ENTEROBACTERALES DNA BLD POS NAA+N-PRB: NOT DETECTED
EOSINOPHIL # BLD: 0 K/UL (ref 0–0.4)
EOSINOPHIL NFR BLD: 0 % (ref 0–5)
ERYTHROCYTE [DISTWIDTH] IN BLOOD BY AUTOMATED COUNT: 17.3 % (ref 11.6–14.5)
GLOBULIN SER CALC-MCNC: 3.1 G/DL (ref 2–4)
GLUCOSE SERPL-MCNC: 75 MG/DL (ref 74–99)
GP B STREP DNA BLD POS QL NAA+NON-PROBE: NOT DETECTED
GRAM STN SPEC: ABNORMAL
GRAM STN SPEC: ABNORMAL
HAEM INFLU DNA BLD POS QL NAA+NON-PROBE: NOT DETECTED
HCT VFR BLD AUTO: 27.3 % (ref 35–45)
HGB BLD-MCNC: 8.2 G/DL (ref 12–16)
IMM GRANULOCYTES # BLD AUTO: 0.1 K/UL (ref 0–0.04)
IMM GRANULOCYTES NFR BLD AUTO: 2 % (ref 0–0.5)
K OXYTOCA DNA BLD POS QL NAA+NON-PROBE: NOT DETECTED
KLEBSIELLA SP DNA BLD POS QL NAA+NON-PRB: NOT DETECTED
KLEBSIELLA SP DNA BLD POS QL NAA+NON-PRB: NOT DETECTED
L MONOCYTOG DNA BLD POS QL NAA+NON-PROBE: NOT DETECTED
LYMPHOCYTES # BLD: 0.8 K/UL (ref 0.9–3.6)
LYMPHOCYTES NFR BLD: 9 % (ref 21–52)
MCH RBC QN AUTO: 27.2 PG (ref 24–34)
MCHC RBC AUTO-ENTMCNC: 30 G/DL (ref 31–37)
MCV RBC AUTO: 90.7 FL (ref 78–100)
MECA+MECC ISLT/SPM QL: DETECTED
MONOCYTES # BLD: 0.4 K/UL (ref 0.05–1.2)
MONOCYTES NFR BLD: 4 % (ref 3–10)
N MEN DNA BLD POS QL NAA+NON-PROBE: NOT DETECTED
NEUTS SEG # BLD: 7.9 K/UL (ref 1.8–8)
NEUTS SEG NFR BLD: 85 % (ref 40–73)
NRBC # BLD: 0 K/UL (ref 0–0.01)
NRBC BLD-RTO: 0 PER 100 WBC
P AERUGINOSA DNA BLD POS NAA+NON-PROBE: NOT DETECTED
PLATELET # BLD AUTO: 61 K/UL (ref 135–420)
PMV BLD AUTO: 10.8 FL (ref 9.2–11.8)
POTASSIUM SERPL-SCNC: 3.6 MMOL/L (ref 3.5–5.5)
PROT SERPL-MCNC: 5 G/DL (ref 6.4–8.2)
PROTEUS SP DNA BLD POS QL NAA+NON-PROBE: NOT DETECTED
RBC # BLD AUTO: 3.01 M/UL (ref 4.2–5.3)
RESISTANT GENE TARGETS: ABNORMAL
S AUREUS DNA BLD POS QL NAA+NON-PROBE: NOT DETECTED
S AUREUS+CONS DNA BLD POS NAA+NON-PROBE: DETECTED
S EPIDERMIDIS DNA BLD POS QL NAA+NON-PRB: DETECTED
S LUGDUNENSIS DNA BLD POS QL NAA+NON-PRB: NOT DETECTED
S MALTOPHILIA DNA BLD POS QL NAA+NON-PRB: NOT DETECTED
S MARCESCENS DNA BLD POS NAA+NON-PROBE: NOT DETECTED
S PNEUM DNA BLD POS QL NAA+NON-PROBE: NOT DETECTED
S PYO DNA BLD POS QL NAA+NON-PROBE: NOT DETECTED
SALMONELLA DNA BLD POS QL NAA+NON-PROBE: NOT DETECTED
SERVICE CMNT-IMP: ABNORMAL
SODIUM SERPL-SCNC: 141 MMOL/L (ref 136–145)
STREPTOCOCCUS DNA BLD POS NAA+NON-PROBE: NOT DETECTED
VANCOMYCIN SERPL-MCNC: 11.1 UG/ML (ref 5–40)
WBC # BLD AUTO: 9.4 K/UL (ref 4.6–13.2)

## 2024-01-10 PROCEDURE — 6370000000 HC RX 637 (ALT 250 FOR IP): Performed by: INTERNAL MEDICINE

## 2024-01-10 PROCEDURE — 80202 ASSAY OF VANCOMYCIN: CPT

## 2024-01-10 PROCEDURE — 6360000002 HC RX W HCPCS: Performed by: STUDENT IN AN ORGANIZED HEALTH CARE EDUCATION/TRAINING PROGRAM

## 2024-01-10 PROCEDURE — 6360000002 HC RX W HCPCS: Performed by: HOSPITALIST

## 2024-01-10 PROCEDURE — 85025 COMPLETE CBC W/AUTO DIFF WBC: CPT

## 2024-01-10 PROCEDURE — 2580000003 HC RX 258: Performed by: INTERNAL MEDICINE

## 2024-01-10 PROCEDURE — 6370000000 HC RX 637 (ALT 250 FOR IP): Performed by: HOSPITALIST

## 2024-01-10 PROCEDURE — 2580000003 HC RX 258: Performed by: STUDENT IN AN ORGANIZED HEALTH CARE EDUCATION/TRAINING PROGRAM

## 2024-01-10 PROCEDURE — 99239 HOSP IP/OBS DSCHRG MGMT >30: CPT | Performed by: STUDENT IN AN ORGANIZED HEALTH CARE EDUCATION/TRAINING PROGRAM

## 2024-01-10 PROCEDURE — 80053 COMPREHEN METABOLIC PANEL: CPT

## 2024-01-10 PROCEDURE — 90935 HEMODIALYSIS ONE EVALUATION: CPT

## 2024-01-10 PROCEDURE — 6360000002 HC RX W HCPCS: Performed by: INTERNAL MEDICINE

## 2024-01-10 PROCEDURE — 2700000000 HC OXYGEN THERAPY PER DAY

## 2024-01-10 PROCEDURE — 94761 N-INVAS EAR/PLS OXIMETRY MLT: CPT

## 2024-01-10 RX ORDER — MIDODRINE HYDROCHLORIDE 10 MG/1
10 TABLET ORAL 3 TIMES DAILY PRN
Status: ON HOLD | DISCHARGE
Start: 2024-01-10

## 2024-01-10 RX ORDER — BUDESONIDE AND FORMOTEROL FUMARATE DIHYDRATE 160; 4.5 UG/1; UG/1
2 AEROSOL RESPIRATORY (INHALATION) 2 TIMES DAILY
Qty: 30.6 G | Refills: 0 | Status: ON HOLD | DISCHARGE
Start: 2024-01-10

## 2024-01-10 RX ADMIN — GENTAMICIN SULFATE 180 MG: 40 INJECTION, SOLUTION INTRAMUSCULAR; INTRAVENOUS at 16:36

## 2024-01-10 RX ADMIN — SEVELAMER CARBONATE 800 MG: 800 TABLET, FILM COATED ORAL at 16:40

## 2024-01-10 RX ADMIN — MIDODRINE HYDROCHLORIDE 10 MG: 10 TABLET ORAL at 08:22

## 2024-01-10 RX ADMIN — MIDODRINE HYDROCHLORIDE 10 MG: 10 TABLET ORAL at 17:15

## 2024-01-10 RX ADMIN — VANCOMYCIN HYDROCHLORIDE 1000 MG: 1 INJECTION, POWDER, LYOPHILIZED, FOR SOLUTION INTRAVENOUS at 11:14

## 2024-01-10 RX ADMIN — LORAZEPAM 1 MG: 1 TABLET ORAL at 08:50

## 2024-01-10 RX ADMIN — HYDROMORPHONE HYDROCHLORIDE 2 MG: 2 TABLET ORAL at 17:15

## 2024-01-10 RX ADMIN — HEPARIN SODIUM 5000 UNITS: 5000 INJECTION INTRAVENOUS; SUBCUTANEOUS at 05:39

## 2024-01-10 ASSESSMENT — PAIN DESCRIPTION - ORIENTATION
ORIENTATION: OTHER (COMMENT)
ORIENTATION: OTHER (COMMENT)

## 2024-01-10 ASSESSMENT — PAIN DESCRIPTION - LOCATION
LOCATION: OTHER (COMMENT)
LOCATION: OTHER (COMMENT)

## 2024-01-10 ASSESSMENT — PAIN DESCRIPTION - DESCRIPTORS
DESCRIPTORS: DISCOMFORT
DESCRIPTORS: DISCOMFORT

## 2024-01-10 ASSESSMENT — PAIN SCALES - WONG BAKER: WONGBAKER_NUMERICALRESPONSE: 0

## 2024-01-10 ASSESSMENT — PAIN SCALES - GENERAL
PAINLEVEL_OUTOF10: 7
PAINLEVEL_OUTOF10: 4
PAINLEVEL_OUTOF10: 0
PAINLEVEL_OUTOF10: 0

## 2024-01-10 NOTE — PROGRESS NOTES
In Patient Progress note      Admit Date: 1/3/2024      Impression:     #1 end-stage renal disease on hemodialysis Monday Wednesday Friday, based on CT appears to be fluid overload , plan to dialyze today may need daily dialysis for the next couple of days if tolerated  #2 altered mental status secondary to metabolic encephalopathy and possibly gabapentin and pain medications  #3 sepsis secondary to UTI versus pneumonia  #4 large mass in the lower pole of right kidney suggestive of renal cell carcinoma  #5 multiple pulmonary nodules suggestive of metastatic disease  #6 anasarca  #7 anemia of CKD  #8 secondary hyperparathyroidism     Plan:  #1 HD today again and then continue MWF schedule   #2 UF ~ 1-2 lit as tolerate with albumin , add low dose midodrine  #3 needs either heme-onc or urology eval for possible renal cell carcinoma  #4 holding of BERRY in the setting of possible diagnosis of cancer  #5 sepsis management and workup per hospitalist team  #6 renally dose medications for EGFR of less than 15  #7 hold pain medications and gabapentin  #8 prognosis is poor, patient is critically ill needs stepdown or high level of care     Discussed with nursing  Dialysis orders placed and dialysis nurse aware     Please call with questions     Juan Carrera MD FASN  Cell [phone]  Pager: [phone]  Fax   [phone]   Subjective:     - No acute over night events.  - respiratory - stable  - hemodynamics - stable, no pressrs  - UOP-minimal  - Nutrition -ok    Objective:     /67   Pulse 100   Temp 97.8 °F (36.6 °C) (Oral)   Resp 18   Ht 1.626 m (5' 4\")   Wt 72.6 kg (160 lb)   SpO2 97%   BMI 27.46 kg/m²       Intake/Output Summary (Last 24 hours) at 1/4/2024 1219  Last data filed at 1/3/2024 2034  Gross per 24 hour   Intake 1000 ml   Output 650 ml   Net 350 ml       Physical Exam:     Gen NAD  HENT mmm  RS AEBE   CVS s1s2 wnl no JVD  Ext edema +  Skin no rashes  Neuro partly oriented   TDC    Data 
    In Patient Progress note      Admit Date: 1/3/2024      Impression:   #1 end-stage renal disease on hemodialysis Monday Wednesday Friday,   #2 altered mental status secondary to metabolic encephalopathy and possibly uti/sepsis   #3 sepsis secondary to UTI,  complicated UTI in the setting of right ureteral stent/malignancy   #4 large mass in the lower pole of right kidney suggestive of renal cell carcinoma  #5 multiple pulmonary nodules suggestive of metastatic disease  #6 anasarca  #7 anemia of CKD  #8 secondary hyperparathyroidism  #9 dialysis catheter site infection      Plan:  #1 HD MWF schedule   #2 continue midodrine and alb PRN   #3 patient refusing IR renal biopsy , suspecting RCC   #4 holding of BERRY in the setting of possible diagnosis of cancer  #5 Vanc with HD MWF added d/t dialysis catheter exit site , wound and blood cx drawn   #6 sepsis management and AB  per hospitalist team/ID   #7 renally dose medications for EGFR of less than 15  #8 prognosis is poor, patient is critically ill consider comorbidities    Noted plans for d/c later today after hd   Have called in AB adriana and gent for patient to be given with dialysis ( [facility]) until 1/20      Discussed with nursing and Dr Walker  Poor prognosis ,palliative care on board     Please call with questions,     Juan Carrera MD FASN  Cell [phone]  Pager: [phone]  Fax   [phone]         Subjective:     - sleepy   - respiratory - stable  - hemodynamics - stable, no pressrs  - UOP-poor  - Nutrition -poor    Objective:     BP (!) 111/96   Pulse 74   Temp 97.5 °F (36.4 °C) (Axillary)   Resp 18   Ht 1.626 m (5' 4.02\")   Wt 89 kg (196 lb 3.4 oz)   SpO2 100%   BMI 33.66 kg/m²       Intake/Output Summary (Last 24 hours) at 1/10/2024 1621  Last data filed at 1/10/2024 1146  Gross per 24 hour   Intake 3500 ml   Output 3000 ml   Net 500 ml         Physical Exam:     Gen NAD  HENT mmm  RS AEBE   CVS s1s2 wnl no JVD  Ext edema +  Skin no 
    In Patient Progress note      Admit Date: 1/3/2024      Impression:   #1 end-stage renal disease on hemodialysis Monday Wednesday Friday,   #2 altered mental status secondary to metabolic encephalopathy and possibly uti/sepsis   #3 sepsis secondary to UTI,  complicated UTI in the setting of right ureteral stent/malignancy   #4 large mass in the lower pole of right kidney suggestive of renal cell carcinoma  #5 multiple pulmonary nodules suggestive of metastatic disease  #6 anasarca  #7 anemia of CKD  #8 secondary hyperparathyroidism  #9 dialysis catheter site infection      Plan:  #1 HD MWF schedule , HD tomorrow  #2 continue midodrine and alb PRN   #3 patient refusing IR renal biopsy , suspecting RCC   #4 holding of BERRY in the setting of possible diagnosis of cancer  #5 Vanc with HD MWF added d/t dialysis catheter exit site , wound and blood cx drawn   #6 sepsis management and workup per hospitalist team/ID   #7 renally dose medications for EGFR of less than 15  #8 prognosis is poor, patient is critically ill consider comorbidities     Discussed with nursing and Dr Walker  Poor prognosis ,palliative care on board     Please call with questions     Juan Carrera MD FASN  Cell [phone]  Pager: [phone]  Fax   [phone]         Subjective:     - sleepy   - respiratory - stable  - hemodynamics - stable, no pressrs  - UOP-poor  - Nutrition -poor    Objective:     BP (!) 92/58   Pulse 73   Temp 97.2 °F (36.2 °C) (Oral)   Resp 17   Ht 1.626 m (5' 4.02\")   Wt 89 kg (196 lb 3.4 oz)   SpO2 97%   BMI 33.66 kg/m²       Intake/Output Summary (Last 24 hours) at 1/9/2024 1624  Last data filed at 1/8/2024 1926  Gross per 24 hour   Intake 810 ml   Output 2000 ml   Net -1190 ml         Physical Exam:     Gen NAD  HENT mmm  RS AEBE   CVS s1s2 wnl no JVD  Ext edema +  Skin no rashes  Neuro oriented X 3  TDC    Data Review:    Recent Labs     01/09/24  0514   WBC 13.1   RBC 2.94*   HCT 26.4*   MCV 89.8   MCH 27.9 
    In Patient Progress note      Admit Date: 1/3/2024      Impression:   #1 end-stage renal disease on hemodialysis Monday Wednesday Friday,   Did get dialysis last 2 days , so holding today and plan for HD tomorrow ( Saturday)   #2 altered mental status secondary to metabolic encephalopathy and possibly uti/sepsis   #3 sepsis secondary to UTI,  complicated UTI in the setting of right ureteral stent/malignancy   #4 large mass in the lower pole of right kidney suggestive of renal cell carcinoma  #5 multiple pulmonary nodules suggestive of metastatic disease  #6 anasarca  #7 anemia of CKD  #8 secondary hyperparathyroidism     Plan:  #1 HD tomorrow ( Saturday ) and then MWF schedule   #2 continue midodrine   #3 needs either heme-onc or urology eval for possible renal cell carcinoma  #4 holding of BERRY in the setting of possible diagnosis of cancer  #5 sepsis management and workup per hospitalist team/ID   #6 renally dose medications for EGFR of less than 15  #7 hold pain medications and gabapentin  #8 prognosis is poor, patient is critically ill consider comorbidities     Discussed with nursing and DR Coates    Dialysis saturday , will follow peripherally over the weekend      Please call with questions     Juan Carrera MD FASN  Cell [phone]  Pager: [phone]  Fax   [phone]         Subjective:     - No acute over night events.  - respiratory - stable  - hemodynamics - stable, no pressrs  - UOP-poor  - Nutrition -poor    Objective:     /71   Pulse (!) 108   Temp 97.2 °F (36.2 °C) (Oral)   Resp 18   Ht 1.626 m (5' 4.02\")   Wt 72.6 kg (160 lb)   SpO2 99%   BMI 27.45 kg/m²       Intake/Output Summary (Last 24 hours) at 1/5/2024 1316  Last data filed at 1/4/2024 1555  Gross per 24 hour   Intake 500 ml   Output 480 ml   Net 20 ml       Physical Exam:     Gen NAD  HENT mmm  RS AEBE   CVS s1s2 wnl no JVD  Ext edema +  Skin no rashes  Neuro oriented X 3  TDC    Data Review:    Recent Labs     
    In Patient Progress note      Admit Date: 1/3/2024      Impression:   #1 end-stage renal disease on hemodialysis Monday Wednesday Friday,   Did get dialysis last 2 days , so holding today and plan for HD tomorrow ( Saturday)   #2 altered mental status secondary to metabolic encephalopathy and possibly uti/sepsis   #3 sepsis secondary to UTI,  complicated UTI in the setting of right ureteral stent/malignancy   #4 large mass in the lower pole of right kidney suggestive of renal cell carcinoma  #5 multiple pulmonary nodules suggestive of metastatic disease  #6 anasarca  #7 anemia of CKD  #8 secondary hyperparathyroidism  #9 dialysis catheter site infection      Plan:  #1 HD MWF schedule   #2 continue midodrine and alb PRN   #3 renal mass biopsy per IR   #4 holding of BERRY in the setting of possible diagnosis of cancer  #5 Vanc with HD MWF added d/t dialysis catheter exit site , wound and blood cx drawn   #6 sepsis management and workup per hospitalist team/ID   #7 renally dose medications for EGFR of less than 15  #8 prognosis is poor, patient is critically ill consider comorbidities     Discussed with nursing and DR Coates     Please call with questions     Juan Carrera MD FASN  Cell [phone]  Pager: [phone]  Fax   [phone]         Subjective:     - No acute over night events.  - respiratory - stable  - hemodynamics - stable, no pressrs  - UOP-poor  - Nutrition -poor    Objective:     BP (!) 153/93   Pulse 96   Temp 97.1 °F (36.2 °C) (Oral)   Resp 18   Ht 1.626 m (5' 4.02\")   Wt 72.6 kg (160 lb)   SpO2 99%   BMI 27.45 kg/m²       Intake/Output Summary (Last 24 hours) at 1/8/2024 1618  Last data filed at 1/7/2024 2051  Gross per 24 hour   Intake 120 ml   Output --   Net 120 ml         Physical Exam:     Gen NAD  HENT mmm  RS AEBE   CVS s1s2 wnl no JVD  Ext edema +  Skin no rashes  Neuro oriented X 3  TDC    Data Review:    Recent Labs     01/08/24  0035   WBC 10.2   RBC 2.53*   HCT 22.9*   MCV 90.5 
    Interventional Radiology    Called primary decision maker for patient Latha Oseguera her son Dennis Carey    Per her son, family does not wish to pursue biopsy at this time. He says his mother has told him she does not want a biopsy, and family has been told by Marilin that she has cancer and this is sufficient for them.    IR will remain available for lung nodule biopsy radiologist choice as needed if family changes their minds.    NPO and procedure order cancelled at this time.      Thank you,  ANDREA Munson  [phone]   
    Interventional Radiology    Unfortunately due to scheduling constraints the patient's lung nodule biopsy has been rescheduled for tomorrow 1/09/24.    OK to resume diet today from IR standpoint.  NPO at midnight for moderate sedation  Hold SQ heparin one dose prior to procedure      Thank you,  ANDREA Munson  [phone]    
  [facility]  Pulmonary, Critical Care, and Sleep Medicine    Name: Latha Oseguera MRN: [id number]   : 1970 Hospital: [facility]   Date: 2024        Pulmonary Medicine: Daily Progress Note    Admission Date:   1/3/2024  LOS: 6  MAR reviewed and pertinent medications noted or modified as needed    IMPRESSION:   Orel  Loculated pleural effusion vs pleural based mass/metastasis. Favor the latter upon review of images. Suspicion heightens with contrast CT findings -will need further evaluation  Encephalopathy, likely acute and toxic-metabolic  Sepsis due to UTI, with Pseudomonas bacteremia  Emphysema on CT, suspect undiagnosed COPD and possible CO2 retention given smoking history   ESRD on IHD  HFrEF compensated  Chronic anemia with history of GI bleed, status post transfusion of RBCs  R renal mass presumed RCC        Patient Active Problem List   Diagnosis    Anemia    Healthcare-associated pneumonia    HFrEF (heart failure with reduced ejection fraction) ([facility])    Severe sepsis (HCC)    Renal mass    Lung nodules    Acute metabolic encephalopathy    Goals of care, counseling/discussion    ESRD (end stage renal disease) ([facility])    Hypotension    Debility    Sepsis ([facility])    Pleural effusion    Encephalopathy acute    ESRD (end stage renal disease) on dialysis ([facility])          RECOMMENDATIONS:     PULM:  IR guided biopsy, declined by son, primary decision maker, per mother's wishes  Not a good candidate for drainage of small loculated pleural effusions  Maintain aspiration precautions: HOB >30 degrees  Supplementary oxygen with SpO2 goal >90%   Bronchial /oral hygiene; IS at bedside please  Bronchodilators: Brovana and Pulmicort.  Switch to inhalers on discharge  Follow temp/WBC  Anti-infectives: Agree with empiric cefepime for UTI and possible pneumonia  Streamline antibiotics per culture data and clinical course  IHD for CKD.  Nephrology  Continue supportive care  Appreciate 
  Physician Progress Note      PATIENT:               SKYLER GRANGER  CSN #:                  [id number]  :                       1970  ADMIT DATE:       1/3/2024 7:53 AM  DISCH DATE:  RESPONDING  PROVIDER #:        Radha Walker DO          QUERY TEXT:    Dr. Walker,  Please accept my apologies as the previous query regarding skin was not   formatted correctly. Thank you, BELLA Atkins CDI      Patient admitted with Sepsis and pneumonia. Per RN flow sheet on admission   1/3, noted to also have stage 2 perineum pressure ulcer. If possible, please   document in progress notes and discharge summary the type of ulcer, site of   the ulcer and present on admission status of the ulcer:    The medical record reflects the following:  Risk Factors: 54 yo female admitted with ESRD, admitted with sepsis and   pneumonia    Clinical Indicators: 1/3 NN flow sheet: Stage 2 perineum noted day of   admission    Treatment: Nursing assessments, local skin care      Thank you for your time,    Josi GUERRERO, RN, CRCR  [address] [place] [address]. [address]  C: [phone]    [email or web address]  Options provided:  -- Stage 2 decubitus Pressure Ulcer to perineum present on admission  -- Other - I will add my own diagnosis  -- Disagree - Not applicable / Not valid  -- Disagree - Clinically unable to determine / Unknown  -- Refer to Clinical Documentation Reviewer    PROVIDER RESPONSE TEXT:    This patient has a stage 2 decubitus pressure ulcer to perineum that was   present on admission.    Query created by: Josi Atkins on 1/10/2024 3:35 PM      Electronically signed by:  Radha Walker DO 1/10/2024 4:52 PM          
  Physician Progress Note      PATIENT:               SKYLER GRANGER  CSN #:                  [id number]  :                       1970  ADMIT DATE:       1/3/2024 7:53 AM  DISCH DATE:  RESPONDING  PROVIDER #:        Radha Walker DO          QUERY TEXT:    Pt admitted with UTI.  Pt noted to have a right ureteral stent. If possible,   please document in the progress notes and discharge summary if you are   evaluating and/or treating any of the following:    The medical record reflects the following:  Risk Factors: 54 yo female with PMH right ureteral stent    Clinical Indicators:  ID consult note cystitis, complicated UTI in the   setting of right ureteral stent/probable  malignancy  U/A with large leukocyte esterase, TNTC WBC, 3+ bacteria   Urology note   JJ stent placed  for hydronephrosis exchanged   2023    Treatment: Urology consult, ID consult, U/A, IV antibiotics      Thank you for your time,    Josi GUERRERO, RN, CRCR  [address] [place] [address]. [address]  C: [phone]    [email or web address]  Options provided:  -- UTI due to ureteral stent  -- UTI not due to ureteral stent  -- Other - I will add my own diagnosis  -- Disagree - Not applicable / Not valid  -- Disagree - Clinically unable to determine / Unknown  -- Refer to Clinical Documentation Reviewer    PROVIDER RESPONSE TEXT:    UTI is not due to ureteral stent.    Query created by: Josi Atkins on 2024 5:29 PM      QUERY TEXT:    Patient admitted with ***. Per *** (date/note type), noted to also have ***   ulcer. If possible, please document in progress notes and discharge summary   the type of ulcer, site of the ulcer and present on admission status of the   ulcer:    The medical record reflects the following:  Risk Factors: ***  Clinical Indicators: ***  Treatment: ***  Options provided:  -- Decubitus Pressure Ulcer to *** (site/location) present on admission  -- Decubitus Pressure Ulcer 
Advance Care Planning     Advance Care Planning Inpatient Note  Spiritual Care Department    Today's Date: 1/5/2024  Unit: [facility]    Received request from admission screening.        Health Care Decision Makers:       Primary Decision Maker: Dennis Carey - Child - [phone]    Secondary Decision Maker: Vahid Carey - Child - [phone]  Summary:  Verified Documents  Verified Healthcare Decision Maker    Advance Care Planning Documents (Patient Wishes):  Healthcare Power of /Advance Directive Appointment of Health Care Agent  Portable DNR Form     Assessment:     01/05/24 1136   Encounter Summary   Encounter Overview/Reason  Advance Care Planning   Service Provided For: Patient   Referral/Consult From: Multi-disciplinary team   Support System Family members   Last Encounter  01/05/24  (IV-SA-KP)   Complexity of Encounter High   Begin Time 1130   End Time  1137   Total Time Calculated 7 min   Spiritual/Emotional needs   Type Spiritual Support   Advance Care Planning   Type Care Preferences Addressed  (DNR/AMD on File)   Assessment/Intervention/Outcome   Assessment Calm   Intervention Active listening   Outcome Encouraged   Plan and Referrals   Plan/Referrals Continue to visit, (comment)       Care Preferences Communicated:   No    Outcomes/Plan:  ACP Discussion: Completed    Electronically signed by UTE Arita on 1/5/2024 at 11:38 AM            
Alanna from Lab called critical blood culture aerobic result gram negative rods, perfect serve MD.   
Attempted to see patient for PT treatment. Patient off of floor for hemodialysis, planned for 3 hours. Will follow up with patient later this pm or tomorrow for therapy.    RASHAD Moy  1/10/2024  
Consultation noted for ESRD pt here for AMS /sepsis  Missed dialysis for 1 week   Orders placed and communicated with nursing   Consult note to follow    Please call with questions    Juan Carrera MD FASN  Cell [phone]  Pager: [phone]  Fax   [phone]     
Discharge patient in stable condition, discharge instructions given to the medical transport. IV lines removed aseptically.  
Infectious Disease progress Note        Reason: Sepsis, gram-negative bacteremia    Current abx Prior abx   Cefepime since 1/3 Cefepime, vancomycin x1 on 1/3     Lines:       Assessment :  53 y.o. female with past medical hx of end-stage renal disease on dialysis, status post TAVR, CAD status post PCI LAD 2/23, hypertension, renal cell carcinoma, dyslipidemia presented to ED on 1/3/2024 with complaints of fever, weakness    Clinical presentation consistent with sepsis-present on admission due to gram-negative bloodstream infection (positive blood culture 1/3/2024), cystitis, complicated UTI in the setting of right ureteral stent/probable  malignancy    No urine output for 2 days-rule out acute urinary retention    Right renal mass-seen on CT scan concerning for renal cell cancer    Loculated left-sided pleural effusion-likely malignant.  Pulmonary follow-up appreciated.  Recommendations for thoracentesis noted    Altered mental status-likely metabolic encephalopathy    Recommendations:    cont cefepime  Obtain bladder scan  Follow-up nephrology recommendations regarding dialysis  Follow-up pulmonary recommendations regarding pleural effusion  Follow-up ID/susceptibility of pseudomonas in blood culture.  Modify antibiotics accordingly  Repeat blood culture tomorrow  Monitor for hemodialysis catheter/prosthetic aortic valve infection  Follow-up palliative care recommendation regarding goals of care      Prognosis: Poor    . Above plan was discussed in details with patient,  and dr Coates. Please call me if any further questions or concerns. Will continue to participate in the care of this patient.  HPI:    Patient has intermittent episodes of confusion and unable to provide a detailed history/review of system.  She does state that she generally makes urine but has not been able to pass urine for the past 2 days.  Denies increasing chest pain, shortness of breath.  Complains of lower abdominal discomfort.  Denies back 
Infectious Disease progress Note        Reason: Sepsis, gram-negative bacteremia    Current abx Prior abx   Cefepime since 1/3 Cefepime, vancomycin x1 on 1/3     Lines:       Assessment :  53 y.o. female with past medical hx of end-stage renal disease on dialysis, status post TAVR, CAD status post PCI LAD 2/23, hypertension, renal cell carcinoma, dyslipidemia presented to ED on 1/3/2024 with complaints of fever, weakness    Clinical presentation consistent with sepsis-present on admission due to pseudomonas bloodstream infection (positive blood culture 1/3/2024, negative blood cx 1/6), cystitis, complicated UTI in the setting of right ureteral stent/probable  malignancy    No urine output for 2 days-rule out acute urinary retention    Right renal mass-seen on CT scan concerning for renal cell cancer    Loculated left-sided pleural effusion-likely malignant.  Pulmonary follow-up appreciated.  Recommendations for thoracentesis noted    Altered mental status-likely metabolic encephalopathy    Drainage noted at HD catheter site - d/w nephrologist. Wound cx obtained    Recommendations:    cont cefepime. Add vancomycin due to concerns for HD cath exit site infection  Obtain wound cx HD cath. Site drainage  Follow-up nephrology recommendations regarding dialysis  Follow-up pulmonary recommendations regarding pleural effusion  Follow-up susceptibility of pseudomonas in blood culture.  Modify antibiotics accordingly  Follow-up palliative care recommendation regarding goals of care      Prognosis: Poor    . Above plan was discussed in details with patient,  and dr Carrera. Please call me if any further questions or concerns. Will continue to participate in the care of this patient.  HPI:    Patient has intermittent episodes of confusion and unable to provide a detailed history/review of system.      Past Medical History:   Diagnosis Date    CHF (congestive heart failure) (HCC)     Chronic kidney disease     COPD (chronic 
Infectious Disease progress Note        Reason: Sepsis, gram-negative bacteremia    Current abx Prior abx   Cefepime since 1/3 Cefepime, vancomycin x1 on 1/3     Lines:       Assessment :  53 y.o. female with past medical hx of end-stage renal disease on dialysis, status post TAVR, CAD status post PCI LAD 2/23, hypertension, renal cell carcinoma, dyslipidemia presented to ED on 1/3/2024 with complaints of fever, weakness    Clinical presentation consistent with sepsis-present on admission due to pseudomonas bloodstream infection (positive blood culture 1/3/2024, negative blood cx 1/6), cystitis, complicated UTI in the setting of right ureteral stent/probable  malignancy    No urine output for 2 days-rule out acute urinary retention    Right renal mass-seen on CT scan concerning for renal cell cancer    Loculated left-sided pleural effusion-likely malignant.  Pulmonary follow-up appreciated.  Recommendations for thoracentesis noted    Altered mental status-likely metabolic encephalopathy    Drainage noted at HD catheter site - d/w nephrologist. Wound cx obtained  Evidence of contact dermatitis from tape noted at HD cath site  + bloody drainage-no purulence noted on today's exam    Recommendations:    cont cefepime.  Continue vancomycin due to concerns for HD cath exit site infection  Follow up wound cx HD cath. Site drainage  Follow-up nephrology recommendations regarding dialysis  Follow-up pulmonary recommendations regarding pleural effusion  Follow-up palliative care recommendation regarding goals of care  Will finalize abx in am based on above test results,clinical course      Prognosis: Poor    . Above plan was discussed in details with patient,  and dr Walker. Please call me if any further questions or concerns. Will continue to participate in the care of this patient.  HPI:    No new complaints    Past Medical History:   Diagnosis Date    CHF (congestive heart failure) (HCC)     Chronic kidney disease     
Infectious Disease progress Note        Reason: Sepsis, gram-negative bacteremia    Current abx Prior abx   Cefepime since 1/3 Cefepime, vancomycin x1 on 1/3     Lines:       Assessment :  53 y.o. female with past medical hx of end-stage renal disease on dialysis, status post TAVR, CAD status post PCI LAD 2/23, hypertension, renal cell carcinoma, dyslipidemia presented to ED on 1/3/2024 with complaints of fever, weakness    Clinical presentation consistent with sepsis-present on admission due to pseudomonas bloodstream infection (positive blood culture 1/3/2024, negative blood cx 1/6), cystitis, complicated UTI in the setting of right ureteral stent/probable  malignancy    No urine output for 2 days-rule out acute urinary retention    Right renal mass-seen on CT scan concerning for renal cell cancer    Loculated left-sided pleural effusion-likely malignant.  Pulmonary follow-up appreciated.  Recommendations for thoracentesis noted    Altered mental status-likely metabolic encephalopathy    Drainage noted at HD catheter site on 1/8- d/w nephrologist. Wound cx beta-hemolytic Streptococcus  Evidence of contact dermatitis from tape noted at HD cath site    Clinically same    Recommendations:    Discontinue cefepime.  Recommend vancomycin, gentamicin till 1/20/2024-will be arranged with hemodialysis.  Discussed with Dr. Carrera  Follow up wound cx HD cath. Site drainage  Follow-up nephrology recommendations regarding dialysis  Follow-up pulmonary recommendations regarding pleural effusion  Follow-up palliative care recommendation regarding goals of care  Discharge planning per primary team      Prognosis: Poor    . Above plan was discussed in details with patient,  and dr Walker. Please call me if any further questions or concerns. Will continue to participate in the care of this patient.  HPI:    No new complaints    Past Medical History:   Diagnosis Date    CHF (congestive heart failure) (HCC)     Chronic kidney disease 
[facility]   Pharmacy Pharmacokinetic Monitoring Service - Aminoglycoside    Latha Oseguera is a 53 y.o. female starting on gentamicin therapy for Bloodstream Infection. Pharmacy consulted by Dr. Leanna Bustamante for monitoring and adjustment.    Target Concentration: Trough: </= 1 mcg/mL    Additional Antimicrobials: Vancomycin    Pertinent Laboratory Values:   Temp: 97 °F (36.1 °C), Weight - Scale: 89 kg (196 lb 3.4 oz)  Recent Labs     01/09/24  0514 01/10/24  0908   BUN 17 18   WBC 13.1 9.4     Estimated Creatinine Clearance: 28 mL/min (A) (based on SCr of 2.49 mg/dL (H)).    Pertinent Cultures:  Culture Date Source Results   1/3 Blood GNR (pseudomonas)   1/8 Wound Strep, B-hemolytic     Plan:  Concentration-guided dosing due to renal impairment and intermittent hemodialysis  Start gentamicin 180 mg x 1 . (2.5 mg/kg x adjBW = 171.8 mg rounded to 180 mg)  Will dose according to levels at this time due to renal insufficiency  Renal labs as indicated   gentamicin concentration ordered for  1/12 @ 0600  Pharmacy will continue to monitor patient and adjust therapy as indicated    Thank you for the consult,  WISAM VAIL RPH  1/10/2024   
[facility] Hospitalist Group  Progress Note    Patient: Latha Oseguera Age: 53 y.o. : 1970 MR#: [id number] SSN: [id number]      Subjective/24-hour events:     No new issues overnight.  Still SOB but no decompensation in respiratory status overnight.  Afebrile.    Assessment:   Sepsis, POA  GNR BSI  Acute metabolic encephalopathy, resolved  Large R renal mass, suspicious for RCC  Electrolyte abnormalities:  hyponatemia, hypokalemia, hypomagnesemia  Bilateral pulmonary nodules, concerning for metastatic disease  ESRD, HD dependent  CAD w/hx PCI/stenting of LAD 2023  Anemia of chronic disease  Chronic systolic CHF EF 30-35%    Plan:   Continue supplemental oxygen, nebs/BD as tolerated.  Cefepime per ID, follow cultures.  HD per nephrology.  Volume electrolyte management as needed.  Image guided biopsy being arranged by IR - anticipate this being done early next week.    Monitor lytes, replace as necessary.  Other care primarily supportive.  Follow.    Case discussed with:  [x]Patient  []Family  [x] Nursing  [x]Case Management  DVT Prophylaxis:  []Lovenox  [x]Hep SQ  []SCDs  []Coumadin   []On Heparin gtt []PO anticoagulant    Objective:   VS: /69   Pulse (!) 105   Temp 98.2 °F (36.8 °C) (Oral)   Resp 18   Ht 1.626 m (5' 4.02\")   Wt 72.6 kg (160 lb)   SpO2 98%   BMI 27.45 kg/m²      Tmax/24hrs: Temp (24hrs), Av.7 °F (36.5 °C), Min:96.4 °F (35.8 °C), Max:98.6 °F (37 °C)    Intake/Output Summary (Last 24 hours) at 2024 0834  Last data filed at 2024 1555  Gross per 24 hour   Intake 500 ml   Output 480 ml   Net 20 ml       Gen:  In NAD.  Nontoxic appearing.  Lungs: Mildly SOB at rest but no distress.  CV: RRR.  Abdomen: Soft, NTTP.  Extremities: Warm, no pitting edema or ischemia.  Neuro:  Awake and alert, moves extremities spontaneously.    Current Facility-Administered Medications   Medication Dose Route Frequency    phenylephrine-mineral oil-petrolatum (PREPARATION H) 
On 1/3/2024 at 2100 the patient arrived to the unit from CT. Patient was transferred from stretcher to bed and tolerated the transfer fairly well. As the writer of this note performed the required admission assessment the patient complained of pain. Informed patient once marko care and skin interventions were implemented the attending provider would be contacted.     On 1/3/2024 at 2230 attending provider Edilma was consulted and new orders were placed for gabapentin and trazodone. These medications were prescribed due to being listed on her home medications list. Patient stated that she no longer takes trazadone because she previously had an allergic reaction symptom of itching. Patient then stated \" Give me some Dilaudid and Ativan.\" Educated patient that these medications were not listed on her home medications list and offered Tylenol for her pain. Patient denied Tylenol.    On 1/4/2024 at 0130 (est) the writer of this note consulted attending provider Edilma and reported that patient is being very anxious and demanding. Patient is still requesting Ativan. Informed provider that patient is a resident at [facility] who was contacted on this evening to fax over patients current medications.     On 01/04/2024 at 0140 the attending provider was consulted for a second time due to received fax from  [facility] with current meds. New orders were placed for preparation H, Dilaudid, and Ativan. At the time of this consult the attending provider was also notified of patients current cardiac status of sustained vtach.   
On-call provider paged after patient demonstrated behaviors putting herself at risk, to include removing her TDC dressing, and scratching herself so badly that she opened two separate areas to the back of her hand. This RN requested either mitts or wrist restraints. MD ordered bilateral mitts. Mitts were applied at this time.  
Patient administered meds per MAR orders. Pills taken whole. IV is flushing well.   Patient left with call light and their bed in the lowest position with brakes on.    
Patient attempted to put back on tele. Patient refused tele   
Patient found with both mitts on the floor. Patient's TDC dressing is removed, and the TDC appears dislodged, as the sutures are no longer in place. Dr Watson paged at this time.  
Patient sleeping and refused 1400 breathing treatment. No respiratory distress or issues noted.   
Patient upgraded to bilateral soft wrist restraints.  
Progress Note  Date:2024       Room:[facility] [place]  Patient Name:Latha Oseguera     YOB: 1970     Age:53 y.o.        Subjective    Subjective receiving nursing care  Review of Systems  Objective         Vitals Last 24 Hours:  TEMPERATURE:  Temp  Av.9 °F (36.6 °C)  Min: 97.2 °F (36.2 °C)  Max: 98.2 °F (36.8 °C)  RESPIRATIONS RANGE: Resp  Av.2  Min: 18  Max: 22  PULSE OXIMETRY RANGE: SpO2  Av.2 %  Min: 95 %  Max: 100 %  PULSE RANGE: Pulse  Av.8  Min: 99  Max: 115  BLOOD PRESSURE RANGE: Systolic (24hrs), Av , Min:117 , Max:158   ; Diastolic (24hrs), Av, Min:69, Max:91    I/O (24Hr):    Intake/Output Summary (Last 24 hours) at 2024 0627  Last data filed at 2024 0330  Gross per 24 hour   Intake 240 ml   Output --   Net 240 ml     Objective  Labs/Imaging/Diagnostics    Labs:  CBC:  Recent Labs     24  0825 24  0353   WBC 11.7 9.1   RBC 2.79* 2.81*   HGB 7.8* 7.8*   HCT 24.5* 25.1*   MCV 87.8 89.3   RDW 16.5* 16.5*   PLT 87* 79*     CHEMISTRIES:  Recent Labs     24  0825 24  0353   * 130*   K 3.5 3.3*   CL 92* 98*   CO2 27 27   BUN 46* 25*   CREATININE 5.08* 3.16*   GLUCOSE 121* 99   PHOS  --  2.3*   MG  --  1.5*     PT/INR:  Recent Labs     24  0120   PROTIME 17.1*   INR 1.4*     APTT:No results for input(s): \"APTT\" in the last 72 hours.  LIVER PROFILE:  Recent Labs     24  0825   AST 26   ALT 13   BILITOT 0.8   ALKPHOS 182*       Imaging Last 24 Hours:  Echo (TTE) complete (PRN contrast/bubble/strain/3D)    Result Date: 2024    Left Ventricle: Moderately reduced left ventricular systolic function with a visually estimated EF of 35 - 40%. Left ventricle is dilated. Mildly increased wall thickness. Global hypokinesis present. Grade III diastolic dysfunction with increased LAP.   Aortic Valve: Moderate annular calcification. Mild stenosis of the aortic valve. AV mean gradient is 12 mmHg. AV area by continuity VTI is 1.2 cm2.   Mitral 
Progress Note  Pulmonary, Critical Care, and Sleep Medicine    Name: Latha Oseguera MRN: [id number]   : 1970 Hospital: [facility]   Date: 2024        IMPRESSION:   B  Loculated pleural effusion vs pleural based mass/metastasis. Favor the latter upon review of images. Suspicion heightens with contrast CT findings   Encephalopathy, likely acute and toxic-metabolic  Sepsis due to UTI  Emphysema on CT, suspect undiagnosed COPD and possible CO2 retention given smoking history   ESRD on IHD  HFrEF compensated  Chronic anemia with history of GI bleed  R renal mass presumed RCC       PLAN:   IR referral for biopsy, suspect metastatic disease from large renal mass  Doubt feasibility of draining small loculated effusion  No current indication for supplemental O2  Ammonia not elevated, hepatic encephalopathy unlikely  Agree with empiric antibiotics for UTI and possible pneumonia. Streamline to cultures.   Follow up CXR in am  Will follow with you.       Subjective/Interval History:   I have reviewed the flowsheet and previous day’s notes. Reviewed interval history. Discussed management with nursing staff.    24  Seen in dialysis, no new respiratory complaints.  Remains on supplemental O2, improving requirements    ROS:Review of systems not obtained due to patient factors.  Orders reviewed including medications. Changes made if indicated.   Telemetry monitor reviewed at the bedside.  Objective:   Vital Signs:    /69   Pulse 97   Temp 97.2 °F (36.2 °C)   Resp 20   Ht 1.626 m (5' 4\")   Wt 72.6 kg (160 lb)   SpO2 97%   BMI 27.46 kg/m²             Temp (24hrs), Av.4 °F (36.3 °C), Min:97.1 °F (36.2 °C), Max:97.8 °F (36.6 °C)       Intake/Output:   Last shift:      No intake/output data recorded.  Last 3 shifts: 1901 -  0700  In: 1500   Out: 650 [Urine:150]    Intake/Output Summary (Last 24 hours) at 2024 1418  Last data filed at 1/3/2024 2034  Gross per 24 hour   Intake 500 ml 
Progress Note  Pulmonary, Critical Care, and Sleep Medicine    Name: Latha Oseguera MRN: [id number]   : 1970 [facility]   Date: 2024        IMPRESSION:   B  Loculated pleural effusion vs pleural based mass/metastasis. Favor the latter upon review of images. Suspicion heightens with contrast CT findings   Encephalopathy, likely acute and toxic-metabolic  Sepsis due to UTI, probable Pseudomonas on culture  Emphysema on CT, suspect undiagnosed COPD and possible CO2 retention given smoking history   ESRD on IHD  HFrEF compensated  Chronic anemia with history of GI bleed  R renal mass presumed RCC       PLAN:   IR consulted for biopsy, suspect metastatic disease from large renal mass. Possible biopsy early next week. Discussed risks/benefits with pt  Small loculated effusions not good candidates for drainage procedure  No current indication for supplemental O2   Agree with empiric Cefepime for UTI and possible pneumonia. Streamline to cultures.   Follow up CXR in am  Will follow with you.       Subjective/Interval History:   I have reviewed the flowsheet and previous day’s notes. Reviewed interval history. Discussed management with nursing staff.    24  Seen in dialysis, no new respiratory complaints.  Remains on supplemental O2, improving requirements    ROS:Review of systems not obtained due to patient factors.  Orders reviewed including medications. Changes made if indicated.   Telemetry monitor reviewed at the bedside.  Objective:   Vital Signs:    /71   Pulse (!) 108   Temp 97.2 °F (36.2 °C) (Oral)   Resp 18   Ht 1.626 m (5' 4.02\")   Wt 72.6 kg (160 lb)   SpO2 99%   BMI 27.45 kg/m²             Temp (24hrs), Av.7 °F (36.5 °C), Min:96.4 °F (35.8 °C), Max:98.6 °F (37 °C)       Intake/Output:   Last shift:      No intake/output data recorded.  Last 3 shifts:  1901 -  0700  In: 1000   Out: 980     Intake/Output Summary (Last 24 hours) at 2024 1428  Last 
Progress Note  Pulmonary, Critical Care, and Sleep Medicine    Name: Latha Oseguera MRN: [id number]   : 1970 Hospital: [facility]   Date: 2024        IMPRESSION:   B  Loculated pleural effusion vs pleural based mass/metastasis. Favor the latter upon review of images. Suspicion heightens with contrast CT findings   Encephalopathy, likely acute and toxic-metabolic  Sepsis due to UTI, with Pseudomonas bacteremia  Emphysema on CT, suspect undiagnosed COPD and possible CO2 retention given smoking history   ESRD on IHD  HFrEF compensated  Chronic anemia with history of GI bleed  R renal mass presumed RCC       PLAN:   Appreciate IR consult for nodule biopsy, suspect metastatic disease from large renal mass. Plan for biopsy Monday. Discussed risks/benefits with pt  Small loculated effusions not good candidates for drainage procedure  Titrate FiO2 to saturation greater than 90%, unclear why pt was started on this overnight as SpO2 greater than 90% on RA  Add Pulmicort to Brovana  Switch to LABA/ICS inhaler on discharge  Baseline PFT's as outpatient  Agree with empiric Cefepime for UTI and possible pneumonia. Streamline to cultures.   Follow up CXR in am  Will follow with you.       Subjective/Interval History:   I have reviewed the flowsheet and previous day’s notes. Reviewed interval history. Discussed management with nursing staff.    24  See just after dialysis. Pt complaining of cold sensation  Remains on supplemental O2, improving requirements. SpO2 100% on 3 LNC    ROS:Review of systems not obtained due to patient factors.  Orders reviewed including medications. Changes made if indicated.   Telemetry monitor reviewed at the bedside.  Objective:   Vital Signs:    BP (!) 108/56   Pulse 94   Temp 97.2 °F (36.2 °C)   Resp 18   Ht 1.626 m (5' 4.02\")   Wt 72.6 kg (160 lb)   SpO2 98%   BMI 27.45 kg/m²             Temp (24hrs), Av.7 °F (36.5 °C), Min:97.2 °F (36.2 °C), Max:98.2 °F (36.8 
Pulmonology follow-up    Reviewed chart  Patient's family does not wish any further diagnostic procedures for further evaluation of abnormal CT scan of chest    Will sign off and be available  Please reconsult should there be any change in plans    Katerine Bustamante MD  01/10/24  Pulmonary, Critical Care Medicine  [facility]      
RN tried to call report to [facility] for this patient. Ms. Rohit Camarena RN received the call, She was given the opportunity to ask some questions about the patient condition and were answered. ETA given and concurred.  
Received patient from ER for HD.  Received pre HD report from  CLAY Webber RN.      Pt in bed, A+O x2-3, on O2 via NC @ 3L, no s/s of distress noted.      Accessed right side CVC w/o complications.  Tx initiated at 1722.      CVC flowing with ease.  For hemodynamic stability UF goal 500 ml.  Offered assistance with repositioning every 2 hours.  Vascular access visible at all times during treatment, line connections intact at all times.      Tx completed at 2024, tolerated well 0L removed.  De-accessed per protocol.    Heparin indwell 1.6ml in arterial, and 1.7ml in venous catheter.      Unit nurse NAVARRO Emmanuel RN given report. Patient sent to CT from dialysis.  
Received pre HD report from  ANEL Barajas RN.      Pt in bed, A+O to self only, agitated, a bit combative, not following redirection commands, on O2 via NC @ 2L, no s/s of distress noted.      Accessed right CVC w/o complications.  Tx initiated at 0844.      CVC flowing with ease.  For hemodynamic stability UF goal 3500 ml.  Offered assistance with repositioning every 2 hours.  Vascular access visible at all times during treatment, line connections intact at all times.      @0855, Patient continued to be combative bilateral soft wrist restraints placed on upper extremities, Nephrologist made aware.  Spoke w/unit Charge nurse and requested a sitter to come sit with patient as she continues to through her legs over the side of the bed and yell at the top of her lungs.    @0900, Nephrologist requested to increase patient UF Goal to 3.5L total.    Tx completed at 1146, tolerated well 3L removed.  De-accessed per protocol.    Heparin indwell 1.6ml in arterial, and 1.7ml in venous catheter.      Unit nurse ANEL Barajas, RN given report.  
Received pre HD report from  NAVARRO Palacios , RN.       Pt in bed, A+O 3, on O2 via NC @ 2L, no s/s of distress noted.       Accessed right CVC w/o complications. Tx initiated at 1254 .       CVC flowing with ease. UF goal 0 ml. Offered assistance with repositioning every 2 hours.  Vascular access visible at all times during treatment, line connections intact at all times.       Tx completed at 1545 , tolerated well 0L removed.  De-accessed per protocol. Heparin indwell 1.6ml in arterial, and 1.7ml in venous catheter.       Unit nurse NAVARRO Palacios, RN given report.  
Received pre HD report from  RODRI Arevalo RN.       Pt in bed, A+O 2, on O2 via NC @ 3L, no s/s of acute distress noted.       Accessed right CVC w/o complications. Tx initiated at 0945 .       CVC flowing with ease. UF goal 7054-3698 ml. Offered assistance with repositioning every 2 hours.  Vascular access visible at all times during treatment, line connections intact at all times.       Tx completed at 1245 , tolerated well 1.5L removed.  De-accessed per protocol. Heparin indwell 1.6ml in arterial, and 1.7ml in venous catheter.       Unit nurse RODRI Arevalo, BILLY given report.  
Received pre HD report from  RODRI Arevalo RN.       Pt in bed, A+O 2, on O2 via NC, patient is restless, no s/s of acute distress noted.     Upon assessment of Right CVC it was noted to have moderate amount of light yellow, opaque drainage around the insertion site. MD notified. Culture swab collected from insertion site, blood cultures collected from HD catheter. New order for vanco to be administered last hour of tx. New dressing applied to catheter site using aseptic technique.     Accessed right CVC w/o complications.  Tx initiated at 1540 .       CVC flowing with ease. UF goal 8896-2969 ml. Offered assistance with repositioning every 2 hours. Vascular access visible at all times during treatment, line connections intact at all times.      Patient restless during tx, ativan was administered by floor nurse during tx. Patient now resting and seems more comfortable. 1 Unit RBC administered during tx. Vancomycin administered per order. Patient tolerated well.     Tx completed at 1840, 1.5L removed.  De-accessed per protocol. Heparin indwell 1.6ml in arterial, and 1.7ml in venous catheter.       Unit nurse RODRI Navarrete RN given report.  
Received report from BILLY Hill. Pt AAOx2-self and place only, NAD, breathing non labored, on O2 NC at 3L, HOB up. IV site clean, dry and intact. Bed at the lowest level on lock position, call bell w/i reach. Bed alarm on.    
This RN introduced self to patient. No needs or questions voiced at this time. Whiteboard is updated. Patient left with call light and their bed in the lowest position with brakes on.    
This RN rounded on patient. 5 P's addressed. No further needs or complaints voiced at this time. Patient left with call light and their bed in the lowest position with brakes on.    
Intake & Therapies:    Average Meal Intake: Unable to assess  Average Supplements Intake: None Ordered  ADULT DIET; Regular; Low Sodium (2 gm); Low Potassium (Less than 3000 mg/day)    Anthropometric Measures:  Height: 162.6 cm (5' 4.02\")  Ideal Body Weight (IBW): 120 lbs (55 kg)    Admission Body Weight: 72.6 kg (160 lb) (Stated)  Current Body Weight: 72.6 kg (160 lb), 133.3 % IBW. Weight Source: Stated  Current BMI (kg/m2): 27.5  Usual Body Weight:  (Unable to obtain)  Weight Adjustment For: No Adjustment      Estimated Daily Nutrient Needs:  Energy Requirements Based On: Formula  Weight Used for Energy Requirements: Current  Energy (kcal/day): 6257-4022 (1.2-1.5)  Weight Used for Protein Requirements: Current  Protein (g/day):  (1.2-1.5)  Method Used for Fluid Requirements: Standard Renal  Fluid (ml/day): 750-1500 ml    Nutrition Diagnosis:   Increased nutrient needs related to renal dysfunction, acute injury/trauma as evidenced by wounds, dialysis    Nutrition Interventions:   Food and/or Nutrient Delivery: Continue Current Diet, Start Oral Nutrition Supplement, Vitamin Supplement  Nutrition Education/Counseling: No recommendation at this time  Coordination of Nutrition Care: Continue to monitor while inpatient       Goals:     Goals: Meet at least 75% of estimated needs, by next RD assessment       Nutrition Monitoring and Evaluation:   Behavioral-Environmental Outcomes: None Identified  Food/Nutrient Intake Outcomes: Food and Nutrient Intake, Supplement Intake, Vitamin/Mineral Intake  Physical Signs/Symptoms Outcomes: Biochemical Data, Chewing or Swallowing, Constipation, Diarrhea, GI Status, Nausea or Vomiting, Fluid Status or Edema, Hemodynamic Status, Meal Time Behavior, Skin, Weight    Discharge Planning:    Continue current diet, Continue Oral Nutrition Supplement     GABRIEL PARISI RD  Contact: [phone]             
issue (e.g., several times per day, once every few days, constant)     FUNCTIONAL ASSESSMENT:     Palliative Performance Scale (PPS):40         PSYCHOSOCIAL/SPIRITUAL SCREENING:      Any spiritual / Yazdanism concerns:  [] Yes /  [x] No    Caregiver Burnout:  [] Yes /  [] No /  [x] No Caregiver Present      Anticipatory grief assessment:   [x] Normal  / [] Maladaptive        REVIEW OF SYSTEMS:     Positive and pertinent negative findings in ROS are noted above in HPI.  The following systems were [x] reviewed / [] unable to be reviewed as noted in HPI  Other findings are noted below.  Systems: constitutional, ears/nose/mouth/throat, respiratory, gastrointestinal, genitourinary, musculoskeletal, integumentary, neurologic, psychiatric, endocrine. Positive findings noted below.  Modified ESAS Completed by: provider                                            PHYSICAL EXAM:     Wt Readings from Last 3 Encounters:   01/09/24 89 kg (196 lb 3.4 oz)   12/24/23 77.6 kg (171 lb)     Blood pressure (!) 92/58, pulse 73, temperature 97.2 °F (36.2 °C), temperature source Oral, resp. rate 17, height 1.626 m (5' 4.02\"), weight 89 kg (196 lb 3.4 oz), SpO2 97 %.  Pain:                         Constitutional: chronically ill female lying in bed in NAD   Eyes: pupils equal  Cardiovascular: RRR  Respiratory: breathing not labored  Gastrointestinal: soft   Skin: warm, dry  Neurologic: alerts to her name sleepy did not robustly engage with us     HISTORY:     Principal Problem:    Healthcare-associated pneumonia  Active Problems:    HFrEF (heart failure with reduced ejection fraction) (HCC)    Severe sepsis (HCC)    Renal mass    Lung nodules    Acute metabolic encephalopathy    Goals of care, counseling/discussion    ESRD (end stage renal disease) (HCC)    Hypotension    Debility    Sepsis (HCC)    Pleural effusion    Encephalopathy acute    ESRD (end stage renal disease) on dialysis (HCC)  Resolved Problems:    * No resolved hospital 
PRN  HYDROmorphone (DILAUDID) tablet 2 mg, 2 mg, Oral, Q12H PRN  LORazepam (ATIVAN) tablet 1 mg, 1 mg, Oral, Q12H PRN  midodrine (PROAMATINE) tablet 10 mg, 10 mg, Oral, TID WC  ceFEPIme (MAXIPIME) 2,000 mg in sodium chloride 0.9 % 100 mL IVPB (mini-bag), 2,000 mg, IntraVENous, Q24H  atorvastatin (LIPITOR) tablet 40 mg, 40 mg, Oral, Nightly  calcitRIOL (ROCALTROL) capsule 0.25 mcg, 0.25 mcg, Oral, Daily  sevelamer (RENVELA) tablet 800 mg, 800 mg, Oral, TID  sodium chloride flush 0.9 % injection 5-40 mL, 5-40 mL, IntraVENous, 2 times per day  sodium chloride flush 0.9 % injection 5-40 mL, 5-40 mL, IntraVENous, PRN  0.9 % sodium chloride infusion, , IntraVENous, PRN  heparin (porcine) injection 5,000 Units, 5,000 Units, SubCUTAneous, 3 times per day  ondansetron (ZOFRAN-ODT) disintegrating tablet 4 mg, 4 mg, Oral, Q8H PRN **OR** ondansetron (ZOFRAN) injection 4 mg, 4 mg, IntraVENous, Q6H PRN  polyethylene glycol (GLYCOLAX) packet 17 g, 17 g, Oral, Daily PRN  acetaminophen (TYLENOL) tablet 650 mg, 650 mg, Oral, Q6H PRN **OR** acetaminophen (TYLENOL) suppository 650 mg, 650 mg, Rectal, Q6H PRN  heparin (porcine) injection 3,800 Units, 3,800 Units, IntraCATHeter, PRN  gabapentin (NEURONTIN) capsule 100 mg, 100 mg, Oral, Once per day on Mon Wed Fri  folic acid (FOLVITE) tablet 1 mg, 1 mg, Oral, Daily      Assessment//Plan           Hospital Problems             Last Modified POA    * (Principal) Healthcare-associated pneumonia 1/3/2024 Yes    HFrEF (heart failure with reduced ejection fraction) (HCC) 1/3/2024 Yes    Severe sepsis (HCC) 1/3/2024 Yes    Renal mass 1/3/2024 Yes    Lung nodules 1/4/2024 Yes    Acute metabolic encephalopathy 1/3/2024 Yes    Goals of care, counseling/discussion 1/4/2024 Yes    ESRD (end stage renal disease) ([facility]) 1/5/2024 Yes    Hypotension 1/4/2024 Yes    Debility 1/4/2024 Yes    Sepsis ([facility]) 1/4/2024 Yes    Pleural effusion 1/4/2024 Yes    Encephalopathy acute 1/5/2024 Yes 
heparin (porcine) injection 5,000 Units, 5,000 Units, SubCUTAneous, 3 times per day  ondansetron (ZOFRAN-ODT) disintegrating tablet 4 mg, 4 mg, Oral, Q8H PRN **OR** ondansetron (ZOFRAN) injection 4 mg, 4 mg, IntraVENous, Q6H PRN  polyethylene glycol (GLYCOLAX) packet 17 g, 17 g, Oral, Daily PRN  acetaminophen (TYLENOL) tablet 650 mg, 650 mg, Oral, Q6H PRN **OR** acetaminophen (TYLENOL) suppository 650 mg, 650 mg, Rectal, Q6H PRN  heparin (porcine) injection 3,800 Units, 3,800 Units, IntraCATHeter, PRN  gabapentin (NEURONTIN) capsule 100 mg, 100 mg, Oral, Once per day on Mon Wed Fri  folic acid (FOLVITE) tablet 1 mg, 1 mg, Oral, Daily      Assessment//Plan           Hospital Problems             Last Modified POA    * (Principal) Healthcare-associated pneumonia 1/3/2024 Yes    HFrEF (heart failure with reduced ejection fraction) ([facility]) 1/3/2024 Yes    Severe sepsis ([facility]) 1/3/2024 Yes    Renal mass 1/3/2024 Yes    Lung nodules 1/4/2024 Yes    Acute metabolic encephalopathy 1/3/2024 Yes    Goals of care, counseling/discussion 1/4/2024 Yes    ESRD (end stage renal disease) ([facility]) 1/5/2024 Yes    Hypotension 1/4/2024 Yes    Debility 1/4/2024 Yes    Sepsis ([facility]) 1/4/2024 Yes    Pleural effusion 1/4/2024 Yes    Encephalopathy acute 1/5/2024 Yes    ESRD (end stage renal disease) on dialysis ([facility]) 1/8/2024 Yes   Assessment:    Condition: In stable condition.  Unchanged.   (    # Sepsis, POA  # Acute hypoxic respiratory failure. On 3L  # Pseudomonas bacteremia  # Acute metabolic encephalopathy, resolved  # Large R renal mass, [id number], suspicious for RCC  # Right ureteral stent  # Electrolyte abnormalities:  hyponatemia, hypokalemia, hypomagnesemia  # Thrombocytopenia  # Bilateral pulmonary nodules, largest 1.3cm, concerning for metastatic disease  # B/l Loculated pleural effusion vs pleural based mass/metastasis. Favor the latter upon review of images. Suspicion heightens with contrast CT findings   # UTI, probable Pseudomonas 
recent):  XR CHEST PORTABLE 01/03/2024    Narrative  XR CHEST PORTABLE    Indication: Sepsis    Comparison: None    Findings: A right internal jugular tunnel dialysis catheter terminates at the  cavoatrial junction.    Heart size is enlarged. Post surgical changes from TAVR are noted.    Bilateral perihilar and interstitial opacities are noted. Small bilateral  pleural effusions. No pneumothorax.    Surgical clips project over the upper lateral left chest. Vascular stents are  seen in the left axillary/subclavian region.    Impression  1.  Probable bilateral pulmonary edema with small bilateral pleural effusions.  Superimposed infectious process is not excluded.        High complexity decision making was performed during the evaluation of this patient at high risk for decompensation with multiple organ involvement     Above mentioned total time spent on reviewing the case/medical record/data/notes/EMR/patient examination/documentation/coordinating care with nurse/consultants, exclusive of procedures with complex decision making performed and > 50% time spent in face to face evaluation.     Katerine Bustamante MD  
2.0 MMOL/L   Basic Metabolic Panel w/ Reflex to MG    Collection Time: 01/04/24  3:53 AM   Result Value Ref Range    Sodium 130 (L) 136 - 145 mmol/L    Potassium 3.3 (L) 3.5 - 5.5 mmol/L    Chloride 98 (L) 100 - 111 mmol/L    CO2 27 21 - 32 mmol/L    Anion Gap 5 3.0 - 18 mmol/L    Glucose 99 74 - 99 mg/dL    BUN 25 (H) 7.0 - 18 MG/DL    Creatinine 3.16 (H) 0.6 - 1.3 MG/DL    Bun/Cre Ratio 8 (L) 12 - 20      Est, Glom Filt Rate 17 (L) >60 ml/min/1.73m2    Calcium 8.3 (L) 8.5 - 10.1 MG/DL   Phosphorus    Collection Time: 01/04/24  3:53 AM   Result Value Ref Range    Phosphorus 2.3 (L) 2.5 - 4.9 MG/DL   CBC with Auto Differential    Collection Time: 01/04/24  3:53 AM   Result Value Ref Range    WBC 9.1 4.6 - 13.2 K/uL    RBC 2.81 (L) 4.20 - 5.30 M/uL    Hemoglobin 7.8 (L) 12.0 - 16.0 g/dL    Hematocrit 25.1 (L) 35.0 - 45.0 %    MCV 89.3 78.0 - 100.0 FL    MCH 27.8 24.0 - 34.0 PG    MCHC 31.1 31.0 - 37.0 g/dL    RDW 16.5 (H) 11.6 - 14.5 %    Platelets 79 (L) 135 - 420 K/uL    MPV 10.8 9.2 - 11.8 FL    Nucleated RBCs 0.0 0  WBC    nRBC 0.00 0.00 - 0.01 K/uL    Neutrophils % 92 (H) 40 - 73 %    Lymphocytes % 1 (L) 21 - 52 %    Monocytes % 5 3 - 10 %    Eosinophils % 0 0 - 5 %    Basophils % 0 0 - 2 %    Immature Granulocytes 1 (H) 0.0 - 0.5 %    Neutrophils Absolute 8.4 (H) 1.8 - 8.0 K/UL    Lymphocytes Absolute 0.1 (L) 0.9 - 3.6 K/UL    Monocytes Absolute 0.4 0.05 - 1.2 K/UL    Eosinophils Absolute 0.0 0.0 - 0.4 K/UL    Basophils Absolute 0.0 0.0 - 0.1 K/UL    Absolute Immature Granulocyte 0.1 (H) 0.00 - 0.04 K/UL    Differential Type AUTOMATED     Ammonia    Collection Time: 01/04/24  3:53 AM   Result Value Ref Range    Ammonia <10 (L) 11 - 32 UMOL/L   Magnesium    Collection Time: 01/04/24  3:53 AM   Result Value Ref Range    Magnesium 1.5 (L) 1.6 - 2.6 mg/dL         Signed By: Sánchez Coates MD           
B/l Loculated pleural effusion vs pleural based mass/metastasis. Favor the latter upon review of images. Suspicion heightens with contrast CT findings   # UTI, probable Pseudomonas on culture  # Emphysema on CT, suspect undiagnosed COPD and possible CO2 retention given smoking history   # ESRD, HD dependent  # CAD w/hx PCI/stenting of LAD 2/2023  # Anemia of chronic disease  # Chronic systolic CHF EF 35-40%).     Plan:   Consults: pulmonology and urology (nephrology, infectious disease).  Regular diet.  Administer medications as ordered.   (    - Continue supplemental oxygen, nebs/BD as tolerated.  - Cefepime per ID, follow cultures.  - Started on Vancomycin due to possible TDC infection  - HD per nephrology.  Volume electrolyte management as needed.  - IR guided biopsy was cancelled per son's request since patient does not want chemotherapy.  - First discussed with oncology (Dr. Kaur) for renal cell carcinoma.  However due to the size of the mass, it is a surgical treatment and recommended reaching out to urology  - Discussed with Urology (Dr. Cano) about the renal cell carcinoma.  He mentioned that there is a possibility for partial nephrectomy to treat RCC.  In their group, there are oncology urologist that can see patient.    - Patient was seen by Dr. Saldaña (urology) on 1/8. Patient expresses that she wants no form of chemotherapy. Since she likely has mets from RCC, it is not necessary to do a formal consult to Oncology. Plan for stent to be exchanged in the next few months.   - Monitor lytes, replace as necessary.  - Pending repeat blood cultures  - Discontinued heparin subQ due to thrombocytopenia  - Likely discharge tomorrow to SNF after HD).       Case discussed with:  [x]Patient  []Family  [x]Nursing  [x]Case Management  DVT Prophylaxis:  []Lovenox  []Hep SQ  [x]SCDs  []Coumadin   []Heparin gtt   []Xarelto   []Eitan Walker DO, SUSHIL, MS  [facility] 
Regular diet.  Administer medications as ordered.   (    - Continue supplemental oxygen, nebs/BD as tolerated.  - Cefepime per ID, follow cultures.  - HD per nephrology.  Volume electrolyte management as needed.  - Image guided biopsy of pulmonary nodule, tentatively planned for Monday (1/7/2024)  - First discussed with oncology (Dr. Kaur) for renal cell carcinoma.  However due to the size of the mass, it is a surgical treatment and recommended reaching out to urology  - Discussed with Urology (Dr. Cano) about the renal cell carcinoma.  He mentioned that there is a possibility for partial nephrectomy to treat RCC.  In their group, there are oncology urologist that can see patient.    - Patient was seen by Dr. Saldaña (urology) today. Patient expresses that she wants no form of chemotherapy. Since she likely has mets from RCC, it is not necessary to do a formal consult to Oncology. Plan for stent to be exchanged in the next few months.   - Monitor lytes, replace as necessary.  - Pending repeat blood cultures  - Discontinued heparin subQ due to thrombocytopenia).       Case discussed with:  [x]Patient  []Family  []Nursing  []Case Management  DVT Prophylaxis:  []Lovenox  []Hep SQ  [x]SCDs  []Coumadin   []Heparin gtt   []Xarelto   []Eitan Walker DO, MBA, MS [facility]  Hospitalist

## 2024-01-10 NOTE — PLAN OF CARE
Problem: Chronic Conditions and Co-morbidities  Goal: Patient's chronic conditions and co-morbidity symptoms are monitored and maintained or improved  1/10/2024 1839 by Kendrick Barajas, RN  Outcome: Completed  1/10/2024 1034 by Barbara Werner, RN  Outcome: Progressing     Problem: Discharge Planning  Goal: Discharge to home or other facility with appropriate resources  Outcome: Completed     Problem: Safety - Adult  Goal: Free from fall injury  Outcome: Completed     Problem: Pain  Goal: Verbalizes/displays adequate comfort level or baseline comfort level  Outcome: Completed     Problem: Nutrition Deficit:  Goal: Optimize nutritional status  Outcome: Completed     Problem: Skin/Tissue Integrity  Goal: Absence of new skin breakdown  Description: 1.  Monitor for areas of redness and/or skin breakdown  2.  Assess vascular access sites hourly  3.  Every 4-6 hours minimum:  Change oxygen saturation probe site  4.  Every 4-6 hours:  If on nasal continuous positive airway pressure, respiratory therapy assess nares and determine need for appliance change or resting period.  Outcome: Completed     Problem: Safety - Medical Restraint  Goal: Remains free of injury from restraints (Restraint for Interference with Medical Device)  Description: INTERVENTIONS:  1. Determine that other, less restrictive measures have been tried or would not be effective before applying the restraint  2. Evaluate the patient's condition at the time of restraint application  3. Inform patient/family regarding the reason for restraint  4. Q2H: Monitor safety, psychosocial status, comfort, nutrition and hydration  Outcome: Completed     
  Problem: Chronic Conditions and Co-morbidities  Goal: Patient's chronic conditions and co-morbidity symptoms are monitored and maintained or improved  Outcome: Progressing  Flowsheets (Taken 1/7/2024 2000)  Care Plan - Patient's Chronic Conditions and Co-Morbidity Symptoms are Monitored and Maintained or Improved:   Monitor and assess patient's chronic conditions and comorbid symptoms for stability, deterioration, or improvement   Collaborate with multidisciplinary team to address chronic and comorbid conditions and prevent exacerbation or deterioration   Update acute care plan with appropriate goals if chronic or comorbid symptoms are exacerbated and prevent overall improvement and discharge     
  Problem: Chronic Conditions and Co-morbidities  Goal: Patient's chronic conditions and co-morbidity symptoms are monitored and maintained or improved  Outcome: Progressing  Flowsheets (Taken 1/9/2024 0717)  Care Plan - Patient's Chronic Conditions and Co-Morbidity Symptoms are Monitored and Maintained or Improved: Monitor and assess patient's chronic conditions and comorbid symptoms for stability, deterioration, or improvement     Problem: Discharge Planning  Goal: Discharge to home or other facility with appropriate resources  Outcome: Progressing  Flowsheets (Taken 1/9/2024 0717)  Discharge to home or other facility with appropriate resources: Identify barriers to discharge with patient and caregiver     Problem: Safety - Adult  Goal: Free from fall injury  Outcome: Progressing     Problem: Pain  Goal: Verbalizes/displays adequate comfort level or baseline comfort level  Outcome: Progressing     Problem: Nutrition Deficit:  Goal: Optimize nutritional status  Outcome: Progressing     Problem: Occupational Therapy - Adult  Goal: By Discharge: Performs self-care activities at highest level of function for planned discharge setting.  See evaluation for individualized goals.  Description: Occupational Therapy Goals:  Initiated 1/4/2024 to be met within 7-10 days.    1.  Patient will perform grooming with supervision/set-up seated edge of bed with good balance.   2.  Patient will perform bathing with minimal assistance/contact guard assist.  3.  Patient will perform upper body dressing and lower body dressing  with minimal assistance/contact guard assist.  4.  Patient will perform bed mobility with minimal assistance/contact guard assist in prep for ADL routine.  5.  Patient will perform all aspects of toileting with minimal assistance/contact guard assist.  6.  Patient will participate in upper extremity therapeutic exercise/activities with supervision/set-up for 8-10 minutes to increase strength/endurance for ADLs.  
  Problem: Discharge Planning  Goal: Discharge to home or other facility with appropriate resources  Outcome: Progressing  Flowsheets (Taken 1/3/2024 2100)  Discharge to home or other facility with appropriate resources:   Identify barriers to discharge with patient and caregiver   Arrange for needed discharge resources and transportation as appropriate     Problem: Safety - Adult  Goal: Free from fall injury  Outcome: Progressing  Flowsheets (Taken 1/3/2024 2100)  Free From Fall Injury: Instruct family/caregiver on patient safety     
  Problem: Occupational Therapy - Adult  Goal: By Discharge: Performs self-care activities at highest level of function for planned discharge setting.  See evaluation for individualized goals.  Description: Occupational Therapy Goals:  Initiated 1/4/2024 to be met within 7-10 days.    1.  Patient will perform grooming with supervision/set-up seated edge of bed with good balance.   2.  Patient will perform bathing with minimal assistance/contact guard assist.  3.  Patient will perform upper body dressing and lower body dressing  with minimal assistance/contact guard assist.  4.  Patient will perform bed mobility with minimal assistance/contact guard assist in prep for ADL routine.  5.  Patient will perform all aspects of toileting with minimal assistance/contact guard assist.  6.  Patient will participate in upper extremity therapeutic exercise/activities with supervision/set-up for 8-10 minutes to increase strength/endurance for ADLs.    7.  Patient will utilize energy conservation techniques during functional activities with min verbal cues.    PLOF: Pt reports conflicting level of assist at PLOF/LTC nursing home, pt appears confused and unable to provide accurate PLOF with ADLs      Outcome: Progressing   OCCUPATIONAL THERAPY EVALUATION    Patient: Latha Oseguera (53 y.o. female)  Date: 1/4/2024  Primary Diagnosis: Healthcare-associated pneumonia [J18.9]  HFrEF (heart failure with reduced ejection fraction) ([facility]) [I50.20]       Precautions: Fall Risk, Aspiration Risk, General Precautions,  ,  ,  ,  ,  ,  ,      ASSESSMENT :  Pt laying semi-reclined in bed, appears confused, difficulty staying on track with providing accurate PLOF e.g. pt states she receives nursing assist and then states she performs independently. Pt c/o 10/10 pain in LUE at site of old HD bruit, which is re-occurring per pt report and 10/10 pain in BLEs, nursing present and aware. Pt declined to participate in bed mobility d/t pain. Pt declined 
INTERVENTION:  HEMODYNAMIC STABILIZATION  MAINTAIN BP WNL WHILE ON HD.     INTERVENTION:  FLUID MANAGEMENT  WILL ATTEMPT 0 ML TOTAL FLUID REMOVAL AS TOLERATED.     INTERVENTION:  METABOLIC/ELECTROLYTE MANAGEMENT  4.0 POTASSIUM 2.5 CALCIUM DIALYSATE USED WITH HD TODAY.     INTERVENTION:  HEMODIALYSIS ACCESS SITE MANAGEMENT  RIGHT CVC ACCESSED USING ASEPTIC TECHNIQUE.     GOAL:  SIGNS AND SYMPTOMS OF LISTED POTENTIAL PROBLEMS WILL BE ABSENT OR MANAGEABLE.     OUTCOME:  PROGRESSING.     HD PLANNED FOR 3 HOURS TODAY.      Problem: Chronic Conditions and Co-morbidities  Goal: Patient's chronic conditions and co-morbidity symptoms are monitored and maintained or improved  1/4/2024 1326 by Brianda Goodwin RN  Outcome: Progressing     
INTERVENTION:  HEMODYNAMIC STABILIZATION  MAINTAIN BP WNL WHILE ON HD.     INTERVENTION:  FLUID MANAGEMENT  WILL ATTEMPT 7269-7325 ML TOTAL FLUID REMOVAL AS TOLERATED.     INTERVENTION:  METABOLIC/ELECTROLYTE MANAGEMENT  3.0 POTASSIUM 2.5 CALCIUM DIALYSATE USED WITH HD TODAY.     INTERVENTION:  HEMODIALYSIS ACCESS SITE MANAGEMENT  RIGHT CVC ACCESSED USING ASEPTIC TECHNIQUE.     GOAL:  SIGNS AND SYMPTOMS OF LISTED POTENTIAL PROBLEMS WILL BE ABSENT OR MANAGEABLE.     OUTCOME:  PROGRESSING.     HD PLANNED FOR 3 HOURS TODAY.      Problem: Chronic Conditions and Co-morbidities  Goal: Patient's chronic conditions and co-morbidity symptoms are monitored and maintained or improved  Outcome: Progressing     
INTERVENTION:  HEMODYNAMIC STABILIZATION  MAINTAIN BP WNL WHILE ON HD.     INTERVENTION:  FLUID MANAGEMENT  WILL ATTEMPT 8861-9197 ML TOTAL FLUID REMOVAL AS TOLERATED.     INTERVENTION:  METABOLIC/ELECTROLYTE MANAGEMENT  3.0 POTASSIUM 2.5 CALCIUM DIALYSATE USED WITH HD TODAY.     INTERVENTION:  HEMODIALYSIS ACCESS SITE MANAGEMENT  RIGHT CVC ACCESSED USING ASEPTIC TECHNIQUE.     GOAL:  SIGNS AND SYMPTOMS OF LISTED POTENTIAL PROBLEMS WILL BE ABSENT OR MANAGEABLE.     OUTCOME:  PROGRESSING.     HD PLANNED FOR 3 HOURS TODAY.      Problem: Chronic Conditions and Co-morbidities  Goal: Patient's chronic conditions and co-morbidity symptoms are monitored and maintained or improved  Outcome: Progressing     
INTERVENTION:  HEMODYNAMIC STABILIZATION  MAINTAIN BP WNL WHILE ON HD.    INTERVENTION:  FLUID MANAGEMENT  WILL ATTEMPT 3500 ML TOTAL FLUID REMOVAL AS TOLERATED.    INTERVENTION:  METABOLIC/ELECTROLYTE MANAGEMENT  4.0 POTASSIUM 2.5 CALCIUM DIALYSATE USED WITH HD TODAY.    INTERVENTION:  HEMODIALYSIS ACCESS SITE MANAGEMENT  RIGHT SIDE CVC ACCESSED USING ASEPTIC TECHNIQUE.    GOAL:  SIGNS AND SYMPTOMS OF LISTED POTENTIAL PROBLEMS WILL BE ABSENT OR MANAGEABLE.    OUTCOME:  PROGRESSING.    HD PLANNED FOR 3 HOURS TODAY.      Problem: Chronic Conditions and Co-morbidities  Goal: Patient's chronic conditions and co-morbidity symptoms are monitored and maintained or improved  Outcome: Progressing     
INTERVENTION:  HEMODYNAMIC STABILIZATION  MAINTAIN BP WNL WHILE ON HD.    INTERVENTION:  FLUID MANAGEMENT  WILL ATTEMPT 500 ML TOTAL FLUID REMOVAL AS TOLERATED.    INTERVENTION:  METABOLIC/ELECTROLYTE MANAGEMENT  3.0 POTASSIUM 2.5 CALCIUM DIALYSATE USED WITH HD TODAY.    INTERVENTION:  HEMODIALYSIS ACCESS SITE MANAGEMENT  RIGHT SIDE CVC ACCESSED USING ASEPTIC TECHNIQUE.    GOAL:  SIGNS AND SYMPTOMS OF LISTED POTENTIAL PROBLEMS WILL BE ABSENT OR MANAGEABLE.    OUTCOME:  PROGRESSING.    HD PLANNED FOR 3 HOURS TODAY.  
goals:  []          Improving appropriately and progressing toward goals  [x]          Improving slowly and progressing toward goals  []          Not making progress toward goals and plan of care will be adjusted     PLAN:  Patient continues to benefit from skilled intervention to address the above impairments.  Continue treatment per established plan of care.    Further Equipment Recommendations for Discharge: hospital bed and wheelchair      AMPAC: AM-PAC Inpatient Daily Activity Raw Score: 10    Current research shows that an AM-PAC score of 17 or less is not associated with a discharge to the patient's home setting. Based on an AM-PAC score and their current ADL deficits; it is recommended that the patient have 3-5 sessions per week of Occupational Therapy at d/c to increase the patient's independence.      This AMPAC score should be considered in conjunction with interdisciplinary team recommendations to determine the most appropriate discharge setting. Patient's social support, diagnosis, medical stability, and prior level of function should also be taken into consideration.     SUBJECTIVE:   Patient stated, \"Thank you.\"    OBJECTIVE DATA SUMMARY:   Cognitive/Behavioral Status:  Orientation  Orientation Level: Oriented to person    ADL Intervention:  Grooming: Moderate assistance  Face/hand hygiene and combing hair  Pt requires hand over hand assist w/RUE reaching for washcloth and to initiate facial hygiene. Hand over hand and MAX A required for hand hygiene. Pt performs hair care w/RUE and requires assist for posterior and L-side of head.     UE Dressing: Maximum assistance  Donning/doffing hospital gown at bed level    UE Therapeutic Exercises:   AAROM > AROM RUE shoulder flexion  PROM > AAROM LUE shoulder flexion  AAROM BUE elbow flexion/extension    Pain:  Pain level pre-treatment: 0/10   Pain level post-treatment: 0/10    Activity Tolerance:    Fair    After treatment:   []  Patient left in no apparent 
(HCC)     Chronic kidney disease     COPD (chronic obstructive pulmonary disease) (HCC)     Diabetes mellitus (HCC)     Hypertension    No past surgical history on file.    Home Situation:  Social/Functional History  Lives With: Other (comment) ([facility])    Critical Behavior:  Orientation  Orientation Level: Oriented to person;Disoriented to time;Disoriented to place;Disoriented to situation    Strength (BLE):    Strength: Generally decreased, functional    Range Of Motion (BLE):  AROM: Generally decreased, functional     Functional Mobility:  Bed Mobility:  Bed Mobility Training  Bed Mobility Training: Yes  Rolling: Moderate assistance  Scooting: Moderate assistance    Pain:  Pain level pre-treatment: 0/10   Pain level post-treatment: 0/10     Activity Tolerance:   Activity Tolerance: Patient tolerated evaluation without incident    After treatment:   []         Patient left in no apparent distress sitting up in chair  [x]         Patient left in no apparent distress in bed  [x]         Call bell left within reach  [x]         Nursing notified  []         Caregiver present  [x]         Bed alarm activated  []         Chair alarm activated  []         SCDs applied    COMMUNICATION/EDUCATION:   Patient Education  Education Given To: Patient  Education Provided: Role of Therapy;Plan of Care  Education Method: Demonstration;Verbal;Teach Back  Barriers to Learning: Cognition  Education Outcome: Verbalized understanding;Demonstrated understanding;Continued education needed    Thank you for this referral.  Anamika Sands, PT  Minutes: 20    Eval Complexity: Decision Making: Medium Complexity

## 2024-01-10 NOTE — DISCHARGE SUMMARY
Discharge Summary    Patient: Latha Oseguera MRN: [id number]  CSN: [id number]    YOB: 1970  Age: 53 y.o.  Sex: female    DOA: 1/3/2024 LOS:  LOS: 7 days        Disposition: SNF    Discharge Date: 01/10/2024    Admission Diagnosis: Healthcare-associated pneumonia [J18.9]  HFrEF (heart failure with reduced ejection fraction) ([facility]) [I50.20]    Discharge Diagnosis:    # Sepsis, POA  # Acute hypoxic respiratory failure. On 3L  # Pseudomonas bacteremia  # Acute metabolic encephalopathy, resolved  # Large R renal mass, [id number], suspicious for RCC  # Right ureteral stent  # Electrolyte abnormalities:  hyponatemia, hypokalemia, hypomagnesemia  # Thrombocytopenia  # Bilateral pulmonary nodules, largest 1.3cm, concerning for metastatic disease  # B/l Loculated pleural effusion vs pleural based mass/metastasis. Favor the latter upon review of images. Suspicion heightens with contrast CT findings   # UTI, probable Pseudomonas on culture  # Emphysema on CT, suspect undiagnosed COPD and possible CO2 retention given smoking history   # ESRD, HD dependent  # CAD w/hx PCI/stenting of LAD 2/2023  # Anemia of chronic disease  # Chronic systolic CHF EF 35-40%).     Discharge Condition: Stable      PHYSICAL EXAM  Visit Vitals  BP (!) 145/90   Pulse 84   Temp 98 °F (36.7 °C) (Oral)   Resp 18   Ht 1.626 m (5' 4.02\")   Wt 89 kg (196 lb 3.4 oz)   SpO2 95%   BMI 33.66 kg/m²       General Appearance:  Comfortable.    Output: No urine output.    HEENT: Normal HEENT exam.    Lungs:  Normal effort and normal respiratory rate.  Breath sounds clear to auscultation.    Heart: Normal rate.  Regular rhythm.    Chest: (TDC present)  Abdomen: Abdomen is soft and flat.  Bowel sounds are normal.   There is no abdominal tenderness.     Extremities: Normal range of motion.  There is dependent edema (b/l lower extremity pitting edema).    Pulses: Distal pulses are intact.    Neurological: Patient is alert and oriented to person, place and time.

## 2024-01-10 NOTE — CARE COORDINATION
1/4/24    Case Management Assessment  Initial Evaluation    Date/Time of Evaluation: 1/4/2024 12:07 PM  Assessment Completed by: Eveline Felix    If patient is discharged prior to next notation, then this note serves as note for discharge by case management.    Patient Name: Latha Oseguera                   YOB: 1970  Diagnosis: Healthcare-associated pneumonia [J18.9]  HFrEF (heart failure with reduced ejection fraction) ([facility]) [I50.20]                   Date / Time: 1/3/2024  7:53 AM    Patient Admission Status: Inpatient   Readmission Risk (Low < 19, Mod (19-27), High > 27): Readmission Risk Score: 20.6    Current PCP: No primary care provider on file.  PCP verified by CM? No (provider at [facility])    Chart Reviewed: Yes      History Provided by: Child/Family  Patient Orientation: Self, Situation    Patient Cognition: Alert    Hospitalization in the last 30 days (Readmission):  Yes    If yes, Readmission Assessment in CM Navigator will be completed.    Advance Directives:      Code Status: Full Code   Patient's Primary Decision Maker is:      Primary Decision Maker: Dennis Carey  Child - [phone]    Secondary Decision Maker: Vahid Carey  Child  [phone]    Discharge Planning:    Patient lives with: Other (Comment) (LTC) Type of Home: Long-Term Care  Primary Care Giver: Other (Comment) ([facility])  Patient Support Systems include: Family Members, Other (Comment) (resides at [facility])   Current Financial resources: Medicaid  Current community resources: None  Current services prior to admission:  (long term care)            Current DME: (P)  ([facility])            Type of Home Care services:  None    ADLS  Prior functional level: Assistance with the following:, Bathing, Dressing, Toileting, Cooking, Housework, Mobility  Current functional level: Assistance with the following:, Bathing, Dressing, Toileting, Cooking, Mobility    PT AM-PAC: 11 /24  OT 
ARACELI spoke with Renae with [facility], [facility], and let Renae know that patient is ready for discharge.   Renae said she asked for chair time from LTC facility, but to go ahead and setup transport, and LTC facility will need to setup Dialysis transport for patient.             Nicole Alexander, RN  Case Management [phone]   
ARACELI spoke with Renae with [facility], [facility], and updated that [facility] scheduled for 6 pm for LTC.             Nicole Alexander, RN  Case Management [phone]   
CM called patient's son Dennis Carey [phone], CM received voicemail,  CM left message with discharge plan for today, back to [facility], with Medical Transport scheduled for 6 pm.   CM left phone number for a return call.         CM called and spoke with patient's son Vahid Carey [phone], and updated with discharge plan for today, back to [facility], and that Medical Transport is scheduled for 6 pm today.   Patient's son is agreeable to the discharge plan for today, and said he will update his brother.           Nicole Alexander, RN  Case Management [phone]   
CM opened the chart for [facility]  Patient not medically ready for discharge at this time.   Patient is a Dialysis patient at [facility], M/W/F, Chair time will be needed from either patient or [facility].   Patient will transport to [facility], and first 3 Dialysis days scheduled with Medical Transport to and from SNF, when patient is medically stable for discharge.       CM attempted to get Dialysis Chair time from patient, but patient sleeping hard, and only partially opened eyes to CM when addressing patient.         Nicole Alexander, RN  Case Management [phone]   
CM placed Transport Envelope with PCS form, 2 facesheets, and copy of DNR form placed on front of patient's chart for Medicaid Transport to transport patient to [facility] today for discharge.   Discharge Summary will need to be placed in Transport Envelope with available.           Nicole Alexander RN  Case Management [phone]   
CM spoke with patient, patient does not know her Chair Time for Dialysis at [facility] M/W/F.               Nicole Alexander, BILLY  Case Management [phone]   
CM uploaded patient with clinicals to [facility] in [facility].   Patient is a LTC resident at [facility].       CM called and spoke with patient's son Dennis Carey [phone], asked about Dialysis Center and Chair time.   Patient's son said he doesn't know and that his brother Vahid and patient's  does not know patient's Dialysis information either, said [facility] would know.           ARACELI called and spoke with Renae with [facility], and asked if CM could find out the patient's Dialysis information, Center, Chair Time, and days.   Renae said she would find out, and call CM back.   ARACELI let Renae know that patient has been uploaded to [facility] for [facility].           Nicole Alexander, RN  Case Management [phone]                         
Call made to [employer] [phone], spoke with Flavia, dispatch will call the nurses station with ARLEEN.  Trip # [id number].    Dialysis transportation to [facility],  at 6:30 am.  1/12/2024 Trip # [id number]  1/15/2024 Trip # [id number]  1/19/2024 Trip # [id number]  
Discharge Summary added to Transport Envelope on front of patient's chart.             Nicole Alexander RN  Case Management [phone]   
Discharge order noted for today. Patient has been accepted to [facility] nursing [facility]. Confirmed with Renae that bed is available today.  Spoke with patient's son and is agreeable to the transition plan today.  Transport to facility has been arranged through [facility]  at 6:30 pm time. Patient's discharge summary has been forwarded to skilled nursing facility via [employer], and was placed in Transport Envelope to go with patient to SNF. Bedside RN, Kendrick, has been updated to the transition plan. Discharge information has been updated on the AVS.  Please call report to [phone] .              Nicole Alexander RN  Case Management [phone]    
[facility] transportation will transport patient at 6:00 pm.  
Renae let CM know that Dialysis Chair is 7 am.           Nicole Alexander RN  Case Management [phone]   
Requested Case Management specialist to assist with transportation to:        [facility]         Address is:    [address] [place] [address]         and phone number is:    [phone]      Patient will require BLS transport.   Pt requires Stretcher If stretcher, reason: HF, Sepsis, Renal Mass, Lung Modules, ESRD, Debility,  Impaired Mobility  Patient is currently requiring oxygen Yes  2 Liters  Height: 5\"4   Weight: 196 lbs  Pt is on isolation:  No    Is the pt ready now? Yes  Requested time: Next Available  PCS Faxed:  No  Insurance verified on face sheet: Yes  Auth needed for transport: Yes  CM completed PCS/ Envelope and placed on chart.        Patient needs Dialysis Transport.     [facility]  M/W/F  Chair Time  7 am  Need Friday, Monday, Wed        
Svetlana Care Coordinator let CM know that Dr. Leanna Bustamante said patient is a possible discharge after Dialysis today.       CM called and spoke with Renae with [facility], [place], [facility], updated that patient is a possible discharge today, and CM will need Dialysis Chair time to setup SNF to Dialysis transport when discharged.   Renae said she will call LTC [facility].           Nicole Alexander RN  Case Management [phone]   
and Supports  Yes:  [] Eligible for medical assistance or will become eligible within 180 days and UAI completed.   [] Provider/Patient and/or support system has requested screening.  [] UAI copy provided to patient or responsible party.  [] UAI unavailable at discharge will send once processed to SNF provider.  [] UAI unavailable at discharged mailed to patient  No:   [] Private pay and is not financially eligible for Medicaid within the next 180 days.  [] Reside out-of-state.  [] A residents of a state owned/operated facility that is licensed  by Department of Behavioral Health and Developmental Services or [facility]  [] Enrollment in Medicaid hospice services  [] Non [place] citizen  [] Patient /Family declines to have screening completed or provide financial information for screening     Financial Resources:  Medicaid    [] Initiated and application pending   [x] Full coverage     Advanced Care Plan:  []Surrogate Decision Maker of Care  []POA  [x]Communicated Code Status  DNR   Other

## 2024-01-11 LAB
BACTERIA SPEC CULT: ABNORMAL
GRAM STN SPEC: ABNORMAL
GRAM STN SPEC: ABNORMAL
SERVICE CMNT-IMP: ABNORMAL

## 2024-01-12 ENCOUNTER — HOSPITAL ENCOUNTER (INPATIENT)
Facility: HOSPITAL | Age: 54
LOS: 4 days | Discharge: NURSING, REHAB OR LONG-TERM CARE | DRG: 466 | End: 2024-01-16
Attending: STUDENT IN AN ORGANIZED HEALTH CARE EDUCATION/TRAINING PROGRAM | Admitting: HOSPITALIST
Payer: MEDICAID

## 2024-01-12 ENCOUNTER — APPOINTMENT (OUTPATIENT)
Facility: HOSPITAL | Age: 54
DRG: 466 | End: 2024-01-12
Payer: MEDICAID

## 2024-01-12 DIAGNOSIS — D64.9 ANEMIA, UNSPECIFIED TYPE: ICD-10-CM

## 2024-01-12 DIAGNOSIS — G93.40 ENCEPHALOPATHY: ICD-10-CM

## 2024-01-12 DIAGNOSIS — T82.9XXA COMPLICATION ASSOCIATED WITH DIALYSIS CATHETER: ICD-10-CM

## 2024-01-12 DIAGNOSIS — N18.6 END STAGE RENAL DISEASE (HCC): ICD-10-CM

## 2024-01-12 DIAGNOSIS — N39.0 URINARY TRACT INFECTION WITH HEMATURIA, SITE UNSPECIFIED: ICD-10-CM

## 2024-01-12 DIAGNOSIS — Z51.5 HOSPICE CARE: Primary | ICD-10-CM

## 2024-01-12 DIAGNOSIS — R31.9 URINARY TRACT INFECTION WITH HEMATURIA, SITE UNSPECIFIED: ICD-10-CM

## 2024-01-12 LAB
ALBUMIN SERPL-MCNC: 1.9 G/DL (ref 3.4–5)
ALBUMIN/GLOB SERPL: 0.6 (ref 0.8–1.7)
ALP SERPL-CCNC: 137 U/L (ref 45–117)
ALT SERPL-CCNC: 12 U/L (ref 13–56)
ANION GAP BLD CALC-SCNC: ABNORMAL MMOL/L (ref 10–20)
ANION GAP SERPL CALC-SCNC: 7 MMOL/L (ref 3–18)
APPEARANCE UR: ABNORMAL
APTT PPP: 31.4 SEC (ref 23–36.4)
AST SERPL-CCNC: 22 U/L (ref 10–38)
BACTERIA SPEC CULT: NORMAL
BACTERIA URNS QL MICRO: POSITIVE /HPF
BASE EXCESS BLD CALC-SCNC: 1.3 MMOL/L
BASOPHILS # BLD: 0 K/UL (ref 0–0.1)
BASOPHILS NFR BLD: 0 % (ref 0–2)
BILIRUB DIRECT SERPL-MCNC: 0.4 MG/DL (ref 0–0.2)
BILIRUB SERPL-MCNC: 1 MG/DL (ref 0.2–1)
BILIRUB UR QL: NEGATIVE
BODY TEMPERATURE: 98
BUN SERPL-MCNC: 21 MG/DL (ref 7–18)
BUN/CREAT SERPL: 7 (ref 12–20)
CA-I BLD-MCNC: 1.23 MMOL/L (ref 1.12–1.32)
CALCIUM SERPL-MCNC: 8.8 MG/DL (ref 8.5–10.1)
CHLORIDE BLD-SCNC: 106 MMOL/L (ref 98–107)
CHLORIDE SERPL-SCNC: 109 MMOL/L (ref 100–111)
CO2 BLD-SCNC: 25 MMOL/L (ref 19–24)
CO2 SERPL-SCNC: 28 MMOL/L (ref 21–32)
COLOR UR: ABNORMAL
CREAT BLD-MCNC: 3.37 MG/DL (ref 0.6–1.3)
CREAT SERPL-MCNC: 3.13 MG/DL (ref 0.6–1.3)
DIFFERENTIAL METHOD BLD: ABNORMAL
EOSINOPHIL # BLD: 0 K/UL (ref 0–0.4)
EOSINOPHIL NFR BLD: 0 % (ref 0–5)
ERYTHROCYTE [DISTWIDTH] IN BLOOD BY AUTOMATED COUNT: 17.3 % (ref 11.6–14.5)
GLOBULIN SER CALC-MCNC: 3.3 G/DL (ref 2–4)
GLUCOSE BLD STRIP.AUTO-MCNC: 125 MG/DL (ref 70–110)
GLUCOSE BLD STRIP.AUTO-MCNC: 73 MG/DL (ref 70–110)
GLUCOSE BLD-MCNC: 66 MG/DL (ref 65–100)
GLUCOSE SERPL-MCNC: 82 MG/DL (ref 74–99)
GLUCOSE UR STRIP.AUTO-MCNC: NEGATIVE MG/DL
HCO3 BLD-SCNC: 25.8 MMOL/L (ref 22–26)
HCT VFR BLD AUTO: 26.7 % (ref 35–45)
HGB BLD-MCNC: 8.1 G/DL (ref 12–16)
HGB UR QL STRIP: ABNORMAL
IMM GRANULOCYTES # BLD AUTO: 0.1 K/UL (ref 0–0.04)
IMM GRANULOCYTES NFR BLD AUTO: 1 % (ref 0–0.5)
INR PPP: 1.5 (ref 0.9–1.1)
KETONES UR QL STRIP.AUTO: 15 MG/DL
LACTATE BLD-SCNC: 1.59 MMOL/L (ref 0.4–2)
LACTATE SERPL-SCNC: 1.4 MMOL/L (ref 0.4–2)
LACTATE SERPL-SCNC: 1.7 MMOL/L (ref 0.4–2)
LEUKOCYTE ESTERASE UR QL STRIP.AUTO: ABNORMAL
LYMPHOCYTES # BLD: 1 K/UL (ref 0.9–3.6)
LYMPHOCYTES NFR BLD: 10 % (ref 21–52)
MCH RBC QN AUTO: 27.7 PG (ref 24–34)
MCHC RBC AUTO-ENTMCNC: 30.3 G/DL (ref 31–37)
MCV RBC AUTO: 91.4 FL (ref 78–100)
MONOCYTES # BLD: 0.8 K/UL (ref 0.05–1.2)
MONOCYTES NFR BLD: 8 % (ref 3–10)
NEUTS SEG # BLD: 8.1 K/UL (ref 1.8–8)
NEUTS SEG NFR BLD: 81 % (ref 40–73)
NITRITE UR QL STRIP.AUTO: NEGATIVE
NRBC # BLD: 0 K/UL (ref 0–0.01)
NRBC BLD-RTO: 0 PER 100 WBC
PCO2 BLDV: 39 MMHG (ref 41–51)
PH BLDV: 7.43 (ref 7.32–7.42)
PH UR STRIP: 7 (ref 5–8)
PLATELET # BLD AUTO: 45 K/UL (ref 135–420)
PMV BLD AUTO: 10.3 FL (ref 9.2–11.8)
PO2 BLDV: 29 MMHG (ref 25–40)
POTASSIUM BLD-SCNC: 3.5 MMOL/L (ref 3.5–5.1)
POTASSIUM SERPL-SCNC: 3.8 MMOL/L (ref 3.5–5.5)
PROT SERPL-MCNC: 5.2 G/DL (ref 6.4–8.2)
PROT UR STRIP-MCNC: >300 MG/DL
PROTHROMBIN TIME: 18.3 SEC (ref 11.9–14.7)
RBC # BLD AUTO: 2.92 M/UL (ref 4.2–5.3)
RBC #/AREA URNS HPF: POSITIVE /HPF (ref 0–5)
SAO2 % BLD: 59 %
SERVICE CMNT-IMP: ABNORMAL
SERVICE CMNT-IMP: NORMAL
SODIUM BLD-SCNC: 142 MMOL/L (ref 136–145)
SODIUM SERPL-SCNC: 144 MMOL/L (ref 136–145)
SP GR UR REFRACTOMETRY: 1.02 (ref 1–1.03)
SPECIMEN SITE: ABNORMAL
TROPONIN I SERPL HS-MCNC: 111 NG/L (ref 0–54)
TROPONIN I SERPL HS-MCNC: 118 NG/L (ref 0–54)
TROPONIN I SERPL HS-MCNC: 119 NG/L (ref 0–54)
TROPONIN I SERPL HS-MCNC: 123 NG/L (ref 0–54)
UROBILINOGEN UR QL STRIP.AUTO: 0.2 EU/DL (ref 0.2–1)
WBC # BLD AUTO: 10.1 K/UL (ref 4.6–13.2)
WBC URNS QL MICRO: ABNORMAL /HPF (ref 0–4)

## 2024-01-12 PROCEDURE — 2580000003 HC RX 258: Performed by: INTERNAL MEDICINE

## 2024-01-12 PROCEDURE — 82330 ASSAY OF CALCIUM: CPT

## 2024-01-12 PROCEDURE — 85730 THROMBOPLASTIN TIME PARTIAL: CPT

## 2024-01-12 PROCEDURE — 87077 CULTURE AEROBIC IDENTIFY: CPT

## 2024-01-12 PROCEDURE — 2500000003 HC RX 250 WO HCPCS: Performed by: STUDENT IN AN ORGANIZED HEALTH CARE EDUCATION/TRAINING PROGRAM

## 2024-01-12 PROCEDURE — 87086 URINE CULTURE/COLONY COUNT: CPT

## 2024-01-12 PROCEDURE — 84295 ASSAY OF SERUM SODIUM: CPT

## 2024-01-12 PROCEDURE — 85025 COMPLETE CBC W/AUTO DIFF WBC: CPT

## 2024-01-12 PROCEDURE — 36415 COLL VENOUS BLD VENIPUNCTURE: CPT

## 2024-01-12 PROCEDURE — 81001 URINALYSIS AUTO W/SCOPE: CPT

## 2024-01-12 PROCEDURE — 84484 ASSAY OF TROPONIN QUANT: CPT

## 2024-01-12 PROCEDURE — 71045 X-RAY EXAM CHEST 1 VIEW: CPT

## 2024-01-12 PROCEDURE — 82962 GLUCOSE BLOOD TEST: CPT

## 2024-01-12 PROCEDURE — 87154 CUL TYP ID BLD PTHGN 6+ TRGT: CPT

## 2024-01-12 PROCEDURE — 80076 HEPATIC FUNCTION PANEL: CPT

## 2024-01-12 PROCEDURE — 99285 EMERGENCY DEPT VISIT HI MDM: CPT

## 2024-01-12 PROCEDURE — 80048 BASIC METABOLIC PNL TOTAL CA: CPT

## 2024-01-12 PROCEDURE — 93005 ELECTROCARDIOGRAM TRACING: CPT | Performed by: STUDENT IN AN ORGANIZED HEALTH CARE EDUCATION/TRAINING PROGRAM

## 2024-01-12 PROCEDURE — 96374 THER/PROPH/DIAG INJ IV PUSH: CPT

## 2024-01-12 PROCEDURE — 6360000002 HC RX W HCPCS: Performed by: STUDENT IN AN ORGANIZED HEALTH CARE EDUCATION/TRAINING PROGRAM

## 2024-01-12 PROCEDURE — 94761 N-INVAS EAR/PLS OXIMETRY MLT: CPT

## 2024-01-12 PROCEDURE — 84132 ASSAY OF SERUM POTASSIUM: CPT

## 2024-01-12 PROCEDURE — 82947 ASSAY GLUCOSE BLOOD QUANT: CPT

## 2024-01-12 PROCEDURE — 85014 HEMATOCRIT: CPT

## 2024-01-12 PROCEDURE — 82803 BLOOD GASES ANY COMBINATION: CPT

## 2024-01-12 PROCEDURE — 1100000003 HC PRIVATE W/ TELEMETRY

## 2024-01-12 PROCEDURE — 6360000002 HC RX W HCPCS: Performed by: INTERNAL MEDICINE

## 2024-01-12 PROCEDURE — 87040 BLOOD CULTURE FOR BACTERIA: CPT

## 2024-01-12 PROCEDURE — 2700000000 HC OXYGEN THERAPY PER DAY

## 2024-01-12 PROCEDURE — 87186 SC STD MICRODIL/AGAR DIL: CPT

## 2024-01-12 PROCEDURE — 85610 PROTHROMBIN TIME: CPT

## 2024-01-12 PROCEDURE — 83605 ASSAY OF LACTIC ACID: CPT

## 2024-01-12 PROCEDURE — 2580000003 HC RX 258: Performed by: STUDENT IN AN ORGANIZED HEALTH CARE EDUCATION/TRAINING PROGRAM

## 2024-01-12 PROCEDURE — 99223 1ST HOSP IP/OBS HIGH 75: CPT | Performed by: HOSPITALIST

## 2024-01-12 RX ORDER — DEXTROSE MONOHYDRATE 25 G/50ML
25 INJECTION, SOLUTION INTRAVENOUS ONCE
Status: COMPLETED | OUTPATIENT
Start: 2024-01-12 | End: 2024-01-12

## 2024-01-12 RX ORDER — ONDANSETRON 2 MG/ML
4 INJECTION INTRAMUSCULAR; INTRAVENOUS EVERY 6 HOURS PRN
Status: DISCONTINUED | OUTPATIENT
Start: 2024-01-12 | End: 2024-01-16 | Stop reason: HOSPADM

## 2024-01-12 RX ORDER — SODIUM CHLORIDE, SODIUM LACTATE, POTASSIUM CHLORIDE, AND CALCIUM CHLORIDE .6; .31; .03; .02 G/100ML; G/100ML; G/100ML; G/100ML
500 INJECTION, SOLUTION INTRAVENOUS ONCE
Status: DISCONTINUED | OUTPATIENT
Start: 2024-01-12 | End: 2024-01-16

## 2024-01-12 RX ORDER — 0.9 % SODIUM CHLORIDE 0.9 %
500 INTRAVENOUS SOLUTION INTRAVENOUS ONCE
Status: DISCONTINUED | OUTPATIENT
Start: 2024-01-12 | End: 2024-01-16

## 2024-01-12 RX ORDER — HEPARIN SODIUM 5000 [USP'U]/ML
5000 INJECTION, SOLUTION INTRAVENOUS; SUBCUTANEOUS EVERY 8 HOURS SCHEDULED
Status: DISCONTINUED | OUTPATIENT
Start: 2024-01-12 | End: 2024-01-12

## 2024-01-12 RX ORDER — POLYETHYLENE GLYCOL 3350 17 G/17G
17 POWDER, FOR SOLUTION ORAL DAILY PRN
Status: DISCONTINUED | OUTPATIENT
Start: 2024-01-12 | End: 2024-01-16 | Stop reason: HOSPADM

## 2024-01-12 RX ORDER — ONDANSETRON 4 MG/1
4 TABLET, ORALLY DISINTEGRATING ORAL EVERY 8 HOURS PRN
Status: DISCONTINUED | OUTPATIENT
Start: 2024-01-12 | End: 2024-01-16 | Stop reason: HOSPADM

## 2024-01-12 RX ORDER — ACETAMINOPHEN 650 MG/1
650 SUPPOSITORY RECTAL EVERY 6 HOURS PRN
Status: DISCONTINUED | OUTPATIENT
Start: 2024-01-12 | End: 2024-01-16 | Stop reason: HOSPADM

## 2024-01-12 RX ORDER — ACETAMINOPHEN 325 MG/1
650 TABLET ORAL EVERY 6 HOURS PRN
Status: DISCONTINUED | OUTPATIENT
Start: 2024-01-12 | End: 2024-01-16 | Stop reason: HOSPADM

## 2024-01-12 RX ADMIN — VANCOMYCIN HYDROCHLORIDE 1000 MG: 1 INJECTION, POWDER, LYOPHILIZED, FOR SOLUTION INTRAVENOUS at 10:32

## 2024-01-12 RX ADMIN — VANCOMYCIN HYDROCHLORIDE 1000 MG: 1 INJECTION, POWDER, LYOPHILIZED, FOR SOLUTION INTRAVENOUS at 16:57

## 2024-01-12 RX ADMIN — GENTAMICIN SULFATE 136.8 MG: 40 INJECTION, SOLUTION INTRAMUSCULAR; INTRAVENOUS at 10:23

## 2024-01-12 RX ADMIN — MEROPENEM 1000 MG: 1 INJECTION, POWDER, FOR SOLUTION INTRAVENOUS at 17:39

## 2024-01-12 RX ADMIN — DEXTROSE MONOHYDRATE 25 G: 25 INJECTION, SOLUTION INTRAVENOUS at 10:29

## 2024-01-12 ASSESSMENT — PAIN SCALES - GENERAL
PAINLEVEL_OUTOF10: 0
PAINLEVEL_OUTOF10: 0
PAINLEVEL_OUTOF10: 2

## 2024-01-12 ASSESSMENT — PAIN - FUNCTIONAL ASSESSMENT: PAIN_FUNCTIONAL_ASSESSMENT: NONE - DENIES PAIN

## 2024-01-12 NOTE — ED NOTES
Charge nurse and CCL made aware that pt needs sitter or to be escalated to a closer room. As pt is agitated/anxious and a fall risk.

## 2024-01-12 NOTE — CARE COORDINATION
1/12/24  Case Management Assessment  Initial Evaluation    Date/Time of Evaluation: 1/12/2024 4:41 PM  Assessment Completed by: Eveline Felix    If patient is discharged prior to next notation, then this note serves as note for discharge by case management.    Patient Name: Latha Oseguera                   YOB: 1970  Diagnosis: Encephalopathy [G93.40]  Urinary tract infection with hematuria, site unspecified [N39.0, R31.9]  Anemia, unspecified type [D64.9]  Acute metabolic encephalopathy [G93.41]  Complication associated with dialysis catheter [T82.9XXA]                   Date / Time: 1/12/2024  7:57 AM    Patient Admission Status: Inpatient   Readmission Risk (Low < 19, Mod (19-27), High > 27): Readmission Risk Score: 26.4    Current PCP: No primary care provider on file.  PCP verified by CM? (P) Yes ([facility])    Chart Reviewed: Yes      History Provided by: (P) Child/Family  Patient Orientation: (P) Other (see comment) (disoriented)    Patient Cognition: (P)  (disoriented)    Hospitalization in the last 30 days (Readmission):  Yes    If yes, Readmission Assessment in CM Navigator will be completed.    Advance Directives:      Code Status: DNR   Patient's Primary Decision Maker is:      Primary Decision Maker: Dennis Carey - Child - [phone]    Secondary Decision Maker: Vahid Carey - Child - [phone]    Discharge Planning:    Patient lives with:   Type of Home: (P) Skilled Nursing Facility  Primary Care Giver: (P)  ([facility])  Patient Support Systems include: (P) Children, Other (Comment) ([facility])   Current Financial resources: (P) Medicaid  Current community resources:    Current services prior to admission:              Current DME:              Type of Home Care services:       ADLS  Prior functional level: (P) Assistance with the following:, Bathing, Dressing, Toileting  Current functional level: (P) Assistance with the following:, Bathing, Dressing, Toileting, Feeding    PT AM-PAC:   /24  OT AM-PAC:

## 2024-01-12 NOTE — ED NOTES
TDC pulled by ID. Dr. Francois reports pt is not having dialysis today to go ahead and give abx now per MAR.

## 2024-01-12 NOTE — ED PROVIDER NOTES
Consumption: Patient unable to answer     Average Number of Drinks: Patient unable to answer     Frequency of Binge Drinking: Patient unable to answer   Financial Resource Strain: Not on file   Food Insecurity: Patient Unable To Answer (1/3/2024)    Hunger Vital Sign     Worried About Running Out of Food in the Last Year: Patient unable to answer     Ran Out of Food in the Last Year: Patient unable to answer   Recent Concern: Food Insecurity - Food Insecurity Present (12/23/2023)    Hunger Vital Sign     Worried About Running Out of Food in the Last Year: Sometimes true     Ran Out of Food in the Last Year: Sometimes true   Transportation Needs: Patient Unable To Answer (1/3/2024)    PRAPARE - Transportation     Lack of Transportation (Medical): Patient unable to answer     Lack of Transportation (Non-Medical): Patient unable to answer   Recent Concern: Transportation Needs - Unmet Transportation Needs (12/23/2023)    PRAPARE - Transportation     Lack of Transportation (Medical): Yes     Lack of Transportation (Non-Medical): Yes   Physical Activity: Not on file   Stress: Not on file   Social Connections: Not on file   Intimate Partner Violence: Not on file   Depression: Not on file   Housing Stability: Patient Unable To Answer (1/3/2024)    Housing Stability Vital Sign     Unable to Pay for Housing in the Last Year: Patient unable to answer     Number of Places Lived in the Last Year: 1     Unstable Housing in the Last Year: Patient unable to answer   Interpersonal Safety: Patient Unable To Answer (1/3/2024)    Interpersonal Safety ([facility] HRSN)     How often does anyone, including family and friends, physically hurt you?: Patient unable to answer     How often does anyone, including family and friends, scream or curse at you?: Not on file     How often does anyone, including family and friends, insult or talk down to you?: Not on file     How often does anyone, including family and friends, threaten you with harm?:

## 2024-01-12 NOTE — H&P
34.0 PG    MCHC 30.3 (L) 31.0 - 37.0 g/dL    RDW 17.3 (H) 11.6 - 14.5 %    Platelets 45 (L) 135 - 420 K/uL    MPV 10.3 9.2 - 11.8 FL    Nucleated RBCs 0.0 0  WBC    nRBC 0.00 0.00 - 0.01 K/uL    Neutrophils % 81 (H) 40 - 73 %    Lymphocytes % 10 (L) 21 - 52 %    Monocytes % 8 3 - 10 %    Eosinophils % 0 0 - 5 %    Basophils % 0 0 - 2 %    Immature Granulocytes 1 (H) 0.0 - 0.5 %    Neutrophils Absolute 8.1 (H) 1.8 - 8.0 K/UL    Lymphocytes Absolute 1.0 0.9 - 3.6 K/UL    Monocytes Absolute 0.8 0.05 - 1.2 K/UL    Eosinophils Absolute 0.0 0.0 - 0.4 K/UL    Basophils Absolute 0.0 0.0 - 0.1 K/UL    Absolute Immature Granulocyte 0.1 (H) 0.00 - 0.04 K/UL    Differential Type AUTOMATED     Troponin    Collection Time: 01/12/24  8:34 AM   Result Value Ref Range    Troponin, High Sensitivity 123 (HH) 0 - 54 ng/L   Lactate, Sepsis    Collection Time: 01/12/24  8:34 AM   Result Value Ref Range    Lactic Acid, Sepsis 1.7 0.4 - 2.0 MMOL/L   Hepatic Function Panel    Collection Time: 01/12/24  8:34 AM   Result Value Ref Range    Total Protein 5.2 (L) 6.4 - 8.2 g/dL    Albumin 1.9 (L) 3.4 - 5.0 g/dL    Globulin 3.3 2.0 - 4.0 g/dL    Albumin/Globulin Ratio 0.6 (L) 0.8 - 1.7      Total Bilirubin 1.0 0.2 - 1.0 MG/DL    Bilirubin, Direct 0.4 (H) 0.0 - 0.2 MG/DL    Alk Phosphatase 137 (H) 45 - 117 U/L    AST 22 10 - 38 U/L    ALT 12 (L) 13 - 56 U/L   BMP    Collection Time: 01/12/24  8:34 AM   Result Value Ref Range    Sodium 144 136 - 145 mmol/L    Potassium 3.8 3.5 - 5.5 mmol/L    Chloride 109 100 - 111 mmol/L    CO2 28 21 - 32 mmol/L    Anion Gap 7 3.0 - 18 mmol/L    Glucose 82 74 - 99 mg/dL    BUN 21 (H) 7.0 - 18 MG/DL    Creatinine 3.13 (H) 0.6 - 1.3 MG/DL    Bun/Cre Ratio 7 (L) 12 - 20      Est, Glom Filt Rate 17 (L) >60 ml/min/1.73m2    Calcium 8.8 8.5 - 10.1 MG/DL   APTT    Collection Time: 01/12/24  8:34 AM   Result Value Ref Range    PTT 31.4 23.0 - 36.4 SEC   Protime-INR    Collection Time: 01/12/24  8:34 AM   Result

## 2024-01-12 NOTE — ED NOTES
TDC dressing to right chest wall dressing changed with this nurse and charge.    Skin noted to be very red and irritated at site. Pt also placed on SpO2 @ 3 LPM via NC due to decreased O2 sat (84% on RA)    Pt repeat SpO@ with O2 95%.    Pt repositioned in bed-will continue with plan of care.

## 2024-01-12 NOTE — ED NOTES
Sepsis protocol initiated at 0912, all labs were obtained and this nurse called pharmacy several times and spoke with pharmacist. Called again at 0855 to make pharm aware EDT Claudia in route to  Abx so that we may remain in compliance of sepsis protocall. Pharmacists states he needs 30 more minutes to have IV abx ready.KITTY Garcia made aware.

## 2024-01-12 NOTE — ED NOTES
TRANSFER - OUT REPORT:    Verbal report given to BILLY Nguyễn on Latha Oseguera  being transferred to [facility] for change in patient condition ( )       Report consisted of patient's Situation, Background, Assessment and   Recommendations(SBAR).     Information from the following report(s) Nurse Handoff Report, ED SBAR, Intake/Output, MAR, Med Rec Status, Cardiac Rhythm  , and Neuro Assessment was reviewed with the receiving nurse.    Skippers Fall Assessment:    Presents to emergency department  because of falls (Syncope, seizure, or loss of consciousness): No  Age > 70: No  Altered Mental Status, Intoxication with alcohol or substance confusion (Disorientation, impaired judgment, poor safety awaremess, or inability to follow instructions): Yes  Impaired Mobility: Ambulates or transfers with assistive devices or assistance; Unable to ambulate or transer.: Yes  Nursing Judgement: No          Lines:   Peripheral IV 01/12/24 Right Antecubital (Active)       Peripheral IV 01/12/24 Distal;Right;Anterior Forearm (Active)   Site Assessment Clean, dry & intact 01/12/24 0854   Line Status Blood return noted 01/12/24 0854   Line Care Chlorhexidine wipes 01/12/24 0854   Phlebitis Assessment No symptoms 01/12/24 0854   Infiltration Assessment 0 01/12/24 0854   Alcohol Cap Used No 01/12/24 0854   Dressing Status Clean, dry & intact 01/12/24 0854   Dressing Type Transparent 01/12/24 0854       Hemodialysis Central Access Right Subclavian (Active)        Opportunity for questions and clarification was provided.      Patient transported with:  Tech

## 2024-01-12 NOTE — ED NOTES
Dr. Joseph, resident spoke with ID and they state that pt needs to go to dialysis first and then do the Gentamycin and vancomycin after dialysis. CCL made aware.

## 2024-01-12 NOTE — ED TRIAGE NOTES
Pt arrives via EMS from [facility] dialysis for AMS. Pt is normally A&Ox  4 today she arrive to dialysis non verbal and only responding with head nods. Nurse at dialysis started to access her port abs noticed it has purulent drainage coming from it.  Pt lives at [facility]. BS-64, pt had IM glucagon. Reoeat BS on arrival was 73

## 2024-01-12 NOTE — CARE COORDINATION
1/12/24 01/12/24 8074   Readmission Assessment   Number of Days since last admission? 1-7 days   Previous Disposition SNF   Who is being Interviewed Caregiver  (Dennis Carey)   What was the patient's/caregiver's perception as to why they think they needed to return back to the hospital?   (dialysis cath with discharge)   Did you visit your Primary Care Physician after you left the hospital, before you returned this time? Yes  (resides at [facility])   Did you see a specialist, such as Cardiac, Pulmonary, Orthopedic Physician, etc. after you left the hospital? Yes  (HD center-nephrologist)   Who advised the patient to return to the hospital?   (HD staff)   Does the patient report anything that got in the way of taking their medications? No  (meds provided by [facility])     Eveline Felix  Case Management

## 2024-01-13 LAB
ACCESSION NUMBER, LLC1M: ABNORMAL
ACINETOBACTER CALCOAC BAUMANNII COMPLEX BY PCR: NOT DETECTED
ANION GAP SERPL CALC-SCNC: 7 MMOL/L (ref 3–18)
B FRAGILIS DNA BLD POS QL NAA+NON-PROBE: NOT DETECTED
BACTERIA SPEC CULT: NORMAL
BASOPHILS # BLD: 0.1 K/UL (ref 0–0.1)
BASOPHILS NFR BLD: 1 % (ref 0–2)
BIOFIRE TEST COMMENT: ABNORMAL
BLACTX-M ISLT/SPM QL: NOT DETECTED
BLAIMP ISLT/SPM QL: NOT DETECTED
BLAKPC BLD POS QL NAA+NON-PROBE: NOT DETECTED
BLAVIM ISLT/SPM QL: NOT DETECTED
BUN SERPL-MCNC: 25 MG/DL (ref 7–18)
BUN/CREAT SERPL: 7 (ref 12–20)
C ALBICANS DNA BLD POS QL NAA+NON-PROBE: NOT DETECTED
C AURIS DNA BLD POS QL NAA+NON-PROBE: NOT DETECTED
C GATTII+NEOFOR DNA BLD POS QL NAA+N-PRB: NOT DETECTED
C GLABRATA DNA BLD POS QL NAA+NON-PROBE: NOT DETECTED
C KRUSEI DNA BLD POS QL NAA+NON-PROBE: NOT DETECTED
C PARAP DNA BLD POS QL NAA+NON-PROBE: NOT DETECTED
C TROPICLS DNA BLD POS QL NAA+NON-PROBE: NOT DETECTED
CALCIUM SERPL-MCNC: 9.6 MG/DL (ref 8.5–10.1)
CC UR VC: NORMAL
CHLORIDE SERPL-SCNC: 109 MMOL/L (ref 100–111)
CO2 SERPL-SCNC: 27 MMOL/L (ref 21–32)
CREAT SERPL-MCNC: 3.54 MG/DL (ref 0.6–1.3)
DIFFERENTIAL METHOD BLD: ABNORMAL
E CLOAC COMP DNA BLD POS NAA+NON-PROBE: NOT DETECTED
E COLI DNA BLD POS QL NAA+NON-PROBE: NOT DETECTED
E FAECALIS DNA BLD POS QL NAA+NON-PROBE: NOT DETECTED
E FAECIUM DNA BLD POS QL NAA+NON-PROBE: NOT DETECTED
EKG ATRIAL RATE: 89 BPM
EKG DIAGNOSIS: NORMAL
EKG P AXIS: 71 DEGREES
EKG P-R INTERVAL: 150 MS
EKG Q-T INTERVAL: 368 MS
EKG QRS DURATION: 76 MS
EKG QTC CALCULATION (BAZETT): 447 MS
EKG R AXIS: 47 DEGREES
EKG T AXIS: 102 DEGREES
EKG VENTRICULAR RATE: 89 BPM
ENTEROBACTERALES DNA BLD POS NAA+N-PRB: NOT DETECTED
EOSINOPHIL # BLD: 0 K/UL (ref 0–0.4)
EOSINOPHIL NFR BLD: 1 % (ref 0–5)
ERYTHROCYTE [DISTWIDTH] IN BLOOD BY AUTOMATED COUNT: 17.3 % (ref 11.6–14.5)
GENTAMICIN SERPL-MCNC: 6.6 UG/ML (ref 0.5–10)
GLUCOSE BLD STRIP.AUTO-MCNC: 111 MG/DL (ref 70–110)
GLUCOSE BLD STRIP.AUTO-MCNC: 66 MG/DL (ref 70–110)
GLUCOSE BLD STRIP.AUTO-MCNC: 66 MG/DL (ref 70–110)
GLUCOSE BLD STRIP.AUTO-MCNC: 68 MG/DL (ref 70–110)
GLUCOSE BLD STRIP.AUTO-MCNC: 70 MG/DL (ref 70–110)
GLUCOSE BLD STRIP.AUTO-MCNC: 70 MG/DL (ref 70–110)
GLUCOSE BLD STRIP.AUTO-MCNC: 78 MG/DL (ref 70–110)
GLUCOSE BLD STRIP.AUTO-MCNC: 97 MG/DL (ref 70–110)
GLUCOSE SERPL-MCNC: 76 MG/DL (ref 74–99)
GP B STREP DNA BLD POS QL NAA+NON-PROBE: NOT DETECTED
HAEM INFLU DNA BLD POS QL NAA+NON-PROBE: NOT DETECTED
HCT VFR BLD AUTO: 25.8 % (ref 35–45)
HGB BLD-MCNC: 7.8 G/DL (ref 12–16)
IMM GRANULOCYTES # BLD AUTO: 0.3 K/UL (ref 0–0.04)
IMM GRANULOCYTES NFR BLD AUTO: 4 % (ref 0–0.5)
K OXYTOCA DNA BLD POS QL NAA+NON-PROBE: NOT DETECTED
KLEBSIELLA SP DNA BLD POS QL NAA+NON-PRB: NOT DETECTED
KLEBSIELLA SP DNA BLD POS QL NAA+NON-PRB: NOT DETECTED
L MONOCYTOG DNA BLD POS QL NAA+NON-PROBE: NOT DETECTED
LYMPHOCYTES # BLD: 0.9 K/UL (ref 0.9–3.6)
LYMPHOCYTES NFR BLD: 13 % (ref 21–52)
MAGNESIUM SERPL-MCNC: 2.1 MG/DL (ref 1.6–2.6)
MCH RBC QN AUTO: 27.6 PG (ref 24–34)
MCHC RBC AUTO-ENTMCNC: 30.2 G/DL (ref 31–37)
MCV RBC AUTO: 91.2 FL (ref 78–100)
MONOCYTES # BLD: 0.6 K/UL (ref 0.05–1.2)
MONOCYTES NFR BLD: 9 % (ref 3–10)
N MEN DNA BLD POS QL NAA+NON-PROBE: NOT DETECTED
NEUTS SEG # BLD: 5.5 K/UL (ref 1.8–8)
NEUTS SEG NFR BLD: 74 % (ref 40–73)
NRBC # BLD: 0 K/UL (ref 0–0.01)
NRBC BLD-RTO: 0 PER 100 WBC
P AERUGINOSA DNA BLD POS NAA+NON-PROBE: DETECTED
PLATELET # BLD AUTO: 40 K/UL (ref 135–420)
PMV BLD AUTO: 11.2 FL (ref 9.2–11.8)
POTASSIUM SERPL-SCNC: 3.3 MMOL/L (ref 3.5–5.5)
PROTEUS SP DNA BLD POS QL NAA+NON-PROBE: NOT DETECTED
RBC # BLD AUTO: 2.83 M/UL (ref 4.2–5.3)
RESISTANT GENE NDM BY PCR: NOT DETECTED
RESISTANT GENE TARGETS: ABNORMAL
S AUREUS DNA BLD POS QL NAA+NON-PROBE: NOT DETECTED
S AUREUS+CONS DNA BLD POS NAA+NON-PROBE: NOT DETECTED
S EPIDERMIDIS DNA BLD POS QL NAA+NON-PRB: NOT DETECTED
S LUGDUNENSIS DNA BLD POS QL NAA+NON-PRB: NOT DETECTED
S MALTOPHILIA DNA BLD POS QL NAA+NON-PRB: NOT DETECTED
S MARCESCENS DNA BLD POS NAA+NON-PROBE: NOT DETECTED
S PNEUM DNA BLD POS QL NAA+NON-PROBE: NOT DETECTED
S PYO DNA BLD POS QL NAA+NON-PROBE: NOT DETECTED
SALMONELLA DNA BLD POS QL NAA+NON-PROBE: NOT DETECTED
SERVICE CMNT-IMP: NORMAL
SODIUM SERPL-SCNC: 143 MMOL/L (ref 136–145)
STREPTOCOCCUS DNA BLD POS NAA+NON-PROBE: NOT DETECTED
VANCOMYCIN SERPL-MCNC: 32.2 UG/ML (ref 5–40)
WBC # BLD AUTO: 7.4 K/UL (ref 4.6–13.2)

## 2024-01-13 PROCEDURE — 80170 ASSAY OF GENTAMICIN: CPT

## 2024-01-13 PROCEDURE — 83735 ASSAY OF MAGNESIUM: CPT

## 2024-01-13 PROCEDURE — 2700000000 HC OXYGEN THERAPY PER DAY

## 2024-01-13 PROCEDURE — 99232 SBSQ HOSP IP/OBS MODERATE 35: CPT | Performed by: HOSPITALIST

## 2024-01-13 PROCEDURE — 80048 BASIC METABOLIC PNL TOTAL CA: CPT

## 2024-01-13 PROCEDURE — 85025 COMPLETE CBC W/AUTO DIFF WBC: CPT

## 2024-01-13 PROCEDURE — 1100000003 HC PRIVATE W/ TELEMETRY

## 2024-01-13 PROCEDURE — 2580000003 HC RX 258: Performed by: STUDENT IN AN ORGANIZED HEALTH CARE EDUCATION/TRAINING PROGRAM

## 2024-01-13 PROCEDURE — 82962 GLUCOSE BLOOD TEST: CPT

## 2024-01-13 PROCEDURE — 36415 COLL VENOUS BLD VENIPUNCTURE: CPT

## 2024-01-13 PROCEDURE — 94761 N-INVAS EAR/PLS OXIMETRY MLT: CPT

## 2024-01-13 PROCEDURE — 80202 ASSAY OF VANCOMYCIN: CPT

## 2024-01-13 PROCEDURE — 6370000000 HC RX 637 (ALT 250 FOR IP): Performed by: HOSPITALIST

## 2024-01-13 PROCEDURE — 93010 ELECTROCARDIOGRAM REPORT: CPT | Performed by: INTERNAL MEDICINE

## 2024-01-13 RX ORDER — SODIUM CHLORIDE 9 MG/ML
INJECTION INTRAVENOUS
Status: DISCONTINUED
Start: 2024-01-13 | End: 2024-01-13 | Stop reason: WASHOUT

## 2024-01-13 RX ORDER — LORAZEPAM 2 MG/ML
1 CONCENTRATE ORAL
Status: DISCONTINUED | OUTPATIENT
Start: 2024-01-13 | End: 2024-01-16 | Stop reason: HOSPADM

## 2024-01-13 RX ORDER — DEXTROSE MONOHYDRATE 100 MG/ML
INJECTION, SOLUTION INTRAVENOUS CONTINUOUS PRN
Status: DISCONTINUED | OUTPATIENT
Start: 2024-01-13 | End: 2024-01-16 | Stop reason: HOSPADM

## 2024-01-13 RX ORDER — SCOLOPAMINE TRANSDERMAL SYSTEM 1 MG/1
1 PATCH, EXTENDED RELEASE TRANSDERMAL
Status: DISCONTINUED | OUTPATIENT
Start: 2024-01-13 | End: 2024-01-16 | Stop reason: HOSPADM

## 2024-01-13 RX ORDER — DEXTROSE MONOHYDRATE 50 MG/ML
INJECTION, SOLUTION INTRAVENOUS CONTINUOUS
Status: DISCONTINUED | OUTPATIENT
Start: 2024-01-13 | End: 2024-01-13

## 2024-01-13 RX ORDER — MORPHINE SULFATE 20 MG/ML
2.5 SOLUTION ORAL EVERY 6 HOURS PRN
Status: DISCONTINUED | OUTPATIENT
Start: 2024-01-13 | End: 2024-01-16

## 2024-01-13 RX ADMIN — DEXTROSE MONOHYDRATE: 50 INJECTION, SOLUTION INTRAVENOUS at 05:35

## 2024-01-13 RX ADMIN — MORPHINE SULFATE 2.5 MG: 20 SOLUTION ORAL at 23:37

## 2024-01-13 RX ADMIN — MORPHINE SULFATE 2.5 MG: 20 SOLUTION ORAL at 17:20

## 2024-01-13 ASSESSMENT — PAIN DESCRIPTION - ORIENTATION: ORIENTATION: POSTERIOR

## 2024-01-13 ASSESSMENT — PAIN SCALES - GENERAL
PAINLEVEL_OUTOF10: 0
PAINLEVEL_OUTOF10: 6
PAINLEVEL_OUTOF10: 0
PAINLEVEL_OUTOF10: 0

## 2024-01-13 ASSESSMENT — PAIN DESCRIPTION - LOCATION: LOCATION: BUTTOCKS;SACRUM;COCCYX

## 2024-01-13 ASSESSMENT — PAIN - FUNCTIONAL ASSESSMENT: PAIN_FUNCTIONAL_ASSESSMENT: PREVENTS OR INTERFERES SOME ACTIVE ACTIVITIES AND ADLS

## 2024-01-13 ASSESSMENT — PAIN DESCRIPTION - DESCRIPTORS: DESCRIPTORS: ACHING

## 2024-01-13 NOTE — DIALYSIS
Pt scheduled for HD as per Nephrologist Dr Diaz, will attempt using the AVF as temp catheter not yet inserted. ,called report for the patient, but patient was reviewed by internal medicine physician who discussed with spouse and agreed to proceed with comfort measures as per his documented note. Confirmed with primary nurse about this decision. Dr Diaz updated.

## 2024-01-14 LAB
BACTERIA SPEC CULT: NORMAL
SERVICE CMNT-IMP: NORMAL

## 2024-01-14 PROCEDURE — 1100000000 HC RM PRIVATE

## 2024-01-14 PROCEDURE — 99231 SBSQ HOSP IP/OBS SF/LOW 25: CPT | Performed by: HOSPITALIST

## 2024-01-14 PROCEDURE — 94761 N-INVAS EAR/PLS OXIMETRY MLT: CPT

## 2024-01-14 PROCEDURE — 6370000000 HC RX 637 (ALT 250 FOR IP): Performed by: HOSPITALIST

## 2024-01-14 PROCEDURE — 2700000000 HC OXYGEN THERAPY PER DAY

## 2024-01-14 RX ADMIN — LORAZEPAM 1 MG: 2 SOLUTION, CONCENTRATE ORAL at 10:47

## 2024-01-14 RX ADMIN — MORPHINE SULFATE 2.5 MG: 20 SOLUTION ORAL at 09:11

## 2024-01-14 RX ADMIN — MORPHINE SULFATE 2.5 MG: 20 SOLUTION ORAL at 17:50

## 2024-01-14 RX ADMIN — LORAZEPAM 1 MG: 2 SOLUTION, CONCENTRATE ORAL at 20:54

## 2024-01-14 RX ADMIN — MORPHINE SULFATE 2.5 MG: 20 SOLUTION ORAL at 23:54

## 2024-01-14 ASSESSMENT — PAIN DESCRIPTION - LOCATION
LOCATION: FOOT;LEG
LOCATION: BUTTOCKS
LOCATION: LEG;FOOT
LOCATION: BUTTOCKS

## 2024-01-14 ASSESSMENT — PAIN SCALES - GENERAL
PAINLEVEL_OUTOF10: 0
PAINLEVEL_OUTOF10: 10
PAINLEVEL_OUTOF10: 8
PAINLEVEL_OUTOF10: 0
PAINLEVEL_OUTOF10: 2
PAINLEVEL_OUTOF10: 5

## 2024-01-14 ASSESSMENT — PAIN DESCRIPTION - ORIENTATION
ORIENTATION: RIGHT;LEFT
ORIENTATION: RIGHT;LEFT

## 2024-01-15 LAB
BACTERIA SPEC CULT: NORMAL
SERVICE CMNT-IMP: NORMAL

## 2024-01-15 PROCEDURE — 99223 1ST HOSP IP/OBS HIGH 75: CPT | Performed by: INTERNAL MEDICINE

## 2024-01-15 PROCEDURE — 99231 SBSQ HOSP IP/OBS SF/LOW 25: CPT | Performed by: HOSPITALIST

## 2024-01-15 PROCEDURE — 94761 N-INVAS EAR/PLS OXIMETRY MLT: CPT

## 2024-01-15 PROCEDURE — 6370000000 HC RX 637 (ALT 250 FOR IP): Performed by: HOSPITALIST

## 2024-01-15 PROCEDURE — 1100000000 HC RM PRIVATE

## 2024-01-15 RX ADMIN — MORPHINE SULFATE 2.5 MG: 20 SOLUTION ORAL at 10:35

## 2024-01-15 RX ADMIN — ONDANSETRON 4 MG: 4 TABLET, ORALLY DISINTEGRATING ORAL at 11:59

## 2024-01-15 RX ADMIN — MORPHINE SULFATE 2.5 MG: 20 SOLUTION ORAL at 19:43

## 2024-01-15 ASSESSMENT — PAIN DESCRIPTION - ORIENTATION
ORIENTATION: POSTERIOR
ORIENTATION: POSTERIOR

## 2024-01-15 ASSESSMENT — PAIN DESCRIPTION - LOCATION
LOCATION: BUTTOCKS
LOCATION: BUTTOCKS

## 2024-01-15 ASSESSMENT — PAIN SCALES - GENERAL
PAINLEVEL_OUTOF10: 0
PAINLEVEL_OUTOF10: 10
PAINLEVEL_OUTOF10: 9

## 2024-01-15 ASSESSMENT — PAIN DESCRIPTION - DESCRIPTORS
DESCRIPTORS: ACHING
DESCRIPTORS: ACHING

## 2024-01-15 ASSESSMENT — PAIN - FUNCTIONAL ASSESSMENT: PAIN_FUNCTIONAL_ASSESSMENT: PREVENTS OR INTERFERES SOME ACTIVE ACTIVITIES AND ADLS

## 2024-01-15 NOTE — ACP (ADVANCE CARE PLANNING)
Advance Care Planning     General Advance Care Planning (ACP) Conversation      Palliative Medicine    Advance Care Planning Conversation      The patient and/or family consented to a voluntary Advance Care Planning conversation. Individuals present for the conversation: Patient      Outcome of the conversation and documents completed:  Visited patient for new consult along with Palliative team member Dr. NICOLE Bustamante. Patient is well known to our team, last visit 1/4/24. Patient is resting quietly in bed, visiting with female friend at bedside. Patient is awake, alert. Denies any pain or discomfort at this time. Gave our team permission to call her son Dennis, her MPOA.    Pt does have an Advance Directive on file in EMR, that names her son, Dennis Carey as her primary medical decision maker, and her son Vahid Carey as the secondary decision maker. Patient also has a DDNR on file.  Reviewed both documents with patient, she remains in agreement with both, no changes at this time.     Please note that patient's  in not her MPOA.    Patient states she is \"done\", \"well done\" and is tired of coming back and forth to the hospital. Discussed hospice support and she is accepting of their services. She understands she will go back to LTC with the support of hospice.    Call place to son Dennis DERAS, he is aware of his mother's decision and both he and his brother support her returning to LTC with hospice.    ACP documents you currently have include:  [x] Advance Directive or Living Will  [x] Durable Do Not Resuscitate  [] Physician Orders for Selective Treatments (POLST)  [] Medical Power of   [] Other      Thank you for the Palliative Medicine consult and allowing us to participate in the care of Ms Oseguera.  Will continue to monitor and provide support.    CODE STATUS: DNR/DNI    Molly Ordoñez RN  Palliative Medicine Inpatient RN  Critical access hospital  Palliative COPE Line: [phone] ([phone])

## 2024-01-15 NOTE — CONSULTS
Consult Note      Consult requested by: No att. providers found    ADMIT DATE: 1/12/2024    CONSULT DATE: January 12, 2024           Admission diagnosis: Complication associated with dialysis catheter   Reason for Nephrology Consultation: ESRD    Assessment and plan:  #1 End-stage renal disease on hemodialysis Monday Wednesday Friday, admitted with concern for altered mental status/sepsis, infected tunneled dialysis catheter, UTI.  TDC was removed due to concern for exit site drainage possible infection.  Vascular was consulted to obtain a temporary dialysis catheter for dialysis.  #2 Severe sepsis, ID consulted concern for UTI causing sepsis.  Per vascular no significant drainage was seen by them and no signs of TDC being colonized/infected   #3 right renal mass suspicious for renal cell cancer, patient had refused renal biopsy last admission  #4 loculated left-sided pleural effusion likely malignant  #5 altered mental status secondary to sepsis or metabolic encephalopathy  #6 anemia of ESRD  #7 thrombocytopenia    Plan:  #1 strict intake output charting  #2 if able to take p.o. add midodrine  #3 consult vascular surgery to place a temporary dialysis catheter  #4 plan to dialyze patient later today, if temporary catheter cannot be placed today due to hypotension/ instability then plan to dialyze the patient tomorrow morning  #5 holding off Epogen secondary to underlying cancer  #6 antibiotics per ID, renally dosed for EGFR of less than 15    Prognosis is poor considering comorbidity  Please consult palliative care team to follow-up  Considering patient's cancer diagnosis, hospice would be appropriate    Discussed plan with Dr. Santamaria, Dr. Leanna Bustamante and Dr. Barton vascular surgery    HPI: Latha Oseguera is a 53 y.o. female Unavailable with end-stage renal disease on hemodialysis Monday Wednesday Friday under my care at [facility], s/p TAVR, coronary artery disease s/p PCI, hypertension, renal cell carcinoma 
ears/nose/mouth/throat, respiratory, gastrointestinal, genitourinary, musculoskeletal, integumentary, neurologic, psychiatric, endocrine. Positive findings noted below.    Modified ESAS Completed by: provider                                              HISTORY:     Past Medical History:   Diagnosis Date    CHF (congestive heart failure) (HCC)     Chronic kidney disease     COPD (chronic obstructive pulmonary disease) (HCC)     Diabetes mellitus (HCC)     Hypertension       No past surgical history on file.   No family history on file.   History reviewed, no pertinent family history.  Social History     Tobacco Use    Smoking status: Every Day     Current packs/day: 0.50     Types: Cigarettes    Smokeless tobacco: Never   Substance Use Topics    Alcohol use: Not Currently     Allergies   Allergen Reactions    Chlorhexidine     Dulaglutide     Januvia [Sitagliptin]     Penicillins     Tramadol     Trazodone And Nefazodone Itching    Codeine Swelling and Rash      Current Facility-Administered Medications   Medication Dose Route Frequency    glucose chewable tablet 16 g  4 tablet Oral PRN    dextrose bolus 10% 125 mL  125 mL IntraVENous PRN    Or    dextrose bolus 10% 250 mL  250 mL IntraVENous PRN    glucagon (rDNA) injection 1 mg  1 mg SubCUTAneous PRN    dextrose 10 % infusion   IntraVENous Continuous PRN    LORazepam (ATIVAN) 2 MG/ML concentrated solution 1 mg  1 mg Oral Q2H PRN    scopolamine (TRANSDERM-SCOP) transdermal patch 1 patch  1 patch TransDERmal Q72H    morphine 20MG/ML concentrated solution 2.5 mg  2.5 mg Oral Q6H PRN    ondansetron (ZOFRAN-ODT) disintegrating tablet 4 mg  4 mg Oral Q8H PRN    Or    ondansetron (ZOFRAN) injection 4 mg  4 mg IntraVENous Q6H PRN    polyethylene glycol (GLYCOLAX) packet 17 g  17 g Oral Daily PRN    acetaminophen (TYLENOL) tablet 650 mg  650 mg Oral Q6H PRN    Or    acetaminophen (TYLENOL) suppository 650 mg  650 mg Rectal Q6H PRN    lactated ringers bolus bolus 500 mL  
2.92 (L)    Hemoglobin Quant 12.0 - 16.0 g/dL 8.1 (L)    Hematocrit 35.0 - 45.0 % 26.7 (L)    MCV 78.0 - 100.0 FL 91.4    MCH 24.0 - 34.0 PG 27.7    MCHC 31.0 - 37.0 g/dL 30.3 (L)    MPV 9.2 - 11.8 FL 10.3    RDW 11.6 - 14.5 % 17.3 (H)    Platelet Count 135 - 420 K/uL 45 (L)    Neutrophils % 40 - 73 % 81 (H)    Lymphocyte % 21 - 52 % 10 (L)    Monocytes % 3 - 10 % 8    Eosinophils % 0 - 5 % 0    Basophils % 0 - 2 % 0    Neutrophils Absolute 1.8 - 8.0 K/UL 8.1 (H)    Lymphocytes Absolute 0.9 - 3.6 K/UL 1.0    Monocytes Absolute 0.05 - 1.2 K/UL 0.8    Eosinophils Absolute 0.0 - 0.4 K/UL 0.0    Basophils Absolute 0.0 - 0.1 K/UL 0.0    Differential Type -   AUTOMATED    Immature Granulocytes 0.0 - 0.5 % 1 (H)    Nucleated Red Blood Cells 0  WBC  0.00 - 0.01 K/uL 0.0  0.00    Absolute Immature Granulocyte 0.00 - 0.04 K/UL 0.1 (H)    (HH): Data is critically high  (H): Data is abnormally high  (L): Data is abnormally low    Assessment/Plan     Continue present Rx -maintain maximum medical management  Spoke with nephrology, Dr. Carrera, patient to have TDC removed, and have a temporary catheter placed for dialysis access.  Post Removal of Tunneled Dialysis Catheter Note    1/12/2024     Procedure(s): Right IJ tunneled dialysis catheter removed without complications.    Sutures necessary: No: Adequate hemostasis obtained with 10 minutes of direct pressure    Findings: Anchoring sutures removed. Cleansed site with betadine. Local anesthesia with 5 mL 1% lidocaine. Blunt dissection of cuff. Catheter removed in its entirety. Tip in its entirety was displayed to nursing, and as per ID was not sent for culture. Pressure dressing applied.    Complications: None    Plan: Per Nephrology    Follow Up: ID to call when patient is cleared for insertion of TDC.  In the meantime patient is to obtain temporary dialysis catheter for dialysis treatment.  Vascular was to put in the ER but the  patient condition worsened and the patient 
PCR Not detected        [employer] test comment       False positive results may rarely occur. Correlate with clinical,epidemiologic, and other laboratory findings           Comment: Please see BCID Interpretation Guide in EPIC Links                   RADIOLOGY:    All available imaging studies/reports in [facility] for this admission were reviewed      Disclaimer: Sections of this note are dictated utilizing voice recognition software, which may have resulted in some phonetic based errors in grammar and contents. Even though attempts were made to correct all the mistakes, some may have been missed, and remained in the body of the document. If questions arise, please contact our department.    Dr. Leanna Bustamante, Infectious Disease Specialist  [phone]  January 12, 2024  12:31 PM

## 2024-01-16 VITALS
OXYGEN SATURATION: 98 % | HEIGHT: 64 IN | RESPIRATION RATE: 18 BRPM | BODY MASS INDEX: 33.46 KG/M2 | SYSTOLIC BLOOD PRESSURE: 143 MMHG | HEART RATE: 89 BPM | TEMPERATURE: 97.2 F | DIASTOLIC BLOOD PRESSURE: 85 MMHG | WEIGHT: 196 LBS

## 2024-01-16 PROBLEM — Z51.5 PALLIATIVE CARE ENCOUNTER: Status: ACTIVE | Noted: 2024-01-04

## 2024-01-16 PROCEDURE — 6370000000 HC RX 637 (ALT 250 FOR IP): Performed by: INTERNAL MEDICINE

## 2024-01-16 PROCEDURE — 94761 N-INVAS EAR/PLS OXIMETRY MLT: CPT

## 2024-01-16 PROCEDURE — 6370000000 HC RX 637 (ALT 250 FOR IP): Performed by: HOSPITALIST

## 2024-01-16 PROCEDURE — 2700000000 HC OXYGEN THERAPY PER DAY

## 2024-01-16 PROCEDURE — 99238 HOSP IP/OBS DSCHRG MGMT 30/<: CPT | Performed by: HOSPITALIST

## 2024-01-16 PROCEDURE — 99233 SBSQ HOSP IP/OBS HIGH 50: CPT | Performed by: INTERNAL MEDICINE

## 2024-01-16 RX ORDER — MORPHINE SULFATE 100 MG/5ML
10 SOLUTION ORAL
Qty: 30 ML | Refills: 0 | Status: SHIPPED | OUTPATIENT
Start: 2024-01-16 | End: 2024-01-21

## 2024-01-16 RX ORDER — MORPHINE SULFATE 20 MG/ML
2.5 SOLUTION ORAL
Status: DISCONTINUED | OUTPATIENT
Start: 2024-01-16 | End: 2024-01-16 | Stop reason: HOSPADM

## 2024-01-16 RX ORDER — ACETAMINOPHEN 650 MG/1
650 SUPPOSITORY RECTAL EVERY 4 HOURS PRN
Qty: 10 SUPPOSITORY | Refills: 0 | Status: SHIPPED | OUTPATIENT
Start: 2024-01-16

## 2024-01-16 RX ORDER — ATROPINE SULFATE 10 MG/ML
2 SOLUTION/ DROPS OPHTHALMIC EVERY 4 HOURS PRN
Status: DISCONTINUED | OUTPATIENT
Start: 2024-01-16 | End: 2024-01-16 | Stop reason: HOSPADM

## 2024-01-16 RX ORDER — ACETAMINOPHEN 650 MG/1
650 SUPPOSITORY RECTAL EVERY 4 HOURS PRN
Status: DISCONTINUED | OUTPATIENT
Start: 2024-01-16 | End: 2024-01-16 | Stop reason: HOSPADM

## 2024-01-16 RX ORDER — HYOSCYAMINE SULFATE 0.12 MG/1
1 TABLET SUBLINGUAL EVERY 4 HOURS PRN
Qty: 10 EACH | Refills: 0 | Status: SHIPPED | OUTPATIENT
Start: 2024-01-16

## 2024-01-16 RX ORDER — BISACODYL 10 MG
10 SUPPOSITORY, RECTAL RECTAL DAILY PRN
Status: DISCONTINUED | OUTPATIENT
Start: 2024-01-16 | End: 2024-01-16 | Stop reason: HOSPADM

## 2024-01-16 RX ORDER — BISACODYL 10 MG
10 SUPPOSITORY, RECTAL RECTAL DAILY
Qty: 10 SUPPOSITORY | Refills: 0 | Status: SHIPPED | OUTPATIENT
Start: 2024-01-16 | End: 2024-01-26

## 2024-01-16 RX ORDER — LORAZEPAM 2 MG/ML
1 CONCENTRATE ORAL EVERY 8 HOURS PRN
Qty: 30 ML | Refills: 0 | Status: SHIPPED | OUTPATIENT
Start: 2024-01-16 | End: 2024-02-05

## 2024-01-16 RX ORDER — LORAZEPAM 2 MG/ML
1 CONCENTRATE ORAL
Status: DISCONTINUED | OUTPATIENT
Start: 2024-01-16 | End: 2024-01-16 | Stop reason: HOSPADM

## 2024-01-16 RX ADMIN — MORPHINE SULFATE 2.5 MG: 20 SOLUTION ORAL at 10:57

## 2024-01-16 RX ADMIN — MORPHINE SULFATE 2.5 MG: 20 SOLUTION ORAL at 05:11

## 2024-01-16 RX ADMIN — MORPHINE SULFATE 2.5 MG: 20 SOLUTION ORAL at 16:32

## 2024-01-16 ASSESSMENT — PAIN DESCRIPTION - ONSET
ONSET: ON-GOING

## 2024-01-16 ASSESSMENT — PAIN DESCRIPTION - ORIENTATION
ORIENTATION: RIGHT;LEFT

## 2024-01-16 ASSESSMENT — PAIN SCALES - GENERAL
PAINLEVEL_OUTOF10: 6
PAINLEVEL_OUTOF10: 8
PAINLEVEL_OUTOF10: 8
PAINLEVEL_OUTOF10: 6
PAINLEVEL_OUTOF10: 4
PAINLEVEL_OUTOF10: 6

## 2024-01-16 ASSESSMENT — PAIN DESCRIPTION - LOCATION
LOCATION: ARM

## 2024-01-16 ASSESSMENT — PAIN DESCRIPTION - PAIN TYPE
TYPE: CHRONIC PAIN

## 2024-01-16 ASSESSMENT — PAIN DESCRIPTION - FREQUENCY
FREQUENCY: CONTINUOUS

## 2024-01-16 ASSESSMENT — PAIN DESCRIPTION - DESCRIPTORS
DESCRIPTORS: THROBBING
DESCRIPTORS: ACHING
DESCRIPTORS: THROBBING;SHARP;ACHING
DESCRIPTORS: THROBBING
DESCRIPTORS: ACHING
DESCRIPTORS: ACHING

## 2024-01-16 NOTE — DISCHARGE SUMMARY
Hospitalist Discharge Summary      Patient: Latha Oseguera MRN: [id number]  CSN: [id number]    YOB: 1970  Age: 53 y.o.  Sex: female    DOA: 1/12/2024 LOS:  LOS: 4 days   Discharge Date:     Admission Diagnoses: Encephalopathy [G93.40]  Urinary tract infection with hematuria, site unspecified [N39.0, R31.9]  Anemia, unspecified type [D64.9]  Acute metabolic encephalopathy [G93.41]  Complication associated with dialysis catheter [T82.9XXA]    Discharge Diagnoses:    Sepsis  ESRD on HD  Anemia of chronic disease  Acute metabolic encephalopathy secondary to sepsis  Loculated left pleural effusion likely malignant  thrombocytopenia  History of TAVR  History of coronary artery disease  History of renal cell cancer    Discharge Condition: Poor    Discharge To: Skilled nursing facility    CODE STATUS: DNR       PHYSICAL EXAM  Visit Vitals  BP (!) 141/72   Pulse 91   Temp 97.6 °F (36.4 °C) (Oral)   Resp 18   Ht 1.626 m (5' 4\")   Wt 88.9 kg (196 lb)   SpO2 100%   BMI 33.64 kg/m²       General: Sleepy, patient is being discharged on hospice care                              HPI:    53-year-old female with a past medical history of end-stage renal disease on hemodialysis, noncompliance with hemodialysis treatment, status post TAVR, CAD, hypertension, renal cell carcinoma presents with altered mental status.  Patient was recently discharged on 1/10/2024 to a skilled nursing facility.  Patient is altered and unable to provide any information.  Chart reviewed from the emergency room.  Patient has been noncompliant with her hemodialysis treatment per nephrology.  Patient noted to have purulent discharge around the site of her tunneled dialysis catheter which was removed in the emergency room.  Patient started on broad-spectrum IV antibiotics, is being admitted to the hospital for further evaluation.    Hospital Course:     Patient admitted to the hospital secondary to sepsis secondary to infected hemodialysis

## 2024-01-16 NOTE — PALLIATIVE CARE
Palliative Medicine      Palliative Medicine team members, Dr. SKYE Bustamante and Ashley Toledo RN for follow up comfort visit at bedside visit.  Ms Oseguera was alert and aware.  She c/o moderate pain and was waiting for next dose of pain medication. Palliative MD adjusted freq of medication to q 2 hours for better pain relief.     Patient questioned the team about her dc plan \"Will hospice see me at the nursing home\".  Informed Ms Oseguera that the hospice will see her there to help manage her symptoms and focus on her comfort. Patient is maintained on 3 liters of O2. Had no c/o shortness of breath.  Patient has DDNR and AMD on file.      Thank you for the Palliative Medicine consult and allowing us to participate in the care of Ms. Oseguera.         Goals of care DNR/DNI.      Ashley LOZANON, RN, [facility]  Palliative Medicine Inpatient RN  Palliative COPE Line: [phone]

## 2024-01-16 NOTE — DISCHARGE INSTRUCTIONS
DISCHARGE SUMMARY from Nurse    PATIENT INSTRUCTIONS:    After general anesthesia or intravenous sedation, for 24 hours or while taking prescription Narcotics:  Limit your activities  Do not drive and operate hazardous machinery  Do not make important personal or business decisions  Do  not drink alcoholic beverages  If you have not urinated within 8 hours after discharge, please contact your surgeon on call.    Report the following to your surgeon:  Excessive pain, swelling, redness or odor of or around the surgical area  Temperature over 100.5  Nausea and vomiting lasting longer than 4 hours or if unable to take medications  Any signs of decreased circulation or nerve impairment to extremity: change in color, persistent  numbness, tingling, coldness or increase pain  Any questions    What to do at Home:  Recommended activity: activity as tolerated,     If you experience any of the following symptoms chest pain, shortness of breath, nausea, vomiting, fever greater than 100.5, pain unrelieved by medication, please follow up with Dr Jose Bustamante.    *  Please give a list of your current medications to your Primary Care Provider.    *  Please update this list whenever your medications are discontinued, doses are      changed, or new medications (including over-the-counter products) are added.    *  Please carry medication information at all times in case of emergency situations.    These are general instructions for a healthy lifestyle:    No smoking/ No tobacco products/ Avoid exposure to second hand smoke  Surgeon General's Warning:  Quitting smoking now greatly reduces serious risk to your health.    Obesity, smoking, and sedentary lifestyle greatly increases your risk for illness    A healthy diet, regular physical exercise & weight monitoring are important for maintaining a healthy lifestyle    You may be retaining fluid if you have a history of heart failure or if you experience any of the following

## 2024-01-16 NOTE — CARE COORDINATION
Discharge order noted for today.  Patient has been accepted back to [facility]. Confirmed with Renae that bed is available for today. Met with pt and family is/are agreeable to the transition plan today.  Transport to facility has been arranged through Medicaid at 1900. Patients discharge summary has been forwarded to facility via [employer] and placed in pt's envelope for transport to facility. Bedside nurse has been notified of transition plan. Discharge information has been updated on AVS. Please call report to [phone].    Terri Cross RN, BSN, [facility]  Case Management

## 2024-01-16 NOTE — CARE COORDINATION
Transition of Care Plan to SNF/Rehab    SNF/Rehab Transition:  Patient has been accepted to [facility] and meets criteria for admission.   Patient will transported by Medicaid transport and expected to leave at this pm.    Communication to Patient/Family:  Met with patient and family (identified care giver) and they are agreeable to the transition plan.    Communication to SNF/Rehab:  Bedside RN, has been notified to update the transition plan to the facility and call report (phone number [phone]).  Discharge information has been updated on the AVS.       Nursing Please include all hard scripts for controlled substances, med rec and dc summary, and AVS in packet.     Reviewed and confirmed with facility, [facility], can manage the patient care needs for the following:     Maged with (X) only those applicable:    Medication:  [x]  Medications will be available at the facility  []  IV Antibiotics   []  Controlled Substance - hard copy to be sent with patient   []  Weekly Labs   Documents:  [] Hard RX  [] MAR  [] Kardex  [] AVS  []Transfer Summary  []Discharge   Equipment:  []  CPAP/BiPAP  []  Wound Vacuum  []  Jo or Urinary Device  []  PICC/Central Line  []  Nebulizer  []  Ventilator   Treatment:  []Isolation (for MRSA, VRE, etc.)  []Surgical Drain Management  []Tracheostomy Care  []Dressing Changes  []Dialysis with transportation and chair time   []PEG Care  [x]Oxygen - 3 L NC  []Daily Weights for Heart Failure   Dietary:  []Any diet limitations  []Tube Feedings   []Total Parenteral Management (TPN)   Eligible for Medicaid Long Term Services and Supports  Yes:  [] Eligible for medical assistance or will become eligible within 180 days and UAI completed.   [] Provider/Patient and/or support system has requested screening.  [] UAI copy provided to patient or responsible party  [] UAI unavailable at discharge will send once processed to SNF provider.  [] UAI unavailable at discharged mailed to

## 2024-01-16 NOTE — CARE COORDINATION
Requested Case Management specialist to assist with transportation to [facility].  Address is [address] [place] [address]  [place] [address] and phone number is [phone]  Patient will require BLS transport.   Pt requires Stretcher If stretcher, reason: Acute metabolic encephalopathy, ESRD, hospice patient, COPD, renal carcinoma  Patient is currently requiring oxygen: Yes. 3 L NC  Height:5'4\"   Weight: 196 lbs  Pt is on isolation: no   Is the pt ready now? no  Requested time: 5:00 pm today  PCS Faxed: not yet  Insurance verified on face sheet: yes  Auth needed for transport: yes  CM completed PCS/ Envelope and placed on chart.       Terri Cross RN, BSN, [facility]  Case Management

## 2024-01-16 NOTE — CARE COORDINATION
Spoke with Jenn dillard regarding pickup time. The representative said the  time is 1900.    Terri Cross RN, BSN, [facility]  Case Management

## 2024-01-16 NOTE — CASE COMMUNICATION
Called [facility] at [phone] spoke with Yaima who received all patient's information and informed me that a representative from the Ambulance Department will call back to schedule transportation.    Received a call from [facility] transportation representative (Veda), scheduled transport (requested 5:00 pm) to [facility] ([address] [place] [address]) and received confirmation number [id number].  Veda stated she will include the requested time with the notes.  Informed Terri transportation has been scheduled.

## 2024-01-16 NOTE — PROGRESS NOTES
paged to state that patient can be discharged back to skilled nursing facility on hospice care, will place discharge orders.  
0720: Bedside and Verbal shift change report given to BILLY Zee (oncoming nurse) by BILLY Nava (offgoing nurse). Report included the following information Nurse Handoff Report, Adult Overview, Intake/Output, MAR, Recent Results, and Cardiac Rhythm NSR .     1101: Critical result received from [facility], laboratory. Positive blood culture for gram negative rods.     1106: MD paged. Awaiting response.     1200: MD returned page. MD made aware of critical result and loss of PIV. Telephone order received for midline and to renew tele orders for 72 hours. RBV.     1203: Spoke with nephrology. Verbal order received for US guided IV instead. RBV.    1400: Restraints discontinued.     1410: Patient now on comfort care. US guided PIV or HD catheter no longer needed, per MD.     1632: MD paged. Awaiting response.     1635: Spoke with MD face to face regarding pain medication.     1647: Order for morphine noted. Spoke with patient and patient's family regarding allergy. Patient's family members stated she has tolerated morphine well in the past with no complications. Also, spoke with pharmacy about the allergy. Pharmacy approved.     1800: Incontinence care provided. Patient repositioned to right side.     1915: Bedside and Verbal shift change report given to BILLY Nava (oncoming nurse) by BILLY Zee (offgoing nurse). Report included the following information Nurse Handoff Report, Adult Overview, and MAR.     
0720: Bedside and Verbal shift change report given to BILLY Zee (oncoming nurse) by BILLY Nava (offgoing nurse). Report included the following information Nurse Handoff Report, Adult Overview, Intake/Output, and MAR.     Upon assessment, patient is in resting with even chest rise and fall.     0920: Removed wedge pillows for repositioning, per patient's request. Medicated for pain. See MAR.     0924: Patient showing signs of agitation. Called patient's family to provide an update. Spoke with patient's family member, as he stated he will be at bedside in an hour.     1437: Family at bedside. Verbal report given to BILLY Butt on Latha Oseguera  being transferred to 4N for routine progression of patient care   Report consisted of patient's Situation, Background, Assessment and   Recommendations(SBAR). Information from the following report(s) Nurse Handoff Report, Adult Overview, Intake/Output, and MAR was reviewed with the receiving nurse.Opportunity for questions and clarification was provided. Patient transported with:  Patient belongings and O2 @ 3lpm.         
1130 Liaison visited patient at bedside to discuss hospice.  Patient was resting with eyes closed upon my arrival.  She alerted to her name, oriented x4.  Patient appeared drowsy, fell asleep off/on during conversation.  Patient shared that she has cancer and is no longer on dialysis.  I discussed hospice and the services we provide.  Patient responded \"I am ready for it.\"  She wishes to proceed with hospice at discharge.  Permission obtained to call her spouse Roberto Oseguera to discuss hospice.  Attempted to reach spouse while at bedside but there was no answer and voicemail was full.  Pt gave permission to call her son/POA Dennis Carey.  Call made to Dennis while at bedside.  Dennis stated he understands his mother has a mass on her kidney and lungs and has kidney failure.  Discussed [facility] philosophy, services, and criteria.  Dennis is in agreement with patient's decision for hospice and is willing to sign hospice consents on patient's behalf.  Plan is for patient to return to [facility] at discharge.  ARACELI Murray updated, ARACELI to contact [facility] to see if patient can return today.  Dr. Santamaria updated via [employer].    1158 Per ARACELI Murray, [facility] can take patient today.    Pt/family pursuing hospice: Yes  Admission dx approved by Hospice Medical Director: ESRD  Anticipated hospital d/c date: 1/16/2024  Discussion occurred with patient and/or family about preference of hospitalization once admitted to hospice: Yes  Reviewed and discussed hospice philosophy and keeping the patient comfortable in their residence: Yes  Discussion with patient/family regarding around the clock caregiver in place due to patient safety in the home once discharged: Yes     DME:  No DME needs at this time.     Please send scripts for comfort medications to the outpatient pharmacy to be filled and sent home with the pt at discharge.      1. Morphine concentrate 20mg/ml  Give 0.25-1ml po/sl every 3 hours 
1208 - Report received from Grecia CERVANTES     1243 - Difficulty obtaining temp. Rectal temp reflecting 97.3F. Patient denies pain. Patient will not answer additional questions. Patient confused, agitated, restless, and attempting to pull at lines.       Dual skin assessment completed with Mamta CERVANTES. Patient with open sores and skin tears scattered to bilateral arms and hands and bilateral lower extremities. Open skin tear to lateral left ankle. Bilateral heels with ecchymosis and bony prominences on bilateral plantar feet with ecchymosis. Blanchable redness to the sacrum/gluteal fold. Excoriation to marko area. Incision wound to right chest with redness to surrounding area (from dialysis catheter removal).    
Advance Care Planning     Advance Care Planning Inpatient Note  Spiritual Care Department    Today's Date: 1/16/2024  Unit: [facility]    Received request from admission screening.  Upon review of chart and communication with care team, patient's decision making abilities are not in question.. Patient was/were present in the room during visit.    Health Care Decision Makers:       Primary Decision Maker: Dennis Carey - Child - [phone]    Secondary Decision Maker: Vahid Carey - Child - [phone]  Summary:  Verified Documents  Completed New Documents  Verified Healthcare Decision Maker      Advance Care Planning Documents (Patient Wishes):  Healthcare Power of /Advance Directive Appointment of Health Care Agent  Living Will/Advance Directive     Assessment:     01/16/24 1433   Encounter Summary   Encounter Overview/Reason  Initial Encounter;Advance Care Planning   Service Provided For: Patient   Referral/Consult From: Multi-disciplinary team   Support System Family members;Children   Last Encounter  01/16/24  (IV-PAL-KP)   Complexity of Encounter High   Begin Time 1408   End Time  1417   Total Time Calculated 9 min   Spiritual/Emotional needs   Type Spiritual Support   Advance Care Planning   Type Care Preferences Addressed  (DNR/AMD on File)   Assessment/Intervention/Outcome   Assessment Coping   Intervention Active listening;Discussed relationship with God;Prayer (assurance of)/[place];Discussed belief system/Judaism practices/ray   Outcome Encouraged;Comfort;Expressed Gratitude   Plan and Referrals   Plan/Referrals Continue to visit, (comment)         Interventions:  Reviewed but did not complete ACP document    Care Preferences Communicated:   No    Outcomes/Plan:  ACP Discussion: Completed    Electronically signed by UTE Arita on 1/16/2024 at 2:35 PM            
Another call made to patient’s . Unable to leave message due to voice mailbox being full. Hospice liaison will follow up on Monday 1/15/24 
Attempted to call patient’s  to discuss hospice. No answer, unable to leave message due to mailbox being full. Will try again later today.  Please call hospice with any questions or concerns. [phone] 
Attempted to call report to [facility] at [phone] with no answer.   
Consulted for temporary dialysis catheter placement  Discussed with nurse and primary team  Patient made comfort care  Call with questions.  
Hospice referral received. Chart review in progress.     Thank you for the referral to [facility]. If we can be of further assistance, please contact [phone]. . 
I spoke with the patient's  at bedside along with nurse Zee who was my witness.  I explained to the  regarding patient's condition as well as her underlying medical problems.  He verbalized understanding.  I did discuss with him about switching to comfort measures and he is agreeable.  Nurse was witness to my conversation.  We will not place hemodialysis catheter or any IV lines, patient wants to eat and drink she is more alert and awake, will proceed with comfort measures and hospice consultation.  
IDR Note   Pt is on comfort measures.Anticipate discharge to SNF today   
Informed pt went comfort care per . Signed off.    
Interim events noted. Patient transitioned to comfort measures only. Will sign off. thanks  
[facility]  Progress Note    Patient: Latha Oseguera Age: 53 y.o. : 1970 MR#: [id number] SSN: [id number]  Date/Time: 2024     Subjective:     Patient seen and evaluated, sitting up in bed, no acute distress.  Family at bedside,  at bedside,  Patient is more awake today.    53-year-old female with a past medical history of end-stage renal disease on hemodialysis, noncompliance with hemodialysis treatment and medications status post TAVR, coronary artery disease, hypertension, renal cell carcinoma presented with altered mental status, admitted to the hospital with infection of her hemodialysis catheter site.  Patient started on broad-spectrum IV antibiotics.  I spoke with the patient's  at bedside today, given her current condition, as well as recent admission and noncompliance with treatment he has decided to proceed forward with comfort measures.      Assessment/Plan:     Continue comfort measures, hospice consulted.  Will continue to follow.  DNR/DNI              Wilver Santamaria MD  24      Case discussed with:  []Patient  [x]Family  [x]Nursing  []Case Management  DVT Prophylaxis:  []Lovenox  []Hep SQ  []SCDs  []Coumadin   []On Heparin gtt    Objective:   VS:   Vitals:    24 1441   BP:    Pulse:    Resp:    Temp: (!) 95.8 °F (35.4 °C)   SpO2:       No intake or output data in the 24 hours ending 24 1551    General:  Alert, cooperative, no acute distress    Pulmonary: Decreased breath sounds bilaterally  Cardiovascular: Regular rate and Rhythm.  GI:  Soft, Non distended, Non tender. + Bowel sounds.  Extremities:  No edema, cyanosis, clubbing. No calf tenderness.   Neurologic: Alert and awake, able to follow commands, able to move all 4 extremities  Additional:    Medications:   Current Facility-Administered Medications   Medication Dose Route Frequency    glucose chewable tablet 16 g  4 tablet Oral PRN    dextrose bolus 10% 125 mL  
[facility] Hospitalist Group  Progress Note    Patient: Latha Oseguera Age: 53 y.o. : 1970 MR#: [id number] SSN: [id number]  Date/Time: 2024     Subjective:     Patient seen and evaluated, sitting up in bed, no acute distress.  Family at bedside,  at bedside,  Patient is more awake today.    53-year-old female with a past medical history of end-stage renal disease on hemodialysis, noncompliance with hemodialysis treatment and medications status post TAVR, coronary artery disease, hypertension, renal cell carcinoma presented with altered mental status, admitted to the hospital with infection of her hemodialysis catheter site.  Patient started on broad-spectrum IV antibiotics.  I spoke with the patient's  at bedside today, given her current condition, as well as recent admission and noncompliance with treatment he has decided to proceed forward with comfort measures.      Assessment/Plan:     Continue comfort measures, hospice consulted.  Will continue to follow.  DNR/DNI              Wilver Santamaria MD  24      Case discussed with:  []Patient  [x]Family  [x]Nursing  []Case Management  DVT Prophylaxis:  []Lovenox  []Hep SQ  []SCDs  []Coumadin   []On Heparin gtt    Objective:   VS:   Vitals:    24 1530   BP: (!) 140/79   Pulse: 82   Resp: 17   Temp: 97.2 °F (36.2 °C)   SpO2: 98%      No intake or output data in the 24 hours ending 24 1709    General:  Alert, cooperative, no acute distress    Pulmonary: Decreased breath sounds bilaterally  Cardiovascular: Regular rate and Rhythm.  GI:  Soft, Non distended, Non tender. + Bowel sounds.  Extremities:  No edema, cyanosis, clubbing. No calf tenderness.   Neurologic: Alert and awake, able to follow commands, able to move all 4 extremities  Additional:    Medications:   Current Facility-Administered Medications   Medication Dose Route Frequency    glucose chewable tablet 16 g  4 tablet Oral PRN    dextrose bolus 
[facility] Hospitalist Group  Progress Note    Patient: Latha Oseguera Age: 53 y.o. : 1970 MR#: [id number] SSN: [id number]  Date/Time: 1/15/2024     Subjective:     Patient seen and evaluated, sitting up in bed, no acute distress.  Family at bedside,  at bedside,  Patient is more awake today.    53-year-old female with a past medical history of end-stage renal disease on hemodialysis, noncompliance with hemodialysis treatment and medications status post TAVR, coronary artery disease, hypertension, renal cell carcinoma presented with altered mental status, admitted to the hospital with infection of her hemodialysis catheter site.  Patient started on broad-spectrum IV antibiotics.  I spoke with the patient's  at bedside today, given her current condition, as well as recent admission and noncompliance with treatment he has decided to proceed forward with comfort measures.    1/15-appreciate palliative care input, patient is DNR, DNI with hospice care and will be discharged to skilled nursing facility once hospice has been arranged.      Assessment/Plan:     Continue comfort measures, hospice consulted.  Will continue to follow.  DNR/DNI              Wilver Santamaria MD  01/15/24      Case discussed with:  []Patient  [x]Family  [x]Nursing  []Case Management  DVT Prophylaxis:  []Lovenox  []Hep SQ  []SCDs  []Coumadin   []On Heparin gtt    Objective:   VS:   Vitals:    01/15/24 0828   BP: (!) 150/76   Pulse: 61   Resp: 16   Temp: 97.6 °F (36.4 °C)   SpO2: 96%      No intake or output data in the 24 hours ending 01/15/24 1559    General:  Alert, cooperative, no acute distress    Pulmonary: Decreased breath sounds bilaterally  Cardiovascular: Regular rate and Rhythm.  GI:  Soft, Non distended, Non tender. + Bowel sounds.  Extremities:  No edema, cyanosis, clubbing. No calf tenderness.   Neurologic: Alert and awake, able to follow commands, able to move all 4 
Received call from Rep from [facility] stating they spoke to patient's son earlier today and they were planning to admit patient to their services. Advised son is agreeable to using  Hospice.  Rep stated she was going to call patient's son again.    ARACELI Murray later updated that son decided to use [facility].  Liaison verified this with son.  Encouraged him to contact  Hospice if needed.    Marie Argueta MDIV, BSN, RN  Hospice Liaison  [address], [address]  [place] [address]  Office: [phone]  Fax: [phone]  Mobile:  [phone]  Email: [email or web address]     
(ZOFRAN-ODT) disintegrating tablet 4 mg  4 mg Oral Q8H PRN Wilver Santamaria MD        Or    ondansetron (ZOFRAN) injection 4 mg  4 mg IntraVENous Q6H PRN Wilver Santamaria MD        polyethylene glycol (GLYCOLAX) packet 17 g  17 g Oral Daily PRN Wilver Santamaria MD        acetaminophen (TYLENOL) tablet 650 mg  650 mg Oral Q6H PRN Wilver Santamaria MD        Or    acetaminophen (TYLENOL) suppository 650 mg  650 mg Rectal Q6H PRN Wilver Santamaria MD        lactated ringers bolus bolus 500 mL  500 mL IntraVENous Once Emily Brenner MD        sodium chloride 0.9 % bolus 500 mL  500 mL IntraVENous Once Gildardo Stanley DO        aminoglycoside intermittent dosing (placeholder)   Other RX Placeholder Leanna Bustamante MD           Objective  Vitals:    01/13/24 1320 01/13/24 1338 01/13/24 1441 01/13/24 1720   BP: 123/84      Pulse: 84      Resp: 18   18   Temp: (!) 95.8 °F (35.4 °C) (!) 95.9 °F (35.5 °C) (!) 95.8 °F (35.4 °C)    TempSrc: Axillary Rectal Axillary    SpO2: 97%      Weight:       Height:           No intake or output data in the 24 hours ending 01/13/24 1910        Admission weight: Weight - Scale: 88.9 kg (196 lb) (01/12/24 0812)  Last Weight Metrics:      1/13/2024     5:20 PM 1/13/2024     2:41 PM 1/13/2024     1:38 PM 1/13/2024     1:20 PM 1/13/2024    12:00 PM 1/13/2024     7:47 AM 1/13/2024     7:04 AM   Ambulatory Bariatric Summary   Systolic    123  105    Diastolic    84  67    Pulse    84 82 87 88   Temp  95.8 °F (35.4 °C) 95.9 °F (35.5 °C) 95.8 °F (35.4 °C)  97.3 °F (36.3 °C)    Respirations 18   18  19              Physical Assessment:     General: confused.  Neck: No jvd.  LUNGS: Clear to Auscultation, No rales, rhonchi or wheezes.  CVS EXM: S1, S2  RRR, no murmurs/gallops/rubs.  Abdomen: soft, non tender.   Lower Extremities: + edema.       Lab    CBC w/Diff Recent Labs     01/12/24  0834 01/13/24  0544   WBC 10.1 7.4   RBC 2.92* 2.83*   HGB 8.1* 7.8*   HCT 26.7* 25.8*   PLT 45* 
Patient and medical records    CHIEF COMPLAINT: Altered mental status    HPI/SUBJECTIVE:    The patient is:   [x] Verbal and participatory  [] Non-participatory due to:         PHYSICAL EXAM:     From RN flowsheet:  Wt Readings from Last 3 Encounters:   01/12/24 88.9 kg (196 lb)   01/10/24 89 kg (196 lb 3.4 oz)   12/24/23 77.6 kg (171 lb)       BP (!) 145/74   Pulse 82   Temp 97.3 °F (36.3 °C) (Oral)   Resp 18   Ht 1.626 m (5' 4\")   Wt 88.9 kg (196 lb)   SpO2 100%   BMI 33.64 kg/m²     Constitutional: Awake, follows verbal commands appropriately, not in acute distress  Cardiovascular: S1-S2 heard  Respiratory: Clear to auscultation bilaterally without any wheezes  Gastrointestinal: soft non-tender, nondistended, +bowel sounds  Musculoskeletal:  pedal edema, no deformity, no tenderness to palpation  Neurologic: following commands, moving upper extremities, no tremors  Psychiatric: No agitation, no hallucinations  Other:       PALLIATIVE DIAGNOSES:   goals of care discussion/Advance care planning   2.  Palliative care encounter and DNR discussion  3.  ESRD  4.  Recurrent hospitalization secondary to sepsis  5.  AMS, resolved currently  6.  Renal cell carcinoma, CHF, and anemia, debility etc.    Patient Active Problem List   Diagnosis    Anemia    Healthcare-associated pneumonia    HFrEF (heart failure with reduced ejection fraction) ([facility])    Severe sepsis (HCC)    Renal mass    Lung nodules    Acute metabolic encephalopathy    Goals of care, counseling/discussion    ESRD (end stage renal disease) ([facility])    Hypotension    Debility    Sepsis (HCC)    Pleural effusion    Encephalopathy acute    ESRD (end stage renal disease) on dialysis ([facility])    Complication associated with dialysis catheter            GOALS OF CARE / TREATMENT PREFERENCES:     GOALS OF CARE:    1/16/24: Patient was seen in presence of palliative care staff member.  Patient is awake and able to answer most of questions appropriately.  She continues

## 2024-01-17 NOTE — PLAN OF CARE
Problem: Discharge Planning  Goal: Discharge to home or other facility with appropriate resources  1/16/2024 1935 by Lana Pino RN  Outcome: Completed  1/16/2024 1142 by Latha Chavez RN  Outcome: Progressing  Flowsheets (Taken 1/16/2024 0857)  Discharge to home or other facility with appropriate resources:   Identify barriers to discharge with patient and caregiver   Arrange for needed discharge resources and transportation as appropriate   Identify discharge learning needs (meds, wound care, etc)   Arrange for interpreters to assist at discharge as needed   Refer to discharge planning if patient needs post-hospital services based on physician order or complex needs related to functional status, cognitive ability or social support system     Problem: Safety - Adult  Goal: Free from fall injury  1/16/2024 1935 by Lana Pino RN  Outcome: Completed  1/16/2024 1142 by Latha Chavez RN  Outcome: Progressing  Flowsheets (Taken 1/16/2024 1140)  Free From Fall Injury: Instruct family/caregiver on patient safety     Problem: Pain  Goal: Verbalizes/displays adequate comfort level or baseline comfort level  1/16/2024 1935 by Lana Pino RN  Outcome: Completed  1/16/2024 1142 by Latha Chavez RN  Outcome: Progressing     Problem: Safety - Medical Restraint  Goal: Remains free of injury from restraints (Restraint for Interference with Medical Device)  Description: INTERVENTIONS:  1. Determine that other, less restrictive measures have been tried or would not be effective before applying the restraint  2. Evaluate the patient's condition at the time of restraint application  3. Inform patient/family regarding the reason for restraint  4. Q2H: Monitor safety, psychosocial status, comfort, nutrition and hydration  1/16/2024 1935 by Lana Pino RN  Outcome: Completed  1/16/2024 1142 by Latha Chavez RN  Outcome: Progressing     Problem: Confusion  Goal: Confusion, delirium, dementia, or psychosis 
  Problem: Safety - Adult  Goal: Free from fall injury  Outcome: Progressing     Problem: Pain  Goal: Verbalizes/displays adequate comfort level or baseline comfort level  Outcome: Progressing     Problem: Safety - Medical Restraint  Goal: Remains free of injury from restraints (Restraint for Interference with Medical Device)  Description: INTERVENTIONS:  1. Determine that other, less restrictive measures have been tried or would not be effective before applying the restraint  2. Evaluate the patient's condition at the time of restraint application  3. Inform patient/family regarding the reason for restraint  4. Q2H: Monitor safety, psychosocial status, comfort, nutrition and hydration  Outcome: Progressing     Problem: Skin/Tissue Integrity  Goal: Absence of new skin breakdown  Description: 1.  Monitor for areas of redness and/or skin breakdown  2.  Assess vascular access sites hourly  3.  Every 4-6 hours minimum:  Change oxygen saturation probe site  4.  Every 4-6 hours:  If on nasal continuous positive airway pressure, respiratory therapy assess nares and determine need for appliance change or resting period.  Outcome: Progressing     Problem: Confusion  Goal: Confusion, delirium, dementia, or psychosis is improved or at baseline  Description: INTERVENTIONS:  1. Assess for possible contributors to thought disturbance, including medications, impaired vision or hearing, underlying metabolic abnormalities, dehydration, psychiatric diagnoses, and notify attending LIP  2. Johnson high risk fall precautions, as indicated  3. Provide frequent short contacts to provide reality reorientation, refocusing and direction  4. Decrease environmental stimuli, including noise as appropriate  5. Monitor and intervene to maintain adequate nutrition, hydration, elimination, sleep and activity  6. If unable to ensure safety without constant attention obtain sitter and review sitter guidelines with assigned personnel  7. Initiate 
overall improvement and discharge     Problem: Skin/Tissue Integrity  Goal: Absence of new skin breakdown  Description: 1.  Monitor for areas of redness and/or skin breakdown  2.  Assess vascular access sites hourly  3.  Every 4-6 hours minimum:  Change oxygen saturation probe site  4.  Every 4-6 hours:  If on nasal continuous positive airway pressure, respiratory therapy assess nares and determine need for appliance change or resting period.  Outcome: Progressing

## 2024-01-18 LAB
BACTERIA SPEC CULT: NORMAL
SERVICE CMNT-IMP: NORMAL

## 2024-09-04 NOTE — PROGRESS NOTES
Outreach attempt was made to schedule a Medicare Wellness Visit. This was the first attempt. Contact was not made, Nursing Home patient.  Perm Comments: 6/03/2024: MWV's will now be done at facility patient resides at per the facility & daughter, Claudette GANDHI 6-3-2024      0.25 MCG capsule  Commonly known as: ROCALTROL     furosemide 40 MG tablet  Commonly known as: LASIX     gabapentin 300 MG capsule  Commonly known as: NEURONTIN     isosorbide dinitrate 20 MG tablet  Commonly known as: ISORDIL     LORazepam 1 MG tablet  Commonly known as: ATIVAN     omeprazole 20 MG delayed release capsule  Commonly known as: PRILOSEC     sevelamer 800 MG tablet  Commonly known as: RENVELA     Stiolto Respimat 2.5-2.5 MCG/ACT Aers  Generic drug: tiotropium-olodaterol     traZODone 50 MG tablet  Commonly known as: DESYREL              Activity: activity as tolerated    Diet: renal diet        Discharge time: >40 minutes spent coordinating this discharge    Dom Aviles MD   12/24/2023, 12:39 PM